# Patient Record
Sex: FEMALE | Race: WHITE | Employment: OTHER
[De-identification: names, ages, dates, MRNs, and addresses within clinical notes are randomized per-mention and may not be internally consistent; named-entity substitution may affect disease eponyms.]

---

## 2017-01-19 PROBLEM — N18.30 ACUTE RENAL FAILURE SUPERIMPOSED ON STAGE 3 CHRONIC KIDNEY DISEASE (HCC): Status: ACTIVE | Noted: 2017-01-19

## 2017-01-19 PROBLEM — E44.0 MODERATE PROTEIN MALNUTRITION (HCC): Chronic | Status: ACTIVE | Noted: 2017-01-19

## 2017-01-19 PROBLEM — D50.9 IRON DEFICIENCY ANEMIA: Chronic | Status: ACTIVE | Noted: 2017-01-19

## 2017-01-19 PROBLEM — D64.9 ANEMIA: Status: ACTIVE | Noted: 2017-01-19

## 2017-01-19 PROBLEM — N17.9 ACUTE RENAL FAILURE SUPERIMPOSED ON STAGE 3 CHRONIC KIDNEY DISEASE (HCC): Status: ACTIVE | Noted: 2017-01-19

## 2017-01-19 PROBLEM — R33.9 URINARY RETENTION: Status: ACTIVE | Noted: 2017-01-19

## 2017-01-19 PROBLEM — N18.30 CKD (CHRONIC KIDNEY DISEASE) STAGE 3, GFR 30-59 ML/MIN (HCC): Chronic | Status: ACTIVE | Noted: 2017-01-19

## 2017-01-20 PROBLEM — N30.00 ACUTE CYSTITIS: Status: ACTIVE | Noted: 2017-01-20

## 2017-01-23 PROBLEM — R53.81 DEBILITY: Status: ACTIVE | Noted: 2017-01-23

## 2017-01-23 PROBLEM — D50.0 IRON DEFICIENCY ANEMIA DUE TO CHRONIC BLOOD LOSS: Status: ACTIVE | Noted: 2017-01-23

## 2017-01-25 ENCOUNTER — OFFICE VISIT (OUTPATIENT)
Dept: ONCOLOGY | Facility: CLINIC | Age: 67
End: 2017-01-25

## 2017-01-25 VITALS
TEMPERATURE: 97.3 F | SYSTOLIC BLOOD PRESSURE: 144 MMHG | BODY MASS INDEX: 38.89 KG/M2 | DIASTOLIC BLOOD PRESSURE: 68 MMHG | HEIGHT: 66 IN | HEART RATE: 84 BPM | RESPIRATION RATE: 26 BRPM | WEIGHT: 242 LBS

## 2017-01-25 DIAGNOSIS — K90.89 OTHER SPECIFIED INTESTINAL MALABSORPTION: ICD-10-CM

## 2017-01-25 DIAGNOSIS — D50.8 OTHER IRON DEFICIENCY ANEMIA: Primary | Chronic | ICD-10-CM

## 2017-01-25 PROCEDURE — 99205 OFFICE O/P NEW HI 60 MIN: CPT | Performed by: INTERNAL MEDICINE

## 2017-01-25 PROCEDURE — 3017F COLORECTAL CA SCREEN DOC REV: CPT | Performed by: INTERNAL MEDICINE

## 2017-01-25 PROCEDURE — G8427 DOCREV CUR MEDS BY ELIG CLIN: HCPCS | Performed by: INTERNAL MEDICINE

## 2017-01-25 PROCEDURE — G8419 CALC BMI OUT NRM PARAM NOF/U: HCPCS | Performed by: INTERNAL MEDICINE

## 2017-01-25 PROCEDURE — 1036F TOBACCO NON-USER: CPT | Performed by: INTERNAL MEDICINE

## 2017-01-25 PROCEDURE — 1090F PRES/ABSN URINE INCON ASSESS: CPT | Performed by: INTERNAL MEDICINE

## 2017-01-25 PROCEDURE — 4040F PNEUMOC VAC/ADMIN/RCVD: CPT | Performed by: INTERNAL MEDICINE

## 2017-01-25 PROCEDURE — G8484 FLU IMMUNIZE NO ADMIN: HCPCS | Performed by: INTERNAL MEDICINE

## 2017-01-25 PROCEDURE — 3014F SCREEN MAMMO DOC REV: CPT | Performed by: INTERNAL MEDICINE

## 2017-01-25 RX ORDER — BISACODYL 10 MG
10 SUPPOSITORY, RECTAL RECTAL DAILY PRN
COMMUNITY

## 2017-02-01 ENCOUNTER — TELEPHONE (OUTPATIENT)
Dept: GASTROENTEROLOGY | Facility: CLINIC | Age: 67
End: 2017-02-01

## 2017-02-01 DIAGNOSIS — D50.0 IRON DEFICIENCY ANEMIA DUE TO CHRONIC BLOOD LOSS: ICD-10-CM

## 2017-02-01 DIAGNOSIS — Z86.010 HISTORY OF COLON POLYPS: Primary | ICD-10-CM

## 2017-02-03 PROBLEM — Z86.010 HISTORY OF COLON POLYPS: Status: ACTIVE | Noted: 2017-02-03

## 2017-02-03 PROBLEM — Z86.0100 HISTORY OF COLON POLYPS: Status: ACTIVE | Noted: 2017-02-03

## 2017-03-10 DIAGNOSIS — D50.0 IRON DEFICIENCY ANEMIA DUE TO CHRONIC BLOOD LOSS: Primary | Chronic | ICD-10-CM

## 2017-03-22 ENCOUNTER — OFFICE VISIT (OUTPATIENT)
Dept: ONCOLOGY | Age: 67
End: 2017-03-22
Payer: MEDICARE

## 2017-03-22 VITALS
HEIGHT: 66 IN | BODY MASS INDEX: 40.5 KG/M2 | WEIGHT: 252 LBS | DIASTOLIC BLOOD PRESSURE: 76 MMHG | TEMPERATURE: 98 F | HEART RATE: 56 BPM | SYSTOLIC BLOOD PRESSURE: 148 MMHG | RESPIRATION RATE: 20 BRPM

## 2017-03-22 DIAGNOSIS — K90.89 OTHER SPECIFIED INTESTINAL MALABSORPTION: ICD-10-CM

## 2017-03-22 DIAGNOSIS — D50.0 IRON DEFICIENCY ANEMIA DUE TO CHRONIC BLOOD LOSS: Primary | Chronic | ICD-10-CM

## 2017-03-22 DIAGNOSIS — D50.8 OTHER IRON DEFICIENCY ANEMIA: Chronic | ICD-10-CM

## 2017-03-22 PROCEDURE — 1090F PRES/ABSN URINE INCON ASSESS: CPT | Performed by: INTERNAL MEDICINE

## 2017-03-22 PROCEDURE — G8417 CALC BMI ABV UP PARAM F/U: HCPCS | Performed by: INTERNAL MEDICINE

## 2017-03-22 PROCEDURE — G8427 DOCREV CUR MEDS BY ELIG CLIN: HCPCS | Performed by: INTERNAL MEDICINE

## 2017-03-22 PROCEDURE — 1123F ACP DISCUSS/DSCN MKR DOCD: CPT | Performed by: INTERNAL MEDICINE

## 2017-03-22 PROCEDURE — 3017F COLORECTAL CA SCREEN DOC REV: CPT | Performed by: INTERNAL MEDICINE

## 2017-03-22 PROCEDURE — 1036F TOBACCO NON-USER: CPT | Performed by: INTERNAL MEDICINE

## 2017-03-22 PROCEDURE — 4040F PNEUMOC VAC/ADMIN/RCVD: CPT | Performed by: INTERNAL MEDICINE

## 2017-03-22 PROCEDURE — G8399 PT W/DXA RESULTS DOCUMENT: HCPCS | Performed by: INTERNAL MEDICINE

## 2017-03-22 PROCEDURE — 99214 OFFICE O/P EST MOD 30 MIN: CPT | Performed by: INTERNAL MEDICINE

## 2017-03-22 PROCEDURE — 3014F SCREEN MAMMO DOC REV: CPT | Performed by: INTERNAL MEDICINE

## 2017-03-22 PROCEDURE — G8484 FLU IMMUNIZE NO ADMIN: HCPCS | Performed by: INTERNAL MEDICINE

## 2017-03-22 RX ORDER — POLYETHYLENE GLYCOL 3350 17 G/17G
17 POWDER, FOR SOLUTION ORAL DAILY PRN
COMMUNITY
End: 2017-04-05

## 2017-03-23 ENCOUNTER — TELEPHONE (OUTPATIENT)
Dept: GASTROENTEROLOGY | Age: 67
End: 2017-03-23

## 2017-03-23 DIAGNOSIS — Z98.890 HISTORY OF COLONOSCOPY: ICD-10-CM

## 2017-03-23 DIAGNOSIS — Z98.890 HISTORY OF ENDOSCOPY: ICD-10-CM

## 2017-03-23 DIAGNOSIS — D50.0 IRON DEFICIENCY ANEMIA DUE TO CHRONIC BLOOD LOSS: Primary | ICD-10-CM

## 2017-04-21 PROBLEM — D64.9 ANEMIA: Status: RESOLVED | Noted: 2017-01-19 | Resolved: 2017-04-21

## 2017-04-21 PROBLEM — E03.9 HYPOTHYROIDISM: Status: ACTIVE | Noted: 2017-04-21

## 2017-04-21 PROBLEM — R53.81 DEBILITY: Status: RESOLVED | Noted: 2017-01-23 | Resolved: 2017-04-21

## 2017-04-21 PROBLEM — N18.30 ACUTE RENAL FAILURE SUPERIMPOSED ON STAGE 3 CHRONIC KIDNEY DISEASE (HCC): Status: RESOLVED | Noted: 2017-01-19 | Resolved: 2017-04-21

## 2017-04-21 PROBLEM — N18.9 CHRONIC RENAL FAILURE IN PEDIATRIC PATIENT: Status: ACTIVE | Noted: 2017-04-21

## 2017-04-21 PROBLEM — R33.9 URINARY RETENTION: Status: RESOLVED | Noted: 2017-01-19 | Resolved: 2017-04-21

## 2017-04-21 PROBLEM — N17.9 ACUTE RENAL FAILURE SUPERIMPOSED ON STAGE 3 CHRONIC KIDNEY DISEASE (HCC): Status: RESOLVED | Noted: 2017-01-19 | Resolved: 2017-04-21

## 2017-04-21 PROBLEM — N30.00 ACUTE CYSTITIS: Status: RESOLVED | Noted: 2017-01-20 | Resolved: 2017-04-21

## 2017-04-21 PROBLEM — E44.0 MODERATE PROTEIN MALNUTRITION (HCC): Chronic | Status: RESOLVED | Noted: 2017-01-19 | Resolved: 2017-04-21

## 2017-05-22 ENCOUNTER — HOSPITAL ENCOUNTER (OUTPATIENT)
Age: 67
Setting detail: SPECIMEN
Discharge: HOME | End: 2017-05-22
Payer: MEDICARE

## 2017-05-22 LAB
CHOLESTEROL/HDL RATIO: 3.6
CHOLESTEROL: 230 MG/DL
ESTIMATED AVERAGE GLUCOSE: 108 MG/DL
HBA1C MFR BLD: 5.4 % (ref 4.8–5.9)
HDLC SERPL-MCNC: 64 MG/DL
LDL CHOLESTEROL: 147 MG/DL (ref 0–130)
TRIGL SERPL-MCNC: 94 MG/DL
TSH SERPL DL<=0.05 MIU/L-ACNC: 2.23 MIU/L (ref 0.3–5)
VLDLC SERPL CALC-MCNC: ABNORMAL MG/DL (ref 1–30)

## 2017-05-22 PROCEDURE — P9604 ONE-WAY ALLOW PRORATED TRIP: HCPCS

## 2017-05-22 PROCEDURE — 36415 COLL VENOUS BLD VENIPUNCTURE: CPT

## 2017-05-22 PROCEDURE — 80061 LIPID PANEL: CPT

## 2017-05-22 PROCEDURE — 83036 HEMOGLOBIN GLYCOSYLATED A1C: CPT

## 2017-05-22 PROCEDURE — 84443 ASSAY THYROID STIM HORMONE: CPT

## 2017-06-21 ENCOUNTER — HOSPITAL ENCOUNTER (OUTPATIENT)
Dept: PREOP/PACU | Age: 67
Discharge: HOME | End: 2017-06-21
Payer: MEDICARE

## 2017-06-21 VITALS
BODY MASS INDEX: 38.57 KG/M2 | RESPIRATION RATE: 18 BRPM | HEART RATE: 64 BPM | TEMPERATURE: 97 F | SYSTOLIC BLOOD PRESSURE: 106 MMHG | WEIGHT: 240 LBS | DIASTOLIC BLOOD PRESSURE: 58 MMHG | HEIGHT: 66 IN

## 2017-06-21 PROCEDURE — 3609019000 HC EGD CAPSULE ENDOSCOPY

## 2017-06-21 ASSESSMENT — PAIN - FUNCTIONAL ASSESSMENT: PAIN_FUNCTIONAL_ASSESSMENT: 0-10

## 2017-06-22 PROBLEM — N18.9 CHRONIC RENAL FAILURE IN PEDIATRIC PATIENT: Status: RESOLVED | Noted: 2017-04-21 | Resolved: 2017-06-22

## 2017-06-26 ENCOUNTER — TELEPHONE (OUTPATIENT)
Dept: GASTROENTEROLOGY | Age: 67
End: 2017-06-26

## 2017-06-26 DIAGNOSIS — D50.0 IRON DEFICIENCY ANEMIA DUE TO CHRONIC BLOOD LOSS: Primary | ICD-10-CM

## 2017-07-10 ENCOUNTER — TELEPHONE (OUTPATIENT)
Dept: GASTROENTEROLOGY | Age: 67
End: 2017-07-10

## 2017-07-10 DIAGNOSIS — Z98.890 HISTORY OF COLONOSCOPY: ICD-10-CM

## 2017-07-10 DIAGNOSIS — D50.0 IRON DEFICIENCY ANEMIA DUE TO CHRONIC BLOOD LOSS: ICD-10-CM

## 2017-07-10 DIAGNOSIS — Z98.890 HISTORY OF ENDOSCOPY: ICD-10-CM

## 2017-07-11 DIAGNOSIS — D50.0 IRON DEFICIENCY ANEMIA DUE TO CHRONIC BLOOD LOSS: ICD-10-CM

## 2017-07-21 ENCOUNTER — HOSPITAL ENCOUNTER (EMERGENCY)
Age: 67
Discharge: HOME | End: 2017-07-21
Attending: FAMILY MEDICINE
Payer: MEDICARE

## 2017-07-21 VITALS
OXYGEN SATURATION: 97 % | TEMPERATURE: 97.9 F | SYSTOLIC BLOOD PRESSURE: 114 MMHG | RESPIRATION RATE: 20 BRPM | DIASTOLIC BLOOD PRESSURE: 83 MMHG | HEART RATE: 54 BPM

## 2017-07-21 DIAGNOSIS — R04.0 EPISTAXIS: Primary | ICD-10-CM

## 2017-07-21 LAB
ABSOLUTE EOS #: 0.1 K/UL (ref 0–0.4)
ABSOLUTE LYMPH #: 1.5 K/UL (ref 1–4.8)
ABSOLUTE MONO #: 0.4 K/UL (ref 0–1)
ANION GAP SERPL CALCULATED.3IONS-SCNC: 11 MMOL/L (ref 9–17)
BASOPHILS # BLD: 0 %
BASOPHILS ABSOLUTE: 0 K/UL (ref 0–0.2)
BUN BLDV-MCNC: 19 MG/DL (ref 8–23)
BUN/CREAT BLD: 21 (ref 9–20)
CALCIUM SERPL-MCNC: 9.2 MG/DL (ref 8.6–10.4)
CHLORIDE BLD-SCNC: 103 MMOL/L (ref 98–107)
CO2: 26 MMOL/L (ref 20–31)
CREAT SERPL-MCNC: 0.91 MG/DL (ref 0.5–0.9)
DIFFERENTIAL TYPE: NORMAL
EOSINOPHILS RELATIVE PERCENT: 2 %
GFR AFRICAN AMERICAN: >60 ML/MIN
GFR NON-AFRICAN AMERICAN: >60 ML/MIN
GFR SERPL CREATININE-BSD FRML MDRD: ABNORMAL ML/MIN/{1.73_M2}
GFR SERPL CREATININE-BSD FRML MDRD: ABNORMAL ML/MIN/{1.73_M2}
GLUCOSE BLD-MCNC: 109 MG/DL (ref 70–99)
HCT VFR BLD CALC: 38 % (ref 36–46)
HEMOGLOBIN: 12.7 G/DL (ref 12–16)
INR BLD: 1 (ref 0.9–1.2)
LYMPHOCYTES # BLD: 19 %
MCH RBC QN AUTO: 30.8 PG (ref 26–34)
MCHC RBC AUTO-ENTMCNC: 33.4 G/DL (ref 31–37)
MCV RBC AUTO: 92.2 FL (ref 80–100)
MONOCYTES # BLD: 5 %
PDW BLD-RTO: 14.9 % (ref 12.1–15.2)
PLATELET # BLD: 283 K/UL (ref 140–450)
PLATELET ESTIMATE: NORMAL
PMV BLD AUTO: 8.1 FL (ref 6–12)
POTASSIUM SERPL-SCNC: 4.4 MMOL/L (ref 3.7–5.3)
PROTHROMBIN TIME: 10.3 SEC (ref 9.7–12.2)
RBC # BLD: 4.12 M/UL (ref 4–5.2)
RBC # BLD: NORMAL 10*6/UL
SEG NEUTROPHILS: 74 %
SEGMENTED NEUTROPHILS ABSOLUTE COUNT: 5.8 K/UL (ref 1.8–7.7)
SODIUM BLD-SCNC: 140 MMOL/L (ref 135–144)
WBC # BLD: 7.8 K/UL (ref 3.5–11)
WBC # BLD: NORMAL 10*3/UL

## 2017-07-21 PROCEDURE — 80048 BASIC METABOLIC PNL TOTAL CA: CPT

## 2017-07-21 PROCEDURE — 30901 CONTROL OF NOSEBLEED: CPT

## 2017-07-21 PROCEDURE — 99283 EMERGENCY DEPT VISIT LOW MDM: CPT

## 2017-07-21 PROCEDURE — 85025 COMPLETE CBC W/AUTO DIFF WBC: CPT

## 2017-07-21 PROCEDURE — 36415 COLL VENOUS BLD VENIPUNCTURE: CPT

## 2017-07-21 PROCEDURE — 85610 PROTHROMBIN TIME: CPT

## 2017-07-21 RX ORDER — ECHINACEA PURPUREA EXTRACT 125 MG
1 TABLET ORAL 3 TIMES DAILY PRN
COMMUNITY

## 2017-07-21 RX ORDER — ACETAMINOPHEN 325 MG/1
650 TABLET ORAL EVERY 4 HOURS PRN
COMMUNITY

## 2017-08-17 PROBLEM — E03.9 HYPOTHYROIDISM: Status: ACTIVE | Noted: 2017-08-17

## 2017-08-17 PROBLEM — E03.9 HYPOTHYROIDISM: Status: RESOLVED | Noted: 2017-04-21 | Resolved: 2017-08-17

## 2017-09-11 ENCOUNTER — HOSPITAL ENCOUNTER (OUTPATIENT)
Age: 67
Setting detail: SPECIMEN
Discharge: HOME | End: 2017-09-11
Payer: MEDICARE

## 2017-09-11 DIAGNOSIS — D50.9 IRON DEFICIENCY ANEMIA, UNSPECIFIED IRON DEFICIENCY ANEMIA TYPE: Chronic | ICD-10-CM

## 2017-09-11 DIAGNOSIS — K90.89 OTHER SPECIFIED INTESTINAL MALABSORPTION: ICD-10-CM

## 2017-09-11 LAB
ABSOLUTE EOS #: 0.2 K/UL (ref 0–0.4)
ABSOLUTE LYMPH #: 1.5 K/UL (ref 1–4.8)
ABSOLUTE MONO #: 0.5 K/UL (ref 0.2–0.8)
BASOPHILS # BLD: 1 %
BASOPHILS ABSOLUTE: 0 K/UL (ref 0–0.2)
DIFFERENTIAL TYPE: NORMAL
EOSINOPHILS RELATIVE PERCENT: 3 %
FERRITIN: 101 UG/L (ref 13–150)
HCT VFR BLD CALC: 38 % (ref 36–46)
HEMOGLOBIN: 12.9 G/DL (ref 12–16)
IRON SATURATION: 29 % (ref 20–55)
IRON: 61 UG/DL (ref 37–145)
LYMPHOCYTES # BLD: 24 %
MCH RBC QN AUTO: 31.6 PG (ref 26–34)
MCHC RBC AUTO-ENTMCNC: 33.9 G/DL (ref 31–37)
MCV RBC AUTO: 93.2 FL (ref 80–100)
MONOCYTES # BLD: 8 %
PDW BLD-RTO: 12.4 % (ref 12.1–15.2)
PLATELET # BLD: 288 K/UL (ref 140–450)
PLATELET ESTIMATE: NORMAL
PMV BLD AUTO: 8.5 FL (ref 6–12)
RBC # BLD: 4.08 M/UL (ref 4–5.2)
RBC # BLD: NORMAL 10*6/UL
SEG NEUTROPHILS: 64 %
SEGMENTED NEUTROPHILS ABSOLUTE COUNT: 4.2 K/UL (ref 1.8–7.7)
TOTAL IRON BINDING CAPACITY: 211 UG/DL (ref 250–450)
UNSATURATED IRON BINDING CAPACITY: 149.6 UG/DL (ref 112–347)
WBC # BLD: 6.4 K/UL (ref 3.5–11)
WBC # BLD: NORMAL 10*3/UL

## 2017-09-11 PROCEDURE — 83540 ASSAY OF IRON: CPT

## 2017-09-11 PROCEDURE — 85025 COMPLETE CBC W/AUTO DIFF WBC: CPT

## 2017-09-11 PROCEDURE — 82728 ASSAY OF FERRITIN: CPT

## 2017-09-11 PROCEDURE — 36415 COLL VENOUS BLD VENIPUNCTURE: CPT

## 2017-09-11 PROCEDURE — P9604 ONE-WAY ALLOW PRORATED TRIP: HCPCS

## 2017-09-11 PROCEDURE — 83550 IRON BINDING TEST: CPT

## 2017-09-20 ENCOUNTER — OFFICE VISIT (OUTPATIENT)
Dept: ONCOLOGY | Age: 67
End: 2017-09-20
Payer: MEDICARE

## 2017-09-20 VITALS
WEIGHT: 248 LBS | RESPIRATION RATE: 22 BRPM | BODY MASS INDEX: 39.86 KG/M2 | DIASTOLIC BLOOD PRESSURE: 104 MMHG | HEIGHT: 66 IN | HEART RATE: 60 BPM | TEMPERATURE: 97.9 F | SYSTOLIC BLOOD PRESSURE: 145 MMHG

## 2017-09-20 DIAGNOSIS — D50.0 IRON DEFICIENCY ANEMIA DUE TO CHRONIC BLOOD LOSS: Primary | ICD-10-CM

## 2017-09-20 PROCEDURE — 3017F COLORECTAL CA SCREEN DOC REV: CPT | Performed by: INTERNAL MEDICINE

## 2017-09-20 PROCEDURE — 1090F PRES/ABSN URINE INCON ASSESS: CPT | Performed by: INTERNAL MEDICINE

## 2017-09-20 PROCEDURE — G8417 CALC BMI ABV UP PARAM F/U: HCPCS | Performed by: INTERNAL MEDICINE

## 2017-09-20 PROCEDURE — G8399 PT W/DXA RESULTS DOCUMENT: HCPCS | Performed by: INTERNAL MEDICINE

## 2017-09-20 PROCEDURE — 3014F SCREEN MAMMO DOC REV: CPT | Performed by: INTERNAL MEDICINE

## 2017-09-20 PROCEDURE — G8427 DOCREV CUR MEDS BY ELIG CLIN: HCPCS | Performed by: INTERNAL MEDICINE

## 2017-09-20 PROCEDURE — 1036F TOBACCO NON-USER: CPT | Performed by: INTERNAL MEDICINE

## 2017-09-20 PROCEDURE — 4040F PNEUMOC VAC/ADMIN/RCVD: CPT | Performed by: INTERNAL MEDICINE

## 2017-09-20 PROCEDURE — 1123F ACP DISCUSS/DSCN MKR DOCD: CPT | Performed by: INTERNAL MEDICINE

## 2017-09-20 PROCEDURE — 99214 OFFICE O/P EST MOD 30 MIN: CPT | Performed by: INTERNAL MEDICINE

## 2018-05-14 ENCOUNTER — HOSPITAL ENCOUNTER (OUTPATIENT)
Age: 68
Setting detail: SPECIMEN
Discharge: HOME | End: 2018-05-14
Payer: MEDICARE

## 2018-05-14 LAB
CHOLESTEROL/HDL RATIO: 4.1
CHOLESTEROL: 190 MG/DL
ESTIMATED AVERAGE GLUCOSE: 105 MG/DL
HBA1C MFR BLD: 5.3 % (ref 4.8–5.9)
HDLC SERPL-MCNC: 46 MG/DL
LDL CHOLESTEROL: 120 MG/DL (ref 0–130)
TRIGL SERPL-MCNC: 119 MG/DL
TSH SERPL DL<=0.05 MIU/L-ACNC: 0.67 MIU/L (ref 0.3–5)
VLDLC SERPL CALC-MCNC: NORMAL MG/DL (ref 1–30)

## 2018-05-14 PROCEDURE — 36415 COLL VENOUS BLD VENIPUNCTURE: CPT

## 2018-05-14 PROCEDURE — 80061 LIPID PANEL: CPT

## 2018-05-14 PROCEDURE — 83036 HEMOGLOBIN GLYCOSYLATED A1C: CPT

## 2018-05-14 PROCEDURE — P9604 ONE-WAY ALLOW PRORATED TRIP: HCPCS

## 2018-05-14 PROCEDURE — 84443 ASSAY THYROID STIM HORMONE: CPT

## 2018-07-02 ENCOUNTER — HOSPITAL ENCOUNTER (OUTPATIENT)
Age: 68
Setting detail: SPECIMEN
Discharge: HOME | End: 2018-07-02
Payer: MEDICARE

## 2018-07-02 LAB
ALBUMIN SERPL-MCNC: 3.2 G/DL (ref 3.5–5.2)
ALBUMIN/GLOBULIN RATIO: 1.1 (ref 1–2.5)
ALP BLD-CCNC: 66 U/L (ref 35–104)
ALT SERPL-CCNC: 14 U/L (ref 5–33)
ANION GAP SERPL CALCULATED.3IONS-SCNC: 10 MMOL/L (ref 9–17)
AST SERPL-CCNC: 14 U/L
BILIRUB SERPL-MCNC: 0.37 MG/DL (ref 0.3–1.2)
BUN BLDV-MCNC: 16 MG/DL (ref 8–23)
BUN/CREAT BLD: 16 (ref 9–20)
CALCIUM SERPL-MCNC: 9 MG/DL (ref 8.6–10.4)
CHLORIDE BLD-SCNC: 103 MMOL/L (ref 98–107)
CO2: 29 MMOL/L (ref 20–31)
CREAT SERPL-MCNC: 0.98 MG/DL (ref 0.5–0.9)
GFR AFRICAN AMERICAN: >60 ML/MIN
GFR NON-AFRICAN AMERICAN: 56 ML/MIN
GFR SERPL CREATININE-BSD FRML MDRD: ABNORMAL ML/MIN/{1.73_M2}
GFR SERPL CREATININE-BSD FRML MDRD: ABNORMAL ML/MIN/{1.73_M2}
GLUCOSE BLD-MCNC: 85 MG/DL (ref 70–99)
POTASSIUM SERPL-SCNC: 3.8 MMOL/L (ref 3.7–5.3)
SODIUM BLD-SCNC: 142 MMOL/L (ref 135–144)
TOTAL PROTEIN: 6.1 G/DL (ref 6.4–8.3)

## 2018-07-02 PROCEDURE — 80053 COMPREHEN METABOLIC PANEL: CPT

## 2018-07-02 PROCEDURE — 36415 COLL VENOUS BLD VENIPUNCTURE: CPT

## 2018-07-02 PROCEDURE — P9604 ONE-WAY ALLOW PRORATED TRIP: HCPCS

## 2018-07-30 ENCOUNTER — HOSPITAL ENCOUNTER (OUTPATIENT)
Age: 68
Setting detail: SPECIMEN
Discharge: HOME | End: 2018-07-30
Payer: MEDICARE

## 2018-07-30 LAB
ABSOLUTE EOS #: 0.28 K/UL (ref 0–0.44)
ABSOLUTE IMMATURE GRANULOCYTE: <0.03 K/UL (ref 0–0.3)
ABSOLUTE LYMPH #: 1.92 K/UL (ref 1.1–3.7)
ABSOLUTE MONO #: 0.54 K/UL (ref 0.1–1.2)
ALBUMIN SERPL-MCNC: 2.9 G/DL (ref 3.5–5.2)
ALBUMIN/GLOBULIN RATIO: 1.1 (ref 1–2.5)
ALP BLD-CCNC: 62 U/L (ref 35–104)
ALT SERPL-CCNC: 12 U/L (ref 5–33)
ANION GAP SERPL CALCULATED.3IONS-SCNC: 8 MMOL/L (ref 9–17)
AST SERPL-CCNC: 13 U/L
BASOPHILS # BLD: 1 % (ref 0–2)
BASOPHILS ABSOLUTE: 0.03 K/UL (ref 0–0.2)
BILIRUB SERPL-MCNC: 0.33 MG/DL (ref 0.3–1.2)
BUN BLDV-MCNC: 15 MG/DL (ref 8–23)
BUN/CREAT BLD: 15 (ref 9–20)
CALCIUM SERPL-MCNC: 8.9 MG/DL (ref 8.6–10.4)
CHLORIDE BLD-SCNC: 103 MMOL/L (ref 98–107)
CO2: 28 MMOL/L (ref 20–31)
CREAT SERPL-MCNC: 0.97 MG/DL (ref 0.5–0.9)
DIFFERENTIAL TYPE: ABNORMAL
EOSINOPHILS RELATIVE PERCENT: 4 % (ref 1–4)
GFR AFRICAN AMERICAN: >60 ML/MIN
GFR NON-AFRICAN AMERICAN: 57 ML/MIN
GFR SERPL CREATININE-BSD FRML MDRD: ABNORMAL ML/MIN/{1.73_M2}
GFR SERPL CREATININE-BSD FRML MDRD: ABNORMAL ML/MIN/{1.73_M2}
GLUCOSE BLD-MCNC: 89 MG/DL (ref 70–99)
HCT VFR BLD CALC: 36.4 % (ref 36.3–47.1)
HEMOGLOBIN: 12.2 G/DL (ref 11.9–15.1)
IMMATURE GRANULOCYTES: 0 %
LYMPHOCYTES # BLD: 30 % (ref 24–43)
MCH RBC QN AUTO: 31.8 PG (ref 25.2–33.5)
MCHC RBC AUTO-ENTMCNC: 33.5 G/DL (ref 28.4–34.8)
MCV RBC AUTO: 94.8 FL (ref 82.6–102.9)
MONOCYTES # BLD: 9 % (ref 3–12)
NRBC AUTOMATED: 0 PER 100 WBC
PDW BLD-RTO: 12.1 % (ref 11.8–14.4)
PLATELET # BLD: 254 K/UL (ref 138–453)
PLATELET ESTIMATE: ABNORMAL
PMV BLD AUTO: 9.9 FL (ref 8.1–13.5)
POTASSIUM SERPL-SCNC: 3.8 MMOL/L (ref 3.7–5.3)
RBC # BLD: 3.84 M/UL (ref 3.95–5.11)
RBC # BLD: ABNORMAL 10*6/UL
SEG NEUTROPHILS: 56 % (ref 36–65)
SEGMENTED NEUTROPHILS ABSOLUTE COUNT: 3.53 K/UL (ref 1.5–8.1)
SODIUM BLD-SCNC: 139 MMOL/L (ref 135–144)
TOTAL PROTEIN: 5.6 G/DL (ref 6.4–8.3)
WBC # BLD: 6.3 K/UL (ref 3.5–11.3)
WBC # BLD: ABNORMAL 10*3/UL

## 2018-07-30 PROCEDURE — 80053 COMPREHEN METABOLIC PANEL: CPT

## 2018-07-30 PROCEDURE — 36415 COLL VENOUS BLD VENIPUNCTURE: CPT

## 2018-07-30 PROCEDURE — P9604 ONE-WAY ALLOW PRORATED TRIP: HCPCS

## 2018-07-30 PROCEDURE — 85025 COMPLETE CBC W/AUTO DIFF WBC: CPT

## 2018-09-17 ENCOUNTER — HOSPITAL ENCOUNTER (OUTPATIENT)
Age: 68
Setting detail: SPECIMEN
Discharge: HOME | End: 2018-09-17
Payer: MEDICARE

## 2018-09-17 DIAGNOSIS — D50.0 IRON DEFICIENCY ANEMIA DUE TO CHRONIC BLOOD LOSS: ICD-10-CM

## 2018-09-17 LAB
ABSOLUTE EOS #: 0.29 K/UL (ref 0–0.44)
ABSOLUTE IMMATURE GRANULOCYTE: <0.03 K/UL (ref 0–0.3)
ABSOLUTE LYMPH #: 2.13 K/UL (ref 1.1–3.7)
ABSOLUTE MONO #: 0.51 K/UL (ref 0.1–1.2)
BASOPHILS # BLD: 1 % (ref 0–2)
BASOPHILS ABSOLUTE: 0.03 K/UL (ref 0–0.2)
DIFFERENTIAL TYPE: ABNORMAL
EOSINOPHILS RELATIVE PERCENT: 5 % (ref 1–4)
FERRITIN: 167 UG/L (ref 13–150)
HCT VFR BLD CALC: 37.4 % (ref 36.3–47.1)
HEMOGLOBIN: 12.5 G/DL (ref 11.9–15.1)
IMMATURE GRANULOCYTES: 0 %
IRON SATURATION: 32 % (ref 20–55)
IRON: 59 UG/DL (ref 37–145)
LYMPHOCYTES # BLD: 34 % (ref 24–43)
MCH RBC QN AUTO: 32.5 PG (ref 25.2–33.5)
MCHC RBC AUTO-ENTMCNC: 33.4 G/DL (ref 28.4–34.8)
MCV RBC AUTO: 97.1 FL (ref 82.6–102.9)
MONOCYTES # BLD: 8 % (ref 3–12)
NRBC AUTOMATED: 0 PER 100 WBC
PDW BLD-RTO: 12.1 % (ref 11.8–14.4)
PLATELET # BLD: 259 K/UL (ref 138–453)
PLATELET ESTIMATE: ABNORMAL
PMV BLD AUTO: 10.1 FL (ref 8.1–13.5)
RBC # BLD: 3.85 M/UL (ref 3.95–5.11)
RBC # BLD: ABNORMAL 10*6/UL
SEG NEUTROPHILS: 52 % (ref 36–65)
SEGMENTED NEUTROPHILS ABSOLUTE COUNT: 3.35 K/UL (ref 1.5–8.1)
TOTAL IRON BINDING CAPACITY: 183 UG/DL (ref 250–450)
UNSATURATED IRON BINDING CAPACITY: 124.3 UG/DL (ref 112–347)
WBC # BLD: 6.3 K/UL (ref 3.5–11.3)
WBC # BLD: ABNORMAL 10*3/UL

## 2018-09-17 PROCEDURE — 83550 IRON BINDING TEST: CPT

## 2018-09-17 PROCEDURE — 82728 ASSAY OF FERRITIN: CPT

## 2018-09-17 PROCEDURE — 36415 COLL VENOUS BLD VENIPUNCTURE: CPT

## 2018-09-17 PROCEDURE — 83540 ASSAY OF IRON: CPT

## 2018-09-17 PROCEDURE — P9604 ONE-WAY ALLOW PRORATED TRIP: HCPCS

## 2018-09-17 PROCEDURE — 85025 COMPLETE CBC W/AUTO DIFF WBC: CPT

## 2018-10-01 PROBLEM — K57.30 DIVERTICULOSIS OF LARGE INTESTINE WITHOUT DIVERTICULITIS: Status: ACTIVE | Noted: 2018-10-01

## 2018-10-01 PROBLEM — G89.29 OTHER CHRONIC PAIN: Status: ACTIVE | Noted: 2018-10-01

## 2018-10-01 PROBLEM — K64.8 OTHER HEMORRHOIDS: Status: ACTIVE | Noted: 2018-10-01

## 2018-10-01 PROBLEM — F90.9 ATTENTION DEFICIT HYPERACTIVITY DISORDER: Status: ACTIVE | Noted: 2018-10-01

## 2018-10-26 PROBLEM — E66.01 MORBID OBESITY (HCC): Status: ACTIVE | Noted: 2018-10-26

## 2019-02-25 ENCOUNTER — HOSPITAL ENCOUNTER (OUTPATIENT)
Age: 69
Setting detail: SPECIMEN
Discharge: HOME | End: 2019-02-25
Payer: MEDICARE

## 2019-02-25 LAB
ALBUMIN SERPL-MCNC: 3.2 G/DL (ref 3.5–5.2)
ALBUMIN/GLOBULIN RATIO: 1.2 (ref 1–2.5)
ALP BLD-CCNC: 63 U/L (ref 35–104)
ALT SERPL-CCNC: 17 U/L (ref 5–33)
ANION GAP SERPL CALCULATED.3IONS-SCNC: 14 MMOL/L (ref 9–17)
AST SERPL-CCNC: 15 U/L
BILIRUB SERPL-MCNC: 0.3 MG/DL (ref 0.3–1.2)
BUN BLDV-MCNC: 19 MG/DL (ref 8–23)
BUN/CREAT BLD: 19 (ref 9–20)
CALCIUM SERPL-MCNC: 9.3 MG/DL (ref 8.6–10.4)
CHLORIDE BLD-SCNC: 104 MMOL/L (ref 98–107)
CHOLESTEROL/HDL RATIO: 3.6
CHOLESTEROL: 175 MG/DL
CO2: 22 MMOL/L (ref 20–31)
CREAT SERPL-MCNC: 1.02 MG/DL (ref 0.5–0.9)
ESTIMATED AVERAGE GLUCOSE: 97 MG/DL
GFR AFRICAN AMERICAN: >60 ML/MIN
GFR NON-AFRICAN AMERICAN: 54 ML/MIN
GFR SERPL CREATININE-BSD FRML MDRD: ABNORMAL ML/MIN/{1.73_M2}
GFR SERPL CREATININE-BSD FRML MDRD: ABNORMAL ML/MIN/{1.73_M2}
GLUCOSE BLD-MCNC: 88 MG/DL (ref 70–99)
HBA1C MFR BLD: 5 % (ref 4.8–5.9)
HCT VFR BLD CALC: 37.4 % (ref 36.3–47.1)
HDLC SERPL-MCNC: 48 MG/DL
HEMOGLOBIN: 12.5 G/DL (ref 11.9–15.1)
LDL CHOLESTEROL: 110 MG/DL (ref 0–130)
MCH RBC QN AUTO: 31.6 PG (ref 25.2–33.5)
MCHC RBC AUTO-ENTMCNC: 33.4 G/DL (ref 28.4–34.8)
MCV RBC AUTO: 94.7 FL (ref 82.6–102.9)
NRBC AUTOMATED: 0 PER 100 WBC
PDW BLD-RTO: 12 % (ref 11.8–14.4)
PLATELET # BLD: 247 K/UL (ref 138–453)
PMV BLD AUTO: 10.3 FL (ref 8.1–13.5)
POTASSIUM SERPL-SCNC: 3.7 MMOL/L (ref 3.7–5.3)
RBC # BLD: 3.95 M/UL (ref 3.95–5.11)
SODIUM BLD-SCNC: 140 MMOL/L (ref 135–144)
TOTAL PROTEIN: 5.9 G/DL (ref 6.4–8.3)
TRIGL SERPL-MCNC: 86 MG/DL
TSH SERPL DL<=0.05 MIU/L-ACNC: 2.14 MIU/L (ref 0.3–5)
VLDLC SERPL CALC-MCNC: NORMAL MG/DL (ref 1–30)
WBC # BLD: 6.1 K/UL (ref 3.5–11.3)

## 2019-02-25 PROCEDURE — 80053 COMPREHEN METABOLIC PANEL: CPT

## 2019-02-25 PROCEDURE — 80061 LIPID PANEL: CPT

## 2019-02-25 PROCEDURE — 84443 ASSAY THYROID STIM HORMONE: CPT

## 2019-02-25 PROCEDURE — 83036 HEMOGLOBIN GLYCOSYLATED A1C: CPT

## 2019-02-25 PROCEDURE — 36415 COLL VENOUS BLD VENIPUNCTURE: CPT

## 2019-02-25 PROCEDURE — P9604 ONE-WAY ALLOW PRORATED TRIP: HCPCS

## 2019-02-25 PROCEDURE — 85027 COMPLETE CBC AUTOMATED: CPT

## 2019-05-15 ENCOUNTER — HOSPITAL ENCOUNTER (OUTPATIENT)
Age: 69
Setting detail: SPECIMEN
Discharge: HOME | End: 2019-05-15
Payer: MEDICARE

## 2019-05-15 LAB
ALBUMIN SERPL-MCNC: 3.2 G/DL (ref 3.5–5.2)
ALBUMIN/GLOBULIN RATIO: 1.2 (ref 1–2.5)
ALP BLD-CCNC: 66 U/L (ref 35–104)
ALT SERPL-CCNC: 19 U/L (ref 5–33)
ANION GAP SERPL CALCULATED.3IONS-SCNC: 11 MMOL/L (ref 9–17)
AST SERPL-CCNC: 17 U/L
BILIRUB SERPL-MCNC: 0.47 MG/DL (ref 0.3–1.2)
BUN BLDV-MCNC: 21 MG/DL (ref 8–23)
BUN/CREAT BLD: 22 (ref 9–20)
CALCIUM SERPL-MCNC: 9 MG/DL (ref 8.6–10.4)
CHLORIDE BLD-SCNC: 104 MMOL/L (ref 98–107)
CHOLESTEROL/HDL RATIO: 3.9
CHOLESTEROL: 177 MG/DL
CO2: 25 MMOL/L (ref 20–31)
CREAT SERPL-MCNC: 0.96 MG/DL (ref 0.5–0.9)
ESTIMATED AVERAGE GLUCOSE: 94 MG/DL
GFR AFRICAN AMERICAN: >60 ML/MIN
GFR NON-AFRICAN AMERICAN: 57 ML/MIN
GFR SERPL CREATININE-BSD FRML MDRD: ABNORMAL ML/MIN/{1.73_M2}
GFR SERPL CREATININE-BSD FRML MDRD: ABNORMAL ML/MIN/{1.73_M2}
GLUCOSE BLD-MCNC: 90 MG/DL (ref 70–99)
HBA1C MFR BLD: 4.9 % (ref 4.8–5.9)
HDLC SERPL-MCNC: 45 MG/DL
LDL CHOLESTEROL: 115 MG/DL (ref 0–130)
POTASSIUM SERPL-SCNC: 4 MMOL/L (ref 3.7–5.3)
SODIUM BLD-SCNC: 140 MMOL/L (ref 135–144)
TOTAL PROTEIN: 5.9 G/DL (ref 6.4–8.3)
TRIGL SERPL-MCNC: 87 MG/DL
TSH SERPL DL<=0.05 MIU/L-ACNC: 1.67 MIU/L (ref 0.3–5)
VLDLC SERPL CALC-MCNC: NORMAL MG/DL (ref 1–30)

## 2019-05-15 PROCEDURE — 36415 COLL VENOUS BLD VENIPUNCTURE: CPT

## 2019-05-15 PROCEDURE — P9604 ONE-WAY ALLOW PRORATED TRIP: HCPCS

## 2019-05-15 PROCEDURE — 80061 LIPID PANEL: CPT

## 2019-05-15 PROCEDURE — 83036 HEMOGLOBIN GLYCOSYLATED A1C: CPT

## 2019-05-15 PROCEDURE — 80053 COMPREHEN METABOLIC PANEL: CPT

## 2019-05-15 PROCEDURE — 84443 ASSAY THYROID STIM HORMONE: CPT

## 2019-06-20 ENCOUNTER — HOSPITAL ENCOUNTER (OUTPATIENT)
Age: 69
Setting detail: SPECIMEN
Discharge: HOME | End: 2019-06-20
Payer: MEDICARE

## 2019-06-20 LAB
HCT VFR BLD CALC: 38.5 % (ref 36.3–47.1)
HEMOGLOBIN: 12.9 G/DL (ref 11.9–15.1)
MCH RBC QN AUTO: 32 PG (ref 25.2–33.5)
MCHC RBC AUTO-ENTMCNC: 33.5 G/DL (ref 28.4–34.8)
MCV RBC AUTO: 95.5 FL (ref 82.6–102.9)
NRBC AUTOMATED: 0 PER 100 WBC
PDW BLD-RTO: 12.2 % (ref 11.8–14.4)
PLATELET # BLD: 261 K/UL (ref 138–453)
PMV BLD AUTO: 10.3 FL (ref 8.1–13.5)
RBC # BLD: 4.03 M/UL (ref 3.95–5.11)
WBC # BLD: 6.1 K/UL (ref 3.5–11.3)

## 2019-06-20 PROCEDURE — 85027 COMPLETE CBC AUTOMATED: CPT

## 2019-06-20 PROCEDURE — P9604 ONE-WAY ALLOW PRORATED TRIP: HCPCS

## 2019-06-20 PROCEDURE — 36415 COLL VENOUS BLD VENIPUNCTURE: CPT

## 2020-05-28 ENCOUNTER — HOSPITAL ENCOUNTER (OUTPATIENT)
Age: 70
Setting detail: SPECIMEN
Discharge: HOME | End: 2020-05-28
Payer: MEDICARE

## 2020-05-28 LAB
ALBUMIN SERPL-MCNC: 3.7 G/DL (ref 3.5–5.2)
ALBUMIN/GLOBULIN RATIO: 1.4 (ref 1–2.5)
ALP BLD-CCNC: 82 U/L (ref 35–104)
ALT SERPL-CCNC: 15 U/L (ref 5–33)
ANION GAP SERPL CALCULATED.3IONS-SCNC: 11 MMOL/L (ref 9–17)
AST SERPL-CCNC: 14 U/L
BILIRUB SERPL-MCNC: 0.54 MG/DL (ref 0.3–1.2)
BUN BLDV-MCNC: 14 MG/DL (ref 8–23)
BUN/CREAT BLD: 14 (ref 9–20)
CALCIUM SERPL-MCNC: 9.5 MG/DL (ref 8.6–10.4)
CHLORIDE BLD-SCNC: 104 MMOL/L (ref 98–107)
CHOLESTEROL/HDL RATIO: 3.3
CHOLESTEROL: 203 MG/DL
CO2: 25 MMOL/L (ref 20–31)
CREAT SERPL-MCNC: 1 MG/DL (ref 0.5–0.9)
GFR AFRICAN AMERICAN: >60 ML/MIN
GFR NON-AFRICAN AMERICAN: 55 ML/MIN
GFR SERPL CREATININE-BSD FRML MDRD: ABNORMAL ML/MIN/{1.73_M2}
GFR SERPL CREATININE-BSD FRML MDRD: ABNORMAL ML/MIN/{1.73_M2}
GLUCOSE BLD-MCNC: 89 MG/DL (ref 70–99)
HDLC SERPL-MCNC: 61 MG/DL
LDL CHOLESTEROL: 120 MG/DL (ref 0–130)
POTASSIUM SERPL-SCNC: 4.1 MMOL/L (ref 3.7–5.3)
SODIUM BLD-SCNC: 140 MMOL/L (ref 135–144)
TOTAL PROTEIN: 6.4 G/DL (ref 6.4–8.3)
TRIGL SERPL-MCNC: 109 MG/DL
TSH SERPL DL<=0.05 MIU/L-ACNC: 2.45 MIU/L (ref 0.3–5)
VLDLC SERPL CALC-MCNC: ABNORMAL MG/DL (ref 1–30)

## 2020-05-28 PROCEDURE — 84443 ASSAY THYROID STIM HORMONE: CPT

## 2020-05-28 PROCEDURE — 80061 LIPID PANEL: CPT

## 2020-05-28 PROCEDURE — 80053 COMPREHEN METABOLIC PANEL: CPT

## 2020-06-03 ENCOUNTER — HOSPITAL ENCOUNTER (OUTPATIENT)
Age: 70
Setting detail: SPECIMEN
Discharge: HOME | End: 2020-06-03
Payer: MEDICARE

## 2020-06-03 LAB
ESTIMATED AVERAGE GLUCOSE: 97 MG/DL
HBA1C MFR BLD: 5 % (ref 4.8–5.9)

## 2020-06-03 PROCEDURE — 83036 HEMOGLOBIN GLYCOSYLATED A1C: CPT

## 2020-10-19 ENCOUNTER — HOSPITAL ENCOUNTER (OUTPATIENT)
Age: 70
Setting detail: SPECIMEN
Discharge: HOME | End: 2020-10-19
Payer: MEDICARE

## 2020-10-21 ENCOUNTER — HOSPITAL ENCOUNTER (OUTPATIENT)
Age: 70
Setting detail: SPECIMEN
Discharge: HOME | End: 2020-10-21
Payer: MEDICARE

## 2020-10-21 PROCEDURE — 86403 PARTICLE AGGLUT ANTBDY SCRN: CPT

## 2020-10-21 PROCEDURE — 87086 URINE CULTURE/COLONY COUNT: CPT

## 2020-10-23 LAB
CULTURE: ABNORMAL
Lab: ABNORMAL
SPECIMEN DESCRIPTION: ABNORMAL

## 2021-02-17 ENCOUNTER — HOSPITAL ENCOUNTER (OUTPATIENT)
Age: 71
Setting detail: SPECIMEN
Discharge: HOME | End: 2021-02-17
Payer: MEDICARE

## 2021-02-17 PROCEDURE — 81001 URINALYSIS AUTO W/SCOPE: CPT

## 2021-02-17 PROCEDURE — 87077 CULTURE AEROBIC IDENTIFY: CPT

## 2021-02-17 PROCEDURE — 87086 URINE CULTURE/COLONY COUNT: CPT

## 2021-02-17 PROCEDURE — 87186 SC STD MICRODIL/AGAR DIL: CPT

## 2021-02-18 LAB
-: ABNORMAL
AMORPHOUS: ABNORMAL
BACTERIA: ABNORMAL
BILIRUBIN URINE: NEGATIVE
CASTS UA: ABNORMAL /LPF
COLOR: YELLOW
COMMENT UA: ABNORMAL
CRYSTALS, UA: ABNORMAL /HPF
EPITHELIAL CELLS UA: ABNORMAL /HPF (ref 0–25)
GLUCOSE URINE: NEGATIVE
KETONES, URINE: NEGATIVE
LEUKOCYTE ESTERASE, URINE: ABNORMAL
MUCUS: ABNORMAL
NITRITE, URINE: NEGATIVE
OTHER OBSERVATIONS UA: ABNORMAL
PH UA: 6 (ref 5–9)
PROTEIN UA: NEGATIVE
RBC UA: ABNORMAL /HPF (ref 0–2)
RENAL EPITHELIAL, UA: ABNORMAL /HPF
SPECIFIC GRAVITY UA: 1.01 (ref 1.01–1.02)
TRICHOMONAS: ABNORMAL
TURBIDITY: CLEAR
URINE HGB: NEGATIVE
UROBILINOGEN, URINE: NORMAL
WBC UA: ABNORMAL /HPF (ref 0–5)
YEAST: ABNORMAL

## 2021-02-20 LAB
CULTURE: ABNORMAL
Lab: ABNORMAL
SPECIMEN DESCRIPTION: ABNORMAL

## 2021-03-05 ENCOUNTER — HOSPITAL ENCOUNTER (OUTPATIENT)
Age: 71
Setting detail: SPECIMEN
Discharge: HOME | End: 2021-03-05
Payer: MEDICARE

## 2021-03-05 LAB
ALBUMIN SERPL-MCNC: 3.7 G/DL (ref 3.5–5.2)
ALBUMIN/GLOBULIN RATIO: 1.3 (ref 1–2.5)
ALP BLD-CCNC: 94 U/L (ref 35–104)
ALT SERPL-CCNC: 23 U/L (ref 5–33)
ANION GAP SERPL CALCULATED.3IONS-SCNC: 9 MMOL/L
AST SERPL-CCNC: 21 U/L
BILIRUB SERPL-MCNC: 0.49 MG/DL (ref 0.3–1.2)
BUN BLDV-MCNC: 17 MG/DL (ref 8–23)
BUN/CREAT BLD: 13 (ref 9–20)
CALCIUM SERPL-MCNC: 9.2 MG/DL (ref 8.6–10.4)
CHLORIDE BLD-SCNC: 102 MMOL/L (ref 98–107)
CO2: 26 MMOL/L (ref 20–31)
CREAT SERPL-MCNC: 1.36 MG/DL (ref 0.5–0.9)
GFR AFRICAN AMERICAN: 46 ML/MIN
GFR NON-AFRICAN AMERICAN: 38 ML/MIN
GFR SERPL CREATININE-BSD FRML MDRD: ABNORMAL ML/MIN/{1.73_M2}
GFR SERPL CREATININE-BSD FRML MDRD: ABNORMAL ML/MIN/{1.73_M2}
GLUCOSE BLD-MCNC: ABNORMAL MG/DL (ref 70–99)
HCT VFR BLD CALC: 36 % (ref 36.3–47.1)
HEMOGLOBIN: 11.1 G/DL (ref 11.9–15.1)
MCH RBC QN AUTO: 29.8 PG (ref 25.2–33.5)
MCHC RBC AUTO-ENTMCNC: 30.8 G/DL (ref 28.4–34.8)
MCV RBC AUTO: 96.8 FL (ref 82.6–102.9)
NRBC AUTOMATED: 0 PER 100 WBC
PDW BLD-RTO: 13.5 % (ref 11.8–14.4)
PLATELET # BLD: 340 K/UL (ref 138–453)
PMV BLD AUTO: 9.3 FL (ref 8.1–13.5)
POTASSIUM SERPL-SCNC: 4.2 MMOL/L (ref 3.7–5.3)
RBC # BLD: 3.72 M/UL (ref 3.95–5.11)
SODIUM BLD-SCNC: 137 MMOL/L (ref 135–144)
TOTAL PROTEIN: 6.5 G/DL (ref 6.4–8.3)
WBC # BLD: 7.7 K/UL (ref 3.5–11.3)

## 2021-03-05 PROCEDURE — 85027 COMPLETE CBC AUTOMATED: CPT

## 2021-03-05 PROCEDURE — 80053 COMPREHEN METABOLIC PANEL: CPT

## 2021-03-08 ENCOUNTER — HOSPITAL ENCOUNTER (OUTPATIENT)
Age: 71
Setting detail: SPECIMEN
Discharge: HOME | End: 2021-03-08
Payer: MEDICARE

## 2021-03-08 LAB
ANION GAP SERPL CALCULATED.3IONS-SCNC: 8 MMOL/L (ref 9–17)
BUN BLDV-MCNC: 15 MG/DL (ref 8–23)
BUN/CREAT BLD: 13 (ref 9–20)
CALCIUM SERPL-MCNC: 9.7 MG/DL (ref 8.6–10.4)
CHLORIDE BLD-SCNC: 94 MMOL/L (ref 98–107)
CO2: 28 MMOL/L (ref 20–31)
CREAT SERPL-MCNC: 1.18 MG/DL (ref 0.5–0.9)
GFR AFRICAN AMERICAN: 55 ML/MIN
GFR NON-AFRICAN AMERICAN: 45 ML/MIN
GFR SERPL CREATININE-BSD FRML MDRD: ABNORMAL ML/MIN/{1.73_M2}
GFR SERPL CREATININE-BSD FRML MDRD: ABNORMAL ML/MIN/{1.73_M2}
GLUCOSE BLD-MCNC: 101 MG/DL (ref 70–99)
POTASSIUM SERPL-SCNC: 4 MMOL/L (ref 3.7–5.3)
SODIUM BLD-SCNC: 130 MMOL/L (ref 135–144)

## 2021-03-08 PROCEDURE — 36415 COLL VENOUS BLD VENIPUNCTURE: CPT

## 2021-03-08 PROCEDURE — 80048 BASIC METABOLIC PNL TOTAL CA: CPT

## 2021-03-08 PROCEDURE — P9603 ONE-WAY ALLOW PRORATED MILES: HCPCS

## 2021-04-09 ENCOUNTER — OUTSIDE SERVICES (OUTPATIENT)
Dept: PRIMARY CARE CLINIC | Age: 71
End: 2021-04-09
Payer: MEDICARE

## 2021-04-09 DIAGNOSIS — E66.9 OBESITY (BMI 30-39.9): ICD-10-CM

## 2021-04-09 DIAGNOSIS — I10 ESSENTIAL HYPERTENSION: ICD-10-CM

## 2021-04-09 DIAGNOSIS — R06.02 SHORTNESS OF BREATH ON EXERTION: Primary | ICD-10-CM

## 2021-04-09 DIAGNOSIS — N18.31 STAGE 3A CHRONIC KIDNEY DISEASE (HCC): ICD-10-CM

## 2021-04-09 DIAGNOSIS — D50.0 IRON DEFICIENCY ANEMIA DUE TO CHRONIC BLOOD LOSS: ICD-10-CM

## 2021-04-09 NOTE — PROGRESS NOTES
Patient:  Theresa Goodson, 1950  I saw this patient on 4/10/2021, at [facility]   She is having shortness of breath on exertion. denies chest pain,palpitations or pnd  Tries to comply with diet and has lost 5 lbs  Blood sugars better  Has gained weight            Reason for Visit:      ICD-10-CM    1. Shortness of breath on exertion  R06.02    2. Obesity (BMI 30-39. 9)  E66.9    3. Essential hypertension  I10    4. Iron deficiency anemia due to chronic blood loss  D50.0    5. Stage 3a chronic kidney disease  N18.31        Changes since last visit:   Has shortness of breath on exertion,releived by rast,no chest pain,palpitations or pnd  Trying to comply with diet  Has lost 5 lbs    BP better  1. Fall:  none  2. Behavioral Change: mood fluctuates  3. Pain Control: adequate  4. Mobility: same  5. Pressure Sore:  none  Current medications    Medication Sig Start Date End Date Taking? Authorizing Provider   ARIPiprazole (ABILIFY) 5 MG tablet Take 5 mg by mouth nightly    Historical Provider, MD   Valbenazine Tosylate (INGREZZA) 40 MG CAPS Take 40 mg by mouth nightly    Historical Provider, MD   melatonin 3 MG TABS tablet Take 3 mg by mouth nightly    Historical Provider, MD   polyethylene glycol (GLYCOLAX) packet Take 17 g by mouth daily    Historical Provider, MD   Amantadine (SYMMETREL) 100 MG TABS tablet Take 100 mg by mouth 2 times daily    Historical Provider, MD   clonazePAM (KLONOPIN) 0.5 MG tablet Take 0.25 mg by mouth every morning. Susan Fuentes     Historical Provider, MD   losartan-hydrochlorothiazide (HYZAAR) 50-12.5 MG per tablet Take 1 tablet by mouth daily    Historical Provider, MD   Dextromethorphan-guaiFENesin (DELSYM COUGH/CHEST CONGEST DM) 5-100 MG/5ML LIQD Take 5 mLs by mouth every 12 hours as needed (cough)    Historical Provider, MD   acetaminophen (TYLENOL) 325 MG tablet Take 650 mg by mouth every 4 hours as needed for Pain or Fever    Historical Provider, MD   sodium chloride (OCEAN) 0.65 % nasal Bipolar disorder ([facility]     Chronic anemia     Chronic back pain     Chronic kidney disease     Patient states unaware of this. Has never been mentioned to her.  Colitis     Constipation     COPD (chronic obstructive pulmonary disease) (HCC)     Cystitis without hematuria     Diastolic CHF (HCC)     Esophagitis     grade A    GERD (gastroesophageal reflux disease)     Hemorrhoids     History of echocardiogram 14    EF >60%, LV wall thickness mildly increased,    Hypertension     Hypothyroidism     Metabolic syndrome     Moderate protein-calorie malnutrition (HCC)     Muscle weakness (generalized)     Obesity     TIA (transient ischemic attack)        Past Surgical History:    Past Surgical History:   Procedure Laterality Date    COLONOSCOPY  2011    ischemic colitis    COLONOSCOPY  2017    -diverticulosis,hemorrhoids    HYMENECTOMY      1972    HYSTERECTOMY      2008    MENISCECTOMY Right     2008    TONSILLECTOMY AND ADENOIDECTOMY      UPPER GASTROINTESTINAL ENDOSCOPY  3/2013    5 cm axial hiatal hernia    UPPER GASTROINTESTINAL ENDOSCOPY  2017    Dr. Xavier V[place]ia       Social History:   Social History     Tobacco Use    Smoking status: Former Smoker     Quit date: 3/12/1998     Years since quittin.0    Smokeless tobacco: Never Used   Substance Use Topics    Alcohol use: No       Family History:   Family History   Problem Relation Age of Onset    Diabetes Mother     High Blood Pressure Mother     Diabetes Father     High Blood Pressure Father     Bipolar Disorder Sister     Schizophrenia Sister     High Blood Pressure Other         2 sons    Bipolar Disorder Other         2 sons           Review of Systems:  Constitutional: negative for fevers or chills  Eyes: negative for visual disturbance   ENT: negative for sore throat or nasal congestion,no dysphagia. No epistaxis.   Respiratory: neg for shortness of breath , cough  Cardiovascular: neg for chest pain , palpitations,pnd,  edema better  Gastrointestinal: neg for abd pain, nausea, vomiting, diarrhea ,  constipation,no reyna,no blood in stool  Genitourinary: negative for dysuria, urgency,neg for hematuria , frequency  Integument/breast: negative for skin rash or lesions  Neurological: negative for unilateral weakness, numbness or tingling,has tremors  Muscular Skeletal: denies joint pain   Psych- mood changes worse    Objective:    Vitals: BP:140/80 T:97.8 P:81  R:18   -----------------------------------------------------------------  Exam:  GEN:   A & O x3, no apparent distress,obese  EYES: No gross abnormalities. and PERRLA  ENT:Throat- no lesions or congestion,nose- no drainage  NECK: normal, supple, no lymphadenopathy,  no carotid bruits  PULM: clear to auscultation bilaterally- no wheezes, rales or rhonchi, normal air movement, no respiratory distress  COR: regular rate & rhythm, no murmurs and no gallops, rate well controlled  ABD:  soft, non-tender, non-distended, normal bowel sounds, no masses or organomegaly  : no cva or flank tenderness  EXT:has minimal edema, no calf tenderness, and warm to touch. NEURO: Motor and sensory grossly intact,has tremors at rest  PSYCH: anxious  SKIN:  No rash or lesions    -----------------------------------------------------------------  Diagnostic Data:   · All diagnostic data was reviewed    Assessment:      Problem List Items Addressed This Visit     Obesity (BMI 30-39. 9)    Essential hypertension    Iron deficiency anemia due to chronic blood loss      Other Visit Diagnoses     Shortness of breath on exertion    -  Primary    Stage 3a chronic kidney disease            Patient Active Problem List   Diagnosis Code    TIA (transient ischemic attack) G45.9    [facility] F31.9    DDD (degenerative disc disease), lumbar M51.36    Bradycardia R00.1    Essential hypertension I10    Obesity (BMI 30-39. 9) E66.9    GERD (gastroesophageal reflux disease) K21.9    Iron deficiency anemia D50.9    Iron deficiency anemia due to chronic blood loss D50.0    History of colon polyps Z86.010    Hypothyroidism E03.9    Other chronic pain G89.29    Other hemorrhoids K64.8    Diverticulosis of large intestine without diverticulitis K57.30    Attention deficit hyperactivity disorder F90.9    Morbid obesity (HCC) E66.01    Cellulitis of leg without foot, right L03.115         Plan:    Echocardiogram  lexiscan stress test  Psych to manage psychotropics  Encourage activity as tolerated and follow diet  Continue current medications  Monitor bp   Prevent pressure sore  Prevent falls      Electronically signed by Mar Lewis MD on 4/11/2021 at 9:15 AM

## 2021-04-10 PROCEDURE — 99309 SBSQ NF CARE MODERATE MDM 30: CPT | Performed by: FAMILY MEDICINE

## 2021-04-19 ENCOUNTER — HOSPITAL ENCOUNTER (OUTPATIENT)
Dept: NON INVASIVE DIAGNOSTICS | Age: 71
Discharge: HOME | End: 2021-04-19
Payer: MEDICARE

## 2021-04-19 DIAGNOSIS — R06.02 SHORTNESS OF BREATH: ICD-10-CM

## 2021-04-19 LAB
LV EF: 60 %
LVEF MODALITY: NORMAL

## 2021-04-19 PROCEDURE — A9500 TC99M SESTAMIBI: HCPCS | Performed by: FAMILY MEDICINE

## 2021-04-19 PROCEDURE — 3430000000 HC RX DIAGNOSTIC RADIOPHARMACEUTICAL: Performed by: FAMILY MEDICINE

## 2021-04-19 PROCEDURE — 93306 TTE W/DOPPLER COMPLETE: CPT

## 2021-04-19 PROCEDURE — 6360000002 HC RX W HCPCS: Performed by: FAMILY MEDICINE

## 2021-04-19 PROCEDURE — 78452 HT MUSCLE IMAGE SPECT MULT: CPT

## 2021-04-19 PROCEDURE — 93017 CV STRESS TEST TRACING ONLY: CPT

## 2021-04-19 RX ADMIN — Medication 30 MILLICURIE: at 10:02

## 2021-04-19 RX ADMIN — REGADENOSON 0.4 MG: 0.08 INJECTION, SOLUTION INTRAVENOUS at 10:59

## 2021-04-19 RX ADMIN — Medication 10 MILLICURIE: at 10:02

## 2021-04-19 NOTE — PROGRESS NOTES
Instructed patient on the benefit and protocol of a Cardiolite/Lexiscan stress test. Verbalized understanding

## 2021-04-19 NOTE — PROCEDURES
[address] [facility], [address] [facility]                              CARDIAC STRESS TEST    PATIENT NAME: Shakila Martinez                :        1950  MED REC NO:   [id number]                              ROOM:  ACCOUNT NO:   [de-identified]                           ADMIT DATE: 2021  PROVIDER:     [place] [facility]    DATE OF STUDY:  2021    ORDERING PROVIDER:  Juana Toth MD    PRIMARY CARE PROVIDER:  Juana Toth MD    INTERPRETING PHYSICIAN:  Caroline Ghosh MD    PHARMACOLOGIC MYOCARDIAL PERFUSION STRESS TESTING    Rest/stress single isotope SPECT imaging with exercise stress and gated  SPECT imaging. INDICATIONS:  Assessment of a cardiac cause:  Dyspnea. CLINICAL HISTORY:  The patient is a 78-year-old woman with no known  coronary artery disease. Previous cardiac history includes:  Stress test.    Other previous history includes:  Fatigue, dyspnea, arthritis,  hypertension. Symptoms just prior to testing include:  None. Relevant medications:  Norvasc. PROCEDURE:  The heart rate was 75 at baseline and lucila to 88 beats per  minute during the regadenoson infusion. The rest blood pressure was  148/77 mm/Hg and increased to 157/88 mm/Hg. The patient did not  complain of any symptoms following infusion. Pharmacologic stress testing was performed with regadenoson at a dose of  0.4 mg. Additionally, low level exercise using hand compressions were  performed along with vasodilator infusion. MYOCARDIAL PERFUSION IMAGING:  Imaging was performed at rest 30-45  minutes following the injection of 10 mCi of sestamibi. Approximately  10 seconds after Lexiscan injection, the patient was injected with 30  mCi of sestamibi. Gating post-stress tomographic imaging was performed  30-45 minutes after stress.     STRESS ECG RESULTS:  The resting electrocardiogram demonstrated atrial  fibrillation with significant ST-segment abnormalities that may impair  accurate ECG detection of stress induced cardiac ischemia. NUCLEAR IMAGING RESULTS:  The overall quality of the study is poor. Mild/moderate attenuation artifact was seen. There is no evidence of  abnormal lung uptake. Additionally, the right ventricle appears normal.  The left ventricular cavity is noted to be normal in size on the stress  images. There is no evidence of transient ischemic dilatation (TID) of  the left ventricle. Gated SPECT imaging reveals normal myocardial thickening and wall motion  with a calculated left ventricular ejection fraction of 68%. The rest images demonstrated a small/moderate perfusion abnormality of  mild/moderate intensity in the anterior and inferior regions, which is  most likely due to artifact. On stress imaging, a small/moderate perfusion abnormality of  mild/moderate intensity in the anterior and inferior regions, which is  most likely due to artifact. IMPRESSION:  1. Limited quality study with significant motion artifact particularly  during stress imaging. 2.  Equivocal myocardial perfusion study. There is a small/moderate  perfusion defect of mild/moderate intensity in the anterior and inferior  regions during stress and rest imaging which is most consistent with  artifact, but may be due to a small degree of coronary ischemia. 3.  Global left ventricular systolic function was normal with an EF of  68% without regional wall motion abnormalities. 4.  Significant ST segment changes at rest that impair accurate ECG  interpretation during stress. Depending on the patient's symptoms and level of clinical suspicion,  aggressive medical management versus additional testing by coronary  angiography may be indicated. The sensitivity for detecting ischemia on this test may have been  reduced due to the patient being on a calcium channel blocker.       ALI Rhoda BREWER    D: 04/19/2021 13:13:35       T: 04/19/2021 13:15:27     [email or web address]  Job#: [id number]     Doc#: Unknown    CC:  Linette Whitaker

## 2021-05-11 ENCOUNTER — OUTSIDE SERVICES (OUTPATIENT)
Dept: PRIMARY CARE CLINIC | Age: 71
End: 2021-05-11
Payer: MEDICARE

## 2021-05-11 DIAGNOSIS — R00.1 BRADYCARDIA: Primary | ICD-10-CM

## 2021-05-11 DIAGNOSIS — E03.9 HYPOTHYROIDISM, UNSPECIFIED TYPE: ICD-10-CM

## 2021-05-11 DIAGNOSIS — I50.31 ACUTE DIASTOLIC HEART FAILURE (HCC): ICD-10-CM

## 2021-05-11 DIAGNOSIS — E66.9 OBESITY (BMI 30-39.9): ICD-10-CM

## 2021-05-11 DIAGNOSIS — I10 ESSENTIAL HYPERTENSION: ICD-10-CM

## 2021-05-11 NOTE — PROGRESS NOTES
Patient:  Slime Raygoza, 1950  I saw this patient on 5/12/2021, at [facility]    shortness of breath on exertion improved with diuresis,. denies chest pain,palpitations or pnd  Tries to comply with diet and has lost 37 lbs  Blood sugars better              Reason for Visit:      ICD-10-CM    1. Bradycardia  R00.1 Holter Monitor 48 Hour   2. Essential hypertension  I10    3. Acute diastolic heart failure (HCC)  I50.31    4. Obesity (BMI 30-39. 9)  E66.9    5. Hypothyroidism, unspecified type  E03.9        Changes since last visit:    shortness of breath on exertion improved,no chest pain,palpitations or pnd  Has lost weight    BP better  1. Fall:  none  2. Behavioral Change: mood better  3. Pain Control: adequate  4. Mobility: improving  5. Pressure Sore:  none  Current medications    Medication Sig Start Date End Date Taking? Authorizing Provider   ARIPiprazole (ABILIFY) 5 MG tablet Take 5 mg by mouth nightly    Historical Provider, MD   Valbenazine Tosylate (INGREZZA) 40 MG CAPS Take 40 mg by mouth nightly    Historical Provider, MD   melatonin 3 MG TABS tablet Take 3 mg by mouth nightly    Historical Provider, MD   polyethylene glycol (GLYCOLAX) packet Take 17 g by mouth daily    Historical Provider, MD   Amantadine (SYMMETREL) 100 MG TABS tablet Take 100 mg by mouth 2 times daily    Historical Provider, MD   clonazePAM (KLONOPIN) 0.5 MG tablet Take 0.25 mg by mouth every morning. Roya Back     Historical Provider, MD   losartan-hydrochlorothiazide (HYZAAR) 50-12.5 MG per tablet Take 1 tablet by mouth daily    Historical Provider, MD   Dextromethorphan-guaiFENesin (DELSYM COUGH/CHEST CONGEST DM) 5-100 MG/5ML LIQD Take 5 mLs by mouth every 12 hours as needed (cough)    Historical Provider, MD   acetaminophen (TYLENOL) 325 MG tablet Take 650 mg by mouth every 4 hours as needed for Pain or Fever    Historical Provider, MD   sodium chloride (OCEAN) 0.65 % nasal spray 1 spray by Nasal route 3 times daily as needed for Congestion    Historical Provider, MD   Sodium Phosphates (FLEET) 7-19 GM/118ML Place 1 enema rectally once as needed    Historical Provider, MD   ascorbic acid (VITAMIN C) 500 MG tablet Take 500 mg by mouth nightly     Historical Provider, MD   bisacodyl (DULCOLAX) 10 MG suppository Place 10 mg rectally daily as needed for Constipation    Historical Provider, MD   magnesium hydroxide (MILK OF MAGNESIA) 400 MG/5ML suspension Take 30 mLs by mouth daily as needed for Constipation    Historical Provider, MD   calcium carbonate 600 MG TABS tablet Take 1 tablet by mouth nightly     Historical Provider, MD   metoprolol (TOPROL-XL) 50 MG XL tablet TAKE 1 TABLET BY MOUTH DAILY. 6/1/15   Beti Carlos MD   levothyroxine (SYNTHROID) 50 MCG tablet TAKE 1 TABLET BY MOUTH DAILY. 6/1/15   Beti Carlos MD   omeprazole (PRILOSEC) 20 MG capsule TAKE 1 CAPSULE BY MOUTH DAILY. 5/21/15   Beti Carlos MD   citalopram (CELEXA) 20 MG tablet Take 20 mg by mouth daily. Historical Provider, MD   traZODone (DESYREL) 50 MG tablet Take 150 mg by mouth nightly     Historical Provider, MD   Simethicone (GAS-X PO) Take  by mouth as needed. Historical Provider, MD   Multiple Vitamin (MULTIVITAMIN PO) Take  by mouth daily. Historical Provider, MD   Acetaminophen (TYLENOL ARTHRITIS PAIN PO) Take 1,300 tablets by mouth 2 times daily as needed 2 tabs TID PRN    Historical Provider, MD   guaifenesin (MUCINEX) 600 MG SR tablet Take 1,200 mg by mouth 2 times daily. PRN    Historical Provider, MD   loratadine (CLARITIN) 10 MG tablet Take 10 mg by mouth daily.  PRN     Historical Provider, MD     Allergies:  Lamictal [lamotrigine], Oxcarbazepine, Pcn [penicillins], Sertraline, Trileptal, and Strattera [atomoxetine hcl]    Past Medical History:    Past Medical History:   Diagnosis Date    ADHD (attention deficit hyperactivity disorder)     Anemia     severe    Asthma     Bipolar disorder (HCC)     Chronic anemia     Chronic back pain     Chronic kidney disease     Patient states unaware of this. Has never been mentioned to her.  Colitis     Constipation     COPD (chronic obstructive pulmonary disease) (HCC)     Cystitis without hematuria     Diastolic CHF (HCC)     Esophagitis     grade A    GERD (gastroesophageal reflux disease)     Hemorrhoids     History of echocardiogram 14    EF >60%, LV wall thickness mildly increased,    Hypertension     Hypothyroidism     Metabolic syndrome     Moderate protein-calorie malnutrition (HCC)     Muscle weakness (generalized)     Obesity     TIA (transient ischemic attack)        Past Surgical History:    Past Surgical History:   Procedure Laterality Date    COLONOSCOPY  2011    ischemic colitis    COLONOSCOPY  2017    -diverticulosis,hemorrhoids    HYMENECTOMY      1972    HYSTERECTOMY      2008    MENISCECTOMY Right     2008    TONSILLECTOMY AND ADENOIDECTOMY      UPPER GASTROINTESTINAL ENDOSCOPY  3/2013    5 cm axial hiatal hernia    UPPER GASTROINTESTINAL ENDOSCOPY  2017    Dr. Adarsh Porter       Social History:   Social History     Tobacco Use    Smoking status: Former Smoker     Quit date: 3/12/1998     Years since quittin.1    Smokeless tobacco: Never Used   Substance Use Topics    Alcohol use: No       Family History:   Family History   Problem Relation Age of Onset    Diabetes Mother     High Blood Pressure Mother     Diabetes Father     High Blood Pressure Father     Bipolar Disorder Sister     Schizophrenia Sister     High Blood Pressure Other         2 sons    Bipolar Disorder Other         2 sons           Review of Systems:  Constitutional: negative for fevers or chills  Eyes: negative for visual disturbance   ENT: negative for sore throat or nasal congestion,no dysphagia. No epistaxis.   Respiratory:  shortness of breath improved  Cardiovascular: neg for chest pain , palpitations,pnd,  edema better  Gastrointestinal: neg for abd pain, nausea, vomiting, diarrhea ,  constipation,no reyna,no blood in stool  Genitourinary: negative for dysuria, urgency,neg for hematuria , frequency  Integument/breast: negative for skin rash or lesions  Neurological: negative for unilateral weakness, numbness or tingling,has tremors  Muscular Skeletal: denies joint pain   Psych- mood changes worse    Objective:    Vitals: BP:126/74 T:97.7 P:58 R:18   -----------------------------------------------------------------  Exam:  GEN:   A & O x3, no apparent distress,obese  EYES: No gross abnormalities. and PERRLA  ENT:Throat- no lesions or congestion,nose- no drainage  NECK: normal, supple, no lymphadenopathy,  no carotid bruits  PULM: Diminished air entry on auscultation bilaterally- no wheezes, rales or rhonchi, normal air movement, no respiratory distress  COR: S1,s2,sinus bradycardia, no murmurs and no gallops,has bilat leg edema  ABD:  soft, non-tender, non-distended, normal bowel sounds, no masses or organomegaly  : no cva or flank tenderness  EXT:has minimal edema, no calf tenderness, and warm to touch. NEURO: Motor and sensory grossly intact,has tremors at rest  PSYCH: anxious  SKIN:  No rash or lesions    -----------------------------------------------------------------  Diagnostic Data:   · All diagnostic data was reviewed    Assessment:      Problem List Items Addressed This Visit     Obesity (BMI 30-39. 9)    Bradycardia - Primary    Relevant Orders    Holter Monitor 48 Hour    Essential hypertension    Hypothyroidism      Other Visit Diagnoses     Acute diastolic heart failure ([facility]            Patient Active Problem List   Diagnosis Code    TIA (transient ischemic attack) G45.9    [facility] F31.9    DDD (degenerative disc disease), lumbar M51.36    Bradycardia R00.1    Essential hypertension I10    Obesity (BMI 30-39. 9) E66.9    GERD (gastroesophageal reflux disease) K21.9    Iron deficiency anemia D50.9    Iron deficiency anemia due to chronic blood loss D50.0    History of colon polyps Z86.010    Hypothyroidism E03.9    Other chronic pain G89.29    Other hemorrhoids K64.8    Diverticulosis of large intestine without diverticulitis K57.30    Attention deficit hyperactivity disorder F90.9    Morbid obesity (HCC) E66.01    Cellulitis of leg without foot, right L03.115         Plan:    continue diuresis  Check bmp and bnp  Await cardiology eval  holter monitor  Psych to manage psychotropics  Encourage activity as tolerated and follow diet  Continue current medications  Monitor bp   Prevent pressure sore  Prevent falls      Electronically signed by Anthony Mason MD on 5/12/2021 at 8:51 PM

## 2021-05-12 PROCEDURE — 99308 SBSQ NF CARE LOW MDM 20: CPT | Performed by: FAMILY MEDICINE

## 2021-05-13 ENCOUNTER — HOSPITAL ENCOUNTER (OUTPATIENT)
Age: 71
Setting detail: SPECIMEN
Discharge: HOME | End: 2021-05-13
Payer: MEDICARE

## 2021-05-13 LAB
ANION GAP SERPL CALCULATED.3IONS-SCNC: 10 MMOL/L (ref 9–17)
BNP INTERPRETATION: ABNORMAL
BUN BLDV-MCNC: 22 MG/DL (ref 8–23)
BUN/CREAT BLD: 18 (ref 9–20)
CALCIUM SERPL-MCNC: 10.4 MG/DL (ref 8.6–10.4)
CHLORIDE BLD-SCNC: 100 MMOL/L (ref 98–107)
CO2: 28 MMOL/L (ref 20–31)
CREAT SERPL-MCNC: 1.24 MG/DL (ref 0.5–0.9)
GFR AFRICAN AMERICAN: 52 ML/MIN
GFR NON-AFRICAN AMERICAN: 43 ML/MIN
GFR SERPL CREATININE-BSD FRML MDRD: ABNORMAL ML/MIN/{1.73_M2}
GFR SERPL CREATININE-BSD FRML MDRD: ABNORMAL ML/MIN/{1.73_M2}
GLUCOSE BLD-MCNC: 100 MG/DL (ref 70–99)
HCT VFR BLD CALC: 37.5 % (ref 36.3–47.1)
HEMOGLOBIN: 11.7 G/DL (ref 11.9–15.1)
MCH RBC QN AUTO: 28.5 PG (ref 25.2–33.5)
MCHC RBC AUTO-ENTMCNC: 31.2 G/DL (ref 28.4–34.8)
MCV RBC AUTO: 91.2 FL (ref 82.6–102.9)
NRBC AUTOMATED: 0 PER 100 WBC
PDW BLD-RTO: 14.7 % (ref 11.8–14.4)
PLATELET # BLD: 325 K/UL (ref 138–453)
PMV BLD AUTO: 9.2 FL (ref 8.1–13.5)
POTASSIUM SERPL-SCNC: 4.3 MMOL/L (ref 3.7–5.3)
PRO-BNP: 3636 PG/ML
RBC # BLD: 4.11 M/UL (ref 3.95–5.11)
SODIUM BLD-SCNC: 138 MMOL/L (ref 135–144)
WBC # BLD: 6.1 K/UL (ref 3.5–11.3)

## 2021-05-13 PROCEDURE — P9604 ONE-WAY ALLOW PRORATED TRIP: HCPCS

## 2021-05-13 PROCEDURE — 85027 COMPLETE CBC AUTOMATED: CPT

## 2021-05-13 PROCEDURE — 83880 ASSAY OF NATRIURETIC PEPTIDE: CPT

## 2021-05-13 PROCEDURE — 36415 COLL VENOUS BLD VENIPUNCTURE: CPT

## 2021-05-13 PROCEDURE — 80048 BASIC METABOLIC PNL TOTAL CA: CPT

## 2021-05-17 ENCOUNTER — HOSPITAL ENCOUNTER (OUTPATIENT)
Dept: NON INVASIVE DIAGNOSTICS | Age: 71
Discharge: HOME | End: 2021-05-17
Payer: MEDICARE

## 2021-05-17 DIAGNOSIS — R00.1 BRADYCARDIA: ICD-10-CM

## 2021-05-17 PROCEDURE — 93225 XTRNL ECG REC<48 HRS REC: CPT

## 2021-05-17 PROCEDURE — 93226 XTRNL ECG REC<48 HR SCAN A/R: CPT

## 2021-05-20 LAB
ACQUISITION DURATION: NORMAL S
AVERAGE HEART RATE: 72 BPM
EKG DIAGNOSIS: NORMAL
HOLTER MAX HEART RATE: 142 BPM
HOOKUP DATE: NORMAL
HOOKUP TIME: NORMAL
MAX HEART RATE TIME/DATE: NORMAL
MIN HEART RATE TIME/DATE: NORMAL
MIN HEART RATE: 50 BPM
NUMBER OF QRS COMPLEXES: NORMAL
NUMBER OF SUPRAVENTRICULAR COUPLETS: 0
NUMBER OF SUPRAVENTRICULAR ECTOPICS: 0
NUMBER OF SUPRAVENTRICULAR ISOLATED BEATS: 0
NUMBER OF VENTRICULAR BIGEMINAL CYCLES: 0
NUMBER OF VENTRICULAR COUPLETS: 19
NUMBER OF VENTRICULAR ECTOPICS: NORMAL

## 2021-05-24 ENCOUNTER — TELEPHONE (OUTPATIENT)
Dept: PRIMARY CARE CLINIC | Age: 71
End: 2021-05-24

## 2021-05-26 ENCOUNTER — HOSPITAL ENCOUNTER (OUTPATIENT)
Age: 71
Setting detail: SPECIMEN
Discharge: HOME | End: 2021-05-26
Payer: MEDICARE

## 2021-05-26 ENCOUNTER — HOSPITAL ENCOUNTER (OUTPATIENT)
Age: 71
Discharge: HOME | End: 2021-05-26
Payer: MEDICARE

## 2021-05-26 ENCOUNTER — OFFICE VISIT (OUTPATIENT)
Dept: CARDIOLOGY | Age: 71
End: 2021-05-26
Payer: MEDICARE

## 2021-05-26 VITALS
BODY MASS INDEX: 43.55 KG/M2 | OXYGEN SATURATION: 96 % | HEART RATE: 59 BPM | HEIGHT: 66 IN | WEIGHT: 271 LBS | DIASTOLIC BLOOD PRESSURE: 74 MMHG | RESPIRATION RATE: 20 BRPM | SYSTOLIC BLOOD PRESSURE: 130 MMHG

## 2021-05-26 DIAGNOSIS — I50.812 CHRONIC RIGHT-SIDED HEART FAILURE (HCC): ICD-10-CM

## 2021-05-26 DIAGNOSIS — E66.01 CLASS 3 SEVERE OBESITY WITH BODY MASS INDEX (BMI) OF 40.0 TO 44.9 IN ADULT, UNSPECIFIED OBESITY TYPE, UNSPECIFIED WHETHER SERIOUS COMORBIDITY PRESENT (HCC): ICD-10-CM

## 2021-05-26 DIAGNOSIS — R94.39 ABNORMAL STRESS TEST: ICD-10-CM

## 2021-05-26 DIAGNOSIS — I73.9 PVD (PERIPHERAL VASCULAR DISEASE) (HCC): ICD-10-CM

## 2021-05-26 DIAGNOSIS — I48.91 NEW ONSET A-FIB (HCC): ICD-10-CM

## 2021-05-26 DIAGNOSIS — I07.1 SEVERE TRICUSPID REGURGITATION: ICD-10-CM

## 2021-05-26 DIAGNOSIS — I10 ESSENTIAL HYPERTENSION: ICD-10-CM

## 2021-05-26 DIAGNOSIS — I48.91 NEW ONSET A-FIB (HCC): Primary | ICD-10-CM

## 2021-05-26 LAB
ANION GAP SERPL CALCULATED.3IONS-SCNC: 12 MMOL/L (ref 9–17)
BUN BLDV-MCNC: 19 MG/DL (ref 8–23)
BUN/CREAT BLD: 16 (ref 9–20)
CALCIUM SERPL-MCNC: 9.7 MG/DL (ref 8.6–10.4)
CHLORIDE BLD-SCNC: 99 MMOL/L (ref 98–107)
CO2: 24 MMOL/L (ref 20–31)
CREAT SERPL-MCNC: 1.16 MG/DL (ref 0.5–0.9)
GFR AFRICAN AMERICAN: 56 ML/MIN
GFR NON-AFRICAN AMERICAN: 46 ML/MIN
GFR SERPL CREATININE-BSD FRML MDRD: ABNORMAL ML/MIN/{1.73_M2}
GFR SERPL CREATININE-BSD FRML MDRD: ABNORMAL ML/MIN/{1.73_M2}
GLUCOSE BLD-MCNC: 114 MG/DL (ref 70–99)
HCT VFR BLD CALC: 37.1 % (ref 36.3–47.1)
HEMOGLOBIN: 11.7 G/DL (ref 11.9–15.1)
MCH RBC QN AUTO: 29 PG (ref 25.2–33.5)
MCHC RBC AUTO-ENTMCNC: 31.5 G/DL (ref 28.4–34.8)
MCV RBC AUTO: 92.1 FL (ref 82.6–102.9)
NRBC AUTOMATED: 0 PER 100 WBC
PDW BLD-RTO: 15.1 % (ref 11.8–14.4)
PLATELET # BLD: 320 K/UL (ref 138–453)
PMV BLD AUTO: 9.2 FL (ref 8.1–13.5)
POTASSIUM SERPL-SCNC: 4 MMOL/L (ref 3.7–5.3)
RBC # BLD: 4.03 M/UL (ref 3.95–5.11)
SODIUM BLD-SCNC: 135 MMOL/L (ref 135–144)
WBC # BLD: 8.2 K/UL (ref 3.5–11.3)

## 2021-05-26 PROCEDURE — G8427 DOCREV CUR MEDS BY ELIG CLIN: HCPCS | Performed by: INTERNAL MEDICINE

## 2021-05-26 PROCEDURE — 80048 BASIC METABOLIC PNL TOTAL CA: CPT

## 2021-05-26 PROCEDURE — 99204 OFFICE O/P NEW MOD 45 MIN: CPT | Performed by: INTERNAL MEDICINE

## 2021-05-26 PROCEDURE — 93010 ELECTROCARDIOGRAM REPORT: CPT | Performed by: INTERNAL MEDICINE

## 2021-05-26 PROCEDURE — G8417 CALC BMI ABV UP PARAM F/U: HCPCS | Performed by: INTERNAL MEDICINE

## 2021-05-26 PROCEDURE — 1090F PRES/ABSN URINE INCON ASSESS: CPT | Performed by: INTERNAL MEDICINE

## 2021-05-26 PROCEDURE — 85027 COMPLETE CBC AUTOMATED: CPT

## 2021-05-26 PROCEDURE — 93005 ELECTROCARDIOGRAM TRACING: CPT | Performed by: INTERNAL MEDICINE

## 2021-05-26 PROCEDURE — 36415 COLL VENOUS BLD VENIPUNCTURE: CPT

## 2021-05-26 PROCEDURE — 99202 OFFICE O/P NEW SF 15 MIN: CPT | Performed by: INTERNAL MEDICINE

## 2021-05-26 RX ORDER — ATORVASTATIN CALCIUM 20 MG/1
20 TABLET, FILM COATED ORAL DAILY
Qty: 90 TABLET | Refills: 3 | Status: ON HOLD | OUTPATIENT
Start: 2021-05-26 | End: 2021-05-27

## 2021-05-26 NOTE — PATIENT INSTRUCTIONS
SURVEY:    You may be receiving a survey from [employer] regarding your visit today. Please complete the survey to enable us to provide the highest quality of care to you and your family. If you cannot score us a very good on any question, please call the office to discuss how we could have made your experience a very good one. Thank you. Danika Baker was your MA today!

## 2021-05-27 ENCOUNTER — HOSPITAL ENCOUNTER (OUTPATIENT)
Dept: CARDIAC CATH/INVASIVE PROCEDURES | Age: 71
Discharge: ANOTHER HOSPITAL | End: 2021-05-28
Attending: FAMILY MEDICINE | Admitting: FAMILY MEDICINE
Payer: MEDICARE

## 2021-05-27 ENCOUNTER — HOSPITAL ENCOUNTER (OUTPATIENT)
Age: 71
Setting detail: SPECIMEN
Discharge: HOME | End: 2021-05-27
Payer: MEDICARE

## 2021-05-27 VITALS
HEIGHT: 66 IN | TEMPERATURE: 97.7 F | RESPIRATION RATE: 18 BRPM | SYSTOLIC BLOOD PRESSURE: 161 MMHG | DIASTOLIC BLOOD PRESSURE: 81 MMHG | WEIGHT: 271 LBS | HEART RATE: 88 BPM | BODY MASS INDEX: 43.55 KG/M2 | OXYGEN SATURATION: 96 %

## 2021-05-27 PROBLEM — I25.10 CORONARY ARTERY DISEASE: Status: ACTIVE | Noted: 2021-05-27

## 2021-05-27 PROBLEM — I25.10 CAD, MULTIPLE VESSEL: Status: ACTIVE | Noted: 2021-05-27

## 2021-05-27 LAB
ALBUMIN SERPL-MCNC: 4 G/DL (ref 3.5–5.2)
ALBUMIN/GLOBULIN RATIO: 1.2 (ref 1–2.5)
ALP BLD-CCNC: 136 U/L (ref 35–104)
ALT SERPL-CCNC: 27 U/L (ref 5–33)
ANION GAP SERPL CALCULATED.3IONS-SCNC: 11 MMOL/L (ref 9–17)
AST SERPL-CCNC: 22 U/L
BILIRUB SERPL-MCNC: 0.81 MG/DL (ref 0.3–1.2)
BUN BLDV-MCNC: 18 MG/DL (ref 8–23)
BUN/CREAT BLD: 18 (ref 9–20)
CALCIUM SERPL-MCNC: 9.9 MG/DL (ref 8.6–10.4)
CHLORIDE BLD-SCNC: 102 MMOL/L (ref 98–107)
CHOLESTEROL/HDL RATIO: 2.2
CHOLESTEROL: 150 MG/DL
CO2: 25 MMOL/L (ref 20–31)
CREAT SERPL-MCNC: 1 MG/DL (ref 0.5–0.9)
GFR AFRICAN AMERICAN: >60 ML/MIN
GFR NON-AFRICAN AMERICAN: 55 ML/MIN
GFR SERPL CREATININE-BSD FRML MDRD: ABNORMAL ML/MIN/{1.73_M2}
GFR SERPL CREATININE-BSD FRML MDRD: ABNORMAL ML/MIN/{1.73_M2}
GLUCOSE BLD-MCNC: 97 MG/DL (ref 70–99)
HDLC SERPL-MCNC: 67 MG/DL
LDL CHOLESTEROL: 72 MG/DL (ref 0–130)
POTASSIUM SERPL-SCNC: 3.9 MMOL/L (ref 3.7–5.3)
SODIUM BLD-SCNC: 138 MMOL/L (ref 135–144)
TOTAL PROTEIN: 7.3 G/DL (ref 6.4–8.3)
TRIGL SERPL-MCNC: 57 MG/DL
TSH SERPL DL<=0.05 MIU/L-ACNC: 4.29 MIU/L (ref 0.3–5)
VLDLC SERPL CALC-MCNC: NORMAL MG/DL (ref 1–30)

## 2021-05-27 PROCEDURE — 6360000004 HC RX CONTRAST MEDICATION: Performed by: FAMILY MEDICINE

## 2021-05-27 PROCEDURE — 2580000003 HC RX 258: Performed by: FAMILY MEDICINE

## 2021-05-27 PROCEDURE — C1887 CATHETER, GUIDING: HCPCS

## 2021-05-27 PROCEDURE — 80061 LIPID PANEL: CPT

## 2021-05-27 PROCEDURE — C1769 GUIDE WIRE: HCPCS

## 2021-05-27 PROCEDURE — 6370000000 HC RX 637 (ALT 250 FOR IP): Performed by: FAMILY MEDICINE

## 2021-05-27 PROCEDURE — C1894 INTRO/SHEATH, NON-LASER: HCPCS

## 2021-05-27 PROCEDURE — 80053 COMPREHEN METABOLIC PANEL: CPT

## 2021-05-27 PROCEDURE — 2709999900 HC NON-CHARGEABLE SUPPLY

## 2021-05-27 PROCEDURE — 84443 ASSAY THYROID STIM HORMONE: CPT

## 2021-05-27 PROCEDURE — P9604 ONE-WAY ALLOW PRORATED TRIP: HCPCS

## 2021-05-27 PROCEDURE — 83036 HEMOGLOBIN GLYCOSYLATED A1C: CPT

## 2021-05-27 PROCEDURE — 93458 L HRT ARTERY/VENTRICLE ANGIO: CPT | Performed by: FAMILY MEDICINE

## 2021-05-27 RX ORDER — SODIUM CHLORIDE 0.9 % (FLUSH) 0.9 %
5-40 SYRINGE (ML) INJECTION EVERY 12 HOURS SCHEDULED
Status: DISCONTINUED | OUTPATIENT
Start: 2021-05-27 | End: 2021-05-28 | Stop reason: HOSPADM

## 2021-05-27 RX ORDER — SODIUM CHLORIDE 9 MG/ML
25 INJECTION, SOLUTION INTRAVENOUS PRN
Status: DISCONTINUED | OUTPATIENT
Start: 2021-05-27 | End: 2021-05-28 | Stop reason: HOSPADM

## 2021-05-27 RX ORDER — ACETAMINOPHEN 325 MG/1
650 TABLET ORAL EVERY 4 HOURS PRN
Status: DISCONTINUED | OUTPATIENT
Start: 2021-05-27 | End: 2021-05-28 | Stop reason: HOSPADM

## 2021-05-27 RX ORDER — SODIUM CHLORIDE 9 MG/ML
INJECTION, SOLUTION INTRAVENOUS CONTINUOUS
Status: DISCONTINUED | OUTPATIENT
Start: 2021-05-27 | End: 2021-05-28 | Stop reason: HOSPADM

## 2021-05-27 RX ORDER — NITROGLYCERIN 0.4 MG/1
0.4 TABLET SUBLINGUAL EVERY 5 MIN PRN
Status: DISCONTINUED | OUTPATIENT
Start: 2021-05-27 | End: 2021-05-28 | Stop reason: HOSPADM

## 2021-05-27 RX ORDER — SODIUM CHLORIDE 0.9 % (FLUSH) 0.9 %
5-40 SYRINGE (ML) INJECTION PRN
Status: DISCONTINUED | OUTPATIENT
Start: 2021-05-27 | End: 2021-05-28 | Stop reason: HOSPADM

## 2021-05-27 RX ORDER — DIPHENHYDRAMINE HCL 50 MG
50 CAPSULE ORAL ONCE
Status: DISCONTINUED | OUTPATIENT
Start: 2021-05-27 | End: 2021-05-28 | Stop reason: HOSPADM

## 2021-05-27 RX ADMIN — SODIUM CHLORIDE: 9 INJECTION, SOLUTION INTRAVENOUS at 13:55

## 2021-05-27 RX ADMIN — ACETAMINOPHEN 650 MG: 325 TABLET ORAL at 18:32

## 2021-05-27 RX ADMIN — IOPAMIDOL 40 ML: 755 INJECTION, SOLUTION INTRAVENOUS at 15:05

## 2021-05-27 ASSESSMENT — PAIN DESCRIPTION - LOCATION: LOCATION: GENERALIZED

## 2021-05-27 ASSESSMENT — PAIN SCALES - GENERAL: PAINLEVEL_OUTOF10: 6

## 2021-05-27 ASSESSMENT — PAIN DESCRIPTION - PAIN TYPE: TYPE: CHRONIC PAIN

## 2021-05-27 NOTE — PROGRESS NOTES
Report given to [facility], RN. Patient vitals stable. Dressing to right wrist clean and dry.

## 2021-05-27 NOTE — PROGRESS NOTES
Call placed to Jone at [facility], updated on patients transfer to [facility]. Wheelchair to be picked up by Susan this evening, placed in ED registration area labeled with patients information. Call received from [facility], [phone] [facility] will be calling with ETA. Inda Brittle

## 2021-05-27 NOTE — PROGRESS NOTES
1638-Call placed to [place] [phone] to arrange transport to [facility] for PCI with Dr Keke Acuña. 1700-Call received from [place] [phone]. [facility]. [facility] arranging transport.

## 2021-05-28 ENCOUNTER — APPOINTMENT (OUTPATIENT)
Dept: CARDIAC CATH/INVASIVE PROCEDURES | Age: 71
DRG: 247 | End: 2021-05-28
Attending: INTERNAL MEDICINE
Payer: MEDICARE

## 2021-05-28 ENCOUNTER — APPOINTMENT (OUTPATIENT)
Dept: GENERAL RADIOLOGY | Age: 71
DRG: 247 | End: 2021-05-28
Attending: INTERNAL MEDICINE
Payer: MEDICARE

## 2021-05-28 ENCOUNTER — HOSPITAL ENCOUNTER (INPATIENT)
Age: 71
LOS: 1 days | Discharge: OTHER INSTITUTION | DRG: 247 | End: 2021-05-31
Attending: INTERNAL MEDICINE | Admitting: INTERNAL MEDICINE
Payer: MEDICARE

## 2021-05-28 LAB
ABO: NORMAL
ACTIVATED CLOTTING TIME: 241 SECONDS (ref 1–150)
ANION GAP SERPL CALCULATED.3IONS-SCNC: 10 MEQ/L (ref 8–16)
ANTIBODY SCREEN: NORMAL
BUN BLDV-MCNC: 13 MG/DL (ref 7–22)
CALCIUM SERPL-MCNC: 9.4 MG/DL (ref 8.5–10.5)
CHLORIDE BLD-SCNC: 103 MEQ/L (ref 98–111)
CHOLESTEROL, TOTAL: 133 MG/DL (ref 100–199)
CO2: 24 MEQ/L (ref 23–33)
CREAT SERPL-MCNC: 0.9 MG/DL (ref 0.4–1.2)
EKG Q-T INTERVAL: 370 MS
EKG QRS DURATION: 92 MS
EKG QTC CALCULATION (BAZETT): 452 MS
EKG R AXIS: 36 DEGREES
EKG T AXIS: -96 DEGREES
EKG VENTRICULAR RATE: 90 BPM
ERYTHROCYTE [DISTWIDTH] IN BLOOD BY AUTOMATED COUNT: 15.2 % (ref 11.5–14.5)
ERYTHROCYTE [DISTWIDTH] IN BLOOD BY AUTOMATED COUNT: 51.7 FL (ref 35–45)
ESTIMATED AVERAGE GLUCOSE: 108 MG/DL
GFR SERPL CREATININE-BSD FRML MDRD: 61 ML/MIN/1.73M2
GLUCOSE BLD-MCNC: 116 MG/DL (ref 70–108)
GLUCOSE BLD-MCNC: 89 MG/DL (ref 70–108)
GLUCOSE BLD-MCNC: 91 MG/DL (ref 70–108)
GLUCOSE BLD-MCNC: 91 MG/DL (ref 70–108)
GLUCOSE BLD-MCNC: 92 MG/DL (ref 70–108)
HBA1C MFR BLD: 5.4 % (ref 4–6)
HCT VFR BLD CALC: 35.1 % (ref 37–47)
HDLC SERPL-MCNC: 62 MG/DL
HEMOGLOBIN: 11 GM/DL (ref 12–16)
INR BLD: 1.25 (ref 0.85–1.13)
LDL CHOLESTEROL CALCULATED: 60 MG/DL
MCH RBC QN AUTO: 29 PG (ref 26–33)
MCHC RBC AUTO-ENTMCNC: 31.3 GM/DL (ref 32.2–35.5)
MCV RBC AUTO: 92.6 FL (ref 81–99)
PLATELET # BLD: 302 THOU/MM3 (ref 130–400)
PMV BLD AUTO: 9 FL (ref 9.4–12.4)
POTASSIUM REFLEX MAGNESIUM: 3.7 MEQ/L (ref 3.5–5.2)
PRO-BNP: 4216 PG/ML (ref 0–900)
RBC # BLD: 3.79 MILL/MM3 (ref 4.2–5.4)
RH FACTOR: NORMAL
SODIUM BLD-SCNC: 137 MEQ/L (ref 135–145)
TRIGL SERPL-MCNC: 56 MG/DL (ref 0–199)
WBC # BLD: 7.5 THOU/MM3 (ref 4.8–10.8)

## 2021-05-28 PROCEDURE — C1769 GUIDE WIRE: HCPCS

## 2021-05-28 PROCEDURE — 6360000002 HC RX W HCPCS: Performed by: NURSE PRACTITIONER

## 2021-05-28 PROCEDURE — 6370000000 HC RX 637 (ALT 250 FOR IP): Performed by: NURSE PRACTITIONER

## 2021-05-28 PROCEDURE — C1887 CATHETER, GUIDING: HCPCS

## 2021-05-28 PROCEDURE — 86850 RBC ANTIBODY SCREEN: CPT

## 2021-05-28 PROCEDURE — 2500000003 HC RX 250 WO HCPCS

## 2021-05-28 PROCEDURE — G0378 HOSPITAL OBSERVATION PER HR: HCPCS

## 2021-05-28 PROCEDURE — 93458 L HRT ARTERY/VENTRICLE ANGIO: CPT | Performed by: FAMILY MEDICINE

## 2021-05-28 PROCEDURE — 82948 REAGENT STRIP/BLOOD GLUCOSE: CPT

## 2021-05-28 PROCEDURE — 83880 ASSAY OF NATRIURETIC PEPTIDE: CPT

## 2021-05-28 PROCEDURE — 93005 ELECTROCARDIOGRAM TRACING: CPT | Performed by: INTERNAL MEDICINE

## 2021-05-28 PROCEDURE — 027034Z DILATION OF CORONARY ARTERY, ONE ARTERY WITH DRUG-ELUTING INTRALUMINAL DEVICE, PERCUTANEOUS APPROACH: ICD-10-PCS | Performed by: INTERNAL MEDICINE

## 2021-05-28 PROCEDURE — 85027 COMPLETE CBC AUTOMATED: CPT

## 2021-05-28 PROCEDURE — 2500000003 HC RX 250 WO HCPCS: Performed by: NURSE PRACTITIONER

## 2021-05-28 PROCEDURE — 85610 PROTHROMBIN TIME: CPT

## 2021-05-28 PROCEDURE — 92928 PRQ TCAT PLMT NTRAC ST 1 LES: CPT | Performed by: INTERNAL MEDICINE

## 2021-05-28 PROCEDURE — 6370000000 HC RX 637 (ALT 250 FOR IP): Performed by: INTERNAL MEDICINE

## 2021-05-28 PROCEDURE — 86901 BLOOD TYPING SEROLOGIC RH(D): CPT

## 2021-05-28 PROCEDURE — 6360000004 HC RX CONTRAST MEDICATION: Performed by: INTERNAL MEDICINE

## 2021-05-28 PROCEDURE — G0379 DIRECT REFER HOSPITAL OBSERV: HCPCS

## 2021-05-28 PROCEDURE — 6360000002 HC RX W HCPCS

## 2021-05-28 PROCEDURE — 80048 BASIC METABOLIC PNL TOTAL CA: CPT

## 2021-05-28 PROCEDURE — 2580000003 HC RX 258: Performed by: INTERNAL MEDICINE

## 2021-05-28 PROCEDURE — 80061 LIPID PANEL: CPT

## 2021-05-28 PROCEDURE — 36415 COLL VENOUS BLD VENIPUNCTURE: CPT

## 2021-05-28 PROCEDURE — 71046 X-RAY EXAM CHEST 2 VIEWS: CPT

## 2021-05-28 PROCEDURE — 86900 BLOOD TYPING SEROLOGIC ABO: CPT

## 2021-05-28 PROCEDURE — C1725 CATH, TRANSLUMIN NON-LASER: HCPCS

## 2021-05-28 PROCEDURE — 6370000000 HC RX 637 (ALT 250 FOR IP)

## 2021-05-28 PROCEDURE — C1894 INTRO/SHEATH, NON-LASER: HCPCS

## 2021-05-28 PROCEDURE — C1874 STENT, COATED/COV W/DEL SYS: HCPCS

## 2021-05-28 PROCEDURE — 85347 COAGULATION TIME ACTIVATED: CPT

## 2021-05-28 RX ORDER — NITROGLYCERIN 0.4 MG/1
0.4 TABLET SUBLINGUAL EVERY 5 MIN PRN
Status: DISCONTINUED | OUTPATIENT
Start: 2021-05-28 | End: 2021-05-31 | Stop reason: HOSPADM

## 2021-05-28 RX ORDER — CLONIDINE HYDROCHLORIDE 0.1 MG/1
0.1 TABLET ORAL 3 TIMES DAILY
Status: DISCONTINUED | OUTPATIENT
Start: 2021-05-28 | End: 2021-05-31 | Stop reason: HOSPADM

## 2021-05-28 RX ORDER — ACETAMINOPHEN 325 MG/1
650 TABLET ORAL EVERY 4 HOURS PRN
Status: DISCONTINUED | OUTPATIENT
Start: 2021-05-28 | End: 2021-05-28 | Stop reason: SDUPTHER

## 2021-05-28 RX ORDER — GUAIFENESIN 600 MG/1
1200 TABLET, EXTENDED RELEASE ORAL 2 TIMES DAILY PRN
Status: DISCONTINUED | OUTPATIENT
Start: 2021-05-28 | End: 2021-05-31 | Stop reason: HOSPADM

## 2021-05-28 RX ORDER — METOPROLOL SUCCINATE 50 MG/1
50 TABLET, EXTENDED RELEASE ORAL DAILY
Status: DISCONTINUED | OUTPATIENT
Start: 2021-05-28 | End: 2021-05-31 | Stop reason: HOSPADM

## 2021-05-28 RX ORDER — ASPIRIN 81 MG/1
324 TABLET, CHEWABLE ORAL ONCE
Status: COMPLETED | OUTPATIENT
Start: 2021-05-28 | End: 2021-05-28

## 2021-05-28 RX ORDER — HYDROCHLOROTHIAZIDE 12.5 MG/1
12.5 CAPSULE, GELATIN COATED ORAL DAILY
Status: DISCONTINUED | OUTPATIENT
Start: 2021-05-28 | End: 2021-05-31 | Stop reason: HOSPADM

## 2021-05-28 RX ORDER — TIZANIDINE 4 MG/1
2 TABLET ORAL EVERY 8 HOURS PRN
Status: DISCONTINUED | OUTPATIENT
Start: 2021-05-28 | End: 2021-05-31 | Stop reason: HOSPADM

## 2021-05-28 RX ORDER — AMANTADINE HYDROCHLORIDE 100 MG/1
100 CAPSULE, GELATIN COATED ORAL 2 TIMES DAILY
Status: DISCONTINUED | OUTPATIENT
Start: 2021-05-28 | End: 2021-05-31 | Stop reason: HOSPADM

## 2021-05-28 RX ORDER — PANTOPRAZOLE SODIUM 40 MG/1
40 TABLET, DELAYED RELEASE ORAL
Status: DISCONTINUED | OUTPATIENT
Start: 2021-05-28 | End: 2021-05-31 | Stop reason: HOSPADM

## 2021-05-28 RX ORDER — LOSARTAN POTASSIUM AND HYDROCHLOROTHIAZIDE 12.5; 1 MG/1; MG/1
1 TABLET ORAL DAILY
Status: DISCONTINUED | OUTPATIENT
Start: 2021-05-28 | End: 2021-05-28 | Stop reason: CLARIF

## 2021-05-28 RX ORDER — ARIPIPRAZOLE 15 MG/1
7.5 TABLET ORAL NIGHTLY
Status: DISCONTINUED | OUTPATIENT
Start: 2021-05-28 | End: 2021-05-31 | Stop reason: HOSPADM

## 2021-05-28 RX ORDER — SODIUM CHLORIDE 9 MG/ML
25 INJECTION, SOLUTION INTRAVENOUS PRN
Status: DISCONTINUED | OUTPATIENT
Start: 2021-05-28 | End: 2021-05-28 | Stop reason: SDUPTHER

## 2021-05-28 RX ORDER — LEVOTHYROXINE SODIUM 0.05 MG/1
50 TABLET ORAL DAILY
Status: DISCONTINUED | OUTPATIENT
Start: 2021-05-29 | End: 2021-05-31 | Stop reason: HOSPADM

## 2021-05-28 RX ORDER — CITALOPRAM 20 MG/1
20 TABLET ORAL DAILY
Status: DISCONTINUED | OUTPATIENT
Start: 2021-05-28 | End: 2021-05-31 | Stop reason: HOSPADM

## 2021-05-28 RX ORDER — AMLODIPINE BESYLATE 10 MG/1
10 TABLET ORAL DAILY
Status: DISCONTINUED | OUTPATIENT
Start: 2021-05-28 | End: 2021-05-31 | Stop reason: HOSPADM

## 2021-05-28 RX ORDER — CALCIUM CARBONATE 200(500)MG
1 TABLET,CHEWABLE ORAL NIGHTLY
Status: DISCONTINUED | OUTPATIENT
Start: 2021-05-28 | End: 2021-05-31 | Stop reason: HOSPADM

## 2021-05-28 RX ORDER — LOSARTAN POTASSIUM 100 MG/1
100 TABLET ORAL DAILY
Status: DISCONTINUED | OUTPATIENT
Start: 2021-05-28 | End: 2021-05-29

## 2021-05-28 RX ORDER — SODIUM CHLORIDE 0.9 % (FLUSH) 0.9 %
5-40 SYRINGE (ML) INJECTION EVERY 12 HOURS SCHEDULED
Status: DISCONTINUED | OUTPATIENT
Start: 2021-05-28 | End: 2021-05-31 | Stop reason: HOSPADM

## 2021-05-28 RX ORDER — SODIUM CHLORIDE 9 MG/ML
25 INJECTION, SOLUTION INTRAVENOUS PRN
Status: DISCONTINUED | OUTPATIENT
Start: 2021-05-28 | End: 2021-05-31 | Stop reason: HOSPADM

## 2021-05-28 RX ORDER — LANOLIN ALCOHOL/MO/W.PET/CERES
3 CREAM (GRAM) TOPICAL NIGHTLY
Status: DISCONTINUED | OUTPATIENT
Start: 2021-05-28 | End: 2021-05-31 | Stop reason: HOSPADM

## 2021-05-28 RX ORDER — POLYETHYLENE GLYCOL 3350 17 G/17G
17 POWDER, FOR SOLUTION ORAL DAILY
Status: DISCONTINUED | OUTPATIENT
Start: 2021-05-28 | End: 2021-05-31 | Stop reason: HOSPADM

## 2021-05-28 RX ORDER — SODIUM CHLORIDE 0.9 % (FLUSH) 0.9 %
5-40 SYRINGE (ML) INJECTION EVERY 12 HOURS SCHEDULED
Status: DISCONTINUED | OUTPATIENT
Start: 2021-05-28 | End: 2021-05-28 | Stop reason: SDUPTHER

## 2021-05-28 RX ORDER — SODIUM CHLORIDE 0.9 % (FLUSH) 0.9 %
5-40 SYRINGE (ML) INJECTION PRN
Status: DISCONTINUED | OUTPATIENT
Start: 2021-05-28 | End: 2021-05-28 | Stop reason: SDUPTHER

## 2021-05-28 RX ORDER — CLOPIDOGREL BISULFATE 75 MG/1
75 TABLET ORAL DAILY
Status: DISCONTINUED | OUTPATIENT
Start: 2021-05-29 | End: 2021-05-31 | Stop reason: HOSPADM

## 2021-05-28 RX ORDER — ASPIRIN 81 MG/1
81 TABLET, CHEWABLE ORAL DAILY
Status: DISCONTINUED | OUTPATIENT
Start: 2021-05-29 | End: 2021-05-31 | Stop reason: HOSPADM

## 2021-05-28 RX ORDER — ARIPIPRAZOLE 15 MG/1
7.5 TABLET ORAL NIGHTLY
COMMUNITY
End: 2022-02-11

## 2021-05-28 RX ORDER — ACETAMINOPHEN 325 MG/1
650 TABLET ORAL EVERY 4 HOURS PRN
Status: DISCONTINUED | OUTPATIENT
Start: 2021-05-28 | End: 2021-05-31 | Stop reason: HOSPADM

## 2021-05-28 RX ORDER — FUROSEMIDE 10 MG/ML
40 INJECTION INTRAMUSCULAR; INTRAVENOUS ONCE
Status: COMPLETED | OUTPATIENT
Start: 2021-05-28 | End: 2021-05-28

## 2021-05-28 RX ORDER — SODIUM CHLORIDE 0.9 % (FLUSH) 0.9 %
5-40 SYRINGE (ML) INJECTION PRN
Status: DISCONTINUED | OUTPATIENT
Start: 2021-05-28 | End: 2021-05-31 | Stop reason: HOSPADM

## 2021-05-28 RX ORDER — SODIUM CHLORIDE 9 MG/ML
INJECTION, SOLUTION INTRAVENOUS CONTINUOUS
Status: DISCONTINUED | OUTPATIENT
Start: 2021-05-28 | End: 2021-05-31 | Stop reason: HOSPADM

## 2021-05-28 RX ADMIN — ASPIRIN 324 MG: 81 TABLET, CHEWABLE ORAL at 07:23

## 2021-05-28 RX ADMIN — IOPAMIDOL 80 ML: 755 INJECTION, SOLUTION INTRAVENOUS at 13:40

## 2021-05-28 RX ADMIN — MICONAZOLE NITRATE: 20 POWDER TOPICAL at 21:21

## 2021-05-28 RX ADMIN — SODIUM CHLORIDE, PRESERVATIVE FREE 10 ML: 5 INJECTION INTRAVENOUS at 21:11

## 2021-05-28 RX ADMIN — Medication 3 MG: at 21:12

## 2021-05-28 RX ADMIN — FUROSEMIDE 40 MG: 10 INJECTION, SOLUTION INTRAVENOUS at 14:26

## 2021-05-28 RX ADMIN — AMLODIPINE BESYLATE 10 MG: 10 TABLET ORAL at 12:17

## 2021-05-28 RX ADMIN — CLONIDINE HYDROCHLORIDE 0.1 MG: 0.1 TABLET ORAL at 21:12

## 2021-05-28 RX ADMIN — METOPROLOL SUCCINATE 50 MG: 50 TABLET, EXTENDED RELEASE ORAL at 12:16

## 2021-05-28 RX ADMIN — AMANTADINE HYDROCHLORIDE 100 MG: 100 CAPSULE, GELATIN COATED ORAL at 12:16

## 2021-05-28 RX ADMIN — TIZANIDINE 2 MG: 4 TABLET ORAL at 21:11

## 2021-05-28 RX ADMIN — CITALOPRAM 20 MG: 20 TABLET, FILM COATED ORAL at 12:16

## 2021-05-28 RX ADMIN — ACETAMINOPHEN 650 MG: 325 TABLET ORAL at 12:15

## 2021-05-28 RX ADMIN — SODIUM CHLORIDE: 9 INJECTION, SOLUTION INTRAVENOUS at 04:45

## 2021-05-28 RX ADMIN — AMANTADINE HYDROCHLORIDE 100 MG: 100 CAPSULE, GELATIN COATED ORAL at 21:12

## 2021-05-28 RX ADMIN — ANTACID TABLETS 500 MG: 500 TABLET, CHEWABLE ORAL at 21:11

## 2021-05-28 RX ADMIN — ARIPIPRAZOLE 7.5 MG: 15 TABLET ORAL at 21:12

## 2021-05-28 RX ADMIN — CLONIDINE HYDROCHLORIDE 0.1 MG: 0.1 TABLET ORAL at 14:27

## 2021-05-28 ASSESSMENT — PAIN DESCRIPTION - PAIN TYPE
TYPE: CHRONIC PAIN
TYPE: CHRONIC PAIN

## 2021-05-28 ASSESSMENT — PAIN DESCRIPTION - DESCRIPTORS
DESCRIPTORS: ACHING
DESCRIPTORS: ACHING

## 2021-05-28 ASSESSMENT — PAIN DESCRIPTION - PROGRESSION
CLINICAL_PROGRESSION: NOT CHANGED

## 2021-05-28 ASSESSMENT — PAIN DESCRIPTION - DIRECTION: RADIATING_TOWARDS: ALL OVER

## 2021-05-28 ASSESSMENT — PAIN SCALES - GENERAL
PAINLEVEL_OUTOF10: 6
PAINLEVEL_OUTOF10: 0
PAINLEVEL_OUTOF10: 4

## 2021-05-28 ASSESSMENT — PAIN - FUNCTIONAL ASSESSMENT
PAIN_FUNCTIONAL_ASSESSMENT: ACTIVITIES ARE NOT PREVENTED
PAIN_FUNCTIONAL_ASSESSMENT: PREVENTS OR INTERFERES SOME ACTIVE ACTIVITIES AND ADLS

## 2021-05-28 ASSESSMENT — PAIN DESCRIPTION - FREQUENCY
FREQUENCY: INTERMITTENT
FREQUENCY: CONTINUOUS

## 2021-05-28 ASSESSMENT — PAIN DESCRIPTION - LOCATION
LOCATION: GENERALIZED
LOCATION: GENERALIZED

## 2021-05-28 ASSESSMENT — PAIN DESCRIPTION - ORIENTATION
ORIENTATION: OTHER (COMMENT)
ORIENTATION: OTHER (COMMENT)

## 2021-05-28 ASSESSMENT — PAIN DESCRIPTION - ONSET
ONSET: ON-GOING
ONSET: ON-GOING

## 2021-05-28 NOTE — BRIEF OP NOTE
[address] [address]  Sedation/Analgesia Post Sedation Record    Pt Name: Poli Klein  Account number: [de-identified]  MRN: [id number]  YOB: 1950  Procedure Performed By: MD MD Cuco Watts, [address]  Primary Care Physician: Parvez Mahmood MD  Date: 5/28/2021    POST-PROCEDURE    Physicians/Assistants: MD MD Cuco Watts, ERI    Procedure Performed:PCI      Sedation/Anesthesia: Versed/ Fentanyl and 2% xylocaine local anesthesia. Estimated Blood Loss: < 50 ml. Specimens Removed: None         Disposition of Specimen: N/A        Complications: No Immediate Complications.        Post-procedure Diagnosis/Findings:     PCI of the LAD               MD MD Cuco Watts, ERI  Electronically signed 5/28/2021 at 1:32 PM  Interventional Cardiology

## 2021-05-28 NOTE — DISCHARGE INSTR - COC
Continuity of Care Form    Patient Name: Luna Billing   :  1950  MRN:  [id number]    Admit date:  2021  Discharge date:  ***    Code Status Order: Full Code   Advance Directives:   Advance Care Flowsheet Documentation       Date/Time Healthcare Directive Type of Healthcare Directive Copy in [address] Agent's Name Healthcare Agent's Phone Number    21 0211  Yes, patient has an advance directive for healthcare treatment  Health care treatment directive  Yes, copy in chart  Healthcare power of   [facility] Physician:  Rhea Randolph MD  PCP: Carol Willis MD    Discharging Nurse: [facility] Unit/Room#: 8B-33/033-A  Discharging Unit Phone Number: ***    Emergency Contact:   Extended Emergency Contact Information  Primary Emergency Contact: Ria Mckeon (POA)   [place] [address] [address]  Phone: [phone]  Relation: Unknown   needed?  No    Past Surgical History:  Past Surgical History:   Procedure Laterality Date    CARDIAC CATHETERIZATION Left 2021    right radial/ [facility]/ Dr. Tabatha Mcdaniel    COLONOSCOPY  2011    ischemic colitis    COLONOSCOPY  2017    -diverticulosis,hemorrhoids    HYMENECTOMY      1972    HYSTERECTOMY      2008    MENISCECTOMY Right     2008    TONSILLECTOMY AND ADENOIDECTOMY      UPPER GASTROINTESTINAL ENDOSCOPY  3/2013    5 cm axial hiatal hernia    UPPER GASTROINTESTINAL ENDOSCOPY  2017    Dr. Jessica Causey       Immunization History:   Immunization History   Administered Date(s) Administered    COVID-19, Pfizer, PF, 30mcg/0.3mL 2021, 2021    Influenza Virus Vaccine 10/19/2020    Influenza, Fadi Hum, IM, PF (6 mo and older Fluzone, Flulaval, Fluarix, and 3 yrs and older Afluria) 2017    Pneumococcal Conjugate 13-valent (Ifoghgm96) 2017, 2018    Pneumococcal Polysaccharide (Nazyjgmby78) 2014    Tdap Concerns: At Risk for Falls    Impairments/Disabilities:      Vision    Nutrition Therapy:  Current Nutrition Therapy:   - Oral Diet:  Cardiac    Routes of Feeding: Oral  Liquids: {Slp liquid thickness:34021}  Daily Fluid Restriction: no  Last Modified Barium Swallow with Video (Video Swallowing Test): not done    Treatments at the Time of Hospital Discharge:   Respiratory Treatments: ***  Oxygen Therapy:  is not on home oxygen therapy.   Ventilator:    - No ventilator support    Rehab Therapies: {THERAPEUTIC INTERVENTION:3784156406}  Weight Bearing Status/Restrictions: No weight bearing restirctions  Other Medical Equipment (for information only, NOT a DME order):  {EQUIPMENT:694597328}  Other Treatments: ***    Patient's personal belongings (please select all that are sent with patient):  Glasses    RN SIGNATURE:  Electronically signed by Rosy Mcintyre RN on 5/29/21 at 10:37 AM EDT       CASE MANAGEMENT/SOCIAL WORK SECTION    Inpatient Status Date: Observation    Readmission Risk Assessment Score:  Readmission Risk              Risk of Unplanned Readmission:  0           Discharging to Facility/ Agency   Name: [facility]  Address: [address], [place] [address], [address] [place] [address]  Phone: [phone]  Fax:     Dialysis Facility (if applicable)   Name:  Address:  Dialysis Schedule:  Phone:  Fax:    / signature: Electronically signed by ANG Whitfield on 5/28/21 at 2:32 PM EDT    PHYSICIAN SECTION    Prognosis: {Prognosis:5568619648}    Condition at Discharge: [address] Patient Condition:384002662}    Rehab Potential (if transferring to Rehab): {Prognosis:8299086841}    Recommended Labs or Other Treatments After Discharge: ***    Physician Certification: I certify the above information and transfer of Olga Monroy  is necessary for the continuing treatment of the diagnosis listed and that she requires {Admit to Appropriate Level of Care:86406} for {GREATER/LESS:531269143} 30 days.     Update Admission H&P: {CHP DME Changes in FNGQV:209363650}    PHYSICIAN SIGNATURE:  Electronically signed by Glenn Hair MD on 5/29/21 at 10:38 AM EDT

## 2021-05-28 NOTE — PROGRESS NOTES
Pt admitted to  8AB33 via via cart/stretcher from [facility]. Complaints: s/p cardiac cath. Admit for PCI placement. IV site free of s/s of infection or infiltration. Vital signs obtained. Assessment and data collection initiated. Two nurse skin assessment performed by Greg Garcia RN and Jerome Abraham. Oriented to room. Policies and procedures for 8AB explained. All questions answered with no further questions at this time. Fall prevention and safety brochure discussed with patient. Bed alarm on. Call light in reach. Oriented to room.    Michell Lao, RN, RN 5/28/2021 5:01 AM

## 2021-05-28 NOTE — PROGRESS NOTES
Last layer of elastoplast removed at this time. Bruising noted around site. No bleeding or redness. Cap refill <3 seconds. No numbness or tinging reported by patient. Site open to air.

## 2021-05-28 NOTE — PROGRESS NOTES
Notified Jeanette Clark patient is here for a heart cath. Hypoxia this morning; spo2 - 87-88% room air. Applied O2 at 2L; now 93% on 2L. Patient has a low-grade fever 99.6, patient needs her home medications reconciled (none have been ordered), patient is also complaining of pain all over (chronic) and has no orders for any pain medication. See chart for new orders.

## 2021-05-28 NOTE — PROGRESS NOTES
First layer of elastoplast removed. Pt denies pain, numbness, or tingling. Radial pulse palpated. Bruising noted around dressing, otherwise color appropriate.

## 2021-05-28 NOTE — PRE SEDATION
[address] [place] [address]  Sedation/Analgesia History & Physical    Pt Name: Deepak Carrillo  Account number: [de-identified]  MRN: [id number]  YOB: 1950  Provider Performing Procedure: Ellie Jarrett MD MD  Referring Provider: Ellie Jarrett MD   Primary Care Physician: Tres Laguna MD  Date: 5/28/2021    PRE-PROCEDURE    Code Status: FULL CODE  Brief History/Pre-Procedure Diagnosis:   Severe LAD stenosis  CCS III anginal equivalent, sob  Equivocal stress test       Consent: : I have discussed with the patient risks, benefits, and alternatives (and relevant risks, benefits, and side effects related to alternatives or not receiving care), and likelihood of the success. The patient and/or representative appear to understand and agree to proceed. The discussion encompasses risks, benefits, and side effects related to the alternatives and the risks related to not receiving the proposed care, treatment, and services. The indication, risks and benefits of the procedure and possible therapeutic consequences and alternatives were discussed with the patient. The patient was given the opportunity to ask questions and to have them answered to his/her satisfaction. Risks of the procedure include but are not limited to the following: Bleeding, hematoma including retroperitoneal hemmorhage, infection, pain and discomfort, injury to the aorta and other blood vessels, rhythm disturbance, low blood pressure, myocardial infarction, stroke, kidney damage/failure, myocardial perforation, allergic reactions to sedatives/contrast material, loss of pulse/vascular compromise requiring surgery, aneurysm/pseudoaneurysm formation, possible loss of a limb/hand/leg, needing blood transfusion, requiring emergent open heart surgery or emergent coronary intervention, the need for intubation/respiratory support, the requirement for defibrillation/cardioversion, and death.  Alternatives to and omission of the suggested procedure were discussed. The patient had no further questions and wished to proceed; the consent form was signed. MEDICAL HISTORY  [x]ASHD/ANGINA/MI/CHF   [x]Hypertension  []Diabetes  [x]Hyperlipidemia  []Smoking  []Family Hx of ASHD  [x]Additional information:       has a past medical history of ADHD (attention deficit hyperactivity disorder), Anemia, Asthma, Bipolar disorder ([address] Bradycardia, unspecified, Chronic anemia, Chronic atrial fibrillation ([address] Chronic back pain, Chronic kidney disease, Colitis, Constipation, COPD (chronic obstructive pulmonary disease) ([address] Cystitis without hematuria, Diastolic CHF ([address] Esophagitis, GERD (gastroesophageal reflux disease), Hemorrhoids, History of echocardiogram, Hypertension, Hypothyroidism, Metabolic syndrome, Moderate protein-calorie malnutrition ([address] Muscle weakness (generalized), Obesity, and TIA (transient ischemic attack). SURGICAL HISTORY   has a past surgical history that includes Hysterectomy; meniscectomy (Right); Tonsillectomy and adenoidectomy; Hymenectomy; Upper gastrointestinal endoscopy (3/2013); Colonoscopy (5/2011); Upper gastrointestinal endoscopy (03/07/2017); Colonoscopy (03/07/2017); and Cardiac catheterization (Left, 05/27/2021).   Additional information:       ALLERGIES   Allergies as of 05/27/2021 - Fully Reviewed 05/27/2021   Allergen Reaction Noted    Lamictal [lamotrigine] Hives 05/15/2012    Oxcarbazepine  01/25/2017    Pcn [penicillins] Itching 05/15/2012    Sertraline  01/25/2017    Trileptal Hives 05/15/2012    Strattera [atomoxetine hcl] Rash 07/31/2012     Additional information:       MEDICATIONS   Aspirin  [x] 81 mg  [] 325 mg  [] None  Antiplatelet drug therapy use last 5 days  [x]No []Yes  Coumadin Use Last 5 Days [x]No []Yes  Other anticoagulant use last 5 days  [x]No []Yes    Current Facility-Administered Medications:     0.9 % sodium chloride infusion, , Intravenous, Continuous, Ayyash Melhem, MD, Last Rate: 100 mL/hr at 05/28/21 0445, New Bag at 05/28/21 0445    sodium chloride flush 0.9 % injection 5-40 mL, 5-40 mL, Intravenous, 2 times per day, David Curtis MD    sodium chloride flush 0.9 % injection 5-40 mL, 5-40 mL, Intravenous, PRN, David Curtis MD    0.9 % sodium chloride infusion, 25 mL, Intravenous, PRN, David Curtis MD    nitroGLYCERIN (NITROSTAT) SL tablet 0.4 mg, 0.4 mg, Sublingual, Q5 Min PRN, SONJA Shankar CNP    acetaminophen (TYLENOL) tablet 650 mg, 650 mg, Oral, Q4H PRN, SONJA Shankar CNP    amantadine (SYMMETREL) capsule 100 mg, 100 mg, Oral, BID, SONJA Louie CNP    amLODIPine (NORVASC) tablet 10 mg, 10 mg, Oral, Daily, SONJA Shankar CNP    [Held by provider] apixaban (ELIQUIS) tablet 5 mg, 5 mg, Oral, BID, SONJA Louie CNP    ARIPiprazole (ABILIFY) tablet 7.5 mg, 7.5 mg, Oral, Nightly, SONJA Shankar CNP    calcium carbonate (TUMS) chewable tablet 500 mg, 1 tablet, Oral, Nightly, SONJA Shankar CNP    citalopram (CELEXA) tablet 20 mg, 20 mg, Oral, Daily, SONJA Louie CNP    cloNIDine (CATAPRES) tablet 0.1 mg, 0.1 mg, Oral, TID, SONJA Shankar CNP    guaiFENesin [facility]) extended release tablet 1,200 mg, 1,200 mg, Oral, BID PRN, SONJA Shankar CNP    [START ON 5/29/2021] levothyroxine (SYNTHROID) tablet 50 mcg, 50 mcg, Oral, Daily, SONJA Shankar CNP    [Held by provider] losartan-hydroCHLOROthiazide (HYZAAR) 100-12.5 MG per tablet 1 tablet, 1 tablet, Oral, Daily, SONJA Shankar CNP    magnesium hydroxide (MILK OF MAGNESIA) 400 MG/5ML suspension 30 mL, 30 mL, Oral, Daily PRN, SONJA Shankar CNP    melatonin tablet 3 mg, 3 mg, Oral, Nightly, SONJA Shankar CNP    metoprolol succinate (TOPROL XL) extended release tablet 50 mg, 50 mg, Oral, Daily, Ebbiloren Jarad Tony, APRN - CNP    pantoprazole (PROTONIX) tablet 40 mg, 40 mg, Oral, Daily before lunch, Ashleigh Ambriz, APRN - CNP    polyethylene glycol (GLYCOLAX) packet 17 g, 17 g, Oral, Daily, Ashleigh Ambriz, APRN - CNP    tiZANidine (ZANAFLEX) tablet 2 mg, 2 mg, Oral, Q8H PRN, Ashleigh Ambriz, APRN - CNP    traZODone (DESYREL) tablet 150 mg, 150 mg, Oral, Nightly, Ashleigh Ambriz, APRN - CNP    Valbenazine Tosylate CAPS 40 mg, 40 mg, Oral, Nightly, Ashleigh Ambriz, APRN - CNP    acetaminophen (TYLENOL) tablet 650 mg, 650 mg, Oral, Q4H PRN, Ashleigh Ambriz, APRN - CNP  Prior to Admission medications    Medication Sig Start Date End Date Taking?  Authorizing Provider   ARIPiprazole (ABILIFY) 15 MG tablet Take 7.5 mg by mouth nightly   Yes Historical Provider, MD   apixaban (ELIQUIS) 5 MG TABS tablet Take 1 tablet by mouth 2 times daily 5/13/21  Yes Osiel Gonsalves MD   tiZANidine (ZANAFLEX) 2 MG tablet Take 2 mg by mouth every 6 hours as needed   Yes Historical Provider, MD   cloNIDine (CATAPRES) 0.1 MG tablet Take 0.1 mg by mouth 3 times daily   Yes Historical Provider, MD   amLODIPine (NORVASC) 10 MG tablet Take 10 mg by mouth daily    Yes Historical Provider, MD   Valbenazine Tosylate (INGREZZA) 40 MG CAPS Take 40 mg by mouth nightly   Yes Historical Provider, MD   melatonin 3 MG TABS tablet Take 3 mg by mouth nightly   Yes Historical Provider, MD   Amantadine (SYMMETREL) 100 MG TABS tablet Take 100 mg by mouth 2 times daily   Yes Historical Provider, MD   losartan-hydroCHLOROthiazide (HYZAAR) 100-12.5 MG per tablet Take 1 tablet by mouth daily    Yes Historical Provider, MD   Dextromethorphan-guaiFENesin (DELSYM COUGH/CHEST CONGEST DM) 5-100 MG/5ML LIQD Take 5 mLs by mouth every 12 hours as needed (cough)   Yes Historical Provider, MD   acetaminophen (TYLENOL) 325 MG tablet Take 650 mg by mouth every 4 hours as needed for Pain or Fever   Yes Historical Provider, MD   calcium C,C,E.  Pulses 2+ bilaterally  Mental Status: Alert & Oriented        PLANNED PROCEDURE   []Cath  [x]PCI                []Pacemaker/AICD  []LANE             []Cardioversion []Peripheral angiography/PTA  []Other:      SEDATION  Planned agent:[x]Midazolam []Meperidine [x]Sublimaze []Morphine  []Diazepam  []Other:     ASA Classification:  []1 []2 [x]3 []4 []5  Class 1: A normal healthy patient  Class 2: Pt with mild to moderate systemic disease  Class 3: Severe systemic disease or disturbance  Class 4: Severe systemic disorders that are already life threatening. Class 5: Moribund pt with little chances of survival, for more than 24 hours. Mallampati I Airway Classification:   []1 []2 [x]3 []4    [x]Pre-procedure diagnostic studies complete and results available. Comment:    [x]Previous sedation/anesthesia experiences assessed. Comment:    [x]The patient is an appropriate candidate to undergo the planned procedure sedation and anesthesia. (Refer to nursing sedation/analgesia documentation record)  [x]Formulation and discussion of sedation/procedure plan, risks, and expectations with patient and/or responsible adult completed. [x]Patient examined immediately prior to the procedure.  (Refer to nursing sedation/analgesia documentation record)    Rhea Randolph MD MD   Electronically signed 5/28/2021 at 11:34 AM

## 2021-05-29 LAB
ANION GAP SERPL CALCULATED.3IONS-SCNC: 11 MEQ/L (ref 8–16)
BUN BLDV-MCNC: 14 MG/DL (ref 7–22)
CALCIUM SERPL-MCNC: 9.3 MG/DL (ref 8.5–10.5)
CHLORIDE BLD-SCNC: 103 MEQ/L (ref 98–111)
CO2: 26 MEQ/L (ref 23–33)
CREAT SERPL-MCNC: 1 MG/DL (ref 0.4–1.2)
GFR SERPL CREATININE-BSD FRML MDRD: 54 ML/MIN/1.73M2
GLUCOSE BLD-MCNC: 87 MG/DL (ref 70–108)
GLUCOSE BLD-MCNC: 87 MG/DL (ref 70–108)
POTASSIUM SERPL-SCNC: 3.8 MEQ/L (ref 3.5–5.2)
SARS-COV-2, NAAT: NOT DETECTED
SODIUM BLD-SCNC: 140 MEQ/L (ref 135–145)

## 2021-05-29 PROCEDURE — 6370000000 HC RX 637 (ALT 250 FOR IP): Performed by: INTERNAL MEDICINE

## 2021-05-29 PROCEDURE — 99232 SBSQ HOSP IP/OBS MODERATE 35: CPT | Performed by: NURSE PRACTITIONER

## 2021-05-29 PROCEDURE — 87635 SARS-COV-2 COVID-19 AMP PRB: CPT

## 2021-05-29 PROCEDURE — 36415 COLL VENOUS BLD VENIPUNCTURE: CPT

## 2021-05-29 PROCEDURE — 80048 BASIC METABOLIC PNL TOTAL CA: CPT

## 2021-05-29 PROCEDURE — 6370000000 HC RX 637 (ALT 250 FOR IP): Performed by: NURSE PRACTITIONER

## 2021-05-29 PROCEDURE — G0378 HOSPITAL OBSERVATION PER HR: HCPCS

## 2021-05-29 PROCEDURE — 2580000003 HC RX 258: Performed by: INTERNAL MEDICINE

## 2021-05-29 PROCEDURE — 82948 REAGENT STRIP/BLOOD GLUCOSE: CPT

## 2021-05-29 RX ORDER — FUROSEMIDE 40 MG/1
40 TABLET ORAL DAILY
Status: DISCONTINUED | OUTPATIENT
Start: 2021-05-29 | End: 2021-05-31 | Stop reason: HOSPADM

## 2021-05-29 RX ORDER — LOSARTAN POTASSIUM 50 MG/1
50 TABLET ORAL DAILY
Status: DISCONTINUED | OUTPATIENT
Start: 2021-05-29 | End: 2021-05-31 | Stop reason: HOSPADM

## 2021-05-29 RX ORDER — NITROGLYCERIN 0.4 MG/1
TABLET SUBLINGUAL
Qty: 25 TABLET | Refills: 1 | Status: SHIPPED | OUTPATIENT
Start: 2021-05-29

## 2021-05-29 RX ORDER — LOSARTAN POTASSIUM 50 MG/1
50 TABLET ORAL DAILY
Qty: 30 TABLET | Refills: 3 | Status: SHIPPED | OUTPATIENT
Start: 2021-05-29

## 2021-05-29 RX ORDER — ASPIRIN 81 MG/1
81 TABLET, CHEWABLE ORAL DAILY
Qty: 30 TABLET | Refills: 3 | Status: SHIPPED | OUTPATIENT
Start: 2021-05-30 | End: 2021-08-16

## 2021-05-29 RX ORDER — CLOPIDOGREL BISULFATE 75 MG/1
75 TABLET ORAL DAILY
Qty: 30 TABLET | Refills: 3 | Status: SHIPPED | OUTPATIENT
Start: 2021-05-30

## 2021-05-29 RX ORDER — FUROSEMIDE 40 MG/1
40 TABLET ORAL DAILY
Qty: 60 TABLET | Refills: 3 | Status: ON HOLD | OUTPATIENT
Start: 2021-05-29 | End: 2022-06-19 | Stop reason: SDUPTHER

## 2021-05-29 RX ADMIN — CLONIDINE HYDROCHLORIDE 0.1 MG: 0.1 TABLET ORAL at 13:27

## 2021-05-29 RX ADMIN — FUROSEMIDE 40 MG: 40 TABLET ORAL at 11:34

## 2021-05-29 RX ADMIN — MICONAZOLE NITRATE: 20 POWDER TOPICAL at 08:53

## 2021-05-29 RX ADMIN — CLOPIDOGREL BISULFATE 75 MG: 75 TABLET ORAL at 08:53

## 2021-05-29 RX ADMIN — PANTOPRAZOLE SODIUM 40 MG: 40 TABLET, DELAYED RELEASE ORAL at 11:34

## 2021-05-29 RX ADMIN — SODIUM CHLORIDE, PRESERVATIVE FREE 10 ML: 5 INJECTION INTRAVENOUS at 21:55

## 2021-05-29 RX ADMIN — AMLODIPINE BESYLATE 10 MG: 10 TABLET ORAL at 08:53

## 2021-05-29 RX ADMIN — LOSARTAN POTASSIUM 50 MG: 50 TABLET, FILM COATED ORAL at 11:34

## 2021-05-29 RX ADMIN — METOPROLOL SUCCINATE 50 MG: 50 TABLET, EXTENDED RELEASE ORAL at 11:32

## 2021-05-29 RX ADMIN — SODIUM CHLORIDE, PRESERVATIVE FREE 10 ML: 5 INJECTION INTRAVENOUS at 08:55

## 2021-05-29 RX ADMIN — CLONIDINE HYDROCHLORIDE 0.1 MG: 0.1 TABLET ORAL at 21:53

## 2021-05-29 RX ADMIN — Medication 3 MG: at 21:53

## 2021-05-29 RX ADMIN — AMANTADINE HYDROCHLORIDE 100 MG: 100 CAPSULE, GELATIN COATED ORAL at 08:53

## 2021-05-29 RX ADMIN — CITALOPRAM 20 MG: 20 TABLET, FILM COATED ORAL at 08:53

## 2021-05-29 RX ADMIN — ANTACID TABLETS 500 MG: 500 TABLET, CHEWABLE ORAL at 21:53

## 2021-05-29 RX ADMIN — MICONAZOLE NITRATE: 20 POWDER TOPICAL at 21:54

## 2021-05-29 RX ADMIN — TIZANIDINE 2 MG: 4 TABLET ORAL at 21:54

## 2021-05-29 RX ADMIN — TRAZODONE HYDROCHLORIDE 150 MG: 100 TABLET ORAL at 21:54

## 2021-05-29 RX ADMIN — ASPIRIN 81 MG: 81 TABLET, CHEWABLE ORAL at 08:53

## 2021-05-29 RX ADMIN — AMANTADINE HYDROCHLORIDE 100 MG: 100 CAPSULE, GELATIN COATED ORAL at 21:53

## 2021-05-29 RX ADMIN — CLONIDINE HYDROCHLORIDE 0.1 MG: 0.1 TABLET ORAL at 08:53

## 2021-05-29 RX ADMIN — LEVOTHYROXINE SODIUM 50 MCG: 0.05 TABLET ORAL at 05:16

## 2021-05-29 RX ADMIN — ARIPIPRAZOLE 7.5 MG: 15 TABLET ORAL at 21:54

## 2021-05-29 ASSESSMENT — PAIN SCALES - GENERAL
PAINLEVEL_OUTOF10: 0

## 2021-05-29 ASSESSMENT — PAIN DESCRIPTION - PROGRESSION: CLINICAL_PROGRESSION: NOT CHANGED

## 2021-05-29 NOTE — PROGRESS NOTES
Was informed by [facility] that patient would not be able to be picked up until 5/30/21 at 12pm. Will call and inform [facility].

## 2021-05-29 NOTE — PROGRESS NOTES
Cardiology Progress Note      Patient:  Suha Santo  YOB: 1950  MRN: [id number]   Acct: [de-identified]  Admit Date:  5/28/2021  Primary Cardiologist: Dr. Cody Starr    Chief Complaint:   Pt transferred from [facility] for PCI     Subjective (Events in last 24 hours):     Pt diuresed 3L with IV diuresis after cath - labs good this am     Pt denies CP or SOB   Still on O2 with 97% sat - 2L NC    VSS    Rt radial cath site - slight ecchymosis - no hematoma - pulses present - neurovascular check WNL         Objective:   BP (!) 152/68   Pulse 69   Temp 98.8 °F (37.1 °C) (Oral)   Resp 18   Wt 276 lb 6.4 oz (125.4 kg)   SpO2 97%   BMI 44.61 kg/m²        TELEMETRY: AFB CVR     Physical Exam:  General Appearance: alert and oriented to person, place and time, in no acute distress  Cardiovascular: irregular rhythm, normal S1 and S2, no murmurs, rubs, clicks, or gallops, distal pulses intact,   Pulmonary/Chest: clear to auscultation bilaterally- no wheezes, rales or rhonchi, normal air movement, no respiratory distress  Abdomen: soft, non-tender, non-distended, normal bowel sounds, no masses Extremities: no cyanosis, clubbing or edema, pulses present    Skin: warm and dry, no rash or erythema  Musculoskeletal: normal range of motion, no joint swelling, deformity or tenderness  Neurological: alert, oriented, normal speech, no focal findings or movement disorder noted    Medications:    sodium chloride flush  5-40 mL Intravenous 2 times per day    amantadine  100 mg Oral BID    amLODIPine  10 mg Oral Daily    [Held by provider] apixaban  5 mg Oral BID    ARIPiprazole  7.5 mg Oral Nightly    calcium carbonate  1 tablet Oral Nightly    citalopram  20 mg Oral Daily    cloNIDine  0.1 mg Oral TID    levothyroxine  50 mcg Oral Daily    melatonin  3 mg Oral Nightly    metoprolol succinate  50 mg Oral Daily    pantoprazole  40 mg Oral Daily before lunch    polyethylene glycol  17 g Oral Daily    traZODone  150 mg Oral Nightly    Valbenazine Tosylate  40 mg Oral Nightly    [Held by provider] losartan  100 mg Oral Daily    And    [Held by provider] hydroCHLOROthiazide  12.5 mg Oral Daily    aspirin  81 mg Oral Daily    clopidogrel  75 mg Oral Daily    miconazole   Topical BID      sodium chloride 100 mL/hr at 05/28/21 0445    sodium chloride       sodium chloride flush, 5-40 mL, PRN  sodium chloride, 25 mL, PRN  nitroGLYCERIN, 0.4 mg, Q5 Min PRN  guaiFENesin, 1,200 mg, BID PRN  magnesium hydroxide, 30 mL, Daily PRN  tiZANidine, 2 mg, Q8H PRN  acetaminophen, 650 mg, Q4H PRN        Diagnostics:      Left Heart Cath:   Procedure Summary      Moderate to severe single vessel disease involving the LAD coronary artery   with moderate disease in a large Cx. Normal left ventricular end diastolic pressure. (LVEDP). Recommendations      Consult to interventional cardiology for possibly angioplasty and/or   stenting of the patients severe stenosis vs guideline directed maximal   medical management. Signature      ----------------------------------------------------------------   Electronically signed by Flo Jean Baptiste(Performing Physician) on   05/27/2021 16:16    Angiographic Findings      Cardiac Arteries and Lesion Findings     LAD:       Lesion on Mid LAD: Proximal subsection. 70% stenosis. LCx:       Lesion on Prox CX: Distal subsection. 50% stenosis. RCA: Normal 0% stenosis.      Coronary Tree      Dominance: Left     LV function assessed as:Normal.  Ejection Fraction  +-------------------------------------------+------------------------------+  ! Method                                     ! EF%                           ! +-------------------------------------------+------------------------------+  ! Echocardiography                           ! 60                            !  +-------------------------------------------+------------------------------+     Ventriculography Findings:  Normal left ventricular end diastolic pressure (LVEDP) with no significant  aortic valve gradient seen on pull-back across the aortic valve. Post-procedure Diagnosis/Findings:           PCI of the LAD                                                                                            Daryll Feinstein, MD MD Audra Dancer, RPVI  Electronically signed 5/28/2021 at 1:32 PM  Interventional Cardiology       Lab Data:    Cardiac Enzymes:  No results for input(s): CKTOTAL, CKMB, CKMBINDEX, TROPONINI in the last 72 hours.     CBC:   Lab Results   Component Value Date    WBC 7.5 05/28/2021    RBC 3.79 05/28/2021    RBC 3.71 05/16/2012    HGB 11.0 05/28/2021    HCT 35.1 05/28/2021     05/28/2021     05/16/2012       CMP:    Lab Results   Component Value Date     05/29/2021    K 3.8 05/29/2021    K 3.7 05/28/2021     05/29/2021    CO2 26 05/29/2021    BUN 14 05/29/2021    CREATININE 1.0 05/29/2021    GFRAA >60 05/27/2021    LABGLOM 54 05/29/2021    GLUCOSE 87 05/29/2021    GLUCOSE 110 05/16/2012    CALCIUM 9.3 05/29/2021       Hepatic Function Panel:    Lab Results   Component Value Date    ALKPHOS 136 05/27/2021    ALT 27 05/27/2021    AST 22 05/27/2021    PROT 7.3 05/27/2021    BILITOT 0.81 05/27/2021    LABALBU 4.0 05/27/2021    LABALBU 4.1 05/16/2012       Magnesium:  No results found for: MG    PT/INR:    Lab Results   Component Value Date    PROTIME 10.3 07/21/2017    INR 1.25 05/28/2021       HgBA1c:    Lab Results   Component Value Date    LABA1C 5.4 05/27/2021       FLP:    Lab Results   Component Value Date    TRIG 56 05/28/2021    HDL 62 05/28/2021    LDLCALC 60 05/28/2021       TSH:    Lab Results   Component Value Date    TSH 4.29 05/27/2021         Assessment:    S\p OP elective cardiac cath 5/28/2021: PCI LAD   Preserved EF     Fluid overload: acute on chronic diastolic chf  - did diurese well with IV lasix - resolving     Chronic AFB CVR    [id number]=  4  On NOAC     HLP- LDL 60 Hx CHF       Plan:  · Wean off O2   ·   · Change to po lasix and stop HCTZ   ·   Salt restriction 2 gm ( 2,000 mg) daily   · Fluid restriction 2 L daily    Weigh yourself daily: Should you gain 2-3 pounds in 1 days time or 3-5 pounds in 2 days time-- call Dr. Deo Guerrero office (cardiology) for recommendations     · Ok to DC to nursing home today   · Follow with Dr. Deo Guerrero in [facility]          Electronically signed by SONJA López CNP on 5/29/2021 at 9:02 AM

## 2021-05-30 PROBLEM — R07.9 CHEST PAIN: Status: ACTIVE | Noted: 2021-05-30

## 2021-05-30 PROCEDURE — 1200000003 HC TELEMETRY R&B

## 2021-05-30 PROCEDURE — 6370000000 HC RX 637 (ALT 250 FOR IP): Performed by: INTERNAL MEDICINE

## 2021-05-30 PROCEDURE — 6370000000 HC RX 637 (ALT 250 FOR IP): Performed by: NURSE PRACTITIONER

## 2021-05-30 PROCEDURE — 2580000003 HC RX 258: Performed by: INTERNAL MEDICINE

## 2021-05-30 RX ADMIN — AMLODIPINE BESYLATE 10 MG: 10 TABLET ORAL at 09:36

## 2021-05-30 RX ADMIN — Medication 3 MG: at 22:21

## 2021-05-30 RX ADMIN — LEVOTHYROXINE SODIUM 50 MCG: 0.05 TABLET ORAL at 06:34

## 2021-05-30 RX ADMIN — AMANTADINE HYDROCHLORIDE 100 MG: 100 CAPSULE, GELATIN COATED ORAL at 09:36

## 2021-05-30 RX ADMIN — METOPROLOL SUCCINATE 50 MG: 50 TABLET, EXTENDED RELEASE ORAL at 09:30

## 2021-05-30 RX ADMIN — ARIPIPRAZOLE 7.5 MG: 15 TABLET ORAL at 22:20

## 2021-05-30 RX ADMIN — SODIUM CHLORIDE, PRESERVATIVE FREE 10 ML: 5 INJECTION INTRAVENOUS at 09:35

## 2021-05-30 RX ADMIN — AMANTADINE HYDROCHLORIDE 100 MG: 100 CAPSULE, GELATIN COATED ORAL at 22:21

## 2021-05-30 RX ADMIN — CLONIDINE HYDROCHLORIDE 0.1 MG: 0.1 TABLET ORAL at 09:37

## 2021-05-30 RX ADMIN — ASPIRIN 81 MG: 81 TABLET, CHEWABLE ORAL at 09:35

## 2021-05-30 RX ADMIN — TRAZODONE HYDROCHLORIDE 150 MG: 100 TABLET ORAL at 20:40

## 2021-05-30 RX ADMIN — PANTOPRAZOLE SODIUM 40 MG: 40 TABLET, DELAYED RELEASE ORAL at 09:35

## 2021-05-30 RX ADMIN — FUROSEMIDE 40 MG: 40 TABLET ORAL at 09:37

## 2021-05-30 RX ADMIN — MICONAZOLE NITRATE: 20 POWDER TOPICAL at 09:38

## 2021-05-30 RX ADMIN — LOSARTAN POTASSIUM 50 MG: 50 TABLET, FILM COATED ORAL at 09:36

## 2021-05-30 RX ADMIN — CLOPIDOGREL BISULFATE 75 MG: 75 TABLET ORAL at 09:35

## 2021-05-30 RX ADMIN — MICONAZOLE NITRATE: 20 POWDER TOPICAL at 22:21

## 2021-05-30 RX ADMIN — CLONIDINE HYDROCHLORIDE 0.1 MG: 0.1 TABLET ORAL at 22:21

## 2021-05-30 RX ADMIN — ANTACID TABLETS 500 MG: 500 TABLET, CHEWABLE ORAL at 22:21

## 2021-05-30 RX ADMIN — CITALOPRAM 20 MG: 20 TABLET, FILM COATED ORAL at 09:36

## 2021-05-30 RX ADMIN — CLONIDINE HYDROCHLORIDE 0.1 MG: 0.1 TABLET ORAL at 15:19

## 2021-05-30 ASSESSMENT — PAIN SCALES - GENERAL
PAINLEVEL_OUTOF10: 0

## 2021-05-30 NOTE — PROGRESS NOTES
Call placed to [facility] to determine ETA for patient. [facility] (Hannah Melendez) informed this nurse they got the patient confused with another patient and they actually never scheduled a  for patient today and [facility] would not be able to  patient until 0030 tomorrow. This nurse informed Branden Garcias; Charge RN, [facility] had called [facility] three times today to verify  for [facility] to be [place] [facility], RN verified with them multiple times for  today and  time was changed three times and the last conversation; nursing staff was informed by [facility], transportation was on the way to the hospital. Branden Garcias informed this the information was incorrect and [facility] wouldn't be able to come and get patient until after midnight. Informed Charge RN, Mariela Champagne of the above and notified house supervisor, Justina Smith. Call placed to facility who informed this nurse patient would not be able to be received that early in the morning and requested transportation be set at 0630 so patient would arrive to their facility at 0830. Updated patient on situation and telemetry placed back on patient.  appointment made by Lyndsay Hill for 0630 tomorrow; Mariela Champagne RN spoke with Magan Goldman who verified patient's name, and date of birth. Updated [place] of new  time for tomorrow.

## 2021-05-30 NOTE — PROGRESS NOTES
This RN spoke with primary nurse [facility] about transport time to return to nursing facility. Julissa Gillespie with [facility] told bravo [facility]UTSCH that there was an error with patients information and they would now not be able to get patient picked up until 1230 am. This RN called nursing facility to verify if that time was okay, Radha Tremaine at [facility] facility stated that they would rather not take her that late due to low staff numbers. [facility] called back and requested morning pickup tomorrow, Rufina with [facility] stated that they can pick her up at 0630 am. This RN verified patients name and date of birth several times to ensure that the patient information was correct. Sharda Vera notified of situation.

## 2021-05-30 NOTE — PROGRESS NOTES
Report called to Edilson Mason LPN at [facility]. Full report given. Awaiting on transporation, [facility] called and unable to give this nurse an ETA. Incontinent care provided to patient; patient dressed and ready for discharge.

## 2021-05-30 NOTE — PLAN OF CARE
Problem: Falls - Risk of:  Goal: Will remain free from falls  Outcome: Ongoing  Goal: Absence of physical injury  Outcome: Ongoing     Problem: Pain:  Goal: Pain level will decrease  Outcome: Ongoing  Goal: Control of acute pain  Outcome: Ongoing  Goal: Control of chronic pain  Outcome: Ongoing     Problem: DISCHARGE BARRIERS  Goal: Patient's continuum of care needs are met  Outcome: Ongoing

## 2021-05-30 NOTE — PLAN OF CARE
Problem: Falls - Risk of:  Goal: Will remain free from falls  Description: Will remain free from falls  Outcome: Completed  Goal: Absence of physical injury  Description: Absence of physical injury  Outcome: Completed     Problem: Pain:  Goal: Pain level will decrease  Description: Pain level will decrease  Outcome: Completed  Goal: Control of acute pain  Description: Control of acute pain  Outcome: Completed  Goal: Control of chronic pain  Description: Control of chronic pain  Outcome: Completed     Problem: DISCHARGE BARRIERS  Goal: Patient's continuum of care needs are met  Outcome: Completed

## 2021-05-31 VITALS
RESPIRATION RATE: 16 BRPM | BODY MASS INDEX: 44.61 KG/M2 | WEIGHT: 276.4 LBS | SYSTOLIC BLOOD PRESSURE: 133 MMHG | OXYGEN SATURATION: 93 % | DIASTOLIC BLOOD PRESSURE: 60 MMHG | TEMPERATURE: 98.6 F | HEART RATE: 64 BPM

## 2021-05-31 RX ADMIN — LEVOTHYROXINE SODIUM 50 MCG: 0.05 TABLET ORAL at 06:44

## 2021-05-31 ASSESSMENT — PAIN SCALES - GENERAL: PAINLEVEL_OUTOF10: 0

## 2021-06-01 NOTE — PROCEDURES
EKG was handed to [facility].
procedure well. IMMEDIATE COMPLICATIONS:  None. MEDICATIONS:  See EMR. ACCESS:  A Vasc Band was used for hemostasis. ESTIMATED BLOOD LOSS:  Less than 10 mL. SUMMARY:  Successful PCI of the LAD with one drug-eluting stent. PLAN:  1. DAPT. 2.  Bedrest.  3.  Optimal medical therapy. 4.  IV fluids. 5.  Overnight observation. 6.  Guideline-directed therapy for CAD. 8.  Follow up with myself, primary cardiologist, in one to two weeks  postprocedure. All the above was explained to the patient and the patient's family. They are agreeable and amenable to the above plan.         Ford Cruz MD    D: 06/01/2021 10:00:28       T: 06/01/2021 10:30:56     LUIS [email or web address]  Job#: [id number]     Doc#: [id number]    CC:

## 2021-06-01 NOTE — CARE COORDINATION
6/1/21, 11:15 AM EDT  Late Entry    Patient goals/plan/ treatment preferences discussed by  and . Patient goals/plan/ treatment preferences reviewed with patient/ family. Patient/ family verbalize understanding of discharge plan and are in agreement with goal/plan/treatment preferences. Understanding was demonstrated using the teach back method. AVS provided by RN at time of discharge, which includes all necessary medical information pertaining to the patients current course of illness, treatment, post-discharge goals of care, and treatment preferences. Services After Discharge  Services At/After Discharge: [facility], [facility], Nursing Services           Patient discharged on 5/31 to return to [address] [place] [address] in [facility].  RN called report and faxed AVS.

## 2021-06-01 NOTE — DISCHARGE SUMMARY
Cardiology Discharge Note      Patient:  Rossy Or  YOB: 1950  MRN: [id number]   Acct: [de-identified]  [facility] Date:  5/28/2021  Primary Cardiologist: Dr. Everardo Quigley    Chief Complaint:   Pt transferred from [address] for PCI     Subjective (Events in last 24 hours):     Pt diuresed 3L with IV diuresis after cath - labs good this am     Pt denies CP or SOB   Still on O2 with 97% sat - 2L NC    VSS    Rt radial cath site - slight ecchymosis - no hematoma - pulses present - neurovascular check WNL         Objective:   /60   Pulse 64   Temp 98.6 °F (37 °C) (Oral)   Resp 16   Wt 276 lb 6.4 oz (125.4 kg)   SpO2 93%   BMI 44.61 kg/m²        TELEMETRY: AFB CVR     Physical Exam:  General Appearance: alert and oriented to person, place and time, in no acute distress  Cardiovascular: irregular rhythm, normal S1 and S2, no murmurs, rubs, clicks, or gallops, distal pulses intact,   Pulmonary/Chest: clear to auscultation bilaterally- no wheezes, rales or rhonchi, normal air movement, no respiratory distress  Abdomen: soft, non-tender, non-distended, normal bowel sounds, no masses Extremities: no cyanosis, clubbing or edema, pulses present    Skin: warm and dry, no rash or erythema  Musculoskeletal: normal range of motion, no joint swelling, deformity or tenderness  Neurological: alert, oriented, normal speech, no focal findings or movement disorder noted    Medications:           Diagnostics:      Left Heart Cath:   Procedure Summary      Moderate to severe single vessel disease involving the LAD coronary artery   with moderate disease in a large Cx. Normal left ventricular end diastolic pressure. (LVEDP). Recommendations      Consult to interventional cardiology for possibly angioplasty and/or   stenting of the patients severe stenosis vs guideline directed maximal   medical management.       Signature      ---------------------------------------------------------------- Electronically signed by Flo Jean Baptiste(Performing Physician) on   05/27/2021 16:16    Angiographic Findings      Cardiac Arteries and Lesion Findings     LAD:       Lesion on Mid LAD: Proximal subsection. 70% stenosis. LCx:       Lesion on Prox CX: Distal subsection. 50% stenosis. RCA: Normal 0% stenosis.      Coronary Tree      Dominance: Left     LV function assessed as:Normal.  Ejection Fraction  +-------------------------------------------+------------------------------+  ! Method                                     ! EF%                           ! +-------------------------------------------+------------------------------+  ! Echocardiography                           ! 61                            !  +-------------------------------------------+------------------------------+     Ventriculography Findings:  Normal left ventricular end diastolic pressure (LVEDP) with no significant  aortic valve gradient seen on pull-back across the aortic valve. Post-procedure Diagnosis/Findings:           PCI of the LAD                                                                                            MD MD Celine Corral RPVI  Electronically signed 5/28/2021 at 1:32 PM  Interventional Cardiology       Lab Data:    Cardiac Enzymes:  No results for input(s): CKTOTAL, CKMB, CKMBINDEX, TROPONINI in the last 72 hours.     CBC:   Lab Results   Component Value Date    WBC 7.5 05/28/2021    RBC 3.79 05/28/2021    RBC 3.71 05/16/2012    HGB 11.0 05/28/2021    HCT 35.1 05/28/2021     05/28/2021     05/16/2012       CMP:    Lab Results   Component Value Date     05/29/2021    K 3.8 05/29/2021    K 3.7 05/28/2021     05/29/2021    CO2 26 05/29/2021    BUN 14 05/29/2021    CREATININE 1.0 05/29/2021    GFRAA >60 05/27/2021    LABGLOM 54 05/29/2021    GLUCOSE 87 05/29/2021    GLUCOSE 110 05/16/2012    CALCIUM 9.3 05/29/2021       Hepatic Function Panel:    Lab Results   Component Value Date    ALKPHOS 136 05/27/2021    ALT 27 05/27/2021    AST 22 05/27/2021    PROT 7.3 05/27/2021    BILITOT 0.81 05/27/2021    LABALBU 4.0 05/27/2021    LABALBU 4.1 05/16/2012       Magnesium:  No results found for: MG    PT/INR:    Lab Results   Component Value Date    PROTIME 10.3 07/21/2017    INR 1.25 05/28/2021       HgBA1c:    Lab Results   Component Value Date    LABA1C 5.4 05/27/2021       FLP:    Lab Results   Component Value Date    TRIG 56 05/28/2021    HDL 62 05/28/2021    LDLCALC 60 05/28/2021       TSH:    Lab Results   Component Value Date    TSH 4.29 05/27/2021         Assessment:    S\p OP elective cardiac cath 5/28/2021: PCI LAD   Preserved EF     Fluid overload: acute on chronic diastolic chf  - did diurese well with IV lasix - resolving     Chronic AFB CVR    [id number]=  4  On NOAC     HLP- LDL 60    Hx CHF       Plan:  · Wean off O2   ·   · Change to po lasix and stop HCTZ   ·   Salt restriction 2 gm ( 2,000 mg) daily   · Fluid restriction 2 L daily    Weigh yourself daily: Should you gain 2-3 pounds in 1 days time or 3-5 pounds in 2 days time-- call Dr. Won Garza office (cardiology) for recommendations     · Ok to DC to nursing home today   · Follow with Dr. Won Garza in [facility]          Electronically signed by SONJA Price CNP on 6/1/2021 at 10:37 AM          ** pt stayed another day in the hospital pending ambulance arrangements  for transfer back to nursing home - pt remained stable without cardiac c/o.  She was DC in stable condition

## 2021-06-02 ENCOUNTER — OUTSIDE SERVICES (OUTPATIENT)
Dept: PRIMARY CARE CLINIC | Age: 71
End: 2021-06-02

## 2021-06-02 DIAGNOSIS — D50.0 IRON DEFICIENCY ANEMIA DUE TO CHRONIC BLOOD LOSS: ICD-10-CM

## 2021-06-02 DIAGNOSIS — E03.9 HYPOTHYROIDISM, UNSPECIFIED TYPE: ICD-10-CM

## 2021-06-02 DIAGNOSIS — I10 ESSENTIAL HYPERTENSION: ICD-10-CM

## 2021-06-02 DIAGNOSIS — I48.91 ATRIAL FIBRILLATION, UNSPECIFIED TYPE (HCC): ICD-10-CM

## 2021-06-02 DIAGNOSIS — I25.10 CORONARY ARTERY DISEASE INVOLVING NATIVE CORONARY ARTERY OF NATIVE HEART WITHOUT ANGINA PECTORIS: Primary | ICD-10-CM

## 2021-06-02 DIAGNOSIS — E66.9 OBESITY (BMI 30-39.9): ICD-10-CM

## 2021-06-02 DIAGNOSIS — I50.32 CHRONIC DIASTOLIC HEART FAILURE (HCC): ICD-10-CM

## 2021-06-02 DIAGNOSIS — N18.31 STAGE 3A CHRONIC KIDNEY DISEASE (HCC): ICD-10-CM

## 2021-06-02 NOTE — PROGRESS NOTES
Patient:  Malinda Boswell, 1950  I saw this patient on 06/02/2021, at [facility]    Had angioplasty and denies any chest pain,palpitation   Shortness of breath better  Edema better  No bleeding  Blood sugars better              Reason for Visit:      ICD-10-CM    1. Coronary artery disease involving native coronary artery of native heart without angina pectoris  I25.10    2. Essential hypertension  I10    3. Atrial fibrillation, unspecified type ([address]  I48.91    4. Chronic diastolic heart failure (HCC)  I50.32    5. Obesity (BMI 30-39. 9)  E66.9    6. Stage 3a chronic kidney disease  N18.31    7. Iron deficiency anemia due to chronic blood loss  D50.0    8. Hypothyroidism, unspecified type  E03.9        Changes since last visit:    shortness of breath on exertion improved,no chest pain,palpitations or pnd      BP better  1. Fall:  none  2. Behavioral Change: mood better  3. Pain Control: adequate  4. Mobility: improving  5. Pressure Sore:  none  Current medications    Medication Sig Start Date End Date Taking? Authorizing Provider   ARIPiprazole (ABILIFY) 5 MG tablet Take 5 mg by mouth nightly    Historical Provider, MD   Valbenazine Tosylate (INGREZZA) 40 MG CAPS Take 40 mg by mouth nightly    Historical Provider, MD   melatonin 3 MG TABS tablet Take 3 mg by mouth nightly    Historical Provider, MD   polyethylene glycol (GLYCOLAX) packet Take 17 g by mouth daily    Historical Provider, MD   Amantadine (SYMMETREL) 100 MG TABS tablet Take 100 mg by mouth 2 times daily    Historical Provider, MD   clonazePAM (KLONOPIN) 0.5 MG tablet Take 0.25 mg by mouth every morning. Andree Lilly     Historical Provider, MD   losartan-hydrochlorothiazide (HYZAAR) 50-12.5 MG per tablet Take 1 tablet by mouth daily    Historical Provider, MD   Dextromethorphan-guaiFENesin (DELSYM COUGH/CHEST CONGEST DM) 5-100 MG/5ML LIQD Take 5 mLs by mouth every 12 hours as needed (cough)    Historical Provider, MD   acetaminophen (TYLENOL) 325 MG tablet Take 650 mg by mouth every 4 hours as needed for Pain or Fever    Historical Provider, MD   sodium chloride (OCEAN) 0.65 % nasal spray 1 spray by Nasal route 3 times daily as needed for Congestion    Historical Provider, MD   Sodium Phosphates (FLEET) 7-19 GM/118ML Place 1 enema rectally once as needed    Historical Provider, MD   ascorbic acid (VITAMIN C) 500 MG tablet Take 500 mg by mouth nightly     Historical Provider, MD   bisacodyl (DULCOLAX) 10 MG suppository Place 10 mg rectally daily as needed for Constipation    Historical Provider, MD   magnesium hydroxide (MILK OF MAGNESIA) 400 MG/5ML suspension Take 30 mLs by mouth daily as needed for Constipation    Historical Provider, MD   calcium carbonate 600 MG TABS tablet Take 1 tablet by mouth nightly     Historical Provider, MD   metoprolol (TOPROL-XL) 50 MG XL tablet TAKE 1 TABLET BY MOUTH DAILY. 6/1/15   Jess Golden MD   levothyroxine (SYNTHROID) 50 MCG tablet TAKE 1 TABLET BY MOUTH DAILY. 6/1/15   Jess Golden MD   omeprazole (PRILOSEC) 20 MG capsule TAKE 1 CAPSULE BY MOUTH DAILY. 5/21/15   Jess Golden MD   citalopram (CELEXA) 20 MG tablet Take 20 mg by mouth daily. Historical Provider, MD   traZODone (DESYREL) 50 MG tablet Take 150 mg by mouth nightly     Historical Provider, MD   Simethicone (GAS-X PO) Take  by mouth as needed. Historical Provider, MD   Multiple Vitamin (MULTIVITAMIN PO) Take  by mouth daily. Historical Provider, MD   Acetaminophen (TYLENOL ARTHRITIS PAIN PO) Take 1,300 tablets by mouth 2 times daily as needed 2 tabs TID PRN    Historical Provider, MD   guaifenesin (MUCINEX) 600 MG SR tablet Take 1,200 mg by mouth 2 times daily. PRN    Historical Provider, MD   loratadine (CLARITIN) 10 MG tablet Take 10 mg by mouth daily.  PRN     Historical Provider, MD     Allergies:  Lamictal [lamotrigine], Oxcarbazepine, Pcn [penicillins], Sertraline, Trileptal, and Strattera [atomoxetine hcl]    Past Medical History: Past Medical History:   Diagnosis Date    ADHD (attention deficit hyperactivity disorder)     Anemia     severe    Asthma     Bipolar disorder (HCC)     Bradycardia, unspecified     Chronic anemia     Chronic atrial fibrillation (HCC)     Chronic back pain     Chronic kidney disease     Patient states unaware of this. Has never been mentioned to her.      Colitis     Constipation     COPD (chronic obstructive pulmonary disease) (HCC)     Cystitis without hematuria     Diastolic CHF (HCC)     Esophagitis     grade A    GERD (gastroesophageal reflux disease)     Hemorrhoids     History of echocardiogram 2014    EF >60%, LV wall thickness mildly increased,    Hypertension     Hypothyroidism     Metabolic syndrome     Moderate protein-calorie malnutrition (HCC)     Muscle weakness (generalized)     Obesity     TIA (transient ischemic attack)        Past Surgical History:    Past Surgical History:   Procedure Laterality Date    CARDIAC CATHETERIZATION Left 2021    right radial/ [id number] [facility]/ Dr. Sophia Liang    COLONOSCOPY  2011    ischemic colitis    COLONOSCOPY  2017    -diverticulosis,hemorrhoids    HYMENECTOMY      1972    HYSTERECTOMY      2008    MENISCECTOMY Right     2008    TONSILLECTOMY AND ADENOIDECTOMY      UPPER GASTROINTESTINAL ENDOSCOPY  3/2013    5 cm axial hiatal hernia    UPPER GASTROINTESTINAL ENDOSCOPY  2017    Dr. Melissa Barnett       Social History:   Social History     Tobacco Use    Smoking status: Former Smoker     Quit date: 3/12/1998     Years since quittin.2    Smokeless tobacco: Never Used   Substance Use Topics    Alcohol use: No       Family History:   Family History   Problem Relation Age of Onset    Diabetes Mother     High Blood Pressure Mother     Diabetes Father     High Blood Pressure Father     Bipolar Disorder Sister     Schizophrenia Sister     High Blood Pressure Other         2 sons    Bipolar Disorder Other         2 sons           Review of Systems:  Constitutional: negative for fevers or chills  Eyes: negative for visual disturbance   ENT: negative for sore throat or nasal congestion,no dysphagia. No epistaxis. Respiratory:  shortness of breath improved  Cardiovascular: neg for chest pain , palpitations,pnd,  edema better  Gastrointestinal: neg for abd pain, nausea, vomiting, diarrhea ,  constipation,no reyna,no blood in stool  Genitourinary: negative for dysuria, urgency,neg for hematuria , frequency  Integument/breast: negative for skin rash or lesions  Neurological: negative for unilateral weakness, numbness or tingling,has tremors  Muscular Skeletal: denies joint pain   Psych- mood changes worse    Objective:    Vitals: BP:138/81 T:97.3 P:90 R:18   -----------------------------------------------------------------  Exam:  GEN:   A & O x3, no apparent distress,obese  EYES: No gross abnormalities. and PERRLA  ENT:Throat- no lesions or congestion,nose- no drainage  NECK: normal, supple, no lymphadenopathy,  no carotid bruits  PULM: Diminished air entry on auscultation bilaterally- no wheezes, rales or rhonchi, normal air movement, no respiratory distress  COR: S1,s2,RRR, no murmurs and no gallops,has bilat leg edema  ABD:  soft, non-tender, non-distended, normal bowel sounds, no masses or organomegaly  : no cva or flank tenderness  EXT:has minimal edema, no calf tenderness, and warm to touch. NEURO: Motor and sensory grossly intact,has tremors at rest  PSYCH: anxious  SKIN:  No rash or lesions    -----------------------------------------------------------------  Diagnostic Data:   · All diagnostic data was reviewed    Assessment:      Problem List Items Addressed This Visit     Obesity (BMI 30-39. 9)    Essential hypertension    Iron deficiency anemia due to chronic blood loss    Hypothyroidism    Coronary artery disease - Primary      Other Visit Diagnoses     Atrial fibrillation, unspecified type ([facility]        Chronic diastolic heart failure (HCC)        Stage 3a chronic kidney disease            Patient Active Problem List   Diagnosis Code    TIA (transient ischemic attack) G45.9    Bipolar 1 disorder ([facility] F31.9    DDD (degenerative disc disease), lumbar M51.36    Bradycardia R00.1    Essential hypertension I10    Obesity (BMI 30-39. 9) E66.9    GERD (gastroesophageal reflux disease) K21.9    Iron deficiency anemia D50.9    Iron deficiency anemia due to chronic blood loss D50.0    History of colon polyps Z86.010    Hypothyroidism E03.9    Other chronic pain G89.29    Other hemorrhoids K64.8    Diverticulosis of large intestine without diverticulitis K57.30    Attention deficit hyperactivity disorder F90.9    Morbid obesity (HCC) E66.01    Cellulitis of leg without foot, right L03.115    Coronary artery disease I25.10    CAD, multiple vessel I25.10    Chest pain R07.9         Plan:    continue diuresis  Monitor for bleeding  Psych to manage psychotropics  Encourage activity as tolerated and follow diet  Continue current medications  Monitor bp   Prevent pressure sore  Prevent falls      Electronically signed by Wesley Hopson MD on 6/2/2021 at 4:20 PM

## 2021-06-09 ENCOUNTER — OUTSIDE SERVICES (OUTPATIENT)
Dept: PRIMARY CARE CLINIC | Age: 71
End: 2021-06-09
Payer: MEDICARE

## 2021-06-09 DIAGNOSIS — I10 ESSENTIAL HYPERTENSION: ICD-10-CM

## 2021-06-09 DIAGNOSIS — I25.10 CORONARY ARTERY DISEASE INVOLVING NATIVE CORONARY ARTERY OF NATIVE HEART WITHOUT ANGINA PECTORIS: ICD-10-CM

## 2021-06-09 DIAGNOSIS — K12.2 ORAL INFECTION: Primary | ICD-10-CM

## 2021-06-09 DIAGNOSIS — I48.91 ATRIAL FIBRILLATION, UNSPECIFIED TYPE (HCC): ICD-10-CM

## 2021-06-09 PROCEDURE — 99308 SBSQ NF CARE LOW MDM 20: CPT | Performed by: FAMILY MEDICINE

## 2021-06-09 NOTE — PROGRESS NOTES
GM/118ML Place 1 enema rectally once as needed    Historical Provider, MD   ascorbic acid (VITAMIN C) 500 MG tablet Take 500 mg by mouth nightly     Historical Provider, MD   bisacodyl (DULCOLAX) 10 MG suppository Place 10 mg rectally daily as needed for Constipation    Historical Provider, MD   magnesium hydroxide (MILK OF MAGNESIA) 400 MG/5ML suspension Take 30 mLs by mouth daily as needed for Constipation    Historical Provider, MD   calcium carbonate 600 MG TABS tablet Take 1 tablet by mouth nightly     Historical Provider, MD   metoprolol (TOPROL-XL) 50 MG XL tablet TAKE 1 TABLET BY MOUTH DAILY. 6/1/15   Lissett Nath MD   levothyroxine (SYNTHROID) 50 MCG tablet TAKE 1 TABLET BY MOUTH DAILY. 6/1/15   Lissett Nath MD   omeprazole (PRILOSEC) 20 MG capsule TAKE 1 CAPSULE BY MOUTH DAILY. 5/21/15   Lissett Nath MD   citalopram (CELEXA) 20 MG tablet Take 20 mg by mouth daily. Historical Provider, MD   traZODone (DESYREL) 50 MG tablet Take 150 mg by mouth nightly     Historical Provider, MD   Simethicone (GAS-X PO) Take  by mouth as needed. Historical Provider, MD   Multiple Vitamin (MULTIVITAMIN PO) Take  by mouth daily. Historical Provider, MD   Acetaminophen (TYLENOL ARTHRITIS PAIN PO) Take 1,300 tablets by mouth 2 times daily as needed 2 tabs TID PRN    Historical Provider, MD   guaifenesin (MUCINEX) 600 MG SR tablet Take 1,200 mg by mouth 2 times daily. PRN    Historical Provider, MD   loratadine (CLARITIN) 10 MG tablet Take 10 mg by mouth daily.  PRN     Historical Provider, MD     Allergies:  Lamictal [lamotrigine], Oxcarbazepine, Pcn [penicillins], Sertraline, Trileptal, and Strattera [atomoxetine hcl]    Past Medical History:    Past Medical History:   Diagnosis Date    ADHD (attention deficit hyperactivity disorder)     Anemia     severe    Asthma     Bipolar disorder (HCC)     Bradycardia, unspecified     Chronic anemia     Chronic atrial fibrillation (HCC)     Chronic back pain     Chronic kidney disease     Patient states unaware of this. Has never been mentioned to her.  Colitis     Constipation     COPD (chronic obstructive pulmonary disease) (HCC)     Cystitis without hematuria     Diastolic CHF (HCC)     Esophagitis     grade A    GERD (gastroesophageal reflux disease)     Hemorrhoids     History of echocardiogram 2014    EF >60%, LV wall thickness mildly increased,    Hypertension     Hypothyroidism     Metabolic syndrome     Moderate protein-calorie malnutrition (HCC)     Muscle weakness (generalized)     Obesity     TIA (transient ischemic attack)        Past Surgical History:    Past Surgical History:   Procedure Laterality Date    CARDIAC CATHETERIZATION Left 2021    right radial/ [facility]/ Dr. Susu Baumann    COLONOSCOPY  2011    ischemic colitis    COLONOSCOPY  2017    -diverticulosis,hemorrhoids    HYMENECTOMY      1972    HYSTERECTOMY      2008    MENISCECTOMY Right     2008    TONSILLECTOMY AND ADENOIDECTOMY      UPPER GASTROINTESTINAL ENDOSCOPY  3/2013    5 cm axial hiatal hernia    UPPER GASTROINTESTINAL ENDOSCOPY  2017    Dr. Marilyn Marcelino       Social History:   Social History     Tobacco Use    Smoking status: Former Smoker     Quit date: 3/12/1998     Years since quittin.2    Smokeless tobacco: Never Used   Substance Use Topics    Alcohol use: No       Family History:   Family History   Problem Relation Age of Onset    Diabetes Mother     High Blood Pressure Mother     Diabetes Father     High Blood Pressure Father     Bipolar Disorder Sister     Schizophrenia Sister     High Blood Pressure Other         2 sons    Bipolar Disorder Other         2 sons           Review of Systems:  Constitutional: negative for fevers or chills  Eyes: negative for visual disturbance   ENT: positive for oral infection,negative for sore throat or nasal congestion,no dysphagia. No epistaxis.   Respiratory: shortness of breath improved  Cardiovascular: neg for chest pain , palpitations,pnd,  edema better  Gastrointestinal: neg for abd pain, nausea, vomiting, diarrhea ,  constipation,no reyna,no blood in stool  Genitourinary: negative for dysuria, urgency,neg for hematuria , frequency  Integument/breast: negative for skin rash or lesions  Neurological: negative for unilateral weakness, numbness or tingling,has tremors  Muscular Skeletal: denies joint pain   Psych- mood changes better    Objective:    Vitals: BP:129/68 T:97.7 P:57 R:18   -----------------------------------------------------------------  Exam:  GEN:   A & O x3, no apparent distress,obese  EYES: No gross abnormalities. and PERRLA  ENT:Throat- has oral unlcer,nose- no drainage  NECK: normal, supple, no lymphadenopathy,  no carotid bruits  PULM: Diminished air entry on auscultation bilaterally- no wheezes, rales or rhonchi, normal air movement, no respiratory distress  COR: S1,s2,RRR, no murmurs and no gallops,has bilat leg edema  ABD:  soft, non-tender, non-distended, normal bowel sounds, no masses or organomegaly  : no cva or flank tenderness  EXT:has minimal edema, no calf tenderness, and warm to touch. NEURO: Motor and sensory grossly intact,has tremors at rest  PSYCH: anxious  SKIN:  No rash or lesions    -----------------------------------------------------------------  Diagnostic Data:   · All diagnostic data was reviewed    Assessment:      Problem List Items Addressed This Visit     Essential hypertension    Coronary artery disease      Other Visit Diagnoses     Oral infection    -  Primary    Atrial fibrillation, unspecified type [facility])            Patient Active Problem List   Diagnosis Code    TIA (transient ischemic attack) G45.9    Bipolar 1 disorder ([facility] F31.9    DDD (degenerative disc disease), lumbar M51.36    Bradycardia R00.1    Essential hypertension I10    Obesity (BMI 30-39. 9) E66.9    GERD (gastroesophageal reflux disease) K21.9    Iron deficiency anemia D50.9    Iron deficiency anemia due to chronic blood loss D50.0    History of colon polyps Z86.010    Hypothyroidism E03.9    Other chronic pain G89.29    Other hemorrhoids K64.8    Diverticulosis of large intestine without diverticulitis K57.30    Attention deficit hyperactivity disorder F90.9    Morbid obesity (HCC) E66.01    Cellulitis of leg without foot, right L03.115    Coronary artery disease I25.10    CAD, multiple vessel I25.10    Chest pain R07.9         Plan:    change pen vk to cleocin  Monitor for bleeding  Psych to manage psychotropics  Encourage activity as tolerated and follow diet  Continue current medications  Monitor bp   Prevent pressure sore  Prevent falls      Electronically signed by Bean Joshi MD on 6/11/2021 at 3:43 PM

## 2021-06-15 ENCOUNTER — OFFICE VISIT (OUTPATIENT)
Dept: CARDIOLOGY | Age: 71
End: 2021-06-15
Payer: MEDICARE

## 2021-06-15 VITALS
DIASTOLIC BLOOD PRESSURE: 70 MMHG | OXYGEN SATURATION: 97 % | SYSTOLIC BLOOD PRESSURE: 112 MMHG | BODY MASS INDEX: 40.72 KG/M2 | WEIGHT: 253.4 LBS | HEART RATE: 90 BPM | HEIGHT: 66 IN | RESPIRATION RATE: 18 BRPM

## 2021-06-15 DIAGNOSIS — E66.01 CLASS 3 SEVERE OBESITY WITH BODY MASS INDEX (BMI) OF 40.0 TO 44.9 IN ADULT, UNSPECIFIED OBESITY TYPE, UNSPECIFIED WHETHER SERIOUS COMORBIDITY PRESENT (HCC): ICD-10-CM

## 2021-06-15 DIAGNOSIS — I73.9 PVD (PERIPHERAL VASCULAR DISEASE) (HCC): ICD-10-CM

## 2021-06-15 DIAGNOSIS — Z95.820 S/P ANGIOPLASTY WITH STENT: ICD-10-CM

## 2021-06-15 DIAGNOSIS — I07.1 SEVERE TRICUSPID REGURGITATION: ICD-10-CM

## 2021-06-15 DIAGNOSIS — I25.10 CAD, MULTIPLE VESSEL: ICD-10-CM

## 2021-06-15 DIAGNOSIS — I10 ESSENTIAL HYPERTENSION: ICD-10-CM

## 2021-06-15 DIAGNOSIS — I50.812 CHRONIC RIGHT-SIDED HEART FAILURE (HCC): ICD-10-CM

## 2021-06-15 DIAGNOSIS — I48.19 PERSISTENT ATRIAL FIBRILLATION (HCC): Primary | ICD-10-CM

## 2021-06-15 PROCEDURE — G8399 PT W/DXA RESULTS DOCUMENT: HCPCS | Performed by: INTERNAL MEDICINE

## 2021-06-15 PROCEDURE — 99214 OFFICE O/P EST MOD 30 MIN: CPT | Performed by: INTERNAL MEDICINE

## 2021-06-15 PROCEDURE — 3017F COLORECTAL CA SCREEN DOC REV: CPT | Performed by: INTERNAL MEDICINE

## 2021-06-15 PROCEDURE — 4040F PNEUMOC VAC/ADMIN/RCVD: CPT | Performed by: INTERNAL MEDICINE

## 2021-06-15 PROCEDURE — 1111F DSCHRG MED/CURRENT MED MERGE: CPT | Performed by: INTERNAL MEDICINE

## 2021-06-15 PROCEDURE — 1090F PRES/ABSN URINE INCON ASSESS: CPT | Performed by: INTERNAL MEDICINE

## 2021-06-15 PROCEDURE — 99211 OFF/OP EST MAY X REQ PHY/QHP: CPT | Performed by: INTERNAL MEDICINE

## 2021-06-15 PROCEDURE — 1123F ACP DISCUSS/DSCN MKR DOCD: CPT | Performed by: INTERNAL MEDICINE

## 2021-06-15 PROCEDURE — G8427 DOCREV CUR MEDS BY ELIG CLIN: HCPCS | Performed by: INTERNAL MEDICINE

## 2021-06-15 PROCEDURE — G8417 CALC BMI ABV UP PARAM F/U: HCPCS | Performed by: INTERNAL MEDICINE

## 2021-06-15 PROCEDURE — 1036F TOBACCO NON-USER: CPT | Performed by: INTERNAL MEDICINE

## 2021-06-15 RX ORDER — NYSTATIN 100000 [USP'U]/G
POWDER TOPICAL PRN
COMMUNITY
Start: 2021-04-21 | End: 2022-01-26

## 2021-06-15 RX ORDER — CHLORHEXIDINE GLUCONATE 0.12 MG/ML
15 RINSE ORAL 2 TIMES DAILY
COMMUNITY
Start: 2021-06-10

## 2021-06-15 RX ORDER — ATORVASTATIN CALCIUM 20 MG/1
20 TABLET, FILM COATED ORAL DAILY
Qty: 90 TABLET | Refills: 3 | Status: SHIPPED | OUTPATIENT
Start: 2021-06-15
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
## 2021-06-25 ENCOUNTER — TELEPHONE (OUTPATIENT)
Dept: CARDIOLOGY | Age: 71
End: 2021-06-25

## 2021-07-01 ENCOUNTER — TELEPHONE (OUTPATIENT)
Dept: CARDIOLOGY | Age: 71
End: 2021-07-01

## 2021-07-01 NOTE — TELEPHONE ENCOUNTER
Patient was seen in office this week. She told SNF that you wanted her to start wearing DAVID hose. I didn't see in your note that they were ordered. Does an order for these need sent for this?

## 2021-07-02 ENCOUNTER — TELEPHONE (OUTPATIENT)
Dept: CARDIOLOGY | Age: 71
End: 2021-07-02

## 2021-07-02 NOTE — TELEPHONE ENCOUNTER
Verbal ordered given for use of DAVID hose per pt request and Dr. India Caicedo approved. Nurse notified. Thanks!

## 2021-07-09 ENCOUNTER — OUTSIDE SERVICES (OUTPATIENT)
Dept: PRIMARY CARE CLINIC | Age: 71
End: 2021-07-09
Payer: MEDICARE

## 2021-07-09 ENCOUNTER — TELEPHONE (OUTPATIENT)
Dept: CARDIOLOGY | Age: 71
End: 2021-07-09

## 2021-07-09 DIAGNOSIS — E66.9 OBESITY (BMI 30-39.9): ICD-10-CM

## 2021-07-09 DIAGNOSIS — I25.10 CORONARY ARTERY DISEASE INVOLVING NATIVE CORONARY ARTERY OF NATIVE HEART WITHOUT ANGINA PECTORIS: Primary | ICD-10-CM

## 2021-07-09 DIAGNOSIS — I10 ESSENTIAL HYPERTENSION: ICD-10-CM

## 2021-07-09 DIAGNOSIS — N18.31 STAGE 3A CHRONIC KIDNEY DISEASE (HCC): ICD-10-CM

## 2021-07-09 DIAGNOSIS — I50.32 CHRONIC DIASTOLIC HEART FAILURE (HCC): ICD-10-CM

## 2021-07-09 DIAGNOSIS — I48.91 ATRIAL FIBRILLATION, UNSPECIFIED TYPE (HCC): ICD-10-CM

## 2021-07-09 NOTE — PROGRESS NOTES
Patient:  Siddhartha Ba, 1950  I saw this patient on 07/10/2021, at [facility]   Shortness of breath better  Edema better  No bleeding from anticoagulant  Blood sugars better              Reason for Visit:      ICD-10-CM    1. Coronary artery disease involving native coronary artery of native heart without angina pectoris  I25.10    2. Essential hypertension  I10    3. Atrial fibrillation, unspecified type ([facility] [address]  I48.91    4. Chronic diastolic heart failure (HCC)  I50.32    5. Obesity (BMI 30-39. 9)  E66.9    6. Stage 3a chronic kidney disease (HCC)  N18.31        Changes since last visit:    Shortness of breath and edema improving    BP better  1. Fall:  none  2. Behavioral Change: mood better  3. Pain Control: adequate  4. Mobility: improving  5. Pressure Sore:  none  Current medications    Medication Sig Start Date End Date Taking? Authorizing Provider   ARIPiprazole (ABILIFY) 5 MG tablet Take 5 mg by mouth nightly    Historical Provider, MD   Valbenazine Tosylate (INGREZZA) 40 MG CAPS Take 40 mg by mouth nightly    Historical Provider, MD   melatonin 3 MG TABS tablet Take 3 mg by mouth nightly    Historical Provider, MD   polyethylene glycol (GLYCOLAX) packet Take 17 g by mouth daily    Historical Provider, MD   Amantadine (SYMMETREL) 100 MG TABS tablet Take 100 mg by mouth 2 times daily    Historical Provider, MD   clonazePAM (KLONOPIN) 0.5 MG tablet Take 0.25 mg by mouth every morning. Judy Flynn     Historical Provider, MD   losartan-hydrochlorothiazide (HYZAAR) 50-12.5 MG per tablet Take 1 tablet by mouth daily    Historical Provider, MD   Dextromethorphan-guaiFENesin (DELSYM COUGH/CHEST CONGEST DM) 5-100 MG/5ML LIQD Take 5 mLs by mouth every 12 hours as needed (cough)    Historical Provider, MD   acetaminophen (TYLENOL) 325 MG tablet Take 650 mg by mouth every 4 hours as needed for Pain or Fever    Historical Provider, MD   sodium chloride (OCEAN) 0.65 % nasal spray 1 spray by Nasal route 3 times daily as needed for Congestion    Historical Provider, MD   Sodium Phosphates (FLEET) 7-19 GM/118ML Place 1 enema rectally once as needed    Historical Provider, MD   ascorbic acid (VITAMIN C) 500 MG tablet Take 500 mg by mouth nightly     Historical Provider, MD   bisacodyl (DULCOLAX) 10 MG suppository Place 10 mg rectally daily as needed for Constipation    Historical Provider, MD   magnesium hydroxide (MILK OF MAGNESIA) 400 MG/5ML suspension Take 30 mLs by mouth daily as needed for Constipation    Historical Provider, MD   calcium carbonate 600 MG TABS tablet Take 1 tablet by mouth nightly     Historical Provider, MD   metoprolol (TOPROL-XL) 50 MG XL tablet TAKE 1 TABLET BY MOUTH DAILY. 6/1/15   Brandy Ross MD   levothyroxine (SYNTHROID) 50 MCG tablet TAKE 1 TABLET BY MOUTH DAILY. 6/1/15   Brandy Ross MD   omeprazole (PRILOSEC) 20 MG capsule TAKE 1 CAPSULE BY MOUTH DAILY. 5/21/15   Brandy Ross MD   citalopram (CELEXA) 20 MG tablet Take 20 mg by mouth daily. Historical Provider, MD   traZODone (DESYREL) 50 MG tablet Take 150 mg by mouth nightly     Historical Provider, MD   Simethicone (GAS-X PO) Take  by mouth as needed. Historical Provider, MD   Multiple Vitamin (MULTIVITAMIN PO) Take  by mouth daily. Historical Provider, MD   Acetaminophen (TYLENOL ARTHRITIS PAIN PO) Take 1,300 tablets by mouth 2 times daily as needed 2 tabs TID PRN    Historical Provider, MD   guaifenesin (MUCINEX) 600 MG SR tablet Take 1,200 mg by mouth 2 times daily. PRN    Historical Provider, MD   loratadine (CLARITIN) 10 MG tablet    Eliquis 5 mg bid  norvasc 10 mg daily  clonodine 0.1 mg tid  Lasix 40 mg daily  deseryl 50 mg hs  Ingrezza 40 mg daily Take 10 mg by mouth daily.  PRN       Historical Provider, MD     Allergies:  Lamictal [lamotrigine], Oxcarbazepine, Pcn [penicillins], Sertraline, Trileptal, and Strattera [atomoxetine hcl]    Past Medical History:    Past Medical History:   Diagnosis Date    ADHD (attention deficit hyperactivity disorder)     Anemia     severe    Asthma     Bipolar disorder (HCC)     Bradycardia, unspecified     Chronic anemia     Chronic atrial fibrillation (HCC)     Chronic back pain     Chronic kidney disease     Patient states unaware of this. Has never been mentioned to her.      Colitis     Constipation     COPD (chronic obstructive pulmonary disease) (HCC)     Cystitis without hematuria     Diastolic CHF (HCC)     Esophagitis     grade A    GERD (gastroesophageal reflux disease)     Hemorrhoids     History of echocardiogram 2014    EF >60%, LV wall thickness mildly increased,    Hypertension     Hypothyroidism     Metabolic syndrome     Moderate protein-calorie malnutrition (HCC)     Muscle weakness (generalized)     Obesity     TIA (transient ischemic attack)        Past Surgical History:    Past Surgical History:   Procedure Laterality Date    CARDIAC CATHETERIZATION Left 2021    right radial/ [facility]/ Dr. Gregoria Lloyd    COLONOSCOPY  2011    ischemic colitis    COLONOSCOPY  2017    -diverticulosis,hemorrhoids    HYMENECTOMY      1972    HYSTERECTOMY      2008    MENISCECTOMY Right     2008    TONSILLECTOMY AND ADENOIDECTOMY      UPPER GASTROINTESTINAL ENDOSCOPY  3/2013    5 cm axial hiatal hernia    UPPER GASTROINTESTINAL ENDOSCOPY  2017    Dr. Kaushal Smith       Social History:   Social History     Tobacco Use    Smoking status: Former Smoker     Packs/day: 0.25     Years: 10.00     Pack years: 2.50     Quit date: 3/12/1998     Years since quittin.3    Smokeless tobacco: Never Used   Substance Use Topics    Alcohol use: No       Family History:   Family History   Problem Relation Age of Onset    Diabetes Mother     High Blood Pressure Mother     Diabetes Father     High Blood Pressure Father     Bipolar Disorder Sister     Schizophrenia Sister     High Blood Pressure Other         2 sons    Bipolar Disorder Other         2 sons           Review of Systems:  Constitutional: negative for fevers or chills  Eyes: negative for visual disturbance   ENT: positive for oral infection,negative for sore throat or nasal congestion,no dysphagia. No epistaxis. Respiratory:  shortness of breath improved  Cardiovascular: neg for chest pain , palpitations,pnd,  edema better  Gastrointestinal: neg for abd pain, nausea, vomiting, diarrhea ,  constipation,no reyna,no blood in stool  Genitourinary: negative for dysuria, urgency,neg for hematuria , frequency  Integument/breast: negative for skin rash or lesions  Neurological: negative for unilateral weakness, numbness or tingling,has tremors  Muscular Skeletal: denies joint pain   Psych- mood changes better    Objective:    Vitals: BP:126/59 T:97.6 P:60 R:18   -----------------------------------------------------------------  Exam:  GEN:   A & O x3, no apparent distress,obese  EYES: No gross abnormalities. and PERRLA  ENT:Throat- has oral unlcer,nose- no drainage  NECK: normal, supple, no lymphadenopathy,  no carotid bruits  PULM: Diminished air entry on auscultation bilaterally- no wheezes, rales or rhonchi, normal air movement, no respiratory distress  COR: S1,s2,RRR, no murmurs and no gallops,has minimal bilat leg edema  ABD:  soft, non-tender, non-distended, normal bowel sounds, no masses or organomegaly  : no cva or flank tenderness  EXT:has minimal edema, no calf tenderness, and warm to touch. NEURO: Motor and sensory grossly intact,has tremors at rest  PSYCH: anxious  SKIN:  No rash or lesions    -----------------------------------------------------------------  Diagnostic Data:   · All diagnostic data was reviewed    Assessment:      Problem List Items Addressed This Visit     Obesity (BMI 30-39. 9)    Essential hypertension    Coronary artery disease - Primary      Other Visit Diagnoses     Atrial fibrillation, unspecified type (HCC)        Chronic diastolic heart failure (HCC)        Stage 3a chronic kidney disease ([facility] [address]            Patient Active Problem List   Diagnosis Code    TIA (transient ischemic attack) G45.9    Bipolar 1 disorder ([facility] [address] F31.9    DDD (degenerative disc disease), lumbar M51.36    Bradycardia R00.1    Essential hypertension I10    Obesity (BMI 30-39. 9) E66.9    GERD (gastroesophageal reflux disease) K21.9    Iron deficiency anemia D50.9    Iron deficiency anemia due to chronic blood loss D50.0    History of colon polyps Z86.010    Hypothyroidism E03.9    Other chronic pain G89.29    Other hemorrhoids K64.8    Diverticulosis of large intestine without diverticulitis K57.30    Attention deficit hyperactivity disorder F90.9    Morbid obesity (HCC) E66.01    Cellulitis of leg without foot, right L03.115    Coronary artery disease I25.10    CAD, multiple vessel I25.10    Chest pain R07.9         Plan:      Monitor for bleeding  Psych to manage psychotropics  Encourage activity as tolerated and follow diet  Continue current medications  Monitor bp   Prevent pressure sore  Prevent falls      Electronically signed by Loretta Kent MD on 7/11/2021 at 2:27 PM

## 2021-07-09 NOTE — TELEPHONE ENCOUNTER
Heart failure follow up call: called patient and asked how she is feeling and doing including weight, vital signs, medication compliance, and symptoms. Spoke with her nurse Kip. She reports that patient is doing well. No complaints of shortness of breath, dizziness, or lightheadedness, CP. Says that she does take breaks from walking but says this is due to her pain and not from shortness of breath. Weight: 252 pounds 7/2/2021- weight stable and leg swelling stable, wearing compression stockings. BP: 121/59 at lunch and 126/59 in the morning today     HR: 57 at lunch and 60 in the morning today and says it ranges between mid 54-72.     Please advise thanks so much

## 2021-07-10 PROCEDURE — 99308 SBSQ NF CARE LOW MDM 20: CPT | Performed by: FAMILY MEDICINE

## 2021-07-30 ENCOUNTER — TELEPHONE (OUTPATIENT)
Dept: CARDIOLOGY | Age: 71
End: 2021-07-30

## 2021-07-30 NOTE — TELEPHONE ENCOUNTER
Heart Failure Follow up Call:        Called patient to see how she is doing and feeling and spoke with [address] to ask about vital signs and weight and symptoms if any. [facility] nurse randy states that she has been doing overall ok with not really any new complaints. Weight today: 248.6 lbs: 7/25/2021 (takes her weight every Sunday) up 3 pounds since 7/18/2021 when they last took her weight before however according to our scale she is down 5 pounds since last visit . She is wearing her esvin hose and denies any worsening shortness of breath and takes breaks when she walks and says that she is in a lot of pain in her right hip and left knee. Which has been ongoing due to arthritis but denies any swelling in lower extremities. Blood Pressure: 130/63    Heart Rate: 60    Temp:97.3    98% on room air. Please advise.  Thanks so much

## 2021-08-10 ENCOUNTER — OUTSIDE SERVICES (OUTPATIENT)
Dept: FAMILY MEDICINE CLINIC | Age: 71
End: 2021-08-10
Payer: MEDICARE

## 2021-08-10 DIAGNOSIS — I48.91 ATRIAL FIBRILLATION, UNSPECIFIED TYPE (HCC): ICD-10-CM

## 2021-08-10 DIAGNOSIS — E03.9 HYPOTHYROIDISM, UNSPECIFIED TYPE: ICD-10-CM

## 2021-08-10 DIAGNOSIS — I10 ESSENTIAL HYPERTENSION: Primary | ICD-10-CM

## 2021-08-10 DIAGNOSIS — E66.01 MORBID OBESITY (HCC): ICD-10-CM

## 2021-08-10 NOTE — PROGRESS NOTES
mouth daily 90 tablet 3    aspirin 81 MG chewable tablet Take 1 tablet by mouth daily 30 tablet 3    nitroGLYCERIN (NITROSTAT) 0.4 MG SL tablet up to max of 3 total doses. If no relief after 1 dose, call 911. 25 tablet 1    clopidogrel (PLAVIX) 75 MG tablet Take 1 tablet by mouth daily 30 tablet 3    losartan (COZAAR) 50 MG tablet Take 1 tablet by mouth daily 30 tablet 3    furosemide (LASIX) 40 MG tablet Take 1 tablet by mouth daily 60 tablet 3    ARIPiprazole (ABILIFY) 15 MG tablet Take 7.5 mg by mouth nightly      apixaban (ELIQUIS) 5 MG TABS tablet Take 1 tablet by mouth 2 times daily 60 tablet 0    tiZANidine (ZANAFLEX) 2 MG tablet Take 2 mg by mouth every 6 hours as needed      cloNIDine (CATAPRES) 0.1 MG tablet Take 0.1 mg by mouth 3 times daily      amLODIPine (NORVASC) 10 MG tablet Take 10 mg by mouth daily       Valbenazine Tosylate (INGREZZA) 40 MG CAPS Take 40 mg by mouth nightly      melatonin 3 MG TABS tablet Take 3 mg by mouth nightly      polyethylene glycol (GLYCOLAX) packet Take 17 g by mouth daily      Amantadine (SYMMETREL) 100 MG TABS tablet Take 100 mg by mouth 2 times daily      Dextromethorphan-guaiFENesin (DELSYM COUGH/CHEST CONGEST DM) 5-100 MG/5ML LIQD Take 5 mLs by mouth every 12 hours as needed (cough)      acetaminophen (TYLENOL) 325 MG tablet Take 650 mg by mouth every 4 hours as needed for Pain or Fever      sodium chloride (OCEAN) 0.65 % nasal spray 1 spray by Nasal route 3 times daily as needed for Congestion      bisacodyl (DULCOLAX) 10 MG suppository Place 10 mg rectally daily as needed for Constipation      magnesium hydroxide (MILK OF MAGNESIA) 400 MG/5ML suspension Take 30 mLs by mouth daily as needed for Constipation      calcium carbonate 600 MG TABS tablet Take 1 tablet by mouth nightly       metoprolol (TOPROL-XL) 50 MG XL tablet TAKE 1 TABLET BY MOUTH DAILY. 30 tablet 5    levothyroxine (SYNTHROID) 50 MCG tablet TAKE 1 TABLET BY MOUTH DAILY.  30 tablet 5    omeprazole (PRILOSEC) 20 MG capsule TAKE 1 CAPSULE BY MOUTH DAILY. 30 capsule 5    citalopram (CELEXA) 20 MG tablet Take 20 mg by mouth daily.  traZODone (DESYREL) 50 MG tablet Take 150 mg by mouth nightly       Multiple Vitamin (MULTIVITAMIN PO) Take  by mouth daily.  Acetaminophen (TYLENOL ARTHRITIS PAIN PO) Take 1,300 tablets by mouth 4 times daily       guaifenesin (MUCINEX) 600 MG SR tablet Take 1,200 mg by mouth 2 times daily. PRN      loratadine (CLARITIN) 10 MG tablet Take 10 mg by mouth daily. PRN        No current facility-administered medications for this visit. Past Medical History:    Past Medical History:   Diagnosis Date    ADHD (attention deficit hyperactivity disorder)     Anemia     severe    Asthma     Bipolar disorder (HCC)     Bradycardia, unspecified     Chronic anemia     Chronic atrial fibrillation (HCC)     Chronic back pain     Chronic kidney disease     Patient states unaware of this. Has never been mentioned to her.      Colitis     Constipation     COPD (chronic obstructive pulmonary disease) (HCC)     Cystitis without hematuria     Diastolic CHF (HCC)     Esophagitis     grade A    GERD (gastroesophageal reflux disease)     Hemorrhoids     History of echocardiogram 06/02/2014    EF >60%, LV wall thickness mildly increased,    Hypertension     Hypothyroidism     Metabolic syndrome     Moderate protein-calorie malnutrition (HCC)     Muscle weakness (generalized)     Obesity     TIA (transient ischemic attack)        Past Surgical History:    Past Surgical History:   Procedure Laterality Date    CARDIAC CATHETERIZATION Left 05/27/2021    [facility] Dr[facility] Osvaldo Parikh  5/2011    ischemic colitis    COLONOSCOPY  03/07/2017    -diverticulosis,hemorrhoids    HYMENECTOMY      1972    HYSTERECTOMY      2008    MENISCECTOMY Right     2008    TONSILLECTOMY AND ADENOIDECTOMY      UPPER GASTROINTESTINAL ENDOSCOPY  3/2013    5 cm axial hiatal hernia    UPPER GASTROINTESTINAL ENDOSCOPY  2017    Dr. Gamaliel Coe       Social History:   Social History     Tobacco Use    Smoking status: Former Smoker     Packs/day: 0.25     Years: 10.00     Pack years: 2.50     Quit date: 3/12/1998     Years since quittin.4    Smokeless tobacco: Never Used   Substance Use Topics    Alcohol use: No       Family History:   Family History   Problem Relation Age of Onset    Diabetes Mother     High Blood Pressure Mother     Diabetes Father     High Blood Pressure Father     Bipolar Disorder Sister     Schizophrenia Sister     High Blood Pressure Other         2 sons    Bipolar Disorder Other         2 sons           Review of Systems:  Constitutional: negative for fevers or chills  Eyes: negative for visual disturbance   ENT: positive for oral infection,negative for sore throat or nasal congestion,no dysphagia. No epistaxis.   Respiratory:  shortness of breath improved  Cardiovascular: neg for chest pain , palpitations,pnd,  edema better  Gastrointestinal: neg for abd pain, nausea, vomiting, diarrhea ,  constipation,no reyna,no blood in stool  Genitourinary: negative for dysuria, urgency,neg for hematuria , frequency  Integument/breast: negative for skin rash or lesions  Neurological: negative for unilateral weakness, numbness or tingling,has tremors  Muscular Skeletal: denies joint pain   Psych- mood changes better    Objective:    Vitals: BP:123/56 T:97.5 P:60 R:18   -----------------------------------------------------------------  Exam:  GEN:   A & O x3, no apparent distress,obese    -----------------------------------------------------------------  Diagnostic Data:   · All diagnostic data was reviewed    Assessment:      Problem List Items Addressed This Visit     Essential hypertension - Primary    Hypothyroidism    Morbid obesity ([facility]      Other Visit Diagnoses     Atrial fibrillation, unspecified type ([facility] Patient Active Problem List   Diagnosis Code    TIA (transient ischemic attack) G45.9    Bipolar 1 disorder ([facility] [address] F31.9    DDD (degenerative disc disease), lumbar M51.36    Bradycardia R00.1    Essential hypertension I10    Obesity (BMI 30-39. 9) E66.9    GERD (gastroesophageal reflux disease) K21.9    Iron deficiency anemia D50.9    Iron deficiency anemia due to chronic blood loss D50.0    History of colon polyps Z86.010    Hypothyroidism E03.9    Other chronic pain G89.29    Other hemorrhoids K64.8    Diverticulosis of large intestine without diverticulitis K57.30    Attention deficit hyperactivity disorder F90.9    Morbid obesity (HCC) E66.01    Cellulitis of leg without foot, right L03.115    Coronary artery disease I25.10    CAD, multiple vessel I25.10    Chest pain R07.9         Plan:      Monitor for bleeding  Psych to manage psychotropics  Encourage activity as tolerated and follow diet  Continue current medications  Monitor bp   Prevent pressure sore  Prevent falls      Electronically signed by Lisa Delgadillo MD on 8/16/2021 at 8:29 AM

## 2021-08-11 PROCEDURE — 99308 SBSQ NF CARE LOW MDM 20: CPT | Performed by: FAMILY MEDICINE

## 2021-08-16 RX ORDER — SODIUM PHOSPHATE, DIBASIC AND SODIUM PHOSPHATE, MONOBASIC 7; 19 G/133ML; G/133ML
1 ENEMA RECTAL PRN
COMMUNITY

## 2021-08-16 RX ORDER — PETROLATUM,WHITE
OINTMENT IN PACKET (GRAM) TOPICAL 2 TIMES DAILY PRN
COMMUNITY

## 2021-08-16 RX ORDER — TRAMADOL HYDROCHLORIDE 50 MG/1
50 TABLET ORAL EVERY 6 HOURS PRN
COMMUNITY

## 2021-08-25 ENCOUNTER — TELEPHONE (OUTPATIENT)
Dept: CARDIOLOGY | Age: 71
End: 2021-08-25

## 2021-08-25 NOTE — TELEPHONE ENCOUNTER
Heart failure follow up call: Benji Weaver (nurse from [facility])     Weight: 244.6 lbs (8/22/2021)  Last weight in office 253 pounds (weight is down a total of 9 pounds since last visit which was in June 2021)    BP: 143/84 today    HR:73 today    Symptoms Newman: Per Mojgan she is doing fine heart wise. She denies any swelling, shortness of breath, or chest pain. Only complains of back pain and alternates tramadol and Zanaflex for this.     Please advise Thanks so much

## 2021-09-03 ENCOUNTER — OUTSIDE SERVICES (OUTPATIENT)
Dept: PRIMARY CARE CLINIC | Age: 71
End: 2021-09-03
Payer: MEDICARE

## 2021-09-03 DIAGNOSIS — E66.9 OBESITY (BMI 30-39.9): ICD-10-CM

## 2021-09-03 DIAGNOSIS — I25.10 CORONARY ARTERY DISEASE INVOLVING NATIVE CORONARY ARTERY OF NATIVE HEART WITHOUT ANGINA PECTORIS: ICD-10-CM

## 2021-09-03 DIAGNOSIS — I10 ESSENTIAL HYPERTENSION: Primary | ICD-10-CM

## 2021-09-03 DIAGNOSIS — I50.32 CHRONIC DIASTOLIC HEART FAILURE (HCC): ICD-10-CM

## 2021-09-03 DIAGNOSIS — I48.91 ATRIAL FIBRILLATION, UNSPECIFIED TYPE (HCC): ICD-10-CM

## 2021-09-03 NOTE — PROGRESS NOTES
Patient:  Kristi Garnett, 1950  I saw this patient on 09/06/2021, at [facility]   Shortness of breath better  Edema better  No bleeding from anticoagulant  Blood sugars better  bp well controlled        Reason for Visit:      ICD-10-CM    1. Essential hypertension  I10    2. Atrial fibrillation, unspecified type ([facility] [address]  I48.91    3. Chronic diastolic heart failure (HCC)  I50.32    4. Coronary artery disease involving native coronary artery of native heart without angina pectoris  I25.10    5. Obesity (BMI 30-39. 9)  E66.9        Changes since last visit:   Shortness of breath and edema improving  BP better  Mood worse  1. Fall:  none  2. Behavioral Change: mood better  3. Pain Control: adequate  4. Mobility: improving  5. Pressure Sore:  none  Allergies:  Lamictal [lamotrigine], Oxcarbazepine, Pcn [penicillins], Sertraline, Trileptal, and Strattera [atomoxetine hcl]      Current Outpatient Medications   Medication Sig Dispense Refill    Sodium Phosphates (FLEET) 7-19 GM/118ML Place 1 enema rectally as needed      SIMETHICONE-80 PO Take by mouth      traMADol (ULTRAM) 50 MG tablet Take 50 mg by mouth every 6 hours as needed for Pain.  white petrolatum GEL Apply topically 2 times daily as needed for Dry Skin Arms and legs      chlorhexidine (PERIDEX) 0.12 % solution Take 15 mLs by mouth 2 times daily       NYSTATIN 809456 UNIT/GM powder Apply topically as needed       atorvastatin (LIPITOR) 20 MG tablet Take 1 tablet by mouth daily 90 tablet 3    nitroGLYCERIN (NITROSTAT) 0.4 MG SL tablet up to max of 3 total doses.  If no relief after 1 dose, call 911. 25 tablet 1    clopidogrel (PLAVIX) 75 MG tablet Take 1 tablet by mouth daily 30 tablet 3    losartan (COZAAR) 50 MG tablet Take 1 tablet by mouth daily 30 tablet 3    furosemide (LASIX) 40 MG tablet Take 1 tablet by mouth daily 60 tablet 3    ARIPiprazole (ABILIFY) 15 MG tablet Take 7.5 mg by mouth nightly      apixaban (ELIQUIS) 5 MG TABS tablet Take 1 tablet by mouth 2 times daily 60 tablet 0    tiZANidine (ZANAFLEX) 2 MG tablet Take 2 mg by mouth every 6 hours as needed      cloNIDine (CATAPRES) 0.1 MG tablet Take 0.1 mg by mouth 3 times daily      amLODIPine (NORVASC) 10 MG tablet Take 10 mg by mouth daily       Valbenazine Tosylate (INGREZZA) 40 MG CAPS Take 40 mg by mouth nightly      melatonin 3 MG TABS tablet Take 3 mg by mouth nightly      polyethylene glycol (GLYCOLAX) packet Take 17 g by mouth daily      Amantadine (SYMMETREL) 100 MG TABS tablet Take 100 mg by mouth 2 times daily      Dextromethorphan-guaiFENesin (DELSYM COUGH/CHEST CONGEST DM) 5-100 MG/5ML LIQD Take 5 mLs by mouth every 12 hours as needed (cough)      acetaminophen (TYLENOL) 325 MG tablet Take 650 mg by mouth every 4 hours as needed for Pain or Fever      sodium chloride (OCEAN) 0.65 % nasal spray 1 spray by Nasal route 3 times daily as needed for Congestion      bisacodyl (DULCOLAX) 10 MG suppository Place 10 mg rectally daily as needed for Constipation      magnesium hydroxide (MILK OF MAGNESIA) 400 MG/5ML suspension Take 30 mLs by mouth daily as needed for Constipation      calcium carbonate 600 MG TABS tablet Take 1 tablet by mouth nightly       metoprolol (TOPROL-XL) 50 MG XL tablet TAKE 1 TABLET BY MOUTH DAILY. 30 tablet 5    levothyroxine (SYNTHROID) 50 MCG tablet TAKE 1 TABLET BY MOUTH DAILY. 30 tablet 5    omeprazole (PRILOSEC) 20 MG capsule TAKE 1 CAPSULE BY MOUTH DAILY. 30 capsule 5    citalopram (CELEXA) 20 MG tablet Take 20 mg by mouth daily.  traZODone (DESYREL) 50 MG tablet Take 150 mg by mouth nightly       guaifenesin (MUCINEX) 600 MG SR tablet Take 1,200 mg by mouth 2 times daily. PRN      loratadine (CLARITIN) 10 MG tablet Take 10 mg by mouth daily. PRN        No current facility-administered medications for this visit.    Past Medical History:    Past Medical History:   Diagnosis Date    ADHD (attention deficit hyperactivity disorder)     Anemia     severe    Asthma     Bipolar disorder (HCC)     Bradycardia, unspecified     Chronic anemia     Chronic atrial fibrillation (HCC)     Chronic back pain     Chronic kidney disease     Patient states unaware of this. Has never been mentioned to her.      Colitis     Constipation     COPD (chronic obstructive pulmonary disease) (HCC)     Cystitis without hematuria     Diastolic CHF (HCC)     Esophagitis     grade A    GERD (gastroesophageal reflux disease)     Hemorrhoids     History of echocardiogram 2014    EF >60%, LV wall thickness mildly increased,    Hypertension     Hypothyroidism     Metabolic syndrome     Moderate protein-calorie malnutrition (HCC)     Muscle weakness (generalized)     Obesity     TIA (transient ischemic attack)        Past Surgical History:    Past Surgical History:   Procedure Laterality Date    CARDIAC CATHETERIZATION Left 2021    right radial/ [facility]/ Dr. Ulloa Earing    COLONOSCOPY  2011    ischemic colitis    COLONOSCOPY  2017    -diverticulosis,hemorrhoids    HYMENECTOMY      1972    HYSTERECTOMY      2008    MENISCECTOMY Right     2008    TONSILLECTOMY AND ADENOIDECTOMY      UPPER GASTROINTESTINAL ENDOSCOPY  3/2013    5 cm axial hiatal hernia    UPPER GASTROINTESTINAL ENDOSCOPY  2017    Dr. Danyell Logan       Social History:   Social History     Tobacco Use    Smoking status: Former Smoker     Packs/day: 0.25     Years: 10.00     Pack years: 2.50     Quit date: 3/12/1998     Years since quittin.5    Smokeless tobacco: Never Used   Substance Use Topics    Alcohol use: No       Family History:   Family History   Problem Relation Age of Onset    Diabetes Mother     High Blood Pressure Mother     Diabetes Father     High Blood Pressure Father     Bipolar Disorder Sister     Schizophrenia Sister     High Blood Pressure Other         2 sons    Bipolar Disorder Other         2 sons Review of Systems:  Constitutional: negative for fevers or chills  Eyes: negative for visual disturbance   ENT: positive for oral infection,negative for sore throat or nasal congestion,no dysphagia. No epistaxis. Respiratory:  shortness of breath improved  Cardiovascular: neg for chest pain , palpitations,pnd,  edema better  Gastrointestinal: neg for abd pain, nausea, vomiting, diarrhea ,  constipation,no reyna,no blood in stool  Genitourinary: negative for dysuria, urgency,neg for hematuria , frequency  Integument/breast: negative for skin rash or lesions  Neurological: negative for unilateral weakness, numbness or tingling,has tremors  Muscular Skeletal: denies joint pain   Psych- mood changes worse    Objective:    Vitals: BP:155/83 T:97.8 P:67 R:18   -----------------------------------------------------------------  Exam:  GEN:   A & O x3, no apparent distress,obese  EYES:  No gross abnormalities. and PERRLA  ENT:Throat- has oral unlcer,nose- no drainage  NECK: normal, supple, no lymphadenopathy,  no carotid bruits  PULM: Diminished air entry on auscultation bilaterally- no wheezes, rales or rhonchi, normal air movement, no respiratory distress  COR:   S1,s2,RRR, no murmurs and no gallops,has minimal bilat leg edema  ABD:    soft, non-tender, non-distended, normal bowel sounds, no masses or organomegaly  : no cva or flank tenderness  EXT:has minimal edema, no calf tenderness, and warm to touch. NEURO: Motor and sensory grossly intact,has tremors at rest  PSYCH: anxious  SKIN:   No rash or lesions    -----------------------------------------------------------------  Diagnostic Data:   · All diagnostic data was reviewed    Assessment:      Problem List Items Addressed This Visit     Obesity (BMI 30-39. 9)    Essential hypertension - Primary    Coronary artery disease      Other Visit Diagnoses     Atrial fibrillation, unspecified type ([facility]        Chronic diastolic heart failure ([facility] Patient Active Problem List   Diagnosis Code    TIA (transient ischemic attack) G45.9    Bipolar 1 disorder ([facility] [address] F31.9    DDD (degenerative disc disease), lumbar M51.36    Bradycardia R00.1    Essential hypertension I10    Obesity (BMI 30-39. 9) E66.9    GERD (gastroesophageal reflux disease) K21.9    Iron deficiency anemia D50.9    Iron deficiency anemia due to chronic blood loss D50.0    History of colon polyps Z86.010    Hypothyroidism E03.9    Other chronic pain G89.29    Other hemorrhoids K64.8    Diverticulosis of large intestine without diverticulitis K57.30    Attention deficit hyperactivity disorder F90.9    Morbid obesity (HCC) E66.01    Cellulitis of leg without foot, right L03.115    Coronary artery disease I25.10    CAD, multiple vessel I25.10    Chest pain R07.9         Plan:      Monitor for bleeding  Psych to manage psychotropics  Encourage activity as tolerated and follow diet  Continue current medications  Monitor bp   Prevent pressure sore  Prevent falls      Electronically signed by Alma Delia Zimmerman MD on 9/6/2021 at 3:37 PM

## 2021-09-06 PROCEDURE — 99308 SBSQ NF CARE LOW MDM 20: CPT | Performed by: FAMILY MEDICINE

## 2021-09-21 ENCOUNTER — OFFICE VISIT (OUTPATIENT)
Dept: CARDIOLOGY | Age: 71
End: 2021-09-21
Payer: MEDICARE

## 2021-09-21 VITALS
WEIGHT: 247 LBS | HEIGHT: 66 IN | BODY MASS INDEX: 39.7 KG/M2 | HEART RATE: 58 BPM | DIASTOLIC BLOOD PRESSURE: 70 MMHG | SYSTOLIC BLOOD PRESSURE: 114 MMHG | RESPIRATION RATE: 18 BRPM | OXYGEN SATURATION: 99 %

## 2021-09-21 DIAGNOSIS — I73.9 PVD (PERIPHERAL VASCULAR DISEASE) (HCC): ICD-10-CM

## 2021-09-21 DIAGNOSIS — Z95.820 S/P ANGIOPLASTY WITH STENT: ICD-10-CM

## 2021-09-21 DIAGNOSIS — I10 ESSENTIAL HYPERTENSION: ICD-10-CM

## 2021-09-21 DIAGNOSIS — I25.10 CAD, MULTIPLE VESSEL: ICD-10-CM

## 2021-09-21 DIAGNOSIS — E66.01 CLASS 3 SEVERE OBESITY WITH BODY MASS INDEX (BMI) OF 40.0 TO 44.9 IN ADULT, UNSPECIFIED OBESITY TYPE, UNSPECIFIED WHETHER SERIOUS COMORBIDITY PRESENT (HCC): ICD-10-CM

## 2021-09-21 DIAGNOSIS — I07.1 SEVERE TRICUSPID REGURGITATION: ICD-10-CM

## 2021-09-21 DIAGNOSIS — I48.19 PERSISTENT ATRIAL FIBRILLATION (HCC): Primary | ICD-10-CM

## 2021-09-21 DIAGNOSIS — I50.812 CHRONIC RIGHT-SIDED HEART FAILURE (HCC): ICD-10-CM

## 2021-09-21 PROCEDURE — 99214 OFFICE O/P EST MOD 30 MIN: CPT | Performed by: INTERNAL MEDICINE

## 2021-09-21 PROCEDURE — 3017F COLORECTAL CA SCREEN DOC REV: CPT | Performed by: INTERNAL MEDICINE

## 2021-09-21 PROCEDURE — 1090F PRES/ABSN URINE INCON ASSESS: CPT | Performed by: INTERNAL MEDICINE

## 2021-09-21 PROCEDURE — G8417 CALC BMI ABV UP PARAM F/U: HCPCS | Performed by: INTERNAL MEDICINE

## 2021-09-21 PROCEDURE — G8399 PT W/DXA RESULTS DOCUMENT: HCPCS | Performed by: INTERNAL MEDICINE

## 2021-09-21 PROCEDURE — G8427 DOCREV CUR MEDS BY ELIG CLIN: HCPCS | Performed by: INTERNAL MEDICINE

## 2021-09-21 PROCEDURE — 99211 OFF/OP EST MAY X REQ PHY/QHP: CPT | Performed by: INTERNAL MEDICINE

## 2021-09-21 PROCEDURE — 1036F TOBACCO NON-USER: CPT | Performed by: INTERNAL MEDICINE

## 2021-09-21 PROCEDURE — 4040F PNEUMOC VAC/ADMIN/RCVD: CPT | Performed by: INTERNAL MEDICINE

## 2021-09-21 PROCEDURE — 1123F ACP DISCUSS/DSCN MKR DOCD: CPT | Performed by: INTERNAL MEDICINE

## 2021-09-21 NOTE — PATIENT INSTRUCTIONS
SURVEY:    You may be receiving a survey from [employer] regarding your visit today. Please complete the survey to enable us to provide the highest quality of care to you and your family. If you cannot score us a very good on any question, please call the office to discuss how we could have made your experience a very good one. Thank you.

## 2021-09-21 NOTE — PROGRESS NOTES
Roopa Fletcher am scribing for and in the presence of Pamela Acosta MD, F.A.C.C. Patient: Geovanni Malcolm  : 1950  Date of Visit: 2021    REASON FOR VISIT / CONSULTATION: Follow-up (Hx: pers afib, CAD, CHF, HTN, obesity, PVD, s/p stent, sev tric regurg. Pt is here for 3 month f/u. SOb when walking Denies: CP, dizziness, lightheaded, palps)      History of Present Illness:         Dear Vikki Matson MD    I had the pleasure of seeing Geovanni Malcolm in my office today. Ms. Devon Fernandez is a 66 y.o. female who presented today for follow-up. She initially seen in my office to establish care back on 2021. She had a stress test, echocardiogram and a Holter monitor done prior to her initial visit as below due to worsening shortness of breath. She had had hypertension for many years and has been on anti-hypertensive drugs for awhile. She also has had thyorid issue and has been on Levothyroxine as well for many years. She denied every being diagnosied with diabtes or sleep apnea. She is a former smoker. She quit smoking however in  and has not smoked since. Her family history consits of her mother who had caroid artery disease in her 50-60's. Echo done on 2021: Global left ventricular systolic function appears preserved with an estimated ejection fraction of >60%. The left ventricular cavity size is within normal limits and the left ventricular wall thickness is mildly increased. No definite specific wall motion abnormalities were identified. The left atrium is severely dilated (>40), borderline dilated with a left atrial volume index of 57 ml/m2. Normal mitral valve structure with mild to moderate mitral regurgitation. Normal tricuspid valve structure with moderate to severe tricuspid regurgitation. Mild to moderate pulmonary hypertension with an estimated right ventricular systolic pressure of 37 mmHg.  Diastology cannot be assessed due to the patients rhythm of what appeared to be atrial fibrillation. Compared to the previous study of 6/2/14, the patient now has moderate to severe tricuspid regurgitation and her mitral regurgitation is now at least mild to moderate. Holter done on 5/17/2021: The rhythm was atrial fibrillation. Average QRS duration 0.09. Maximum R-R 2.19 seconds with 58 pauses greater than 2.0  seconds. 2. Diary returned with entries of \"shortness of breath\" with isolated PVCs noted. 1-Atrial fibrillation throughout with average HR of 72 bpm , ranging between 50 and 142 bpm. well controlled ventricular  rate. No significant pauses. Maximum R-R interval is 2.19 second during typical sleep time. recorded 2- Very frequent PVCs (PVCs burden 10%), mainly isolated with occasional couplets. No ventricular runs. Stress test done on 4/19/2021: Limited quality study with significant motion artifact particularly during stress imaging. Equivocal myocardial perfusion study. There is a small/moderate perfusion defect of mild/moderate intensity in the anterior and inferior regions during stress and rest imaging which is most consistent with artifact, but may be due to a small degree of coronary ischemia. Global left ventricular systolic function was normal with an EF of 68% without regional wall motion abnormalities. Significant ST segment changes at rest that impair accurate ECG interpretation during stress. EKG done in office (5/26/2021): Showed atrial fibrillation with a HR of 70 bpm.    Heart cath done on 5/27/2021-  Moderate to severe single vessel disease involving the LAD coronary artery with moderate disease in a large Cx. Normal left ventricular end diastolic pressure. (LVEDP). Heart cath done at [facility] on 5/28/2021- Successful PCI of the with no with one drug-eluting stent.     Ms. Karen Nova is here for a follow up. She is doing well since having her stent placed in May.  She has had no emergency room visits, hospitalizations or minor office procedures. She does have very limited mobility secondary to her Parkinson's disease and morbid obesity. She is trying to do her own physical therapy at home by pushing her walker around. She lives in an assisted living facility. She has been in assited living for about 4-5 years. She said her tremors are secondary to dyskinesia that she is aware of however she does not follow up with a neurologist for this. No fever or cough. No stomach pain. No issues moving her bowels. She is able to sleep well through out the night. She denied any lightheaded or dizziness or heart palpitations. She denied any current or recent chest pain, abdominal pain, bleeding problems, problems with her medications or any other concerns at this time. PAST MEDICAL HISTORY:        Past Medical History:   Diagnosis Date    ADHD (attention deficit hyperactivity disorder)     Anemia     severe    Asthma     Bipolar disorder (HCC)     Bradycardia, unspecified     Chronic anemia     Chronic atrial fibrillation (HCC)     Chronic back pain     Chronic kidney disease     Patient states unaware of this. Has never been mentioned to her.  Colitis     Constipation     COPD (chronic obstructive pulmonary disease) (HCC)     Cystitis without hematuria     Diastolic [facility])     Esophagitis     grade A    GERD (gastroesophageal reflux disease)     Hemorrhoids     History of echocardiogram 06/02/2014    EF >60%, LV wall thickness mildly increased,    Hypertension     Hypothyroidism     Metabolic syndrome     Moderate protein-calorie malnutrition (HCC)     Muscle weakness (generalized)     Obesity     TIA (transient ischemic attack)            CURRENT ALLERGIES: Lamictal [lamotrigine], Oxcarbazepine, Pcn [penicillins], Sertraline, Trileptal, and Strattera [atomoxetine hcl] REVIEW OF SYSTEMS: 14 systems were reviewed. Pertinent positives and negatives as above, all else negative.      Past Surgical History: Procedure Laterality Date    CARDIAC CATHETERIZATION Left 2021    right radial/ [facility]/ Dr. Kev Gillette    COLONOSCOPY  2011    ischemic colitis    COLONOSCOPY  2017    -diverticulosis,hemorrhoids    HYMENECTOMY      1972    HYSTERECTOMY      2008    MENISCECTOMY Right     2008    TONSILLECTOMY AND ADENOIDECTOMY      UPPER GASTROINTESTINAL ENDOSCOPY  3/2013    5 cm axial hiatal hernia    UPPER GASTROINTESTINAL ENDOSCOPY  2017    Dr. Radha Devries    Social History:  Social History     Tobacco Use    Smoking status: Former Smoker     Packs/day: 0.25     Years: 10.00     Pack years: 2.50     Quit date: 3/12/1998     Years since quittin.5    Smokeless tobacco: Never Used   Substance Use Topics    Alcohol use: No    Drug use: No        CURRENT MEDICATIONS:        Outpatient Medications Marked as Taking for the 21 encounter (Office Visit) with Randee Vera MD   Medication Sig Dispense Refill    Sodium Phosphates (FLEET) 7-19 GM/118ML Place 1 enema rectally as needed      SIMETHICONE-80 PO Take 1 capsule by mouth every 6-8 hours as needed       traMADol (ULTRAM) 50 MG tablet Take 50 mg by mouth every 6 hours as needed for Pain.  white petrolatum GEL Apply topically 2 times daily as needed for Dry Skin Arms and legs      chlorhexidine (PERIDEX) 0.12 % solution Take 15 mLs by mouth 2 times daily       NYSTATIN 002961 UNIT/GM powder Apply topically as needed       atorvastatin (LIPITOR) 20 MG tablet Take 1 tablet by mouth daily 90 tablet 3    nitroGLYCERIN (NITROSTAT) 0.4 MG SL tablet up to max of 3 total doses.  If no relief after 1 dose, call 911. 25 tablet 1    clopidogrel (PLAVIX) 75 MG tablet Take 1 tablet by mouth daily 30 tablet 3    losartan (COZAAR) 50 MG tablet Take 1 tablet by mouth daily 30 tablet 3    furosemide (LASIX) 40 MG tablet Take 1 tablet by mouth daily 60 tablet 3    ARIPiprazole (ABILIFY) 15 MG tablet Take 7.5 mg by mouth nightly  apixaban (ELIQUIS) 5 MG TABS tablet Take 1 tablet by mouth 2 times daily 60 tablet 0    tiZANidine (ZANAFLEX) 2 MG tablet Take 2 mg by mouth every 6 hours as needed      cloNIDine (CATAPRES) 0.1 MG tablet Take 0.1 mg by mouth 3 times daily      amLODIPine (NORVASC) 10 MG tablet Take 10 mg by mouth daily       Valbenazine Tosylate (INGREZZA) 40 MG CAPS Take 40 mg by mouth nightly      melatonin 3 MG TABS tablet Take 3 mg by mouth nightly      polyethylene glycol (GLYCOLAX) packet Take 17 g by mouth daily      Amantadine (SYMMETREL) 100 MG TABS tablet Take 100 mg by mouth 2 times daily      Dextromethorphan-guaiFENesin (DELSYM COUGH/CHEST CONGEST DM) 5-100 MG/5ML LIQD Take 5 mLs by mouth every 12 hours as needed (cough)      acetaminophen (TYLENOL) 325 MG tablet Take 650 mg by mouth every 4 hours as needed for Pain or Fever      sodium chloride (OCEAN) 0.65 % nasal spray 1 spray by Nasal route 3 times daily as needed for Congestion      bisacodyl (DULCOLAX) 10 MG suppository Place 10 mg rectally daily as needed for Constipation      magnesium hydroxide (MILK OF MAGNESIA) 400 MG/5ML suspension Take 30 mLs by mouth daily as needed for Constipation      calcium carbonate 600 MG TABS tablet Take 1 tablet by mouth nightly       metoprolol (TOPROL-XL) 50 MG XL tablet TAKE 1 TABLET BY MOUTH DAILY. 30 tablet 5    levothyroxine (SYNTHROID) 50 MCG tablet TAKE 1 TABLET BY MOUTH DAILY. 30 tablet 5    omeprazole (PRILOSEC) 20 MG capsule TAKE 1 CAPSULE BY MOUTH DAILY. 30 capsule 5    citalopram (CELEXA) 20 MG tablet Take 20 mg by mouth daily.  traZODone (DESYREL) 50 MG tablet Take 150 mg by mouth nightly       guaifenesin (MUCINEX) 600 MG SR tablet Take 1,200 mg by mouth 2 times daily. PRN      loratadine (CLARITIN) 10 MG tablet Take 10 mg by mouth daily.  PRN          FAMILY HISTORY: family history includes Bipolar Disorder in her sister and another family member; Diabetes in her father and mother; High Blood Pressure in her father, mother, and another family member; Schizophrenia in her sister. Physical Examination:     /70 (Site: Right Upper Arm, Position: Sitting, Cuff Size: Large Adult)   Pulse 58   Resp 18   Ht 5' 5.98\" (1.676 m)   Wt 247 lb (112 kg)   SpO2 99%   BMI 39.89 kg/m²  Body mass index is 39.89 kg/m². Constitutional: She appeared oriented to person and place. She appears well-developed and well-nourished. In no acute distress. HEENT: Normocephalic and atraumatic. No JVD present. Carotid bruit is not present. No mass and no thyromegaly present. No lymphadenopathy noted. Cardiovascular: Normal rate, irregularly irregular rhythm, normal heart sounds. Exam reveals no gallop and no friction rubs. 2/6 systolic murmur, 4th intercostal space on the LEFT must lateral to the sternal border  Pulmonary/Chest: Effort normal and breath sounds normal. No respiratory distress. She has no wheezes, rhonchi or rales. Abdominal: Soft, non-tender. She exhibits no organomegaly, mass or bruit. Extremities: Mild bilateral ankle edema. No cyanosis or clubbing. 2+ radial and carotid pulses. Distal extremity pulses: 2+ bilaterally. Neurological: Alertness and orientation as per Constitutional exam. No evidence of gross cranial nerve deficit. Coordination appeared normal.   Skin: Skin is warm and dry. There is no rash or diaphoresis. Psychiatric: She has a normal mood and affect.  Her speech is normal and behavior is normal.      MOST RECENT LABS ON RECORD:   Lab Results   Component Value Date    WBC 7.5 05/28/2021    HGB 11.0 (L) 05/28/2021    HCT 35.1 (L) 05/28/2021     05/28/2021    CHOL 133 05/28/2021    TRIG 56 05/28/2021    HDL 62 05/28/2021    ALT 27 05/27/2021    AST 22 05/27/2021     05/29/2021    K 3.8 05/29/2021     05/29/2021    CREATININE 1.0 05/29/2021    BUN 14 05/29/2021    CO2 26 05/29/2021    TSH 4.29 05/27/2021    INR 1.25 (H) 05/28/2021    LABA1C 5.4 05/27/2021 LABMICR CANNOT BE CALCULATED 09/20/2013    BNP NOT REPORTED 03/11/2014       ASSESSMENT:        1. Persistent atrial fibrillation ([facility]    2. Chronic right-sided heart failure ([facility]    3. Severe tricuspid regurgitation    4. Essential hypertension    5. Class 3 severe obesity with body mass index (BMI) of 40.0 to 44.9 in adult, unspecified obesity type, unspecified whether serious comorbidity present ([facility]    6. PVD (peripheral vascular disease) ([facility]    7. CAD, multiple vessel    8. S/P angioplasty with stent       PLAN:      Persistent Atrial Fibrillation: Rate Control. Reviewed her Holter from 5/17/202 did atrial fibrillation throughout with average HR of 72 bpm, ranging between 50 and 142 bpm. Well controlled ventricular rate. Beta Blocker: Continue metoprolol succinate (Toprol XL) 50 mg once daily. I also discussed the potential side effects of this medication including lightheadedness and dizziness and told her to stop the medication of this occurs and call our office if this occurs. Calcium Channel Blocker: Continue amlodipine (Norvasc)  10 mg daily. I discussed the potential side effects of this medication including lightheadedness and dizziness and told her to call the office if this occurs. [id number] Score for Atrial Fibrillation Stroke Risk   Risk   Factors  Component Value   C CHF No 0   H HTN Yes 1   A2 Age >= 76 No,  (72 y.o.) 0   D DM No 0   S2 Prior Stroke/TIA Yes 2   V Vascular Disease No 0   A Age 74-69 Yes,  (72 y.o.) 1   Sc Sex female 1    [id number]  Score  5   Score last updated 2/51/01 1:69 PM EDT  Click here for a link to the UpToDate guideline \"Atrial Fibrillation: Anticoagulation therapy to prevent embolization  Disclaimer: Risk Score calculation is dependent on accuracy of patient problem list and past encounter diagnosis. Stroke Risk: Her CHADS2-VASc score is 5/9 (6.7% stroke risk)  Anticoagulation: Continue Apixaban (Eliquis) 5 mg every 12 hours.  I also reminded her to watch for signs of blood in her stool or black tarry stools and stop the medication immediately if this develops as this could be life threatening.  Right Sided Chronic diastolic heart failure: New York Heart Association Class: IIb (Marked symptoms during daily activities)   Beta Blocker: Continue Metoprolol succinate (Toprol XL) 50 mg daily. ACE Inibitor/ARB: Continue losartan (Cozaar) 50 mg daily.  Heart failure counseling: I advised them to try and keep their legs up whenever possible and to limit salt in their diet.  I do plan to get a repeat echocardiogram at her next visit in 6 months. · Moderate to Severe Tricuspid Regurgitation seen on Echo from 4/2021:    · Beta Blocker: Continue Metoprolol succinate (Toprol XL) 50 mg daily. · ACE Inibitor/ARB: Continue losartan (Cozaar) 50 mg daily. · Essential Hypertension: Controlled   Beta Blocker: Continue Metoprolol succinate (Toprol XL) 50 mg daily.  ACE Inibitor/ARB: Continue losartan (Cozaar) 50 mg daily.  Calcium Channel Blocker: Continue amlodipine (Norvasc) 10 mg once daily. Morbid obesity: Body mass index is 39.89 kg/m². I also briefly discussed both diet and exercise strategies for her to continue to loses weight and she was very receptive to this. · Peripheral Vascular Disease: No Stents   · Continue current therapy and medical management as above. · Atherosclerotic Heart Disease: Successful PCI of the LAD with one drug-eluting stent on 5/28/2021.  Antiplatelet Agent: Continue clopidogrel (Plavix): Plavix 75 mg daily.  Beta Blocker: Continue Metoprolol succinate (Toprol XL) 50 mg daily.  Anti-anginal medications: Continue nitroglycerin 0.4 mg tablets as needed for chest pain. and amlodipine (Norvasc) 10 mg once daily.  Cholesterol Reduction Therapy: Continue Atorvastatin (Lipitor) 20 mg daily.      Additional counseling: I advised them to call our office or go to the emergency room if they developed worsening or persistent chest pain or increased shortness of breath as this could be life threatening. In the meantime, I encouraged Ms. Dos Santos to continue to take her other medications. FOLLOW UP:   I told Ms. Mary Jo Talavera to call my office if she had any problems, but otherwise I asked her to Return in about 6 months (around 3/21/2022). However, I would be happy to see her sooner should the need arise. Sincerely,  Jesse White MD, F.A.C.C. [facility]    [address] [place] [address]  Phone: [phone], Fax: [phone]     I believe that the risk of significant morbidity and mortality related to the patient's current medical conditions are: intermediate-high. >30 minutes were spent during prep work, discussion and exam of the patient, and follow up documentation and all of their questions were answered. The documentation recorded by the scribe, accurately and completely reflects the services I personally performed and the decisions made by me. Jesse White MD, F.A.C.C.  September 21, 2021

## 2021-10-12 ENCOUNTER — OUTSIDE SERVICES (OUTPATIENT)
Dept: PRIMARY CARE CLINIC | Age: 71
End: 2021-10-12
Payer: MEDICARE

## 2021-10-12 DIAGNOSIS — I50.32 CHRONIC DIASTOLIC HEART FAILURE (HCC): ICD-10-CM

## 2021-10-12 DIAGNOSIS — I25.10 CORONARY ARTERY DISEASE INVOLVING NATIVE CORONARY ARTERY OF NATIVE HEART WITHOUT ANGINA PECTORIS: ICD-10-CM

## 2021-10-12 DIAGNOSIS — I48.91 ATRIAL FIBRILLATION, UNSPECIFIED TYPE (HCC): ICD-10-CM

## 2021-10-12 DIAGNOSIS — I10 ESSENTIAL HYPERTENSION: Primary | ICD-10-CM

## 2021-10-12 NOTE — PROGRESS NOTES
Patient:  Bakari Ontiveros, 1950  I saw this patient on 10/13/2021, at [facility]   Shortness of breath better  Edema better  No bleeding from anticoagulant  Blood sugars better  bp well controlled        Reason for Visit:      ICD-10-CM    1. Essential hypertension  I10    2. Atrial fibrillation, unspecified type ([facility] [address]  I48.91    3. Coronary artery disease involving native coronary artery of native heart without angina pectoris  I25.10    4. Chronic diastolic heart failure (HCC)  I50.32        Changes since last visit:   Shortness of breath and edema improving  BP better  Mood worse  1. Fall:  none  2. Behavioral Change: mood better  3. Pain Control: adequate  4. Mobility: improving  5. Pressure Sore:  none  Allergies:  Lamictal [lamotrigine], Oxcarbazepine, Pcn [penicillins], Sertraline, Trileptal, and Strattera [atomoxetine hcl]      Current Outpatient Medications   Medication Sig Dispense Refill    Sodium Phosphates (FLEET) 7-19 GM/118ML Place 1 enema rectally as needed      SIMETHICONE-80 PO Take 1 capsule by mouth every 6-8 hours as needed       traMADol (ULTRAM) 50 MG tablet Take 50 mg by mouth every 6 hours as needed for Pain.  white petrolatum GEL Apply topically 2 times daily as needed for Dry Skin Arms and legs      chlorhexidine (PERIDEX) 0.12 % solution Take 15 mLs by mouth 2 times daily       NYSTATIN 113754 UNIT/GM powder Apply topically as needed       atorvastatin (LIPITOR) 20 MG tablet Take 1 tablet by mouth daily 90 tablet 3    nitroGLYCERIN (NITROSTAT) 0.4 MG SL tablet up to max of 3 total doses.  If no relief after 1 dose, call 911. 25 tablet 1    clopidogrel (PLAVIX) 75 MG tablet Take 1 tablet by mouth daily 30 tablet 3    losartan (COZAAR) 50 MG tablet Take 1 tablet by mouth daily 30 tablet 3    furosemide (LASIX) 40 MG tablet Take 1 tablet by mouth daily 60 tablet 3    ARIPiprazole (ABILIFY) 15 MG tablet Take 7.5 mg by mouth nightly      apixaban (ELIQUIS) 5 MG TABS tablet Take 1 tablet by mouth 2 times daily 60 tablet 0    tiZANidine (ZANAFLEX) 2 MG tablet Take 2 mg by mouth every 6 hours as needed      cloNIDine (CATAPRES) 0.1 MG tablet Take 0.1 mg by mouth 3 times daily      amLODIPine (NORVASC) 10 MG tablet Take 10 mg by mouth daily       Valbenazine Tosylate (INGREZZA) 40 MG CAPS Take 40 mg by mouth nightly      melatonin 3 MG TABS tablet Take 3 mg by mouth nightly      polyethylene glycol (GLYCOLAX) packet Take 17 g by mouth daily      Amantadine (SYMMETREL) 100 MG TABS tablet Take 100 mg by mouth 2 times daily      Dextromethorphan-guaiFENesin (DELSYM COUGH/CHEST CONGEST DM) 5-100 MG/5ML LIQD Take 5 mLs by mouth every 12 hours as needed (cough)      acetaminophen (TYLENOL) 325 MG tablet Take 650 mg by mouth every 4 hours as needed for Pain or Fever      sodium chloride (OCEAN) 0.65 % nasal spray 1 spray by Nasal route 3 times daily as needed for Congestion      bisacodyl (DULCOLAX) 10 MG suppository Place 10 mg rectally daily as needed for Constipation      magnesium hydroxide (MILK OF MAGNESIA) 400 MG/5ML suspension Take 30 mLs by mouth daily as needed for Constipation      calcium carbonate 600 MG TABS tablet Take 1 tablet by mouth nightly       metoprolol (TOPROL-XL) 50 MG XL tablet TAKE 1 TABLET BY MOUTH DAILY. 30 tablet 5    levothyroxine (SYNTHROID) 50 MCG tablet TAKE 1 TABLET BY MOUTH DAILY. 30 tablet 5    omeprazole (PRILOSEC) 20 MG capsule TAKE 1 CAPSULE BY MOUTH DAILY. 30 capsule 5    citalopram (CELEXA) 20 MG tablet Take 20 mg by mouth daily.  traZODone (DESYREL) 50 MG tablet Take 150 mg by mouth nightly       guaifenesin (MUCINEX) 600 MG SR tablet Take 1,200 mg by mouth 2 times daily. PRN      loratadine (CLARITIN) 10 MG tablet Take 10 mg by mouth daily. PRN        No current facility-administered medications for this visit.    Past Medical History:    Past Medical History:   Diagnosis Date    ADHD (attention deficit hyperactivity disorder)     Anemia     severe    Asthma     Bipolar disorder (HCC)     Bradycardia, unspecified     Chronic anemia     Chronic atrial fibrillation (HCC)     Chronic back pain     Chronic kidney disease     Patient states unaware of this. Has never been mentioned to her.      Colitis     Constipation     COPD (chronic obstructive pulmonary disease) (HCC)     Cystitis without hematuria     Diastolic CHF (HCC)     Esophagitis     grade A    GERD (gastroesophageal reflux disease)     Hemorrhoids     History of echocardiogram 2014    EF >60%, LV wall thickness mildly increased,    Hypertension     Hypothyroidism     Metabolic syndrome     Moderate protein-calorie malnutrition (HCC)     Muscle weakness (generalized)     Obesity     TIA (transient ischemic attack)        Past Surgical History:    Past Surgical History:   Procedure Laterality Date    CARDIAC CATHETERIZATION Left 2021    right radial/ [id number] [facility]/ Dr. Volodymyr Lynn    COLONOSCOPY  2011    ischemic colitis    COLONOSCOPY  2017    -diverticulosis,hemorrhoids    HYMENECTOMY      1972    HYSTERECTOMY      2008    MENISCECTOMY Right     2008    TONSILLECTOMY AND ADENOIDECTOMY      UPPER GASTROINTESTINAL ENDOSCOPY  3/2013    5 cm axial hiatal hernia    UPPER GASTROINTESTINAL ENDOSCOPY  2017    Dr. Foreign Louie       Social History:   Social History     Tobacco Use    Smoking status: Former Smoker     Packs/day: 0.25     Years: 10.00     Pack years: 2.50     Quit date: 3/12/1998     Years since quittin.6    Smokeless tobacco: Never Used   Substance Use Topics    Alcohol use: No       Family History:   Family History   Problem Relation Age of Onset    Diabetes Mother     High Blood Pressure Mother     Diabetes Father     High Blood Pressure Father     Bipolar Disorder Sister     Schizophrenia Sister     High Blood Pressure Other         2 sons    Bipolar Disorder Other         2 sons Review of Systems:  Constitutional: negative for fevers or chills  Eyes: negative for visual disturbance   ENT: positive for oral infection,negative for sore throat or nasal congestion,no dysphagia. No epistaxis. Respiratory:  shortness of breath improved  Cardiovascular: neg for chest pain , palpitations,pnd,  edema better  Gastrointestinal: neg for abd pain, nausea, vomiting, diarrhea ,  constipation,no reyna,no blood in stool  Genitourinary: negative for dysuria, urgency,neg for hematuria , frequency  Integument/breast: negative for skin rash or lesions  Neurological: negative for unilateral weakness, numbness or tingling,has tremors  Muscular Skeletal: denies joint pain   Psych- mood changes better    Objective:    Vitals: BP:116/67 T:97.3 P:71 R:18 [id number]   -----------------------------------------------------------------  Exam:  GEN:   A & O x3, no apparent distress,obese  EYES:  No gross abnormalities. and PERRLA  ENT:Throat- has oral unlcer,nose- no drainage  NECK: normal, supple, no lymphadenopathy,  no carotid bruits  PULM: Diminished air entry on auscultation bilaterally- no wheezes, rales or rhonchi, normal air movement, no respiratory distress  COR:   S1,s2,RRR, no murmurs and no gallops,has minimal bilat leg edema  ABD:    soft, non-tender, non-distended, normal bowel sounds, no masses or organomegaly  : no cva or flank tenderness  EXT:has minimal edema, no calf tenderness, and warm to touch.    NEURO: Motor and sensory grossly intact,has tremors at rest  PSYCH: mood stable  SKIN:   No rash or lesions    -----------------------------------------------------------------  Diagnostic Data:   · All diagnostic data was reviewed    Assessment:      Problem List Items Addressed This Visit     Essential hypertension - Primary    Coronary artery disease      Other Visit Diagnoses     Atrial fibrillation, unspecified type ([facility]        Chronic diastolic heart failure [facility])            Patient Active Problem List   Diagnosis Code    TIA (transient ischemic attack) G45.9    Bipolar 1 disorder ([facility] [address] F31.9    DDD (degenerative disc disease), lumbar M51.36    Bradycardia R00.1    Essential hypertension I10    Obesity (BMI 30-39. 9) E66.9    GERD (gastroesophageal reflux disease) K21.9    Iron deficiency anemia D50.9    Iron deficiency anemia due to chronic blood loss D50.0    History of colon polyps Z86.010    Hypothyroidism E03.9    Other chronic pain G89.29    Other hemorrhoids K64.8    Diverticulosis of large intestine without diverticulitis K57.30    Attention deficit hyperactivity disorder F90.9    Morbid obesity (HCC) E66.01    Cellulitis of leg without foot, right L03.115    Coronary artery disease I25.10    CAD, multiple vessel I25.10    Chest pain R07.9         Plan:      Monitor for bleeding  Psych to manage psychotropics  Encourage activity as tolerated and follow diet  Continue current medications  Monitor bp   Prevent pressure sore  Prevent falls      Electronically signed by Santa Farmer MD on 10/14/2021 at 2:13 PM

## 2021-10-13 ENCOUNTER — HOSPITAL ENCOUNTER (OUTPATIENT)
Age: 71
Setting detail: SPECIMEN
Discharge: HOME | End: 2021-10-13
Payer: MEDICARE

## 2021-10-13 LAB
VALPROIC ACID LEVEL: 42 UG/ML (ref 50–125)
VALPROIC DATE LAST DOSE: ABNORMAL
VALPROIC DOSE AMOUNT: ABNORMAL
VALPROIC TIME LAST DOSE: ABNORMAL

## 2021-10-13 PROCEDURE — 80164 ASSAY DIPROPYLACETIC ACD TOT: CPT

## 2021-10-13 PROCEDURE — P9604 ONE-WAY ALLOW PRORATED TRIP: HCPCS

## 2021-10-13 PROCEDURE — 99308 SBSQ NF CARE LOW MDM 20: CPT | Performed by: FAMILY MEDICINE

## 2021-10-13 PROCEDURE — 36415 COLL VENOUS BLD VENIPUNCTURE: CPT

## 2021-11-02 RX ORDER — SENNOSIDES 8.6 MG
650 CAPSULE ORAL 4 TIMES DAILY
COMMUNITY

## 2021-11-02 RX ORDER — DIVALPROEX SODIUM 250 MG/1
TABLET, DELAYED RELEASE ORAL
COMMUNITY
Start: 2021-09-28 | End: 2022-02-11

## 2021-11-10 PROCEDURE — 99308 SBSQ NF CARE LOW MDM 20: CPT | Performed by: FAMILY MEDICINE

## 2021-11-11 ENCOUNTER — OUTSIDE SERVICES (OUTPATIENT)
Dept: PRIMARY CARE CLINIC | Age: 71
End: 2021-11-11
Payer: MEDICARE

## 2021-11-11 DIAGNOSIS — I10 ESSENTIAL HYPERTENSION: ICD-10-CM

## 2021-11-11 DIAGNOSIS — I50.32 CHRONIC DIASTOLIC HEART FAILURE (HCC): ICD-10-CM

## 2021-11-11 DIAGNOSIS — I48.91 ATRIAL FIBRILLATION, UNSPECIFIED TYPE (HCC): Primary | ICD-10-CM

## 2021-11-11 DIAGNOSIS — I25.10 CORONARY ARTERY DISEASE INVOLVING NATIVE CORONARY ARTERY OF NATIVE HEART WITHOUT ANGINA PECTORIS: ICD-10-CM

## 2021-11-11 DIAGNOSIS — N18.31 STAGE 3A CHRONIC KIDNEY DISEASE (HCC): ICD-10-CM

## 2021-11-11 NOTE — PROGRESS NOTES
Patient:  Jaylen Morales, 1950  I saw this patient on 11/10/2021, at [facility]   Shortness of breath better  Edema better  No bleeding from anticoagulant  Blood sugars better  bp well controlled        Reason for Visit:      ICD-10-CM    1. Atrial fibrillation, unspecified type ([facility] [address]  I48.91    2. Coronary artery disease involving native coronary artery of native heart without angina pectoris  I25.10    3. Chronic diastolic heart failure (HCC)  I50.32    4. Essential hypertension  I10    5. Stage 3a chronic kidney disease (HCC)  N18.31        Changes since last visit:   Shortness of breath and edema improving  BP better  Mood better  1. Fall:  none  2. Behavioral Change: mood better  3. Pain Control: adequate  4. Mobility: improving  5. Pressure Sore:  none  Allergies:  Lamictal [lamotrigine], Oxcarbazepine, Pcn [penicillins], Sertraline, Trileptal, and Strattera [atomoxetine hcl]      Current Outpatient Medications   Medication Sig Dispense Refill    divalproex (DEPAKOTE) 250 MG DR tablet       acetaminophen (TYLENOL) 650 MG extended release tablet Take 650 mg by mouth 4 times daily      Sodium Phosphates (FLEET) 7-19 GM/118ML Place 1 enema rectally as needed      SIMETHICONE-80 PO Take 1 capsule by mouth every 6-8 hours as needed       traMADol (ULTRAM) 50 MG tablet Take 50 mg by mouth every 6 hours as needed for Pain.  white petrolatum GEL Apply topically 2 times daily as needed for Dry Skin Arms and legs      chlorhexidine (PERIDEX) 0.12 % solution Take 15 mLs by mouth 2 times daily       NYSTATIN [id number] UNIT/GM powder Apply topically as needed       atorvastatin (LIPITOR) 20 MG tablet Take 1 tablet by mouth daily 90 tablet 3    nitroGLYCERIN (NITROSTAT) 0.4 MG SL tablet up to max of 3 total doses.  If no relief after 1 dose, call 911. 25 tablet 1    clopidogrel (PLAVIX) 75 MG tablet Take 1 tablet by mouth daily 30 tablet 3    losartan (COZAAR) 50 MG tablet Take 1 tablet by mouth daily 30 tablet 3    furosemide (LASIX) 40 MG tablet Take 1 tablet by mouth daily 60 tablet 3    ARIPiprazole (ABILIFY) 15 MG tablet Take 7.5 mg by mouth nightly      apixaban (ELIQUIS) 5 MG TABS tablet Take 1 tablet by mouth 2 times daily 60 tablet 0    tiZANidine (ZANAFLEX) 2 MG tablet Take 2 mg by mouth every 6 hours as needed      cloNIDine (CATAPRES) 0.1 MG tablet Take 0.1 mg by mouth 3 times daily      amLODIPine (NORVASC) 10 MG tablet Take 10 mg by mouth daily       Valbenazine Tosylate (INGREZZA) 40 MG CAPS Take 40 mg by mouth nightly      melatonin 3 MG TABS tablet Take 3 mg by mouth nightly      polyethylene glycol (GLYCOLAX) packet Take 17 g by mouth daily      Amantadine (SYMMETREL) 100 MG TABS tablet Take 100 mg by mouth 2 times daily      Dextromethorphan-guaiFENesin (DELSYM COUGH/CHEST CONGEST DM) 5-100 MG/5ML LIQD Take 5 mLs by mouth every 12 hours as needed (cough)      acetaminophen (TYLENOL) 325 MG tablet Take 650 mg by mouth every 4 hours as needed for Pain or Fever      sodium chloride (OCEAN) 0.65 % nasal spray 1 spray by Nasal route 3 times daily as needed for Congestion      bisacodyl (DULCOLAX) 10 MG suppository Place 10 mg rectally daily as needed for Constipation      magnesium hydroxide (MILK OF MAGNESIA) 400 MG/5ML suspension Take 30 mLs by mouth daily as needed for Constipation      calcium carbonate 600 MG TABS tablet Take 1 tablet by mouth nightly       metoprolol (TOPROL-XL) 50 MG XL tablet TAKE 1 TABLET BY MOUTH DAILY. 30 tablet 5    levothyroxine (SYNTHROID) 50 MCG tablet TAKE 1 TABLET BY MOUTH DAILY. 30 tablet 5    omeprazole (PRILOSEC) 20 MG capsule TAKE 1 CAPSULE BY MOUTH DAILY. 30 capsule 5    citalopram (CELEXA) 20 MG tablet Take 20 mg by mouth daily.  traZODone (DESYREL) 50 MG tablet Take 150 mg by mouth nightly       guaifenesin (MUCINEX) 600 MG SR tablet Take 1,200 mg by mouth 2 times daily.  PRN      loratadine (CLARITIN) 10 MG tablet Take 10 mg by mouth daily. PRN        No current facility-administered medications for this visit. Past Medical History:    Past Medical History:   Diagnosis Date    ADHD (attention deficit hyperactivity disorder)     Anemia     severe    Asthma     Bipolar disorder (HCC)     Bradycardia, unspecified     Chronic anemia     Chronic atrial fibrillation (HCC)     Chronic back pain     Chronic kidney disease     Patient states unaware of this. Has never been mentioned to her.      Colitis     Constipation     COPD (chronic obstructive pulmonary disease) (HCC)     Cystitis without hematuria     Diastolic CHF (HCC)     Esophagitis     grade A    GERD (gastroesophageal reflux disease)     Hemorrhoids     History of echocardiogram 2014    EF >60%, LV wall thickness mildly increased,    Hypertension     Hypothyroidism     Metabolic syndrome     Moderate protein-calorie malnutrition (HCC)     Muscle weakness (generalized)     Obesity     TIA (transient ischemic attack)        Past Surgical History:    Past Surgical History:   Procedure Laterality Date    CARDIAC CATHETERIZATION Left 2021    right radial/ [address] [facility]/ Dr. Sheila Corey    COLONOSCOPY  2011    ischemic colitis    COLONOSCOPY  2017    -diverticulosis,hemorrhoids    HYMENECTOMY      1972    HYSTERECTOMY      2008    MENISCECTOMY Right     2008    TONSILLECTOMY AND ADENOIDECTOMY      UPPER GASTROINTESTINAL ENDOSCOPY  3/2013    5 cm axial hiatal hernia    UPPER GASTROINTESTINAL ENDOSCOPY  2017    Dr. Judge Zaragoza       Social History:   Social History     Tobacco Use    Smoking status: Former Smoker     Packs/day: 0.25     Years: 10.00     Pack years: 2.50     Quit date: 3/12/1998     Years since quittin.6    Smokeless tobacco: Never Used   Substance Use Topics    Alcohol use: No       Family History:   Family History   Problem Relation Age of Onset    Diabetes Mother     High Blood Pressure Mother     Diabetes Father     High Blood Pressure Father     Bipolar Disorder Sister     Schizophrenia Sister     High Blood Pressure Other         2 sons    Bipolar Disorder Other         2 sons           Review of Systems:  Constitutional: negative for fevers or chills  Eyes: negative for visual disturbance   ENT: positive for oral infection,negative for sore throat or nasal congestion,no dysphagia. No epistaxis. Respiratory:  shortness of breath improved  Cardiovascular: neg for chest pain , palpitations,pnd,  edema better  Gastrointestinal: neg for abd pain, nausea, vomiting, diarrhea ,  constipation,no reyna,no blood in stool  Genitourinary: negative for dysuria, urgency,neg for hematuria , frequency  Integument/breast: negative for skin rash or lesions  Neurological: negative for unilateral weakness, numbness or tingling,has tremors  Muscular Skeletal: denies joint pain   Psych- mood changes better    Objective:    Vitals: BP:117/70 T:97.2 P:71 R:18    -----------------------------------------------------------------  Exam:  GEN:   A & O x3, no apparent distress,obese  EYES:  No gross abnormalities. and PERRLA  ENT:Throat- has oral unlcer,nose- no drainage  NECK: normal, supple, no lymphadenopathy,  no carotid bruits  PULM: Diminished air entry on auscultation bilaterally- no wheezes, rales or rhonchi, normal air movement, no respiratory distress  COR:   S1,s2,RRR, no murmurs and no gallops,has minimal bilat leg edema  ABD:    soft, non-tender, non-distended, normal bowel sounds, no masses or organomegaly  : no cva or flank tenderness  EXT:has minimal edema, no calf tenderness, and warm to touch.    NEURO: Motor and sensory grossly intact,has tremors at rest  PSYCH: mood improved  SKIN:   No rash or lesions    -----------------------------------------------------------------  Diagnostic Data:   · All diagnostic data was reviewed    Assessment:      Problem List Items Addressed This Visit     Essential hypertension    Coronary artery disease      Other Visit Diagnoses     Atrial fibrillation, unspecified type ([facility]    -  Primary    Chronic diastolic heart failure (HCC)        Stage 3a chronic kidney disease ([facility]            Patient Active Problem List   Diagnosis Code    TIA (transient ischemic attack) G45.9    Bipolar 1 disorder ([facility] F31.9    DDD (degenerative disc disease), lumbar M51.36    Bradycardia R00.1    Essential hypertension I10    Obesity (BMI 30-39. 9) E66.9    GERD (gastroesophageal reflux disease) K21.9    Iron deficiency anemia D50.9    Iron deficiency anemia due to chronic blood loss D50.0    History of colon polyps Z86.010    Hypothyroidism E03.9    Other chronic pain G89.29    Other hemorrhoids K64.8    Diverticulosis of large intestine without diverticulitis K57.30    Attention deficit hyperactivity disorder F90.9    Morbid obesity (HCC) E66.01    Cellulitis of leg without foot, right L03.115    Coronary artery disease I25.10    CAD, multiple vessel I25.10    Chest pain R07.9         Plan:      Monitor for bleeding  Psych to manage psychotropics  Encourage activity as tolerated and follow diet  Continue current medications  Monitor bp   Prevent pressure sore  Prevent falls      Electronically signed by Rory Azar MD on 11/12/2021 at 1:39 PM

## 2021-12-08 ENCOUNTER — OUTSIDE SERVICES (OUTPATIENT)
Dept: PRIMARY CARE CLINIC | Age: 71
End: 2021-12-08
Payer: MEDICARE

## 2021-12-08 DIAGNOSIS — I50.32 CHRONIC DIASTOLIC HEART FAILURE (HCC): Primary | ICD-10-CM

## 2021-12-08 DIAGNOSIS — I48.91 ATRIAL FIBRILLATION, UNSPECIFIED TYPE (HCC): ICD-10-CM

## 2021-12-08 DIAGNOSIS — I25.10 CORONARY ARTERY DISEASE INVOLVING NATIVE CORONARY ARTERY OF NATIVE HEART WITHOUT ANGINA PECTORIS: ICD-10-CM

## 2021-12-08 PROCEDURE — 99308 SBSQ NF CARE LOW MDM 20: CPT | Performed by: FAMILY MEDICINE

## 2021-12-08 NOTE — PROGRESS NOTES
Patient:  Guilherme Arita, 1950  I saw this patient on 12/08/2021, at [facility]   Shortness of breath better  Edema better  No bleeding from anticoagulant  bp well controlled        Reason for Visit:      ICD-10-CM    1. Chronic diastolic heart failure (HCC)  I50.32    2. Atrial fibrillation, unspecified type ([facility] [address]  I48.91    3. Coronary artery disease involving native coronary artery of native heart without angina pectoris  I25.10        Changes since last visit:   Shortness of breath and edema improving  BP better  Mood better  1. Fall:  none  2. Behavioral Change: mood better  3. Pain Control: adequate  4. Mobility: improving  5. Pressure Sore:  none  Allergies:  Lamictal [lamotrigine], Oxcarbazepine, Pcn [penicillins], Sertraline, Trileptal, and Strattera [atomoxetine hcl]    Current Outpatient Medications   Medication Sig Dispense Refill    divalproex (DEPAKOTE) 250 MG DR tablet       acetaminophen (TYLENOL) 650 MG extended release tablet Take 650 mg by mouth 4 times daily      Sodium Phosphates (FLEET) 7-19 GM/118ML Place 1 enema rectally as needed      SIMETHICONE-80 PO Take 1 capsule by mouth every 6-8 hours as needed       traMADol (ULTRAM) 50 MG tablet Take 50 mg by mouth every 6 hours as needed for Pain.  white petrolatum GEL Apply topically 2 times daily as needed for Dry Skin Arms and legs      chlorhexidine (PERIDEX) 0.12 % solution Take 15 mLs by mouth 2 times daily       NYSTATIN 436100 UNIT/GM powder Apply topically as needed       atorvastatin (LIPITOR) 20 MG tablet Take 1 tablet by mouth daily 90 tablet 3    nitroGLYCERIN (NITROSTAT) 0.4 MG SL tablet up to max of 3 total doses.  If no relief after 1 dose, call 911. 25 tablet 1    clopidogrel (PLAVIX) 75 MG tablet Take 1 tablet by mouth daily 30 tablet 3    losartan (COZAAR) 50 MG tablet Take 1 tablet by mouth daily 30 tablet 3    furosemide (LASIX) 40 MG tablet Take 1 tablet by mouth daily 60 tablet 3    ARIPiprazole (ABILIFY) 15 MG tablet Take 7.5 mg by mouth nightly      apixaban (ELIQUIS) 5 MG TABS tablet Take 1 tablet by mouth 2 times daily 60 tablet 0    tiZANidine (ZANAFLEX) 2 MG tablet Take 2 mg by mouth every 6 hours as needed      cloNIDine (CATAPRES) 0.1 MG tablet Take 0.1 mg by mouth 3 times daily      amLODIPine (NORVASC) 10 MG tablet Take 10 mg by mouth daily       Valbenazine Tosylate (INGREZZA) 40 MG CAPS Take 40 mg by mouth nightly      melatonin 3 MG TABS tablet Take 3 mg by mouth nightly      polyethylene glycol (GLYCOLAX) packet Take 17 g by mouth daily      Amantadine (SYMMETREL) 100 MG TABS tablet Take 100 mg by mouth 2 times daily      Dextromethorphan-guaiFENesin (DELSYM COUGH/CHEST CONGEST DM) 5-100 MG/5ML LIQD Take 5 mLs by mouth every 12 hours as needed (cough)      acetaminophen (TYLENOL) 325 MG tablet Take 650 mg by mouth every 4 hours as needed for Pain or Fever      sodium chloride (OCEAN) 0.65 % nasal spray 1 spray by Nasal route 3 times daily as needed for Congestion      bisacodyl (DULCOLAX) 10 MG suppository Place 10 mg rectally daily as needed for Constipation      magnesium hydroxide (MILK OF MAGNESIA) 400 MG/5ML suspension Take 30 mLs by mouth daily as needed for Constipation      calcium carbonate 600 MG TABS tablet Take 1 tablet by mouth nightly       metoprolol (TOPROL-XL) 50 MG XL tablet TAKE 1 TABLET BY MOUTH DAILY. 30 tablet 5    levothyroxine (SYNTHROID) 50 MCG tablet TAKE 1 TABLET BY MOUTH DAILY. 30 tablet 5    omeprazole (PRILOSEC) 20 MG capsule TAKE 1 CAPSULE BY MOUTH DAILY. 30 capsule 5    citalopram (CELEXA) 20 MG tablet Take 20 mg by mouth daily.  traZODone (DESYREL) 50 MG tablet Take 150 mg by mouth nightly       guaifenesin (MUCINEX) 600 MG SR tablet Take 1,200 mg by mouth 2 times daily. PRN      loratadine (CLARITIN) 10 MG tablet Take 10 mg by mouth daily. PRN        No current facility-administered medications for this visit.        Past Medical History:    Past Medical History:   Diagnosis Date    ADHD (attention deficit hyperactivity disorder)     Anemia     severe    Asthma     Bipolar disorder (HCC)     Bradycardia, unspecified     Chronic anemia     Chronic atrial fibrillation (HCC)     Chronic back pain     Chronic kidney disease     Patient states unaware of this. Has never been mentioned to her.      Colitis     Constipation     COPD (chronic obstructive pulmonary disease) (HCC)     Cystitis without hematuria     Diastolic CHF (HCC)     Esophagitis     grade A    GERD (gastroesophageal reflux disease)     Hemorrhoids     History of echocardiogram 2014    EF >60%, LV wall thickness mildly increased,    Hypertension     Hypothyroidism     Metabolic syndrome     Moderate protein-calorie malnutrition (HCC)     Muscle weakness (generalized)     Obesity     TIA (transient ischemic attack)        Past Surgical History:    Past Surgical History:   Procedure Laterality Date    CARDIAC CATHETERIZATION Left 2021    right radial/ [facility]/ Dr. George Delgado    COLONOSCOPY  2011    ischemic colitis    COLONOSCOPY  2017    -diverticulosis,hemorrhoids    HYMENECTOMY      1972    HYSTERECTOMY      2008    MENISCECTOMY Right     2008    TONSILLECTOMY AND ADENOIDECTOMY      UPPER GASTROINTESTINAL ENDOSCOPY  3/2013    5 cm axial hiatal hernia    UPPER GASTROINTESTINAL ENDOSCOPY  2017    Dr. Berny Becerril       Social History:   Social History     Tobacco Use    Smoking status: Former Smoker     Packs/day: 0.25     Years: 10.00     Pack years: 2.50     Quit date: 3/12/1998     Years since quittin.7    Smokeless tobacco: Never Used   Substance Use Topics    Alcohol use: No       Family History:   Family History   Problem Relation Age of Onset    Diabetes Mother     High Blood Pressure Mother     Diabetes Father     High Blood Pressure Father     Bipolar Disorder Sister     Schizophrenia Sister     High Blood Pressure Other         2 sons    Bipolar Disorder Other         2 sons           Review of Systems:  Constitutional: negative for fevers or chills  Eyes: negative for visual disturbance   ENT: positive for oral infection,negative for sore throat or nasal congestion,no dysphagia. No epistaxis. Respiratory:  shortness of breath improved  Cardiovascular: neg for chest pain , palpitations,pnd,  edema better  Gastrointestinal: neg for abd pain, nausea, vomiting, diarrhea ,  constipation,no reyna,no blood in stool  Genitourinary: negative for dysuria, urgency,neg for hematuria , frequency  Integument/breast: negative for skin rash or lesions  Neurological: negative for unilateral weakness, numbness or tingling,has tremors  Muscular Skeletal: denies joint pain   Psych- mood changes better    Objective:    Vitals: BP:140/54 T:97.3 P:65 R:18    -----------------------------------------------------------------  Exam:  GEN:   A & O x3, no apparent distress,obese  EYES:  No gross abnormalities. and PERRLA  ENT:Throat- has oral unlcer,nose- no drainage  NECK: normal, supple, no lymphadenopathy,  no carotid bruits  PULM: Diminished air entry on auscultation bilaterally- no wheezes, rales or rhonchi, normal air movement, no respiratory distress  COR:   S1,s2,RRR, no murmurs and no gallops,has minimal bilat leg edema  ABD:    soft, non-tender, non-distended, normal bowel sounds, no masses or organomegaly  : no cva or flank tenderness  EXT:has minimal edema, no calf tenderness, and warm to touch.    NEURO: Motor and sensory grossly intact,has tremors at rest  PSYCH: mood improved  SKIN:   No rash or lesions    -----------------------------------------------------------------  Diagnostic Data:   · All diagnostic data was reviewed    Assessment:      Problem List Items Addressed This Visit     Coronary artery disease      Other Visit Diagnoses     Chronic diastolic heart failure ([facility] [address]    -  Primary    Atrial fibrillation, unspecified type [facility])            Patient Active Problem List   Diagnosis Code    TIA (transient ischemic attack) G45.9    Bipolar 1 disorder ([address] F31.9    DDD (degenerative disc disease), lumbar M51.36    Bradycardia R00.1    Essential hypertension I10    Obesity (BMI 30-39. 9) E66.9    GERD (gastroesophageal reflux disease) K21.9    Iron deficiency anemia D50.9    Iron deficiency anemia due to chronic blood loss D50.0    History of colon polyps Z86.010    Hypothyroidism E03.9    Other chronic pain G89.29    Other hemorrhoids K64.8    Diverticulosis of large intestine without diverticulitis K57.30    Attention deficit hyperactivity disorder F90.9    Morbid obesity (HCC) E66.01    Cellulitis of leg without foot, right L03.115    Coronary artery disease I25.10    CAD, multiple vessel I25.10    Chest pain R07.9         Plan:      Monitor for bleeding  Psych to manage psychotropics  Encourage activity as tolerated and follow diet  Continue current medications  Monitor bp   Prevent pressure sore  Prevent falls      Electronically signed by Christel Sprague MD on 12/12/2021 at 1:00 PM

## 2021-12-26 PROBLEM — H25.041 POSTERIOR SUBCAPSULAR AGE-RELATED CATARACT OF RIGHT EYE: Chronic | Status: ACTIVE | Noted: 2021-12-26

## 2021-12-27 ENCOUNTER — HOSPITAL ENCOUNTER (OUTPATIENT)
Age: 71
Setting detail: OUTPATIENT SURGERY
Discharge: HOME | End: 2021-12-27
Attending: OPHTHALMOLOGY | Admitting: OPHTHALMOLOGY
Payer: MEDICARE

## 2021-12-27 VITALS
WEIGHT: 250 LBS | HEIGHT: 66 IN | DIASTOLIC BLOOD PRESSURE: 84 MMHG | BODY MASS INDEX: 40.18 KG/M2 | SYSTOLIC BLOOD PRESSURE: 161 MMHG | HEART RATE: 68 BPM | RESPIRATION RATE: 16 BRPM | OXYGEN SATURATION: 98 % | TEMPERATURE: 97.2 F

## 2021-12-27 PROBLEM — H25.041 POSTERIOR SUBCAPSULAR AGE-RELATED CATARACT OF RIGHT EYE: Chronic | Status: RESOLVED | Noted: 2021-12-26 | Resolved: 2021-12-27

## 2021-12-27 PROCEDURE — 7100000010 HC PHASE II RECOVERY - FIRST 15 MIN: Performed by: OPHTHALMOLOGY

## 2021-12-27 PROCEDURE — 2580000003 HC RX 258: Performed by: OPHTHALMOLOGY

## 2021-12-27 PROCEDURE — 6360000002 HC RX W HCPCS: Performed by: OPHTHALMOLOGY

## 2021-12-27 PROCEDURE — 2500000003 HC RX 250 WO HCPCS: Performed by: OPHTHALMOLOGY

## 2021-12-27 PROCEDURE — 3600000003 HC SURGERY LEVEL 3 BASE: Performed by: OPHTHALMOLOGY

## 2021-12-27 PROCEDURE — 7100000011 HC PHASE II RECOVERY - ADDTL 15 MIN: Performed by: OPHTHALMOLOGY

## 2021-12-27 PROCEDURE — 6370000000 HC RX 637 (ALT 250 FOR IP): Performed by: OPHTHALMOLOGY

## 2021-12-27 PROCEDURE — 2709999900 HC NON-CHARGEABLE SUPPLY: Performed by: OPHTHALMOLOGY

## 2021-12-27 PROCEDURE — 99153 MOD SED SAME PHYS/QHP EA: CPT | Performed by: OPHTHALMOLOGY

## 2021-12-27 PROCEDURE — 99152 MOD SED SAME PHYS/QHP 5/>YRS: CPT | Performed by: OPHTHALMOLOGY

## 2021-12-27 PROCEDURE — 3600000013 HC SURGERY LEVEL 3 ADDTL 15MIN: Performed by: OPHTHALMOLOGY

## 2021-12-27 PROCEDURE — V2632 POST CHMBR INTRAOCULAR LENS: HCPCS | Performed by: OPHTHALMOLOGY

## 2021-12-27 DEVICE — LENS INTRAOCULAR BCNVX 23.5+ DIOPT 6X12.5 MM ACRYL ENVISTA: Type: IMPLANTABLE DEVICE | Site: EYE | Status: FUNCTIONAL

## 2021-12-27 RX ORDER — PROPARACAINE HYDROCHLORIDE 5 MG/ML
1 SOLUTION/ DROPS OPHTHALMIC SEE ADMIN INSTRUCTIONS
Status: DISCONTINUED | OUTPATIENT
Start: 2021-12-27 | End: 2021-12-27 | Stop reason: HOSPADM

## 2021-12-27 RX ORDER — TETRACAINE HYDROCHLORIDE 5 MG/ML
1 SOLUTION OPHTHALMIC SEE ADMIN INSTRUCTIONS
Status: DISCONTINUED | OUTPATIENT
Start: 2021-12-27 | End: 2021-12-27 | Stop reason: HOSPADM

## 2021-12-27 RX ORDER — TETRACAINE HYDROCHLORIDE 5 MG/ML
SOLUTION OPHTHALMIC PRN
Status: DISCONTINUED | OUTPATIENT
Start: 2021-12-27 | End: 2021-12-27 | Stop reason: ALTCHOICE

## 2021-12-27 RX ORDER — SODIUM CHLORIDE 9 MG/ML
25 INJECTION, SOLUTION INTRAVENOUS PRN
Status: DISCONTINUED | OUTPATIENT
Start: 2021-12-27 | End: 2021-12-27 | Stop reason: HOSPADM

## 2021-12-27 RX ORDER — PHENYLEPHRINE HCL 2.5 %
1 DROPS OPHTHALMIC (EYE) SEE ADMIN INSTRUCTIONS
Status: DISCONTINUED | OUTPATIENT
Start: 2021-12-27 | End: 2021-12-27 | Stop reason: HOSPADM

## 2021-12-27 RX ORDER — LIDOCAINE HYDROCHLORIDE 10 MG/ML
INJECTION, SOLUTION EPIDURAL; INFILTRATION; INTRACAUDAL; PERINEURAL PRN
Status: DISCONTINUED | OUTPATIENT
Start: 2021-12-27 | End: 2021-12-27 | Stop reason: ALTCHOICE

## 2021-12-27 RX ORDER — SODIUM CHLORIDE 0.9 % (FLUSH) 0.9 %
10 SYRINGE (ML) INJECTION EVERY 12 HOURS SCHEDULED
Status: CANCELLED | OUTPATIENT
Start: 2021-12-27

## 2021-12-27 RX ORDER — SODIUM CHLORIDE 9 MG/ML
25 INJECTION, SOLUTION INTRAVENOUS PRN
Status: CANCELLED | OUTPATIENT
Start: 2021-12-27

## 2021-12-27 RX ORDER — SODIUM CHLORIDE 9 MG/ML
INJECTION, SOLUTION INTRAVENOUS CONTINUOUS
Status: DISCONTINUED | OUTPATIENT
Start: 2021-12-27 | End: 2021-12-27 | Stop reason: HOSPADM

## 2021-12-27 RX ORDER — FENTANYL CITRATE 50 UG/ML
INJECTION, SOLUTION INTRAMUSCULAR; INTRAVENOUS PRN
Status: DISCONTINUED | OUTPATIENT
Start: 2021-12-27 | End: 2021-12-27 | Stop reason: ALTCHOICE

## 2021-12-27 RX ORDER — TROPICAMIDE 10 MG/ML
1 SOLUTION/ DROPS OPHTHALMIC SEE ADMIN INSTRUCTIONS
Status: DISCONTINUED | OUTPATIENT
Start: 2021-12-27 | End: 2021-12-27 | Stop reason: HOSPADM

## 2021-12-27 RX ORDER — SODIUM CHLORIDE 0.9 % (FLUSH) 0.9 %
5-40 SYRINGE (ML) INJECTION PRN
Status: DISCONTINUED | OUTPATIENT
Start: 2021-12-27 | End: 2021-12-27 | Stop reason: HOSPADM

## 2021-12-27 RX ORDER — SODIUM CHLORIDE 0.9 % (FLUSH) 0.9 %
5-40 SYRINGE (ML) INJECTION EVERY 12 HOURS SCHEDULED
Status: DISCONTINUED | OUTPATIENT
Start: 2021-12-27 | End: 2021-12-27 | Stop reason: HOSPADM

## 2021-12-27 RX ORDER — SODIUM CHLORIDE 0.9 % (FLUSH) 0.9 %
10 SYRINGE (ML) INJECTION PRN
Status: CANCELLED | OUTPATIENT
Start: 2021-12-27

## 2021-12-27 RX ORDER — MIDAZOLAM HYDROCHLORIDE 1 MG/ML
INJECTION INTRAMUSCULAR; INTRAVENOUS PRN
Status: DISCONTINUED | OUTPATIENT
Start: 2021-12-27 | End: 2021-12-27 | Stop reason: ALTCHOICE

## 2021-12-27 RX ADMIN — TETRACAINE HYDROCHLORIDE 1 DROP: 5 SOLUTION OPHTHALMIC at 11:12

## 2021-12-27 RX ADMIN — PROPARACAINE HYDROCHLORIDE 1 DROP: 5 SOLUTION/ DROPS OPHTHALMIC at 10:26

## 2021-12-27 RX ADMIN — PHENYLEPHRINE HYDROCHLORIDE 1 DROP: 25 SOLUTION/ DROPS OPHTHALMIC at 10:26

## 2021-12-27 RX ADMIN — PHENYLEPHRINE HYDROCHLORIDE 1 DROP: 25 SOLUTION/ DROPS OPHTHALMIC at 10:52

## 2021-12-27 RX ADMIN — PROPARACAINE HYDROCHLORIDE 1 DROP: 5 SOLUTION/ DROPS OPHTHALMIC at 10:47

## 2021-12-27 RX ADMIN — PHENYLEPHRINE HYDROCHLORIDE 1 DROP: 25 SOLUTION/ DROPS OPHTHALMIC at 10:47

## 2021-12-27 RX ADMIN — TROPICAMIDE 1 DROP: 10 SOLUTION/ DROPS OPHTHALMIC at 10:52

## 2021-12-27 RX ADMIN — SODIUM CHLORIDE: 9 INJECTION, SOLUTION INTRAVENOUS at 11:11

## 2021-12-27 RX ADMIN — PROPARACAINE HYDROCHLORIDE 1 DROP: 5 SOLUTION/ DROPS OPHTHALMIC at 10:52

## 2021-12-27 RX ADMIN — TROPICAMIDE 1 DROP: 10 SOLUTION/ DROPS OPHTHALMIC at 10:26

## 2021-12-27 RX ADMIN — TROPICAMIDE 1 DROP: 10 SOLUTION/ DROPS OPHTHALMIC at 10:46

## 2021-12-27 ASSESSMENT — PAIN SCALES - GENERAL: PAINLEVEL_OUTOF10: 1

## 2021-12-27 ASSESSMENT — PAIN DESCRIPTION - DESCRIPTORS: DESCRIPTORS: OTHER (COMMENT)

## 2021-12-27 ASSESSMENT — PAIN DESCRIPTION - LOCATION
LOCATION: EYE
LOCATION: EYE

## 2021-12-27 ASSESSMENT — PAIN DESCRIPTION - ORIENTATION
ORIENTATION: RIGHT
ORIENTATION: RIGHT

## 2021-12-27 NOTE — H&P
Cathy Degroot is a 66y.o. year old who presents for elective outpatient ophthalmic surgery with Rajan Mustafa DO. The patient complains of decreased vision, glare and halos around lights, and trouble with vision for activities of daily living. Past Medical History:   Diagnosis Date    ADHD (attention deficit hyperactivity disorder)     Anemia     severe    Asthma     Bipolar disorder (HCC)     Bradycardia, unspecified     Chronic anemia     Chronic atrial fibrillation (HCC)     Chronic back pain     Chronic kidney disease     Patient states unaware of this. Has never been mentioned to her.  Colitis     Constipation     COPD (chronic obstructive pulmonary disease) (HCC)     Cystitis without hematuria     Diastolic CHF (HCC)     Esophagitis     grade A    GERD (gastroesophageal reflux disease)     Hemorrhoids     History of echocardiogram 06/02/2014    EF >60%, LV wall thickness mildly increased,    Hypertension     Hypothyroidism     Metabolic syndrome     Moderate protein-calorie malnutrition (HCC)     Muscle weakness (generalized)     Obesity     TIA (transient ischemic attack)        Past Surgical History:   Procedure Laterality Date    CARDIAC CATHETERIZATION Left 05/27/2021    right radial/ [facility]/ Dr. Raza Field    COLONOSCOPY  5/2011    ischemic colitis    COLONOSCOPY  03/07/2017    -diverticulosis,hemorrhoids    HYMENECTOMY      1972    HYSTERECTOMY      2008    MENISCECTOMY Right     2008    TONSILLECTOMY AND ADENOIDECTOMY      UPPER GASTROINTESTINAL ENDOSCOPY  3/2013    5 cm axial hiatal hernia    UPPER GASTROINTESTINAL ENDOSCOPY  03/07/2017    Dr. Decker Mo       Home meds:   Prior to Admission medications    Medication Sig Start Date End Date Taking?  Authorizing Provider   divalproex (DEPAKOTE) 250 MG DR tablet  9/28/21   Historical Provider, MD   acetaminophen (TYLENOL) 650 MG extended release tablet Take 650 mg by mouth 4 times daily    Historical Provider, MD   Sodium Phosphates (FLEET) 7-19 GM/118ML Place 1 enema rectally as needed    Historical Provider, MD   SIMETHICONE-80 PO Take 1 capsule by mouth every 6-8 hours as needed     Historical Provider, MD   traMADol (ULTRAM) 50 MG tablet Take 50 mg by mouth every 6 hours as needed for Pain. Historical Provider, MD   white petrolatum GEL Apply topically 2 times daily as needed for Dry Skin Arms and legs    Historical Provider, MD   chlorhexidine (PERIDEX) 0.12 % solution Take 15 mLs by mouth 2 times daily  6/10/21   Historical Provider, MD   NYSTATIN 543390 UNIT/GM powder Apply topically as needed  4/21/21   Historical Provider, MD   atorvastatin (LIPITOR) 20 MG tablet Take 1 tablet by mouth daily 6/15/21   Kevin Vázquez MD   nitroGLYCERIN (NITROSTAT) 0.4 MG SL tablet up to max of 3 total doses.  If no relief after 1 dose, call 911. 5/29/21   Christi Simeon MD   clopidogrel (PLAVIX) 75 MG tablet Take 1 tablet by mouth daily 5/30/21   Christi Simeon MD   losartan (COZAAR) 50 MG tablet Take 1 tablet by mouth daily 5/29/21   SONJA Villeda CNP   furosemide (LASIX) 40 MG tablet Take 1 tablet by mouth daily 5/29/21   SONJA Villeda CNP   ARIPiprazole (ABILIFY) 15 MG tablet Take 7.5 mg by mouth nightly    Historical Provider, MD   apixaban (ELIQUIS) 5 MG TABS tablet Take 1 tablet by mouth 2 times daily 5/13/21   Dima Foss MD   tiZANidine (ZANAFLEX) 2 MG tablet Take 2 mg by mouth every 6 hours as needed    Historical Provider, MD   cloNIDine (CATAPRES) 0.1 MG tablet Take 0.1 mg by mouth 3 times daily    Historical Provider, MD   amLODIPine (NORVASC) 10 MG tablet Take 10 mg by mouth daily     Historical Provider, MD   Valbenazine Tosylate (INGREZZA) 40 MG CAPS Take 40 mg by mouth nightly    Historical Provider, MD   melatonin 3 MG TABS tablet Take 3 mg by mouth nightly    Historical Provider, MD   polyethylene glycol (GLYCOLAX) packet Take 17 g by mouth daily    Historical Provider, MD   Amantadine (SYMMETREL) 100 MG TABS tablet Take 100 mg by mouth 2 times daily    Historical Provider, MD   Dextromethorphan-guaiFENesin (DELSYM COUGH/CHEST CONGEST DM) 5-100 MG/5ML LIQD Take 5 mLs by mouth every 12 hours as needed (cough)    Historical Provider, MD   acetaminophen (TYLENOL) 325 MG tablet Take 650 mg by mouth every 4 hours as needed for Pain or Fever    Historical Provider, MD   sodium chloride (OCEAN) 0.65 % nasal spray 1 spray by Nasal route 3 times daily as needed for Congestion    Historical Provider, MD   bisacodyl (DULCOLAX) 10 MG suppository Place 10 mg rectally daily as needed for Constipation    Historical Provider, MD   magnesium hydroxide (MILK OF MAGNESIA) 400 MG/5ML suspension Take 30 mLs by mouth daily as needed for Constipation    Historical Provider, MD   calcium carbonate 600 MG TABS tablet Take 1 tablet by mouth nightly     Historical Provider, MD   metoprolol (TOPROL-XL) 50 MG XL tablet TAKE 1 TABLET BY MOUTH DAILY. 6/1/15   Ethan Olmos MD   levothyroxine (SYNTHROID) 50 MCG tablet TAKE 1 TABLET BY MOUTH DAILY. 6/1/15   Ethan Olmos MD   omeprazole (PRILOSEC) 20 MG capsule TAKE 1 CAPSULE BY MOUTH DAILY. 5/21/15   Ethan Olmos MD   citalopram (CELEXA) 20 MG tablet Take 20 mg by mouth daily. Historical Provider, MD   traZODone (DESYREL) 50 MG tablet Take 150 mg by mouth nightly     Historical Provider, MD   guaifenesin (MUCINEX) 600 MG SR tablet Take 1,200 mg by mouth 2 times daily. PRN    Historical Provider, MD   loratadine (CLARITIN) 10 MG tablet Take 10 mg by mouth daily. PRN     Historical Provider, MD     Scheduled Meds:  Continuous Infusions:  PRN Meds:.     Allergies   Allergen Reactions    Lamictal [Lamotrigine] Hives    Oxcarbazepine     Pcn [Penicillins] Itching     And rash    Sertraline     Trileptal Hives    Strattera [Atomoxetine Hcl] Rash       There were no vitals filed for this

## 2021-12-27 NOTE — OP NOTE
OPERATIVE NOTE      Patient:  Karen Charles:  1950    Account #:  [de-identified]    Date:  12/27/2021    Surgeon:  Puneet Hernandez. Rios Mccallum MD      Preoperative Diagnosis: Posterior Subcapsular Age-Related Cataract- right eye    Postoperative Diagnosis: Same    Procedure: Phacoemulsification with intraocular lens implantation, right eye    Anesthesia: Topical and intracameral anesthesia with intravenous sedation    Complications: none    Specimens: none    Indications for procedure: The patient is a 66y.o. year old with decreased vision, glare and halos around lights, and trouble with activities of daily living. Examination revealed a visually significant cataract in the right eye. Other eye diseases have been ruled out as the primary cause of decreased visual function. Significant improvement is expected in the patient's visual acuity and/or visual function from the removal of the cataract. Risks, benefits, and alternatives to surgery were discussed with the patient and the patient elected to proceed with phacoemulsification with lens implantation. Details of the procedure: Following informed consent, the patient was identified by name and identification tag in the holding area,taken to the operating room and placed in the supine position. A time out was performed by all present in the room to identify the patient, the eye to be operated on, the correct operative procedure, and the correct intraocular lens. Monitoring leads were placed. The patient was positioned. An eyelid prep of half-strength Betadine was performed. The patient was administered intravenous sedation if desired and topical anesthetic was placed in the operative eye. The eye prepped a second time and draped in the usual sterile fashion using aseptic technique for cataract surgery. A lid speculum was then inserted. [place] was placed onto the corneal surface.   A stab incision was made using a disposable blade into the anterior chamber. Intracameral preservative free xylocaine was placed into the anterior chamber. Amvisc was then used to replace the aqueous of the anterior chamber. A 2.2 mm keratome blade was used to make a 3-plane clear corneal incision into cornea depending on the patient's astigmatism. The cystitome was used to make a tear in the anterior capsule. Fine forceps were used to make a complete curvilinear capsulorrhexis. The lens was hydrodissected and freely rotated using a balanced salt filled syringe. The ultrasound handpiece was then used to emulsify the nucleus and epi nucleus. The residual cortex was then aspirated using the IA handpiece. The posterior capsule was polished. The eye was filled with viscoelastic and a foldable posterior chamber intraocular lens was injected into the capsular bag unless otherwise stated in the addendum. Irrigation/aspiration was used to remove all excess viscoelastic. The eye was pressurized and the wounds were check for leaks and none were found. A collagen shield, which had been soaked in antibiotic drops, was then placed onto the cornea. The lid speculum was removed. The eye was covered with a clear plastic shield. The patient was taken to the recovery area in excellent condition, having tolerated the procedure well, and will follow up with me tomorrow for postop care. ADDENDUM:  The phacoemulsification time 56.41 seconds @ 18% average ultrasound power for an effective phacoemulsification time of 10.26 seconds. The lens inserted was a model MX60 made by [facility] [employer] [employer] that was +23.5 diopters in power. The lens was inserted into the capsular bag utilizing the BLIS-X1 injector made by [employer] [employer].  The serial number on this lens was [id number]. There was no stitch placed to close this temporal posterior corneal incision. EBL was less than 1.0 ml. Cherrie Young.  Thomas Reynoso DO

## 2021-12-27 NOTE — PROGRESS NOTES
Discharge instructions given to Jessica Basilio LPN at [facility], verbalizes understanding and offers no questions at this time. Paper copy of discharge instructions given to patient, informed Jessica Basilio that patient has paper copy of discharge instructions.

## 2021-12-27 NOTE — H&P
I have examined the patient and reviewed the history and physical completed on 12/26/21 and find no relevant changes. I have reviewed with the patient and/or family the risks, benefits, and alternatives to the procedure and they have agreed to proceed.     Maile Peraza DO  12/27/2021  10:57 AM

## 2022-01-12 PROCEDURE — 99308 SBSQ NF CARE LOW MDM 20: CPT | Performed by: FAMILY MEDICINE

## 2022-01-13 ENCOUNTER — OUTSIDE SERVICES (OUTPATIENT)
Dept: PRIMARY CARE CLINIC | Age: 72
End: 2022-01-13
Payer: MEDICARE

## 2022-01-13 DIAGNOSIS — I48.91 ATRIAL FIBRILLATION, UNSPECIFIED TYPE (HCC): ICD-10-CM

## 2022-01-13 DIAGNOSIS — I73.9 PVD (PERIPHERAL VASCULAR DISEASE) (HCC): ICD-10-CM

## 2022-01-13 DIAGNOSIS — I25.118 ATHEROSCLEROTIC HEART DISEASE OF NATIVE CORONARY ARTERY WITH OTHER FORMS OF ANGINA PECTORIS (HCC): ICD-10-CM

## 2022-01-13 DIAGNOSIS — F31.70 BIPOLAR AFFECTIVE DISORDER IN REMISSION (HCC): ICD-10-CM

## 2022-01-13 DIAGNOSIS — I50.32 CHRONIC DIASTOLIC HEART FAILURE (HCC): Primary | ICD-10-CM

## 2022-01-13 DIAGNOSIS — N18.31 STAGE 3A CHRONIC KIDNEY DISEASE (HCC): ICD-10-CM

## 2022-01-13 NOTE — PROGRESS NOTES
Patient:  Jah Mustafa, 1950  I saw this patient on 1/12/2022, at [facility]   Shortness of breath better,blood sugars well controlled  Edema better  No bleeding from anticoagulant  bp well controlled        Reason for Visit:      ICD-10-CM    1. Chronic diastolic heart failure ([facility])  I50.32    2. PVD (peripheral vascular disease) ([facility])  I73.9    3. Atrial fibrillation, unspecified type ([facility]  I48.91    4. Stage 3a chronic kidney disease ([facility])  N18.31    5. Bipolar affective disorder in remission ([facility]  F31.70    6. Atherosclerotic heart disease of native coronary artery with other forms of angina pectoris ([facility]  I25.118        Changes since last visit:   Shortness of breath and edema improving  BP better  Mood better  1. Fall:  none  2. Behavioral Change: mood better  3. Pain Control: adequate  4. Mobility: improving  5. Pressure Sore:  none  Allergies:  Lamictal [lamotrigine], Oxcarbazepine, Pcn [penicillins], Sertraline, Trileptal, and Strattera [atomoxetine hcl]    Current Outpatient Medications   Medication Sig Dispense Refill    divalproex (DEPAKOTE) 250 MG DR tablet       acetaminophen (TYLENOL) 650 MG extended release tablet Take 650 mg by mouth 4 times daily      Sodium Phosphates (FLEET) 7-19 GM/118ML Place 1 enema rectally as needed      SIMETHICONE-80 PO Take 1 capsule by mouth every 6-8 hours as needed       traMADol (ULTRAM) 50 MG tablet Take 50 mg by mouth every 6 hours as needed for Pain.  white petrolatum GEL Apply topically 2 times daily as needed for Dry Skin Arms and legs      chlorhexidine (PERIDEX) 0.12 % solution Take 15 mLs by mouth 2 times daily       NYSTATIN 183634 UNIT/GM powder Apply topically as needed       atorvastatin (LIPITOR) 20 MG tablet Take 1 tablet by mouth daily 90 tablet 3    nitroGLYCERIN (NITROSTAT) 0.4 MG SL tablet up to max of 3 total doses.  If no relief after 1 dose, call 911. 25 tablet 1    clopidogrel (PLAVIX) 75 MG tablet Take 1 tablet by mouth daily 30 tablet 3    losartan (COZAAR) 50 MG tablet Take 1 tablet by mouth daily 30 tablet 3    furosemide (LASIX) 40 MG tablet Take 1 tablet by mouth daily 60 tablet 3    ARIPiprazole (ABILIFY) 15 MG tablet Take 7.5 mg by mouth nightly      apixaban (ELIQUIS) 5 MG TABS tablet Take 1 tablet by mouth 2 times daily 60 tablet 0    tiZANidine (ZANAFLEX) 2 MG tablet Take 2 mg by mouth every 6 hours as needed      cloNIDine (CATAPRES) 0.1 MG tablet Take 0.1 mg by mouth 3 times daily      amLODIPine (NORVASC) 10 MG tablet Take 10 mg by mouth daily       Valbenazine Tosylate (INGREZZA) 40 MG CAPS Take 40 mg by mouth nightly      melatonin 3 MG TABS tablet Take 3 mg by mouth nightly      polyethylene glycol (GLYCOLAX) packet Take 17 g by mouth daily      Amantadine (SYMMETREL) 100 MG TABS tablet Take 100 mg by mouth 2 times daily      Dextromethorphan-guaiFENesin (DELSYM COUGH/CHEST CONGEST DM) 5-100 MG/5ML LIQD Take 5 mLs by mouth every 12 hours as needed (cough)      acetaminophen (TYLENOL) 325 MG tablet Take 650 mg by mouth every 4 hours as needed for Pain or Fever      sodium chloride (OCEAN) 0.65 % nasal spray 1 spray by Nasal route 3 times daily as needed for Congestion      bisacodyl (DULCOLAX) 10 MG suppository Place 10 mg rectally daily as needed for Constipation      magnesium hydroxide (MILK OF MAGNESIA) 400 MG/5ML suspension Take 30 mLs by mouth daily as needed for Constipation      calcium carbonate 600 MG TABS tablet Take 1 tablet by mouth nightly       metoprolol (TOPROL-XL) 50 MG XL tablet TAKE 1 TABLET BY MOUTH DAILY. 30 tablet 5    levothyroxine (SYNTHROID) 50 MCG tablet TAKE 1 TABLET BY MOUTH DAILY. 30 tablet 5    omeprazole (PRILOSEC) 20 MG capsule TAKE 1 CAPSULE BY MOUTH DAILY. 30 capsule 5    citalopram (CELEXA) 20 MG tablet Take 20 mg by mouth daily.       traZODone (DESYREL) 50 MG tablet Take 150 mg by mouth nightly       guaifenesin (MUCINEX) 600 MG SR tablet Take 1,200 mg by mouth 2 times daily. PRN      loratadine (CLARITIN) 10 MG tablet Take 10 mg by mouth daily. PRN        No current facility-administered medications for this visit. Past Medical History:    Past Medical History:   Diagnosis Date    ADHD (attention deficit hyperactivity disorder)     Anemia     severe    Asthma     Bipolar disorder (HCC)     Bradycardia, unspecified     Chronic anemia     Chronic atrial fibrillation (HCC)     Chronic back pain     Chronic kidney disease     Patient states unaware of this. Has never been mentioned to her.      Colitis     Constipation     COPD (chronic obstructive pulmonary disease) (HCC)     Cystitis without hematuria     Diastolic CHF (HCC)     Esophagitis     grade A    GERD (gastroesophageal reflux disease)     Hemorrhoids     History of echocardiogram 06/02/2014    EF >60%, LV wall thickness mildly increased,    Hypertension     Hypothyroidism     Metabolic syndrome     Moderate protein-calorie malnutrition (HCC)     Muscle weakness (generalized)     Obesity     TIA (transient ischemic attack)        Past Surgical History:    Past Surgical History:   Procedure Laterality Date    CARDIAC CATHETERIZATION Left 05/27/2021    right radial/ [facility]/ Dr. Cem Harrison Right 12/27/2021    Dr. Rigo Jiménez  5/2011    ischemic colitis    COLONOSCOPY  03/07/2017    -diverticulosis,hemorrhoids    HYMENECTOMY      1972    HYSTERECTOMY      2008    INTRACAPSULAR CATARACT EXTRACTION Right 12/27/2021    EYE CATARACT EMULSIFICATION IOL IMPLANT performed by Alexandru De Los Santos DO at [address] Right     2008    TONSILLECTOMY AND ADENOIDECTOMY      UPPER GASTROINTESTINAL ENDOSCOPY  3/2013    5 cm axial hiatal hernia    UPPER GASTROINTESTINAL ENDOSCOPY  03/07/2017    Dr. Hair Medina       Social History:   Social History     Tobacco Use    Smoking status: Former Smoker     Packs/day: 0.25 Years: 10.00     Pack years: 2.50     Quit date: 3/12/1998     Years since quittin.8    Smokeless tobacco: Never Used   Substance Use Topics    Alcohol use: No       Family History:   Family History   Problem Relation Age of Onset    Diabetes Mother     High Blood Pressure Mother     Diabetes Father     High Blood Pressure Father     Bipolar Disorder Sister     Schizophrenia Sister     High Blood Pressure Other         2 sons    Bipolar Disorder Other         2 sons           Review of Systems:  Constitutional: negative for fevers or chills  Eyes: negative for visual disturbance   ENT: positive for oral infection,negative for sore throat or nasal congestion,no dysphagia. No epistaxis. Respiratory:  shortness of breath improved  Cardiovascular: neg for chest pain , palpitations,pnd,  edema better  Gastrointestinal: neg for abd pain, nausea, vomiting, diarrhea ,  constipation,no reyna,no blood in stool  Genitourinary: negative for dysuria, urgency,neg for hematuria , frequency  Integument/breast: negative for skin rash or lesions  Neurological: negative for unilateral weakness, numbness or tingling,has tremors  Muscular Skeletal: denies joint pain   Psych- mood changes better    Objective:    Vitals: BP:141/71 T:98.6 P:69 R:18    -----------------------------------------------------------------  Exam:  GEN:   A & O x3, no apparent distress,obese  EYES:  No gross abnormalities. and PERRLA  ENT:Throat- has oral unlcer,nose- no drainage  NECK: normal, supple, no lymphadenopathy,  no carotid bruits  PULM: Diminished air entry on auscultation bilaterally- no wheezes, rales or rhonchi, normal air movement, no respiratory distress  COR:   S1,s2,RRR, no murmurs and no gallops,has minimal bilat leg edema  ABD:    soft, non-tender, non-distended, normal bowel sounds, no masses or organomegaly  : no cva or flank tenderness  EXT:has minimal edema, no calf tenderness, and warm to touch.    NEURO: Motor and sensory grossly intact,has tremors at rest  PSYCH: mood improved  SKIN:   No rash or lesions    -----------------------------------------------------------------  Diagnostic Data:   · All diagnostic data was reviewed    Assessment:      Problem List Items Addressed This Visit     Atherosclerotic heart disease of native coronary artery with other forms of angina pectoris ([facility]    PVD (peripheral vascular disease) ([facility]    Atrial fibrillation, unspecified type ([facility]    Stage 3a chronic kidney disease ([facility]      Other Visit Diagnoses     Chronic diastolic heart failure ([facility]    -  Primary    Bipolar affective disorder in remission [facility])            Patient Active Problem List   Diagnosis Code    TIA (transient ischemic attack) G45.9    Bipolar 1 disorder ([facility] F31.9    DDD (degenerative disc disease), lumbar M51.36    Bradycardia R00.1    Essential hypertension I10    Obesity (BMI 30-39. 9) E66.9    GERD (gastroesophageal reflux disease) K21.9    Iron deficiency anemia D50.9    Iron deficiency anemia due to chronic blood loss D50.0    History of colon polyps Z86.010    Hypothyroidism E03.9    Other chronic pain G89.29    Other hemorrhoids K64.8    Diverticulosis of large intestine without diverticulitis K57.30    Attention deficit hyperactivity disorder F90.9    Morbid obesity ([facility]) E66.01    Cellulitis of leg without foot, right L03.115    Atherosclerotic heart disease of native coronary artery with other forms of angina pectoris ([facility]) I25.118    CAD, multiple vessel I25.10    Chest pain R07.9    PVD (peripheral vascular disease) ([facility]) I73.9    Atrial fibrillation, unspecified type ([facility]) I48.91    Stage 3a chronic kidney disease ([facility]) N18.31         Plan:      Monitor for bleeding  Psych to manage psychotropics  Encourage activity as tolerated and follow diet  Continue current medications  Monitor bp   Prevent pressure sore  Prevent falls      Electronically signed by Bella Anand MD on 1/13/2022 at 5:17 PM

## 2022-01-21 ENCOUNTER — HOSPITAL ENCOUNTER (OUTPATIENT)
Age: 72
Setting detail: SPECIMEN
Discharge: HOME | End: 2022-01-21

## 2022-01-25 ENCOUNTER — HOSPITAL ENCOUNTER (OUTPATIENT)
Age: 72
Setting detail: SPECIMEN
Discharge: HOME | End: 2022-01-25
Payer: MEDICARE

## 2022-01-25 LAB
ALBUMIN SERPL-MCNC: 3.9 G/DL (ref 3.5–5.2)
ALBUMIN/GLOBULIN RATIO: 1.3 (ref 1–2.5)
ALP BLD-CCNC: 111 U/L (ref 35–104)
ALT SERPL-CCNC: 28 U/L (ref 5–33)
ANION GAP SERPL CALCULATED.3IONS-SCNC: 17 MMOL/L (ref 9–17)
AST SERPL-CCNC: 16 U/L
BILIRUB SERPL-MCNC: 0.77 MG/DL (ref 0.3–1.2)
BUN BLDV-MCNC: 12 MG/DL (ref 8–23)
BUN/CREAT BLD: 11 (ref 9–20)
CALCIUM SERPL-MCNC: 9.9 MG/DL (ref 8.6–10.4)
CHLORIDE BLD-SCNC: 97 MMOL/L (ref 98–107)
CO2: 22 MMOL/L (ref 20–31)
CREAT SERPL-MCNC: 1.08 MG/DL (ref 0.5–0.9)
GFR AFRICAN AMERICAN: >60 ML/MIN
GFR NON-AFRICAN AMERICAN: 50 ML/MIN
GFR SERPL CREATININE-BSD FRML MDRD: ABNORMAL ML/MIN/{1.73_M2}
GFR SERPL CREATININE-BSD FRML MDRD: ABNORMAL ML/MIN/{1.73_M2}
GLUCOSE BLD-MCNC: 100 MG/DL (ref 70–99)
HCT VFR BLD CALC: 42.2 % (ref 36.3–47.1)
HEMOGLOBIN: 13.7 G/DL (ref 11.9–15.1)
MCH RBC QN AUTO: 31.4 PG (ref 25.2–33.5)
MCHC RBC AUTO-ENTMCNC: 32.5 G/DL (ref 28.4–34.8)
MCV RBC AUTO: 96.6 FL (ref 82.6–102.9)
NRBC AUTOMATED: 0 PER 100 WBC
PDW BLD-RTO: 12.7 % (ref 11.8–14.4)
PLATELET # BLD: 352 K/UL (ref 138–453)
PMV BLD AUTO: 9.5 FL (ref 8.1–13.5)
POTASSIUM SERPL-SCNC: 4.3 MMOL/L (ref 3.7–5.3)
RBC # BLD: 4.37 M/UL (ref 3.95–5.11)
SODIUM BLD-SCNC: 136 MMOL/L (ref 135–144)
TOTAL PROTEIN: 7 G/DL (ref 6.4–8.3)
VALPROIC ACID LEVEL: 45 UG/ML (ref 50–125)
VALPROIC DATE LAST DOSE: ABNORMAL
VALPROIC DOSE AMOUNT: ABNORMAL
VALPROIC TIME LAST DOSE: ABNORMAL
WBC # BLD: 7.7 K/UL (ref 3.5–11.3)

## 2022-01-25 PROCEDURE — 85027 COMPLETE CBC AUTOMATED: CPT

## 2022-01-25 PROCEDURE — 80053 COMPREHEN METABOLIC PANEL: CPT

## 2022-01-25 PROCEDURE — 36415 COLL VENOUS BLD VENIPUNCTURE: CPT

## 2022-01-25 PROCEDURE — P9603 ONE-WAY ALLOW PRORATED MILES: HCPCS

## 2022-01-25 PROCEDURE — 80164 ASSAY DIPROPYLACETIC ACD TOT: CPT

## 2022-01-26 RX ORDER — LATANOPROST 50 UG/ML
1 SOLUTION/ DROPS OPHTHALMIC NIGHTLY
COMMUNITY

## 2022-01-26 RX ORDER — POLYVINYL ALCOHOL 14 MG/ML
1 SOLUTION/ DROPS OPHTHALMIC 4 TIMES DAILY
COMMUNITY

## 2022-01-26 NOTE — PROGRESS NOTES
Pt does not need clearance prior to surgery per Catina Donovan CRNA. Pt does not need to stop plavix and eliquis per Dr. Loo Fus office. Pre surgical instructions faxed to [facility].

## 2022-01-31 ENCOUNTER — APPOINTMENT (OUTPATIENT)
Dept: ULTRASOUND IMAGING | Age: 72
End: 2022-01-31
Payer: MEDICARE

## 2022-01-31 ENCOUNTER — APPOINTMENT (OUTPATIENT)
Dept: CT IMAGING | Age: 72
End: 2022-01-31
Payer: MEDICARE

## 2022-01-31 ENCOUNTER — APPOINTMENT (OUTPATIENT)
Dept: GENERAL RADIOLOGY | Age: 72
End: 2022-01-31
Payer: MEDICARE

## 2022-01-31 ENCOUNTER — HOSPITAL ENCOUNTER (EMERGENCY)
Age: 72
Discharge: HOME | End: 2022-01-31
Payer: MEDICARE

## 2022-01-31 VITALS
TEMPERATURE: 96.9 F | HEART RATE: 82 BPM | OXYGEN SATURATION: 95 % | RESPIRATION RATE: 24 BRPM | DIASTOLIC BLOOD PRESSURE: 69 MMHG | SYSTOLIC BLOOD PRESSURE: 147 MMHG

## 2022-01-31 DIAGNOSIS — K85.80 OTHER ACUTE PANCREATITIS WITHOUT INFECTION OR NECROSIS: Primary | ICD-10-CM

## 2022-01-31 LAB
ABSOLUTE EOS #: 0.33 K/UL (ref 0–0.44)
ABSOLUTE IMMATURE GRANULOCYTE: 0.05 K/UL (ref 0–0.3)
ABSOLUTE LYMPH #: 1.48 K/UL (ref 1.1–3.7)
ABSOLUTE MONO #: 0.97 K/UL (ref 0.1–1.2)
ALBUMIN SERPL-MCNC: 3.1 G/DL (ref 3.5–5.2)
ALBUMIN/GLOBULIN RATIO: 1 (ref 1–2.5)
ALP BLD-CCNC: 107 U/L (ref 35–104)
ALT SERPL-CCNC: 21 U/L (ref 5–33)
ANION GAP SERPL CALCULATED.3IONS-SCNC: 17 MMOL/L (ref 9–17)
AST SERPL-CCNC: 14 U/L
BASOPHILS # BLD: 1 % (ref 0–2)
BASOPHILS ABSOLUTE: 0.05 K/UL (ref 0–0.2)
BILIRUB SERPL-MCNC: 0.66 MG/DL (ref 0.3–1.2)
BNP INTERPRETATION: ABNORMAL
BUN BLDV-MCNC: 15 MG/DL (ref 8–23)
BUN/CREAT BLD: 15 (ref 9–20)
C-REACTIVE PROTEIN: 222.3 MG/L (ref 0–5)
CALCIUM SERPL-MCNC: 9 MG/DL (ref 8.6–10.4)
CHLORIDE BLD-SCNC: 96 MMOL/L (ref 98–107)
CO2: 21 MMOL/L (ref 20–31)
CREAT SERPL-MCNC: 0.98 MG/DL (ref 0.5–0.9)
DIFFERENTIAL TYPE: ABNORMAL
EOSINOPHILS RELATIVE PERCENT: 3 % (ref 1–4)
GFR AFRICAN AMERICAN: >60 ML/MIN
GFR NON-AFRICAN AMERICAN: 56 ML/MIN
GFR SERPL CREATININE-BSD FRML MDRD: ABNORMAL ML/MIN/{1.73_M2}
GFR SERPL CREATININE-BSD FRML MDRD: ABNORMAL ML/MIN/{1.73_M2}
GLUCOSE BLD-MCNC: 127 MG/DL (ref 70–99)
HCT VFR BLD CALC: 32 % (ref 36.3–47.1)
HEMOGLOBIN: 10.7 G/DL (ref 11.9–15.1)
IMMATURE GRANULOCYTES: 1 %
INR BLD: 1.6
LACTIC ACID, SEPSIS WHOLE BLOOD: NORMAL MMOL/L (ref 0.5–1.9)
LACTIC ACID, SEPSIS: 1.2 MMOL/L (ref 0.5–1.9)
LIPASE: 22 U/L (ref 13–60)
LYMPHOCYTES # BLD: 14 % (ref 24–43)
MCH RBC QN AUTO: 32.2 PG (ref 25.2–33.5)
MCHC RBC AUTO-ENTMCNC: 33.4 G/DL (ref 28.4–34.8)
MCV RBC AUTO: 96.4 FL (ref 82.6–102.9)
MONOCYTES # BLD: 9 % (ref 3–12)
NRBC AUTOMATED: 0 PER 100 WBC
PDW BLD-RTO: 12.9 % (ref 11.8–14.4)
PLATELET # BLD: 254 K/UL (ref 138–453)
PLATELET ESTIMATE: ABNORMAL
PMV BLD AUTO: 9.7 FL (ref 8.1–13.5)
POTASSIUM SERPL-SCNC: 4.5 MMOL/L (ref 3.7–5.3)
PRO-BNP: 5350 PG/ML
PROTHROMBIN TIME: 18.4 SEC (ref 11.5–14.2)
RBC # BLD: 3.32 M/UL (ref 3.95–5.11)
RBC # BLD: ABNORMAL 10*6/UL
SARS-COV-2, RAPID: NOT DETECTED
SEDIMENTATION RATE, ERYTHROCYTE: 54 MM (ref 0–30)
SEG NEUTROPHILS: 72 % (ref 36–65)
SEGMENTED NEUTROPHILS ABSOLUTE COUNT: 8.01 K/UL (ref 1.5–8.1)
SODIUM BLD-SCNC: 134 MMOL/L (ref 135–144)
SPECIMEN DESCRIPTION: NORMAL
TOTAL PROTEIN: 6.2 G/DL (ref 6.4–8.3)
TROPONIN INTERP: ABNORMAL
TROPONIN INTERP: ABNORMAL
TROPONIN T: ABNORMAL NG/ML
TROPONIN T: ABNORMAL NG/ML
TROPONIN, HIGH SENSITIVITY: 16 NG/L (ref 0–14)
TROPONIN, HIGH SENSITIVITY: 17 NG/L (ref 0–14)
WBC # BLD: 10.9 K/UL (ref 3.5–11.3)
WBC # BLD: ABNORMAL 10*3/UL

## 2022-01-31 PROCEDURE — 2580000003 HC RX 258: Performed by: NURSE PRACTITIONER

## 2022-01-31 PROCEDURE — 74022 RADEX COMPL AQT ABD SERIES: CPT

## 2022-01-31 PROCEDURE — 85025 COMPLETE CBC W/AUTO DIFF WBC: CPT

## 2022-01-31 PROCEDURE — 83880 ASSAY OF NATRIURETIC PEPTIDE: CPT

## 2022-01-31 PROCEDURE — 74177 CT ABD & PELVIS W/CONTRAST: CPT

## 2022-01-31 PROCEDURE — 80053 COMPREHEN METABOLIC PANEL: CPT

## 2022-01-31 PROCEDURE — 76705 ECHO EXAM OF ABDOMEN: CPT

## 2022-01-31 PROCEDURE — 96374 THER/PROPH/DIAG INJ IV PUSH: CPT

## 2022-01-31 PROCEDURE — 86140 C-REACTIVE PROTEIN: CPT

## 2022-01-31 PROCEDURE — 85652 RBC SED RATE AUTOMATED: CPT

## 2022-01-31 PROCEDURE — 85610 PROTHROMBIN TIME: CPT

## 2022-01-31 PROCEDURE — 87635 SARS-COV-2 COVID-19 AMP PRB: CPT

## 2022-01-31 PROCEDURE — 6360000004 HC RX CONTRAST MEDICATION: Performed by: NURSE PRACTITIONER

## 2022-01-31 PROCEDURE — 83690 ASSAY OF LIPASE: CPT

## 2022-01-31 PROCEDURE — 83605 ASSAY OF LACTIC ACID: CPT

## 2022-01-31 PROCEDURE — 71260 CT THORAX DX C+: CPT

## 2022-01-31 PROCEDURE — C9803 HOPD COVID-19 SPEC COLLECT: HCPCS

## 2022-01-31 PROCEDURE — 6360000002 HC RX W HCPCS: Performed by: NURSE PRACTITIONER

## 2022-01-31 PROCEDURE — 99283 EMERGENCY DEPT VISIT LOW MDM: CPT

## 2022-01-31 PROCEDURE — 96375 TX/PRO/DX INJ NEW DRUG ADDON: CPT

## 2022-01-31 PROCEDURE — 2500000003 HC RX 250 WO HCPCS: Performed by: NURSE PRACTITIONER

## 2022-01-31 PROCEDURE — 84484 ASSAY OF TROPONIN QUANT: CPT

## 2022-01-31 PROCEDURE — 93005 ELECTROCARDIOGRAM TRACING: CPT | Performed by: NURSE PRACTITIONER

## 2022-01-31 RX ORDER — LEVOTHYROXINE SODIUM 0.05 MG/1
50 TABLET ORAL DAILY
COMMUNITY

## 2022-01-31 RX ORDER — 0.9 % SODIUM CHLORIDE 0.9 %
500 INTRAVENOUS SOLUTION INTRAVENOUS ONCE
Status: COMPLETED | OUTPATIENT
Start: 2022-01-31 | End: 2022-01-31

## 2022-01-31 RX ORDER — FUROSEMIDE 10 MG/ML
20 INJECTION INTRAMUSCULAR; INTRAVENOUS ONCE
Status: COMPLETED | OUTPATIENT
Start: 2022-01-31 | End: 2022-01-31

## 2022-01-31 RX ADMIN — IOPAMIDOL 75 ML: 755 INJECTION, SOLUTION INTRAVENOUS at 15:17

## 2022-01-31 RX ADMIN — FAMOTIDINE 20 MG: 10 INJECTION, SOLUTION INTRAVENOUS at 13:01

## 2022-01-31 RX ADMIN — SODIUM CHLORIDE 500 ML: 9 INJECTION, SOLUTION INTRAVENOUS at 13:06

## 2022-01-31 RX ADMIN — FUROSEMIDE 20 MG: 10 INJECTION, SOLUTION INTRAVENOUS at 16:36

## 2022-01-31 ASSESSMENT — PAIN DESCRIPTION - LOCATION: LOCATION: ABDOMEN

## 2022-01-31 ASSESSMENT — PAIN SCALES - GENERAL: PAINLEVEL_OUTOF10: 8

## 2022-01-31 ASSESSMENT — PAIN DESCRIPTION - DESCRIPTORS: DESCRIPTORS: SQUEEZING

## 2022-01-31 ASSESSMENT — PAIN DESCRIPTION - ORIENTATION: ORIENTATION: RIGHT;UPPER

## 2022-01-31 ASSESSMENT — PAIN DESCRIPTION - FREQUENCY: FREQUENCY: CONTINUOUS

## 2022-01-31 NOTE — ED PROVIDER NOTES
LV wall thickness mildly increased,    Hypertension     Hypothyroidism     Metabolic syndrome     Moderate protein-calorie malnutrition (HCC)     Muscle weakness (generalized)     Obesity     TIA (transient ischemic attack)          SURGICAL HISTORY       Past Surgical History:   Procedure Laterality Date    CARDIAC CATHETERIZATION Left 05/27/2021    right radial/ [facility]/ Dr. Isela Mancera Right 12/27/2021    Dr. Jennie Fong COLONOSCOPY  5/2011    ischemic colitis    COLONOSCOPY  03/07/2017    -diverticulosis,hemorrhoids    HYMENECTOMY      1972    HYSTERECTOMY      2008    INTRACAPSULAR CATARACT EXTRACTION Right 12/27/2021    EYE CATARACT EMULSIFICATION IOL IMPLANT performed by Cristal Toro DO at [facility] Right     2008    TONSILLECTOMY AND ADENOIDECTOMY      UPPER GASTROINTESTINAL ENDOSCOPY  3/2013    5 cm axial hiatal hernia    UPPER GASTROINTESTINAL ENDOSCOPY  03/07/2017    Dr. Bon Triana         CURRENT MEDICATIONS       Previous Medications    ACETAMINOPHEN (TYLENOL) 325 MG TABLET    Take 650 mg by mouth every 4 hours as needed for Pain or Fever    ACETAMINOPHEN (TYLENOL) 650 MG EXTENDED RELEASE TABLET    Take 650 mg by mouth 4 times daily    AMANTADINE (SYMMETREL) 100 MG TABS TABLET    Take 100 mg by mouth 2 times daily    AMLODIPINE (NORVASC) 10 MG TABLET    Take 10 mg by mouth daily     APIXABAN (ELIQUIS) 5 MG TABS TABLET    Take 1 tablet by mouth 2 times daily    ARIPIPRAZOLE (ABILIFY) 15 MG TABLET    Take 7.5 mg by mouth nightly    ATORVASTATIN (LIPITOR) 20 MG TABLET    Take 1 tablet by mouth daily    BISACODYL (DULCOLAX) 10 MG SUPPOSITORY    Place 10 mg rectally daily as needed for Constipation    CALCIUM CARBONATE 600 MG TABS TABLET    Take 1 tablet by mouth nightly     CHLORHEXIDINE (PERIDEX) 0.12 % SOLUTION    Take 15 mLs by mouth 2 times daily     CITALOPRAM (CELEXA) 20 MG TABLET    Take 20 mg by mouth daily.     CLONIDINE (CATAPRES) 0.1 MG TABLET    Take 0.1 mg by mouth 3 times daily    CLOPIDOGREL (PLAVIX) 75 MG TABLET    Take 1 tablet by mouth daily    DEXTROMETHORPHAN-GUAIFENESIN (DELSYM COUGH/CHEST CONGEST DM) 5-100 MG/5ML LIQD    Take 5 mLs by mouth every 12 hours as needed (cough)    DIVALPROEX (DEPAKOTE) 250 MG DR TABLET        FUROSEMIDE (LASIX) 40 MG TABLET    Take 1 tablet by mouth daily    GUAIFENESIN (MUCINEX) 600 MG SR TABLET    Take 1,200 mg by mouth 2 times daily. PRN    LATANOPROST (XALATAN) 0.005 % OPHTHALMIC SOLUTION    1 drop nightly    LEVOTHYROXINE (SYNTHROID) 50 MCG TABLET    Take 50 mcg by mouth Daily    LORATADINE (CLARITIN) 10 MG TABLET    Take 10 mg by mouth daily. PRN     LOSARTAN (COZAAR) 50 MG TABLET    Take 1 tablet by mouth daily    MAGNESIUM HYDROXIDE (MILK OF MAGNESIA) 400 MG/5ML SUSPENSION    Take 30 mLs by mouth daily as needed for Constipation    MELATONIN 3 MG TABS TABLET    Take 3 mg by mouth nightly    METOPROLOL (TOPROL-XL) 50 MG XL TABLET    TAKE 1 TABLET BY MOUTH DAILY. NITROGLYCERIN (NITROSTAT) 0.4 MG SL TABLET    up to max of 3 total doses. If no relief after 1 dose, call 911. OFLOXACIN OP    Apply to eye    OMEPRAZOLE (PRILOSEC) 20 MG CAPSULE    TAKE 1 CAPSULE BY MOUTH DAILY. POLYETHYLENE GLYCOL (GLYCOLAX) PACKET    Take 17 g by mouth daily    POLYVINYL ALCOHOL (ARTIFICIAL TEARS) 1.4 % OPHTHALMIC SOLUTION    1 drop as needed    SODIUM CHLORIDE (OCEAN) 0.65 % NASAL SPRAY    1 spray by Nasal route 3 times daily as needed for Congestion    SODIUM PHOSPHATES (FLEET) 7-19 GM/118ML    Place 1 enema rectally as needed    TIZANIDINE (ZANAFLEX) 2 MG TABLET    Take 2 mg by mouth every 6 hours as needed    TRAMADOL (ULTRAM) 50 MG TABLET    Take 50 mg by mouth every 6 hours as needed for Pain.     TRAZODONE (DESYREL) 50 MG TABLET    Take 150 mg by mouth nightly     VALBENAZINE TOSYLATE (INGREZZA) 40 MG CAPS    Take 40 mg by mouth nightly    WHITE PETROLATUM GEL    Apply topically 2 times daily Not on file    Marital Status: Not on file   Intimate Partner Violence:     Fear of Current or Ex-Partner: Not on file    Emotionally Abused: Not on file    Physically Abused: Not on file    Sexually Abused: Not on file   Housing Stability:     Unable to Pay for Housing in the Last Year: Not on file    Number of [place] in the Last Year: Not on file    Unstable Housing in the Last Year: Not on file       SCREENINGS                               CIWA Assessment  BP: (!) 147/59  Pulse: 80                 PHYSICAL EXAM    (up to 7 for level 4, 8 or more for level 5)     ED Triage Vitals [01/31/22 1213]   BP Temp Temp Source Pulse Resp SpO2 Height Weight   -- 96.9 °F (36.1 °C) Tympanic 61 24 98 % -- --       Physical Exam  General: Well-developed, well-nourished, in no apparent distress. HEENT exam: Normocephalic, atraumatic. Pupils equal round and reactive to light and external ocular muscles intact. Posterior pharynx noninjected. Oral airway widely patent. No exudate present. Neck exam: Supple no lymphadenopathy, trachea midline. Chest exam: No audible wheezing. No increased respiratory effort. Heart: Heart rhythm irregular. Normal rate. .  No murmur. Abdomen: Soft, obese, gastric and right upper quadrant tenderness without guarding. Back: No midline tenderness. No CVA tenderness. Extremities: Patient moving all extremities or difficulty. Intact distal pulses and sensation. No significant extremity swelling. Neurologic: Alert and oriented x3. Able to make informed decisions. Skin exam: Clean dry and intact.   DIAGNOSTIC RESULTS     EKG: All EKG's are interpreted by the Emergency Department Physician who either signs or Co-signs this chart in the absence of a cardiologist.        RADIOLOGY:   Non-plain film images such as CT, Ultrasound and MRI are read by the radiologist. Plain radiographic images are visualized and preliminarily interpreted by the emergency physician with the below findings:        Interpretation per the Radiologist below, if available at the time of this note:    CT ABDOMEN PELVIS W IV CONTRAST Additional Contrast? None   Final Result   No pulmonary embolism or acute pulmonary abnormality. Cardiomegaly and coronary artery disease of the left anterior descending. Findings of mild acute interstitial pancreatitis involving the head and   uncinate process. No organized peripancreatic collection. Secondary inflammatory changes of the pylorus and proximal duodenum. CT CHEST PULMONARY EMBOLISM W CONTRAST   Final Result   No pulmonary embolism or acute pulmonary abnormality. Cardiomegaly and coronary artery disease of the left anterior descending. Findings of mild acute interstitial pancreatitis involving the head and   uncinate process. No organized peripancreatic collection. Secondary inflammatory changes of the pylorus and proximal duodenum. US ABDOMEN LIMITED Specify organ? LIVER, GALLBLADDER, PANCREAS   Final Result   Increased echogenicity of the liver suggests steatosis. Unremarkable   gallbladder. XR ACUTE ABD SERIES CHEST 1 VW   Final Result   No acute abnormality seen.                ED BEDSIDE ULTRASOUND:   Performed by ED Physician - none    LABS:  Labs Reviewed   COMPREHENSIVE METABOLIC PANEL W/ REFLEX TO MG FOR LOW K - Abnormal; Notable for the following components:       Result Value    Glucose 127 (*)     CREATININE 0.98 (*)     Sodium 134 (*)     Chloride 96 (*)     Alkaline Phosphatase 107 (*)     Total Protein 6.2 (*)     Albumin 3.1 (*)     GFR Non- 56 (*)     All other components within normal limits   TROPONIN - Abnormal; Notable for the following components:    Troponin, High Sensitivity 17 (*)     All other components within normal limits   BRAIN NATRIURETIC PEPTIDE - Abnormal; Notable for the following components:    Pro-BNP 5,350 (*)     All other components within normal limits C-REACTIVE PROTEIN - Abnormal; Notable for the following components:    .3 (*)     All other components within normal limits   CBC WITH AUTO DIFFERENTIAL - Abnormal; Notable for the following components:    RBC 3.32 (*)     Hemoglobin 10.7 (*)     Hematocrit 32.0 (*)     Seg Neutrophils 72 (*)     Lymphocytes 14 (*)     Immature Granulocytes 1 (*)     All other components within normal limits   PROTIME-INR - Abnormal; Notable for the following components:    Protime 18.4 (*)     All other components within normal limits   SEDIMENTATION RATE - Abnormal; Notable for the following components:    Sed Rate 54 (*)     All other components within normal limits   TROPONIN - Abnormal; Notable for the following components:    Troponin, High Sensitivity 16 (*)     All other components within normal limits   COVID-19, RAPID   LIPASE   LACTATE, SEPSIS   LACTATE, SEPSIS       All other labs were within normal range or not returned as of this dictation. EMERGENCY DEPARTMENT COURSE and DIFFERENTIAL DIAGNOSIS/MDM:   Vitals:    Vitals:    01/31/22 1415 01/31/22 1430 01/31/22 1446 01/31/22 1726   BP: (!) 114/57 (!) 118/57 101/68 (!) 147/59   Pulse: 55 62 66 80   Resp:       Temp:       TempSrc:       SpO2: 97% 97%  (!) 87%           MDM    Josi Delgado is a 67 y.o. female presents emergency department with complaints of shortness of breath, right upper quadrant and epigastric pain, urgency and frequency of urination onset 3 days ago. Patient ports she lives in a nursing home and was brought in by staff. She denies measured fever. Reports no decrease in p.o. intake or appetite or difficulty swallowing. Denies chest pain. Reports no cough. No lower abdominal pain. Denies vomiting or diarrhea. No danyelle dysuria. Exam remarkable for a well-appearing 53-year-old in no acute distress. HEENT unremarkable. Patient has no audible wheezing. No increased respiratory effort. She satting 90% on room air.   Abdomen markedly obese. She has tenderness to the epigastrium and right upper quadrant without guarding. She is moving all extremities no difficulty. No significant extremity swelling appreciated. Initial troponin XVII, subsequent troponin XVI. Lactic acid 1.2. CBC revealed a hemoglobin of 10.7 and stable otherwise unremarkable. BNP 5350. C-reactive protein 222.3. Sed rate 54. EKG revealed atrial fibrillation with a slow ventricular response. Ventricular rate 52 bpm, atrial rate 54, QRS duration 98 ms, corrected  ms. No ST segment elevation or depression identified. No STEMI. Abdomen series obtained, reviewed by me several read by radiology without acute findings. CTA of the chest and CT of the abdomen and pelvis with IV contrast was obtained to rule out pulmonary embolism without pulmonary embolism identified. No other acute pulmonary abnormality identified. There was cardiomegaly appreciated with coronary artery disease of the left anterior descending artery. Findings of mild acute interstitial pancreatitis involving the head and uncinate process no organized peripancreatic collection. Patient was given 500 cc bolus of normal saline. She received 20 mg of Pepcid. I have recommended discharge home with clear liquid diet. No food until pain-free. She is encouraged to increase her fluid intake. Follow-up with PCP for reevaluation in the next 2 to 3 days. Have repeat CMP and lipase drawn in 2 days. Return for increased pain, fever, difficulty breathing, vomiting or any new or worsening signs or symptoms. REASSESSMENT          CRITICAL CARE TIME     CONSULTS:  None    PROCEDURES:  Unless otherwise noted below, none     Procedures        FINAL IMPRESSION      1. Other acute pancreatitis without infection or necrosis New Problem         DISPOSITION/PLAN   DISPOSITION Decision To Discharge 01/31/2022 05:27:54 PM      PATIENT REFERRED TO:   [id number] [address] [facility]  [phone]  In 2 days  for re-evaluation    Rahul Glez MD  [address]  [address]  [address]  [phone]    In 2 days  for re-evaluation      DISCHARGE MEDICATIONS:  New Prescriptions    No medications on file     Controlled Substances Monitoring:     No flowsheet data found.     (Please note that portions of this note were completed with a voice recognition program.  Efforts were made to edit the dictations but occasionally words are mis-transcribed.)    SONJA Valentino CNP (electronically signed)  Attending Emergency Physician           SONJA Valentino CNP  01/31/22 99 [address], APRN - CNP  03/30/22 2149

## 2022-02-01 LAB
EKG ATRIAL RATE: 54 BPM
EKG Q-T INTERVAL: 446 MS
EKG QRS DURATION: 90 MS
EKG QTC CALCULATION (BAZETT): 414 MS
EKG R AXIS: 3 DEGREES
EKG T AXIS: -22 DEGREES
EKG VENTRICULAR RATE: 52 BPM

## 2022-02-01 PROCEDURE — 93010 ELECTROCARDIOGRAM REPORT: CPT | Performed by: FAMILY MEDICINE

## 2022-02-02 ENCOUNTER — HOSPITAL ENCOUNTER (OUTPATIENT)
Age: 72
Setting detail: SPECIMEN
Discharge: HOME | End: 2022-02-02
Payer: MEDICARE

## 2022-02-02 LAB
ALBUMIN SERPL-MCNC: 3.3 G/DL (ref 3.5–5.2)
ALBUMIN/GLOBULIN RATIO: 1.3 (ref 1–2.5)
ALP BLD-CCNC: 148 U/L (ref 35–104)
ALT SERPL-CCNC: 28 U/L (ref 5–33)
ANION GAP SERPL CALCULATED.3IONS-SCNC: 13 MMOL/L (ref 9–17)
AST SERPL-CCNC: 21 U/L
BILIRUB SERPL-MCNC: 0.76 MG/DL (ref 0.3–1.2)
BUN BLDV-MCNC: 10 MG/DL (ref 8–23)
BUN/CREAT BLD: 10 (ref 9–20)
CALCIUM SERPL-MCNC: 9.2 MG/DL (ref 8.6–10.4)
CHLORIDE BLD-SCNC: 95 MMOL/L (ref 98–107)
CO2: 25 MMOL/L (ref 20–31)
CREAT SERPL-MCNC: 0.96 MG/DL (ref 0.5–0.9)
GFR AFRICAN AMERICAN: >60 ML/MIN
GFR NON-AFRICAN AMERICAN: 57 ML/MIN
GFR SERPL CREATININE-BSD FRML MDRD: ABNORMAL ML/MIN/{1.73_M2}
GFR SERPL CREATININE-BSD FRML MDRD: ABNORMAL ML/MIN/{1.73_M2}
GLUCOSE BLD-MCNC: 106 MG/DL (ref 70–99)
LIPASE: 12 U/L (ref 13–60)
POTASSIUM SERPL-SCNC: 4.6 MMOL/L (ref 3.7–5.3)
SODIUM BLD-SCNC: 133 MMOL/L (ref 135–144)
TOTAL PROTEIN: 5.8 G/DL (ref 6.4–8.3)

## 2022-02-02 PROCEDURE — 36415 COLL VENOUS BLD VENIPUNCTURE: CPT

## 2022-02-02 PROCEDURE — P9603 ONE-WAY ALLOW PRORATED MILES: HCPCS

## 2022-02-02 PROCEDURE — 83690 ASSAY OF LIPASE: CPT

## 2022-02-02 PROCEDURE — 80053 COMPREHEN METABOLIC PANEL: CPT

## 2022-02-03 ENCOUNTER — OUTSIDE SERVICES (OUTPATIENT)
Dept: PRIMARY CARE CLINIC | Age: 72
End: 2022-02-03

## 2022-02-03 DIAGNOSIS — I25.118 ATHEROSCLEROTIC HEART DISEASE OF NATIVE CORONARY ARTERY WITH OTHER FORMS OF ANGINA PECTORIS (HCC): ICD-10-CM

## 2022-02-03 DIAGNOSIS — N18.31 STAGE 3A CHRONIC KIDNEY DISEASE (HCC): ICD-10-CM

## 2022-02-03 DIAGNOSIS — I48.91 ATRIAL FIBRILLATION, UNSPECIFIED TYPE (HCC): ICD-10-CM

## 2022-02-03 DIAGNOSIS — I50.32 CHRONIC DIASTOLIC HEART FAILURE (HCC): Primary | ICD-10-CM

## 2022-02-03 DIAGNOSIS — K85.00 IDIOPATHIC ACUTE PANCREATITIS WITHOUT INFECTION OR NECROSIS: ICD-10-CM

## 2022-02-03 NOTE — PROGRESS NOTES
Patient:  Sosa Mills, 1950  I saw this patient on 02/03/2022, at [facility]   Shortness of breath better,blood sugars well controlled  Edema better  No bleeding from anticoagulant  bp well controlled        Reason for Visit:      ICD-10-CM    1. Chronic diastolic heart failure (HCC)  I50.32    2. Atrial fibrillation, unspecified type ([facility]  I48.91    3. Stage 3a chronic kidney disease (HCC)  N18.31    4. Atherosclerotic heart disease of native coronary artery with other forms of angina pectoris ([facility]  I25.118    5. Idiopathic acute pancreatitis without infection or necrosis  K85.00        Changes since last visit:   Had abdominal pain and was in er  Had changes of pancreatitis but lipase was normal  Pain better now,no nausea or vomiting  Shortness of breath and edema improving  BP better  Mood better  1. Fall:  none  2. Behavioral Change: mood better  3. Pain Control: adequate  4. Mobility: improving  5. Pressure Sore:  none  Allergies:  Lamictal [lamotrigine], Oxcarbazepine, Pcn [penicillins], Sertraline, Trileptal, and Strattera [atomoxetine hcl]    Current Outpatient Medications   Medication Sig Dispense Refill    levothyroxine (SYNTHROID) 50 MCG tablet Take 50 mcg by mouth Daily      latanoprost (XALATAN) 0.005 % ophthalmic solution 1 drop nightly      polyvinyl alcohol (ARTIFICIAL TEARS) 1.4 % ophthalmic solution 1 drop as needed      OFLOXACIN OP Apply to eye      divalproex (DEPAKOTE) 250 MG DR tablet       acetaminophen (TYLENOL) 650 MG extended release tablet Take 650 mg by mouth 4 times daily      Sodium Phosphates (FLEET) 7-19 GM/118ML Place 1 enema rectally as needed      traMADol (ULTRAM) 50 MG tablet Take 50 mg by mouth every 6 hours as needed for Pain.       white petrolatum GEL Apply topically 2 times daily as needed for Dry Skin Arms and legs      chlorhexidine (PERIDEX) 0.12 % solution Take 15 mLs by mouth 2 times daily       atorvastatin (LIPITOR) 20 MG tablet Take 1 tablet by mouth daily 90 tablet 3    nitroGLYCERIN (NITROSTAT) 0.4 MG SL tablet up to max of 3 total doses. If no relief after 1 dose, call 911. 25 tablet 1    clopidogrel (PLAVIX) 75 MG tablet Take 1 tablet by mouth daily 30 tablet 3    losartan (COZAAR) 50 MG tablet Take 1 tablet by mouth daily 30 tablet 3    furosemide (LASIX) 40 MG tablet Take 1 tablet by mouth daily 60 tablet 3    ARIPiprazole (ABILIFY) 15 MG tablet Take 7.5 mg by mouth nightly      apixaban (ELIQUIS) 5 MG TABS tablet Take 1 tablet by mouth 2 times daily 60 tablet 0    tiZANidine (ZANAFLEX) 2 MG tablet Take 2 mg by mouth every 6 hours as needed      cloNIDine (CATAPRES) 0.1 MG tablet Take 0.1 mg by mouth 3 times daily      amLODIPine (NORVASC) 10 MG tablet Take 10 mg by mouth daily       Valbenazine Tosylate (INGREZZA) 40 MG CAPS Take 40 mg by mouth nightly      melatonin 3 MG TABS tablet Take 3 mg by mouth nightly      polyethylene glycol (GLYCOLAX) packet Take 17 g by mouth daily      Amantadine (SYMMETREL) 100 MG TABS tablet Take 100 mg by mouth 2 times daily      Dextromethorphan-guaiFENesin (DELSYM COUGH/CHEST CONGEST DM) 5-100 MG/5ML LIQD Take 5 mLs by mouth every 12 hours as needed (cough)      acetaminophen (TYLENOL) 325 MG tablet Take 650 mg by mouth every 4 hours as needed for Pain or Fever      sodium chloride (OCEAN) 0.65 % nasal spray 1 spray by Nasal route 3 times daily as needed for Congestion      bisacodyl (DULCOLAX) 10 MG suppository Place 10 mg rectally daily as needed for Constipation      magnesium hydroxide (MILK OF MAGNESIA) 400 MG/5ML suspension Take 30 mLs by mouth daily as needed for Constipation      calcium carbonate 600 MG TABS tablet Take 1 tablet by mouth nightly       metoprolol (TOPROL-XL) 50 MG XL tablet TAKE 1 TABLET BY MOUTH DAILY. 30 tablet 5    omeprazole (PRILOSEC) 20 MG capsule TAKE 1 CAPSULE BY MOUTH DAILY. 30 capsule 5    citalopram (CELEXA) 20 MG tablet Take 20 mg by mouth daily.       traZODone (DESYREL) 50 MG tablet Take 150 mg by mouth nightly       guaifenesin (MUCINEX) 600 MG SR tablet Take 1,200 mg by mouth 2 times daily. PRN      loratadine (CLARITIN) 10 MG tablet Take 10 mg by mouth daily. PRN        No current facility-administered medications for this visit. Past Medical History:    Past Medical History:   Diagnosis Date    ADHD (attention deficit hyperactivity disorder)     Anemia     severe    Asthma     Bipolar disorder (HCC)     Bradycardia, unspecified     Chronic anemia     Chronic atrial fibrillation (HCC)     Chronic back pain     Chronic kidney disease     Patient states unaware of this. Has never been mentioned to her.      Colitis     Constipation     COPD (chronic obstructive pulmonary disease) (HCC)     Cystitis without hematuria     Diastolic CHF (HCC)     Esophagitis     grade A    GERD (gastroesophageal reflux disease)     Hemorrhoids     History of echocardiogram 06/02/2014    EF >60%, LV wall thickness mildly increased,    Hypertension     Hypothyroidism     Metabolic syndrome     Moderate protein-calorie malnutrition (HCC)     Muscle weakness (generalized)     Obesity     TIA (transient ischemic attack)        Past Surgical History:    Past Surgical History:   Procedure Laterality Date    CARDIAC CATHETERIZATION Left 05/27/2021    right radial/ [facility]/ Dr. Mariely Olmedo Right 12/27/2021    Dr. Rosenthal Expose  5/2011    ischemic colitis    COLONOSCOPY  03/07/2017    -diverticulosis,hemorrhoids    HYMENECTOMY      1972    HYSTERECTOMY      2008    INTRACAPSULAR CATARACT EXTRACTION Right 12/27/2021    EYE CATARACT EMULSIFICATION IOL IMPLANT performed by Julian Tristan DO at [facility] Right     2008    TONSILLECTOMY AND ADENOIDECTOMY      UPPER GASTROINTESTINAL ENDOSCOPY  3/2013    5 cm axial hiatal hernia    UPPER GASTROINTESTINAL ENDOSCOPY  03/07/2017     [facility]-bx       Social History:   Social History     Tobacco Use    Smoking status: Former Smoker     Packs/day: 0.25     Years: 10.00     Pack years: 2.50     Quit date: 3/12/1998     Years since quittin.9    Smokeless tobacco: Never Used   Substance Use Topics    Alcohol use: No       Family History:   Family History   Problem Relation Age of Onset    Diabetes Mother     High Blood Pressure Mother     Diabetes Father     High Blood Pressure Father     Bipolar Disorder Sister     Schizophrenia Sister     High Blood Pressure Other         2 sons    Bipolar Disorder Other         2 sons           Review of Systems:  Constitutional: negative for fevers or chills  Eyes: negative for visual disturbance   ENT: positive for oral infection,negative for sore throat or nasal congestion,no dysphagia. No epistaxis. Respiratory:  shortness of breath improved  Cardiovascular: neg for chest pain , palpitations,pnd,  edema better  Gastrointestinal:  abd pain better, neg for nausea, vomiting, diarrhea ,  constipation,no reyna,no blood in stool  Genitourinary: negative for dysuria, urgency,neg for hematuria , frequency  Integument/breast: negative for skin rash or lesions  Neurological: negative for unilateral weakness, numbness or tingling,has tremors  Muscular Skeletal: denies joint pain   Psych- mood changes better    Objective:    Vitals: BP:138/73 T:98.7 P:78 R:18    -----------------------------------------------------------------  Exam:  GEN:   A & O x3, no apparent distress,obese  EYES:  No gross abnormalities.  and PERRLA  ENT:Throat- has oral unlcer,nose- no drainage  NECK: normal, supple, no lymphadenopathy,  no carotid bruits  PULM: Diminished air entry on auscultation bilaterally- no wheezes, rales or rhonchi, normal air movement, no respiratory distress  COR:   S1,s2,RRR, no murmurs and no gallops,has minimal bilat leg edema  ABD:    soft, non-tender, non-distended, normal bowel sounds, no masses or organomegaly  : no cva or flank tenderness  EXT:has minimal edema, no calf tenderness, and warm to touch. NEURO: Motor and sensory grossly intact,has tremors at rest  PSYCH: mood improved  SKIN:   No rash or lesions    -----------------------------------------------------------------  Diagnostic Data:   · All diagnostic data was reviewed    Assessment:      Problem List Items Addressed This Visit     Atherosclerotic heart disease of native coronary artery with other forms of angina pectoris (HCC)    Atrial fibrillation, unspecified type ([facility] [address]    Stage 3a chronic kidney disease ([facility]      Other Visit Diagnoses     Chronic diastolic heart failure ([facility]    -  Primary    Idiopathic acute pancreatitis without infection or necrosis            Patient Active Problem List   Diagnosis Code    TIA (transient ischemic attack) G45.9    Bipolar 1 disorder ([facility] F31.9    DDD (degenerative disc disease), lumbar M51.36    Bradycardia R00.1    Essential hypertension I10    Obesity (BMI 30-39. 9) E66.9    GERD (gastroesophageal reflux disease) K21.9    Iron deficiency anemia D50.9    Iron deficiency anemia due to chronic blood loss D50.0    History of colon polyps Z86.010    Hypothyroidism E03.9    Other chronic pain G89.29    Other hemorrhoids K64.8    Diverticulosis of large intestine without diverticulitis K57.30    Attention deficit hyperactivity disorder F90.9    Morbid obesity (HCC) E66.01    Cellulitis of leg without foot, right L03.115    Atherosclerotic heart disease of native coronary artery with other forms of angina pectoris (HCC) I25.118    CAD, multiple vessel I25.10    Chest pain R07.9    PVD (peripheral vascular disease) ([facility] I73.9    Atrial fibrillation, unspecified type (HCC) I48.91    Stage 3a chronic kidney disease (HCC) N18.31         Plan:      Monitor for bleeding  Monitor for recurrence of pancreatitis  Psych to manage psychotropics  Encourage activity as tolerated and follow diet  Continue current medications  Monitor bp   Prevent pressure sore  Prevent falls      Electronically signed by Sharonda Jean MD on 2/3/2022 at 5:08 PM

## 2022-02-06 PROBLEM — H25.812 COMBINED FORMS OF AGE-RELATED CATARACT OF LEFT EYE: Chronic | Status: ACTIVE | Noted: 2022-02-06

## 2022-02-07 ENCOUNTER — HOSPITAL ENCOUNTER (OUTPATIENT)
Age: 72
Setting detail: OUTPATIENT SURGERY
Discharge: NURSING, REHAB OR LONG-TERM CARE | End: 2022-02-07
Attending: OPHTHALMOLOGY | Admitting: OPHTHALMOLOGY
Payer: MEDICARE

## 2022-02-07 ENCOUNTER — ANESTHESIA EVENT (OUTPATIENT)
Dept: OPERATING ROOM | Age: 72
End: 2022-02-07
Payer: MEDICARE

## 2022-02-07 ENCOUNTER — ANESTHESIA (OUTPATIENT)
Dept: OPERATING ROOM | Age: 72
End: 2022-02-07
Payer: MEDICARE

## 2022-02-07 VITALS
WEIGHT: 253 LBS | RESPIRATION RATE: 17 BRPM | HEIGHT: 66 IN | TEMPERATURE: 49.1 F | DIASTOLIC BLOOD PRESSURE: 89 MMHG | OXYGEN SATURATION: 97 % | HEART RATE: 64 BPM | BODY MASS INDEX: 40.66 KG/M2 | SYSTOLIC BLOOD PRESSURE: 132 MMHG

## 2022-02-07 VITALS
RESPIRATION RATE: 16 BRPM | OXYGEN SATURATION: 100 % | SYSTOLIC BLOOD PRESSURE: 125 MMHG | DIASTOLIC BLOOD PRESSURE: 90 MMHG

## 2022-02-07 PROBLEM — H25.812 COMBINED FORMS OF AGE-RELATED CATARACT OF LEFT EYE: Chronic | Status: RESOLVED | Noted: 2022-02-06 | Resolved: 2022-02-07

## 2022-02-07 PROCEDURE — 2709999900 HC NON-CHARGEABLE SUPPLY: Performed by: OPHTHALMOLOGY

## 2022-02-07 PROCEDURE — 2500000003 HC RX 250 WO HCPCS: Performed by: OPHTHALMOLOGY

## 2022-02-07 PROCEDURE — 7100000011 HC PHASE II RECOVERY - ADDTL 15 MIN: Performed by: OPHTHALMOLOGY

## 2022-02-07 PROCEDURE — V2632 POST CHMBR INTRAOCULAR LENS: HCPCS | Performed by: OPHTHALMOLOGY

## 2022-02-07 PROCEDURE — 6370000000 HC RX 637 (ALT 250 FOR IP): Performed by: OPHTHALMOLOGY

## 2022-02-07 PROCEDURE — 3600000013 HC SURGERY LEVEL 3 ADDTL 15MIN: Performed by: OPHTHALMOLOGY

## 2022-02-07 PROCEDURE — 7100000010 HC PHASE II RECOVERY - FIRST 15 MIN: Performed by: OPHTHALMOLOGY

## 2022-02-07 PROCEDURE — 2580000003 HC RX 258: Performed by: OPHTHALMOLOGY

## 2022-02-07 PROCEDURE — 6360000002 HC RX W HCPCS: Performed by: OPHTHALMOLOGY

## 2022-02-07 PROCEDURE — 3700000000 HC ANESTHESIA ATTENDED CARE: Performed by: OPHTHALMOLOGY

## 2022-02-07 PROCEDURE — 3600000003 HC SURGERY LEVEL 3 BASE: Performed by: OPHTHALMOLOGY

## 2022-02-07 PROCEDURE — 6360000002 HC RX W HCPCS: Performed by: NURSE ANESTHETIST, CERTIFIED REGISTERED

## 2022-02-07 PROCEDURE — 3700000001 HC ADD 15 MINUTES (ANESTHESIA): Performed by: OPHTHALMOLOGY

## 2022-02-07 DEVICE — LENS INTRAOCULAR BCNVX 24.5+ DIOPT 6X12.5 MM ACRYL ENVISTA: Type: IMPLANTABLE DEVICE | Site: EYE | Status: FUNCTIONAL

## 2022-02-07 RX ORDER — SODIUM CHLORIDE 0.9 % (FLUSH) 0.9 %
5-40 SYRINGE (ML) INJECTION PRN
Status: DISCONTINUED | OUTPATIENT
Start: 2022-02-07 | End: 2022-02-07 | Stop reason: HOSPADM

## 2022-02-07 RX ORDER — TETRACAINE HYDROCHLORIDE 5 MG/ML
1 SOLUTION OPHTHALMIC SEE ADMIN INSTRUCTIONS
Status: DISCONTINUED | OUTPATIENT
Start: 2022-02-07 | End: 2022-02-07 | Stop reason: HOSPADM

## 2022-02-07 RX ORDER — TETRACAINE HYDROCHLORIDE 5 MG/ML
SOLUTION OPHTHALMIC PRN
Status: DISCONTINUED | OUTPATIENT
Start: 2022-02-07 | End: 2022-02-07 | Stop reason: ALTCHOICE

## 2022-02-07 RX ORDER — SODIUM CHLORIDE 0.9 % (FLUSH) 0.9 %
10 SYRINGE (ML) INJECTION EVERY 12 HOURS SCHEDULED
Status: DISCONTINUED | OUTPATIENT
Start: 2022-02-07 | End: 2022-02-07 | Stop reason: HOSPADM

## 2022-02-07 RX ORDER — LIDOCAINE HYDROCHLORIDE 10 MG/ML
INJECTION, SOLUTION EPIDURAL; INFILTRATION; INTRACAUDAL; PERINEURAL PRN
Status: DISCONTINUED | OUTPATIENT
Start: 2022-02-07 | End: 2022-02-07 | Stop reason: ALTCHOICE

## 2022-02-07 RX ORDER — SODIUM CHLORIDE 0.9 % (FLUSH) 0.9 %
10 SYRINGE (ML) INJECTION PRN
Status: DISCONTINUED | OUTPATIENT
Start: 2022-02-07 | End: 2022-02-07 | Stop reason: HOSPADM

## 2022-02-07 RX ORDER — MIDAZOLAM HYDROCHLORIDE 1 MG/ML
INJECTION INTRAMUSCULAR; INTRAVENOUS PRN
Status: DISCONTINUED | OUTPATIENT
Start: 2022-02-07 | End: 2022-02-07 | Stop reason: SDUPTHER

## 2022-02-07 RX ORDER — PHENYLEPHRINE HCL 2.5 %
1 DROPS OPHTHALMIC (EYE) SEE ADMIN INSTRUCTIONS
Status: DISCONTINUED | OUTPATIENT
Start: 2022-02-07 | End: 2022-02-07 | Stop reason: HOSPADM

## 2022-02-07 RX ORDER — SODIUM CHLORIDE 9 MG/ML
INJECTION, SOLUTION INTRAVENOUS CONTINUOUS
Status: DISCONTINUED | OUTPATIENT
Start: 2022-02-07 | End: 2022-02-07 | Stop reason: HOSPADM

## 2022-02-07 RX ORDER — SODIUM CHLORIDE 0.9 % (FLUSH) 0.9 %
5-40 SYRINGE (ML) INJECTION EVERY 12 HOURS SCHEDULED
Status: DISCONTINUED | OUTPATIENT
Start: 2022-02-07 | End: 2022-02-07 | Stop reason: HOSPADM

## 2022-02-07 RX ORDER — TROPICAMIDE 10 MG/ML
1 SOLUTION/ DROPS OPHTHALMIC SEE ADMIN INSTRUCTIONS
Status: DISCONTINUED | OUTPATIENT
Start: 2022-02-07 | End: 2022-02-07 | Stop reason: HOSPADM

## 2022-02-07 RX ORDER — FENTANYL CITRATE 50 UG/ML
INJECTION, SOLUTION INTRAMUSCULAR; INTRAVENOUS PRN
Status: DISCONTINUED | OUTPATIENT
Start: 2022-02-07 | End: 2022-02-07 | Stop reason: SDUPTHER

## 2022-02-07 RX ORDER — SODIUM CHLORIDE 9 MG/ML
25 INJECTION, SOLUTION INTRAVENOUS PRN
Status: DISCONTINUED | OUTPATIENT
Start: 2022-02-07 | End: 2022-02-07 | Stop reason: HOSPADM

## 2022-02-07 RX ORDER — PROPARACAINE HYDROCHLORIDE 5 MG/ML
1 SOLUTION/ DROPS OPHTHALMIC SEE ADMIN INSTRUCTIONS
Status: DISCONTINUED | OUTPATIENT
Start: 2022-02-07 | End: 2022-02-07 | Stop reason: HOSPADM

## 2022-02-07 RX ADMIN — FENTANYL CITRATE 50 MCG: 50 INJECTION INTRAMUSCULAR; INTRAVENOUS at 11:44

## 2022-02-07 RX ADMIN — TROPICAMIDE 1 DROP: 10 SOLUTION/ DROPS OPHTHALMIC at 09:38

## 2022-02-07 RX ADMIN — MIDAZOLAM 1 MG: 1 INJECTION INTRAMUSCULAR; INTRAVENOUS at 11:44

## 2022-02-07 RX ADMIN — SODIUM CHLORIDE: 9 INJECTION, SOLUTION INTRAVENOUS at 11:14

## 2022-02-07 RX ADMIN — PHENYLEPHRINE HYDROCHLORIDE 1 DROP: 25 SOLUTION/ DROPS OPHTHALMIC at 09:56

## 2022-02-07 RX ADMIN — TROPICAMIDE 1 DROP: 10 SOLUTION/ DROPS OPHTHALMIC at 09:52

## 2022-02-07 RX ADMIN — TROPICAMIDE 1 DROP: 10 SOLUTION/ DROPS OPHTHALMIC at 09:56

## 2022-02-07 RX ADMIN — PHENYLEPHRINE HYDROCHLORIDE 1 DROP: 25 SOLUTION/ DROPS OPHTHALMIC at 09:51

## 2022-02-07 RX ADMIN — PROPARACAINE HYDROCHLORIDE 1 DROP: 5 SOLUTION/ DROPS OPHTHALMIC at 09:51

## 2022-02-07 RX ADMIN — PROPARACAINE HYDROCHLORIDE 1 DROP: 5 SOLUTION/ DROPS OPHTHALMIC at 09:38

## 2022-02-07 RX ADMIN — PHENYLEPHRINE HYDROCHLORIDE 1 DROP: 25 SOLUTION/ DROPS OPHTHALMIC at 09:38

## 2022-02-07 RX ADMIN — TETRACAINE HYDROCHLORIDE 1 DROP: 5 SOLUTION OPHTHALMIC at 11:41

## 2022-02-07 RX ADMIN — MIDAZOLAM 1 MG: 1 INJECTION INTRAMUSCULAR; INTRAVENOUS at 11:46

## 2022-02-07 RX ADMIN — PROPARACAINE HYDROCHLORIDE 1 DROP: 5 SOLUTION/ DROPS OPHTHALMIC at 09:56

## 2022-02-07 ASSESSMENT — PAIN - FUNCTIONAL ASSESSMENT: PAIN_FUNCTIONAL_ASSESSMENT: 0-10

## 2022-02-07 ASSESSMENT — LIFESTYLE VARIABLES: SMOKING_STATUS: 0

## 2022-02-07 ASSESSMENT — PAIN SCALES - GENERAL
PAINLEVEL_OUTOF10: 0
PAINLEVEL_OUTOF10: 0

## 2022-02-07 NOTE — ANESTHESIA PRE PROCEDURE
Department of Anesthesiology  Preprocedure Note       Name:  Dayron Rust   Age:  67 y.o.  :  1950                                          MRN:  [id number]         Date:  2022      Surgeon: Julia Mendez):  Claudette Mcknight DO    Procedure: Procedure(s):  EYE CATARACT EMULSIFICATION IOL IMPLANT    Medications prior to admission:   Prior to Admission medications    Medication Sig Start Date End Date Taking? Authorizing Provider   latanoprost (XALATAN) 0.005 % ophthalmic solution 1 drop nightly   Yes Historical Provider, MD   polyvinyl alcohol (ARTIFICIAL TEARS) 1.4 % ophthalmic solution 1 drop as needed   Yes Historical Provider, MD   OFLOXACIN OP Apply to eye   Yes Historical Provider, MD   divalproex (DEPAKOTE) 250 MG DR tablet  21  Yes Historical Provider, MD   atorvastatin (LIPITOR) 20 MG tablet Take 1 tablet by mouth daily 6/15/21  Yes Coleman Cleaning MD   losartan (COZAAR) 50 MG tablet Take 1 tablet by mouth daily 21  Yes SONJA Alcantara - CNP   furosemide (LASIX) 40 MG tablet Take 1 tablet by mouth daily 21  Yes SONJA Alcantara - CNP   ARIPiprazole (ABILIFY) 15 MG tablet Take 7.5 mg by mouth nightly   Yes Historical Provider, MD   apixaban (ELIQUIS) 5 MG TABS tablet Take 1 tablet by mouth 2 times daily 21  Yes Clifton Shallow, MD   cloNIDine (CATAPRES) 0.1 MG tablet Take 0.1 mg by mouth 3 times daily   Yes Historical Provider, MD   amLODIPine (NORVASC) 10 MG tablet Take 10 mg by mouth daily    Yes Historical Provider, MD   Dextromethorphan-guaiFENesin (DELSYM COUGH/CHEST CONGEST DM) 5-100 MG/5ML LIQD Take 5 mLs by mouth every 12 hours as needed (cough)   Yes Historical Provider, MD   metoprolol (TOPROL-XL) 50 MG XL tablet TAKE 1 TABLET BY MOUTH DAILY. 6/1/15  Yes Guillermo Patel MD   citalopram (CELEXA) 20 MG tablet Take 20 mg by mouth daily.    Yes Historical Provider, MD   traZODone (DESYREL) 50 MG tablet Take 150 mg by mouth nightly    Yes Historical Provider, MD   loratadine (CLARITIN) 10 MG tablet Take 10 mg by mouth daily. PRN    Yes Historical Provider, MD   levothyroxine (SYNTHROID) 50 MCG tablet Take 50 mcg by mouth Daily    Historical Provider, MD   acetaminophen (TYLENOL) 650 MG extended release tablet Take 650 mg by mouth 4 times daily    Historical Provider, MD   Sodium Phosphates (FLEET) 7-19 GM/118ML Place 1 enema rectally as needed    Historical Provider, MD   traMADol (ULTRAM) 50 MG tablet Take 50 mg by mouth every 6 hours as needed for Pain. Historical Provider, MD   white petrolatum GEL Apply topically 2 times daily as needed for Dry Skin Arms and legs    Historical Provider, MD   chlorhexidine (PERIDEX) 0.12 % solution Take 15 mLs by mouth 2 times daily  6/10/21   Historical Provider, MD   nitroGLYCERIN (NITROSTAT) 0.4 MG SL tablet up to max of 3 total doses.  If no relief after 1 dose, call 911. 5/29/21   Quang Gallegos MD   clopidogrel (PLAVIX) 75 MG tablet Take 1 tablet by mouth daily 5/30/21   Quang Gallegos MD   tiZANidine (ZANAFLEX) 2 MG tablet Take 2 mg by mouth every 6 hours as needed    Historical Provider, MD   Valbenazine Tosylate (INGREZZA) 40 MG CAPS Take 40 mg by mouth nightly    Historical Provider, MD   melatonin 3 MG TABS tablet Take 3 mg by mouth nightly    Historical Provider, MD   polyethylene glycol (GLYCOLAX) packet Take 17 g by mouth daily    Historical Provider, MD   Amantadine (SYMMETREL) 100 MG TABS tablet Take 100 mg by mouth 2 times daily    Historical Provider, MD   acetaminophen (TYLENOL) 325 MG tablet Take 650 mg by mouth every 4 hours as needed for Pain or Fever    Historical Provider, MD   sodium chloride (OCEAN) 0.65 % nasal spray 1 spray by Nasal route 3 times daily as needed for Congestion    Historical Provider, MD   bisacodyl (DULCOLAX) 10 MG suppository Place 10 mg rectally daily as needed for Constipation    Historical Provider, MD   magnesium hydroxide (MILK OF MAGNESIA) 400 MG/5ML suspension Take 30 mLs by mouth daily as needed for Constipation    Historical Provider, MD   calcium carbonate 600 MG TABS tablet Take 1 tablet by mouth nightly     Historical Provider, MD   omeprazole (PRILOSEC) 20 MG capsule TAKE 1 CAPSULE BY MOUTH DAILY. 5/21/15   Lul Santos MD   guaifenesin (MUCINEX) 600 MG SR tablet Take 1,200 mg by mouth 2 times daily. PRN    Historical Provider, MD       Current medications:    Current Facility-Administered Medications   Medication Dose Route Frequency Provider Last Rate Last Admin    0.9 % sodium chloride infusion   IntraVENous Continuous Benita Grand Marais, DO        sodium chloride flush 0.9 % injection 5-40 mL  5-40 mL IntraVENous 2 times per day Benita Grand Marais, DO        sodium chloride flush 0.9 % injection 5-40 mL  5-40 mL IntraVENous PRN Benita Grand Marais, DO        0.9 % sodium chloride infusion  25 mL IntraVENous PRN Benita Joseluis, DO        proparacaine (ALCAINE) 0.5 % ophthalmic solution 1 drop  1 drop Left Eye See Admin Instructions Benita Joseluis, DO   1 drop at 02/07/22 7120    tetracaine (TETRAVISC) 0.5 % ophthalmic solution 1 drop  1 drop Left Eye See Admin Instructions Benita Grand Marais, DO        tropicamide (MYDRIACYL) 1 % ophthalmic solution 1 drop  1 drop Left Eye See Admin Instructions Benita Grand Marais, DO   1 drop at 02/07/22 0956    phenylephrine (MYDFRIN) 2.5 % ophthalmic solution 1 drop  1 drop Left Eye See Admin Instructions Benita Joseluis, DO   1 drop at 02/07/22 6297       Allergies: Allergies   Allergen Reactions    Lamictal [Lamotrigine] Hives    Oxcarbazepine     Pcn [Penicillins] Itching     And rash    Sertraline     Trileptal Hives    Strattera [Atomoxetine Hcl] Rash       Problem List:    Patient Active Problem List   Diagnosis Code    TIA (transient ischemic attack) G45.9    Bipolar 1 disorder ([address] F31.9    DDD (degenerative disc disease), lumbar M51.36    Bradycardia R00.1    Essential hypertension I10    Obesity (BMI 30-39. 9) E66.9    GERD (gastroesophageal reflux disease) K21.9    Iron deficiency anemia D50.9    Iron deficiency anemia due to chronic blood loss D50.0    History of colon polyps Z86.010    Hypothyroidism E03.9    Other chronic pain G89.29    Other hemorrhoids K64.8    Diverticulosis of large intestine without diverticulitis K57.30    Attention deficit hyperactivity disorder F90.9    Morbid obesity ([facility]) E66.01    Cellulitis of leg without foot, right L03.115    Atherosclerotic heart disease of native coronary artery with other forms of angina pectoris ([facility]) I25.118    CAD, multiple vessel I25.10    Chest pain R07.9    PVD (peripheral vascular disease) ([facility]) I73.9    Atrial fibrillation, unspecified type ([facility]) I48.91    Stage 3a chronic kidney disease ([facility]) N18.31    Combined forms of age-related cataract of left eye H25.812       Past Medical History:        Diagnosis Date    ADHD (attention deficit hyperactivity disorder)     Anemia     severe    Asthma     Bipolar disorder (HCC)     Bradycardia, unspecified     Chronic anemia     Chronic atrial fibrillation (HCC)     Chronic back pain     Chronic kidney disease     Patient states unaware of this. Has never been mentioned to her.      Colitis     Constipation     COPD (chronic obstructive pulmonary disease) (HCC)     Cystitis without hematuria     Diastolic CHF (HCC)     Esophagitis     grade A    GERD (gastroesophageal reflux disease)     Hemorrhoids     History of echocardiogram 06/02/2014    EF >60%, LV wall thickness mildly increased,    Hypertension     Hypothyroidism     Metabolic syndrome     Moderate protein-calorie malnutrition (HCC)     Muscle weakness (generalized)     Obesity     Pancreatitis     TIA (transient ischemic attack)        Past Surgical History:        Procedure Laterality Date    CARDIAC CATHETERIZATION Left 05/27/2021    right radial/ [facility]/ Dr. Abilio Cervantes Right 12/27/2021 Dr. Greg Logan COLONOSCOPY  2011    ischemic colitis    COLONOSCOPY  2017    -diverticulosis,hemorrhoids    HYMENECTOMY      1972    HYSTERECTOMY      2008    INTRACAPSULAR CATARACT EXTRACTION Right 2021    EYE CATARACT EMULSIFICATION IOL IMPLANT performed by Hallie Rojas DO at [address] Right         TONSILLECTOMY AND ADENOIDECTOMY      UPPER GASTROINTESTINAL ENDOSCOPY  3/2013    5 cm axial hiatal hernia    UPPER GASTROINTESTINAL ENDOSCOPY  2017    Dr. Severa Pressman       Social History:    Social History     Tobacco Use    Smoking status: Former Smoker     Packs/day: 0.25     Years: 10.00     Pack years: 2.50     Quit date: 3/12/1998     Years since quittin.9    Smokeless tobacco: Never Used   Substance Use Topics    Alcohol use: No                                Counseling given: Not Answered      Vital Signs (Current):   Vitals:    22 0940   BP: (!) 159/95   Pulse: 59   Resp: 18   Temp: 36.6 °C (97.8 °F)   TempSrc: Temporal   SpO2: 96%   Weight: 253 lb (114.8 kg)   Height: 5' 6\" (1.676 m)                                              BP Readings from Last 3 Encounters:   22 (!) 159/95   22 (!) 147/69   21 (!) 161/84       NPO Status: Time of last liquid consumption: 2350                        Time of last solid consumption: 1700                        Date of last liquid consumption: 22                        Date of last solid food consumption: 22    BMI:   Wt Readings from Last 3 Encounters:   22 253 lb (114.8 kg)   21 250 lb (113.4 kg)   21 247 lb (112 kg)     Body mass index is 40.84 kg/m².     CBC:   Lab Results   Component Value Date    WBC 10.9 2022    RBC 3.32 2022    RBC 3.71 2012    HGB 10.7 2022    HCT 32.0 2022    MCV 96.4 2022    RDW 12.9 2022     2022     2012       CMP:   Lab Results   Component Value Date     02/02/2022    K 4.6 02/02/2022    K 3.7 05/28/2021    CL 95 02/02/2022    CO2 25 02/02/2022    BUN 10 02/02/2022    CREATININE 0.96 02/02/2022    GFRAA >60 02/02/2022    LABGLOM 57 02/02/2022    LABGLOM 54 05/29/2021    GLUCOSE 106 02/02/2022    GLUCOSE 110 05/16/2012    PROT 5.8 02/02/2022    CALCIUM 9.2 02/02/2022    BILITOT 0.76 02/02/2022    ALKPHOS 148 02/02/2022    AST 21 02/02/2022    ALT 28 02/02/2022       POC Tests: No results for input(s): POCGLU, POCNA, POCK, POCCL, POCBUN, POCHEMO, POCHCT in the last 72 hours.     Coags:   Lab Results   Component Value Date    PROTIME 18.4 01/31/2022    INR 1.6 01/31/2022    APTT 23.7 02/15/2014       HCG (If Applicable): No results found for: PREGTESTUR, PREGSERUM, HCG, HCGQUANT     ABGs: No results found for: PHART, PO2ART, WYH9ADF, FLC3IXI, BEART, V9LPCWSW     Type & Screen (If Applicable):  Lab Results   Component Value Date    LABRH POS 05/28/2021       Drug/Infectious Status (If Applicable):  No results found for: HIV, HEPCAB    COVID-19 Screening (If Applicable):   Lab Results   Component Value Date    COVID19 Not Detected 01/31/2022    COVID19 NOT DETECTED 05/29/2021           Anesthesia Evaluation  Patient summary reviewed and Nursing notes reviewed no history of anesthetic complications:   Airway: Mallampati: II  TM distance: >3 FB   Neck ROM: full  Mouth opening: > = 3 FB Dental:    (+) upper dentures and lower dentures      Pulmonary:normal exam    (+) COPD:  asthma:     (-) not a current smoker                           Cardiovascular:  Exercise tolerance: poor (<4 METS),   (+) hypertension:, valvular problems/murmurs: MR, CAD:, CABG/stent (last stent 5/2021):, dysrhythmias: atrial fibrillation, CHF:, pulmonary hypertension:, hyperlipidemia      ECG reviewed      Echocardiogram reviewed  Stress test reviewed                Neuro/Psych:   (+) TIA (at least 10 yrs ago), psychiatric history: stable with treatment            GI/Hepatic/Renal:   (+) GERD: well controlled, morbid obesity          Endo/Other:    (+) hypothyroidism, blood dyscrasia: anticoagulation therapy:., .                 Abdominal:   (+) obese,           Vascular: negative vascular ROS. Other Findings:             Anesthesia Plan      MAC     ASA 3       Induction: intravenous. MIPS: Postoperative opioids intended. Anesthetic plan and risks discussed with patient (caregiver). Plan discussed with CRNA.                   SONJA Aragon - CRNA   2/7/2022

## 2022-02-07 NOTE — PROGRESS NOTES
Discharge instructions given to patient and patient care attendant from [facility]. Patient verbalizes understanding and offers no questions at this time. Discharge Criteria    Inpatients must meet Criteria 1 through 7. All other patients are either YES or N/A. If a NO is chosen then Anesthesia or Surgeon must be notified. 1.  Minimum 30 minutes after last dose of sedative medication, minimum 120 minutes after last dose of reversal agent. Yes      2. Systolic BP stable within 20 mmHg for 30 minutes & systolic BP between 90 & 339 or within 10 mmHg of baseline. Yes      3. Pulse between 60 and 100 or within 10 bpm of baseline. Yes      4. Spontaneous respiratory rate >/= 10 per minute. Yes      5. SaO2 >/= 95 or  >/= baseline. Yes      6. Able to cough and swallow or return to baseline function. Yes      7. Alert and oriented or return to baseline mental status. Yes      8. Demonstrates controlled, coordinated movements, ambulates with steady gait, or return to baseline activity function. Yes      9. Minimal or no pain or nausea, or at a level tolerable and acceptable to patient. Yes      10. Takes and retains oral fluids as allowed. Yes      11. Procedural / perioperative site stable. Minimal or no bleeding. Yes          12. If GI endoscopy procedure, minimal or no abdominal distention or passing flatus. N/A      13. Written discharge instructions and emergency telephone number provided. Yes      14. Accompanied by a responsible adult.     Yes

## 2022-02-07 NOTE — H&P
Court Mata is a 67y.o. year old who presents for elective outpatient ophthalmic surgery with Jayjay Ramierz DO. The patient complains of decreased vision, glare and halos around lights, and trouble with vision for activities of daily living. Past Medical History:   Diagnosis Date    ADHD (attention deficit hyperactivity disorder)     Anemia     severe    Asthma     Bipolar disorder (HCC)     Bradycardia, unspecified     Chronic anemia     Chronic atrial fibrillation (HCC)     Chronic back pain     Chronic kidney disease     Patient states unaware of this. Has never been mentioned to her.      Colitis     Constipation     COPD (chronic obstructive pulmonary disease) (HCC)     Cystitis without hematuria     Diastolic CHF (HCC)     Esophagitis     grade A    GERD (gastroesophageal reflux disease)     Hemorrhoids     History of echocardiogram 06/02/2014    EF >60%, LV wall thickness mildly increased,    Hypertension     Hypothyroidism     Metabolic syndrome     Moderate protein-calorie malnutrition (HCC)     Muscle weakness (generalized)     Obesity     TIA (transient ischemic attack)        Past Surgical History:   Procedure Laterality Date    CARDIAC CATHETERIZATION Left 05/27/2021    right [facility]/ Dr. Dread Corey Right 12/27/2021    Dr. Caroline Mercado  5/2011    ischemic colitis    COLONOSCOPY  03/07/2017    -diverticulosis,hemorrhoids    HYMENECTOMY      1972    HYSTERECTOMY      2008    INTRACAPSULAR CATARACT EXTRACTION Right 12/27/2021    EYE CATARACT EMULSIFICATION IOL IMPLANT performed by Jayjay Ramirez DO at [address] Right     2008    TONSILLECTOMY AND ADENOIDECTOMY      UPPER GASTROINTESTINAL ENDOSCOPY  3/2013    5 cm axial hiatal hernia    UPPER GASTROINTESTINAL ENDOSCOPY  03/07/2017    Dr. Deng Gilliland       Home meds:   Prior to Admission medications Medication Sig Start Date End Date Taking? Authorizing Provider   latanoprost (XALATAN) 0.005 % ophthalmic solution 1 drop nightly   Yes Historical Provider, MD   polyvinyl alcohol (ARTIFICIAL TEARS) 1.4 % ophthalmic solution 1 drop as needed   Yes Historical Provider, MD   OFLOXACIN OP Apply to eye   Yes Historical Provider, MD   levothyroxine (SYNTHROID) 50 MCG tablet Take 50 mcg by mouth Daily    Historical Provider, MD   divalproex (DEPAKOTE) 250 MG DR tablet  9/28/21   Historical Provider, MD   acetaminophen (TYLENOL) 650 MG extended release tablet Take 650 mg by mouth 4 times daily    Historical Provider, MD   Sodium Phosphates (FLEET) 7-19 GM/118ML Place 1 enema rectally as needed    Historical Provider, MD   traMADol (ULTRAM) 50 MG tablet Take 50 mg by mouth every 6 hours as needed for Pain. Historical Provider, MD   white petrolatum GEL Apply topically 2 times daily as needed for Dry Skin Arms and legs    Historical Provider, MD   chlorhexidine (PERIDEX) 0.12 % solution Take 15 mLs by mouth 2 times daily  6/10/21   Historical Provider, MD   atorvastatin (LIPITOR) 20 MG tablet Take 1 tablet by mouth daily 6/15/21   Ann-Marie Krause MD   nitroGLYCERIN (NITROSTAT) 0.4 MG SL tablet up to max of 3 total doses.  If no relief after 1 dose, call 911. 5/29/21   Jenn Marley MD   clopidogrel (PLAVIX) 75 MG tablet Take 1 tablet by mouth daily 5/30/21   Jenn Marley MD   losartan (COZAAR) 50 MG tablet Take 1 tablet by mouth daily 5/29/21   Juana Fitting, APRN - CNP   furosemide (LASIX) 40 MG tablet Take 1 tablet by mouth daily 5/29/21   Juana Fitting, APRN - CNP   ARIPiprazole (ABILIFY) 15 MG tablet Take 7.5 mg by mouth nightly    Historical Provider, MD   apixaban (ELIQUIS) 5 MG TABS tablet Take 1 tablet by mouth 2 times daily 5/13/21   Bella Anand MD   tiZANidine (ZANAFLEX) 2 MG tablet Take 2 mg by mouth every 6 hours as needed    Historical Provider, MD   cloNIDine (CATAPRES) 0.1 MG tablet Take 0.1 mg by mouth 3 times daily    Historical Provider, MD   amLODIPine (NORVASC) 10 MG tablet Take 10 mg by mouth daily     Historical Provider, MD   Valbenazine Tosylate (INGREZZA) 40 MG CAPS Take 40 mg by mouth nightly    Historical Provider, MD   melatonin 3 MG TABS tablet Take 3 mg by mouth nightly    Historical Provider, MD   polyethylene glycol (GLYCOLAX) packet Take 17 g by mouth daily    Historical Provider, MD   Amantadine (SYMMETREL) 100 MG TABS tablet Take 100 mg by mouth 2 times daily    Historical Provider, MD   Dextromethorphan-guaiFENesin (DELSYM COUGH/CHEST CONGEST DM) 5-100 MG/5ML LIQD Take 5 mLs by mouth every 12 hours as needed (cough)    Historical Provider, MD   acetaminophen (TYLENOL) 325 MG tablet Take 650 mg by mouth every 4 hours as needed for Pain or Fever    Historical Provider, MD   sodium chloride (OCEAN) 0.65 % nasal spray 1 spray by Nasal route 3 times daily as needed for Congestion    Historical Provider, MD   bisacodyl (DULCOLAX) 10 MG suppository Place 10 mg rectally daily as needed for Constipation    Historical Provider, MD   magnesium hydroxide (MILK OF MAGNESIA) 400 MG/5ML suspension Take 30 mLs by mouth daily as needed for Constipation    Historical Provider, MD   calcium carbonate 600 MG TABS tablet Take 1 tablet by mouth nightly     Historical Provider, MD   metoprolol (TOPROL-XL) 50 MG XL tablet TAKE 1 TABLET BY MOUTH DAILY. 6/1/15   Rafat Henriquez MD   omeprazole (PRILOSEC) 20 MG capsule TAKE 1 CAPSULE BY MOUTH DAILY. 5/21/15   Rafat Henriquez MD   citalopram (CELEXA) 20 MG tablet Take 20 mg by mouth daily. Historical Provider, MD   traZODone (DESYREL) 50 MG tablet Take 150 mg by mouth nightly     Historical Provider, MD   guaifenesin (MUCINEX) 600 MG SR tablet Take 1,200 mg by mouth 2 times daily. PRN    Historical Provider, MD   loratadine (CLARITIN) 10 MG tablet Take 10 mg by mouth daily.  PRN     Historical Provider, MD     Scheduled Meds:  Continuous Infusions:  PRN Meds:. Allergies   Allergen Reactions    Lamictal [Lamotrigine] Hives    Oxcarbazepine     Pcn [Penicillins] Itching     And rash    Sertraline     Trileptal Hives    Strattera [Atomoxetine Hcl] Rash       There were no vitals filed for this visit. PHYSICAL EXAMINATION    Gen: NAD  HEENT: BCVA= 20/40, Glare testing= NLP, Cataract severity/type-1+ Combined Forms of Age Related Cataract-left eye, Other significant ocular findings= dry eye  Pulm: CTA   Heart: RRR, no C/M/R/G  Abd: S/NT/ND  Neuro: no focal defecits    Assessment:   1. 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 ASA Score-3      Plan:   1. Risks, benefits, alternatives to surgery discussed with the patient and family. 2. All questions were answered to their satisfaction. 3. Ok to proceed with surgery as planned.     Benita Huggins, DO, DO

## 2022-02-07 NOTE — ANESTHESIA POSTPROCEDURE EVALUATION
Department of Anesthesiology  Postprocedure Note    Patient: Prasanna Barros  MRN: [id number]  [place]: 1950  Date of evaluation: 2/7/2022  Time:  2:35 PM     Procedure Summary     Date: 02/07/22 Room / Location: [address] [place] [address]    Anesthesia Start: 8494 Anesthesia Stop: 6839    Procedure: EYE CATARACT EMULSIFICATION IOL IMPLANT (Left Eye) Diagnosis: (COMBINED FORMS OF AGE RELATED CAT. HNS, 1+CS)    Surgeons: Sang Genao DO Responsible Provider: SONJA Jimenez CRNA    Anesthesia Type: MAC ASA Status: 3          Anesthesia Type: MAC    Alexi Phase I:      Alexi Phase II: Alexi Score: 10    Last vitals: Reviewed and per EMR flowsheets.        Anesthesia Post Evaluation    Patient location during evaluation: PACU  Patient participation: complete - patient participated  Level of consciousness: awake and alert  Pain score: 0  Airway patency: patent  Nausea & Vomiting: no nausea and no vomiting  Complications: no  Cardiovascular status: blood pressure returned to baseline  Respiratory status: acceptable and room air  Hydration status: stable

## 2022-02-07 NOTE — PROGRESS NOTES
Report called to [facility] [place] and given to Ольга Lang. Verbalizes understanding and offers no questions at this time.

## 2022-02-07 NOTE — OP NOTE
OPERATIVE NOTE      Patient:  Susan Campbell    Account #- [de-identified]    YOB: 1950    Date:  2/7/2022    Surgeon:  Lorena Santos MD      Preoperative Diagnosis: Combined Forms Age-Related Cataract- left eye    Postoperative Diagnosis: Same    Procedure: Phacoemulsification with intraocular lens implantation, left eye    Anesthesia: Topical anesthesia with intravenous sedation    Complications: none    Specimens: none    Indications for procedure: The patient is a 67y.o. year old female with decreased vision, glare and halos around lights, and trouble with activities of daily living. Examination revealed a visually significant cataract in the left eye. Other eye diseases have been ruled out as the primary cause of decreased visual acuity. Significant improvement in the patient's visual acuity and/or visual function is expected from the removal of the cataract. Risks, benefits, and alternatives to surgery were discussed with the patient and the patient elected to proceed with phacoemulsification with lens implantation. Details of the procedure: Following informed consent, the patient was identified by name and identification tag in the holding area,taken to the operating room and placed in the supine position. A time out was performed by all present in the room to identify the patient, the eye to be operated on, the correct operative procedure, and the correct intraocular lens. Monitoring leads were placed. The patient was positioned. An eyelid prep of half-strength Betadine was performed. The patient was administered intravenous sedation if desired and topical anesthetic was placed in the operative eye. The eye prepped a second time and draped in the usual sterile fashion using aseptic technique for cataract surgery. A lid speculum was then inserted. Ocucoat was placed onto the corneal surface.   A stab incision was made using a disposable blade into the anterior chamber. Amvisc was then used to replace the aqueous of the anterior chamber. A 2.2 mm keratome blade was used to make a 3-plane clear corneal incision into cornea depending on the patient's astigmatism. The cystitome was used to make a tear in the anterior capsule. Fine forceps were used to make a complete curvilinear capsulorrhexis. The lens was hydrodissected and freely rotated using a balanced salt filled syringe. The ultrasound handpiece was then used to emulsify the nucleus and epi nucleus. The residual cortex was then aspirated using the IA handpiece. The posterior capsule was polished. The eye was filled with viscoelastic and a foldable posterior chamber intraocular lens was injected into the capsular bag unless otherwise stated in the addendum. Irrigation/aspiration was used to remove all excess viscoelastic. The eye was pressurized and the wounds were check for leaks and none were found. A collagen shield, which had been soaked in antibiotic drops, was then placed onto the cornea. The lid speculum was removed. The eye was covered with a clear plastic shield. The patient was taken to the recovery area in excellent condition, having tolerated the procedure well, and will follow up with me tomorrow for postop care. ADDENDUM:  The phacoemulsification time 35.0 seconds @ 20% average ultrasound power for an effective phacoemulsification time of 7.14 seconds. The lens inserted was a model MX60 made by [facility] [employer] that was +24.5 diopters in power. The lens was inserted into the capsular bag utilizing the BLIS-X1 injector made by [employer].  The serial number on this lens was [id number]. There was no stitch placed to close this temporal posterior corneal incision. Estimated blood loss was  <1.0 ml.     Donnell Poe DO

## 2022-02-07 NOTE — PROGRESS NOTES
Contact lens soaked in patient's home supply of Ofloxacin solution prior to administration to left eye

## 2022-02-07 NOTE — H&P
I have examined the patient and reviewed the history and physical completed on 2/6/22 and find no relevant changes. I have reviewed with the patient and/or family the risks, benefits, and alternatives to the procedure and they have agreed to proceed.     Dakota Austin DO  2/7/2022  10:29 AM

## 2022-02-11 RX ORDER — NYSTATIN 100000 [USP'U]/G
POWDER TOPICAL PRN
COMMUNITY
End: 2022-07-08

## 2022-02-11 RX ORDER — SIMETHICONE 80 MG
80 TABLET,CHEWABLE ORAL EVERY 6 HOURS PRN
COMMUNITY

## 2022-02-24 ENCOUNTER — HOSPITAL ENCOUNTER (OUTPATIENT)
Age: 72
Setting detail: SPECIMEN
Discharge: HOME | End: 2022-02-24
Payer: MEDICARE

## 2022-02-24 LAB
ANION GAP SERPL CALCULATED.3IONS-SCNC: 11 MMOL/L (ref 9–17)
BUN BLDV-MCNC: 16 MG/DL (ref 8–23)
BUN/CREAT BLD: 16 (ref 9–20)
CALCIUM SERPL-MCNC: 9.6 MG/DL (ref 8.6–10.4)
CHLORIDE BLD-SCNC: 101 MMOL/L (ref 98–107)
CO2: 24 MMOL/L (ref 20–31)
CREAT SERPL-MCNC: 1.01 MG/DL (ref 0.5–0.9)
GFR AFRICAN AMERICAN: >60 ML/MIN
GFR NON-AFRICAN AMERICAN: 54 ML/MIN
GFR SERPL CREATININE-BSD FRML MDRD: ABNORMAL ML/MIN/{1.73_M2}
GFR SERPL CREATININE-BSD FRML MDRD: ABNORMAL ML/MIN/{1.73_M2}
GLUCOSE BLD-MCNC: 155 MG/DL (ref 70–99)
POTASSIUM SERPL-SCNC: 3.9 MMOL/L (ref 3.7–5.3)
SODIUM BLD-SCNC: 136 MMOL/L (ref 135–144)

## 2022-02-24 PROCEDURE — 80048 BASIC METABOLIC PNL TOTAL CA: CPT

## 2022-02-24 PROCEDURE — 36415 COLL VENOUS BLD VENIPUNCTURE: CPT

## 2022-02-24 PROCEDURE — P9603 ONE-WAY ALLOW PRORATED MILES: HCPCS

## 2022-03-09 ENCOUNTER — OUTSIDE SERVICES (OUTPATIENT)
Dept: PRIMARY CARE CLINIC | Age: 72
End: 2022-03-09

## 2022-03-09 DIAGNOSIS — I48.91 ATRIAL FIBRILLATION, UNSPECIFIED TYPE (HCC): ICD-10-CM

## 2022-03-09 DIAGNOSIS — I50.32 CHRONIC DIASTOLIC HEART FAILURE (HCC): Primary | ICD-10-CM

## 2022-03-09 DIAGNOSIS — I10 ESSENTIAL HYPERTENSION: ICD-10-CM

## 2022-03-09 DIAGNOSIS — N18.31 STAGE 3A CHRONIC KIDNEY DISEASE (HCC): ICD-10-CM

## 2022-03-09 NOTE — PROGRESS NOTES
Patient:  Jeferson Smith, 1950  I saw this patient on 03/09/2022, at [facility]   Shortness of breath better,blood sugars well controlled  Edema better  No bleeding from anticoagulant  bp well controlled        Reason for Visit:      ICD-10-CM    1. Chronic diastolic heart failure (HCC)  I50.32    2. Atrial fibrillation, unspecified type ([facility]  I48.91    3. Stage 3a chronic kidney disease (HCC)  N18.31    4. Essential hypertension  I10        Changes since last visit:     Pain better now,no nausea or vomiting  Shortness of breath and edema improving  BP better  Mood better  1. Fall:  none  2. Behavioral Change: mood better  3. Pain Control: adequate  4. Mobility: improving  5. Pressure Sore:  none  Allergies:  Lamictal [lamotrigine], Oxcarbazepine, Pcn [penicillins], Sertraline, Trileptal, and Strattera [atomoxetine hcl]    Current Outpatient Medications   Medication Sig Dispense Refill    simethicone (MYLICON) 80 MG chewable tablet Take 80 mg by mouth every 6 hours as needed for Flatulence      nystatin (MYCOSTATIN) 665556 UNIT/GM powder Apply topically as needed Apply topically to breasts as needed      levothyroxine (SYNTHROID) 50 MCG tablet Take 50 mcg by mouth Daily      latanoprost (XALATAN) 0.005 % ophthalmic solution 1 drop nightly      polyvinyl alcohol (ARTIFICIAL TEARS) 1.4 % ophthalmic solution 1 drop as needed      OFLOXACIN OP Apply to eye Apply to left eye QID      acetaminophen (TYLENOL) 650 MG extended release tablet Take 650 mg by mouth 4 times daily      Sodium Phosphates (FLEET) 7-19 GM/118ML Place 1 enema rectally as needed      traMADol (ULTRAM) 50 MG tablet Take 50 mg by mouth every 6 hours as needed for Pain.       white petrolatum GEL Apply topically 2 times daily as needed for Dry Skin Arms and legs      chlorhexidine (PERIDEX) 0.12 % solution Take 15 mLs by mouth 2 times daily       atorvastatin (LIPITOR) 20 MG tablet Take 1 tablet by mouth daily 90 tablet 3    nitroGLYCERIN (NITROSTAT) 0.4 MG SL tablet up to max of 3 total doses. If no relief after 1 dose, call 911. 25 tablet 1    clopidogrel (PLAVIX) 75 MG tablet Take 1 tablet by mouth daily 30 tablet 3    losartan (COZAAR) 50 MG tablet Take 1 tablet by mouth daily 30 tablet 3    furosemide (LASIX) 40 MG tablet Take 1 tablet by mouth daily 60 tablet 3    apixaban (ELIQUIS) 5 MG TABS tablet Take 1 tablet by mouth 2 times daily 60 tablet 0    tiZANidine (ZANAFLEX) 2 MG tablet Take 2 mg by mouth every 6 hours as needed      cloNIDine (CATAPRES) 0.1 MG tablet Take 0.1 mg by mouth 3 times daily      amLODIPine (NORVASC) 10 MG tablet Take 10 mg by mouth daily       Valbenazine Tosylate (INGREZZA) 40 MG CAPS Take 40 mg by mouth nightly      melatonin 3 MG TABS tablet Take 3 mg by mouth nightly      polyethylene glycol (GLYCOLAX) packet Take 17 g by mouth daily      Amantadine (SYMMETREL) 100 MG TABS tablet Take 100 mg by mouth 2 times daily      Dextromethorphan-guaiFENesin (DELSYM COUGH/CHEST CONGEST DM) 5-100 MG/5ML LIQD Take 5 mLs by mouth every 12 hours as needed (cough)      acetaminophen (TYLENOL) 325 MG tablet Take 650 mg by mouth every 4 hours as needed for Pain or Fever      sodium chloride (OCEAN) 0.65 % nasal spray 1 spray by Nasal route 3 times daily as needed for Congestion      bisacodyl (DULCOLAX) 10 MG suppository Place 10 mg rectally daily as needed for Constipation      magnesium hydroxide (MILK OF MAGNESIA) 400 MG/5ML suspension Take 30 mLs by mouth daily as needed for Constipation      calcium carbonate 600 MG TABS tablet Take 1 tablet by mouth nightly       metoprolol (TOPROL-XL) 50 MG XL tablet TAKE 1 TABLET BY MOUTH DAILY. 30 tablet 5    omeprazole (PRILOSEC) 20 MG capsule TAKE 1 CAPSULE BY MOUTH DAILY. 30 capsule 5    citalopram (CELEXA) 20 MG tablet Take 20 mg by mouth daily.       traZODone (DESYREL) 50 MG tablet Take 150 mg by mouth nightly       guaifenesin (MUCINEX) 600 MG SR tablet Take 1,200 mg by mouth 2 times daily. PRN      loratadine (CLARITIN) 10 MG tablet Take 10 mg by mouth daily. PRN        No current facility-administered medications for this visit. Past Medical History:    Past Medical History:   Diagnosis Date    ADHD (attention deficit hyperactivity disorder)     Anemia     severe    Asthma     Bipolar disorder (HCC)     Bradycardia, unspecified     Chronic anemia     Chronic atrial fibrillation (HCC)     Chronic back pain     Chronic kidney disease     Patient states unaware of this. Has never been mentioned to her.      Colitis     Constipation     COPD (chronic obstructive pulmonary disease) (HCC)     Cystitis without hematuria     Diastolic CHF (HCC)     Esophagitis     grade A    GERD (gastroesophageal reflux disease)     Hemorrhoids     History of echocardiogram 06/02/2014    EF >60%, LV wall thickness mildly increased,    Hypertension     Hypothyroidism     Metabolic syndrome     Moderate protein-calorie malnutrition (HCC)     Muscle weakness (generalized)     Obesity     Pancreatitis     TIA (transient ischemic attack)        Past Surgical History:    Past Surgical History:   Procedure Laterality Date    CARDIAC CATHETERIZATION Left 05/27/2021    right radial/ [facility]/ Dr. Shalini Moore Right 12/27/2021    Dr. Karen Weeks Left 02/07/2022    EYE CATARACT EMULSIFICATION IOL IMPLANT (Left Eye)    COLONOSCOPY  5/2011    ischemic colitis    COLONOSCOPY  03/07/2017    -diverticulosis,hemorrhoids    HYMENECTOMY      1972    HYSTERECTOMY      2008    INTRACAPSULAR CATARACT EXTRACTION Right 12/27/2021    EYE CATARACT EMULSIFICATION IOL IMPLANT performed by Kirk Preston DO at [address] Left 2/7/2022    EYE CATARACT EMULSIFICATION IOL IMPLANT performed by Kirk Preston DO at [facility] Right     2008    TONSILLECTOMY AND ADENOIDECTOMY      UPPER GASTROINTESTINAL ENDOSCOPY  3/2013    5 cm axial hiatal hernia    UPPER GASTROINTESTINAL ENDOSCOPY  2017    Dr. Jenna Worthington       Social History:   Social History     Tobacco Use    Smoking status: Former Smoker     Packs/day: 0.25     Years: 10.00     Pack years: 2.50     Quit date: 3/12/1998     Years since quittin.0    Smokeless tobacco: Never Used   Substance Use Topics    Alcohol use: No       Family History:   Family History   Problem Relation Age of Onset    Diabetes Mother     High Blood Pressure Mother     Diabetes Father     High Blood Pressure Father     Bipolar Disorder Sister     Schizophrenia Sister     High Blood Pressure Other         2 sons    Bipolar Disorder Other         2 sons           Review of Systems:  Constitutional: negative for fevers or chills  Eyes: negative for visual disturbance   ENT: positive for oral infection,negative for sore throat or nasal congestion,no dysphagia. No epistaxis. Respiratory:  shortness of breath improved  Cardiovascular: neg for chest pain , palpitations,pnd,  edema better  Gastrointestinal:  abd pain better, neg for nausea, vomiting, diarrhea ,  constipation,no reyan,no blood in stool  Genitourinary: negative for dysuria, urgency,neg for hematuria , frequency  Integument/breast: negative for skin rash or lesions  Neurological: negative for unilateral weakness, numbness or tingling,has tremors  Muscular Skeletal: denies joint pain   Psych- mood changes better    Objective:    Vitals: BP:134/59 T:97.3 P:53 R:18    -----------------------------------------------------------------  Exam:  GEN:   A & O x3, no apparent distress,obese  EYES:  No gross abnormalities.  and PERRLA  ENT:Throat- has oral unlcer,nose- no drainage  NECK: normal, supple, no lymphadenopathy,  no carotid bruits  PULM: Diminished air entry on auscultation bilaterally- no wheezes, rales or rhonchi, normal air movement, no respiratory distress  COR:   S1,s2,RRR, no murmurs and no gallops,has minimal bilat leg edema  ABD:    soft, non-tender, non-distended, normal bowel sounds, no masses or organomegaly  : no cva or flank tenderness  EXT:has minimal edema, no calf tenderness, and warm to touch. NEURO: Motor and sensory grossly intact,has tremors at rest  PSYCH: mood improved  SKIN:   No rash or lesions    -----------------------------------------------------------------  Diagnostic Data:   · All diagnostic data was reviewed    Assessment:      Problem List Items Addressed This Visit     Essential hypertension    Atrial fibrillation, unspecified type ([facility]    Stage 3a chronic kidney disease ([facility]      Other Visit Diagnoses     Chronic diastolic heart failure ([facility]    -  Primary        Patient Active Problem List   Diagnosis Code    TIA (transient ischemic attack) G45.9    [facility] F31.9    DDD (degenerative disc disease), lumbar M51.36    Bradycardia R00.1    Essential hypertension I10    Obesity (BMI 30-39. 9) E66.9    GERD (gastroesophageal reflux disease) K21.9    Iron deficiency anemia D50.9    Iron deficiency anemia due to chronic blood loss D50.0    History of colon polyps Z86.010    Hypothyroidism E03.9    Other chronic pain G89.29    Other hemorrhoids K64.8    Diverticulosis of large intestine without diverticulitis K57.30    Attention deficit hyperactivity disorder F90.9    Morbid obesity ([facility]) E66.01    Cellulitis of leg without foot, right L03.115    Atherosclerotic heart disease of native coronary artery with other forms of angina pectoris ([facility]) I25.118    CAD, multiple vessel I25.10    Chest pain R07.9    PVD (peripheral vascular disease) ([facility]) I73.9    Atrial fibrillation, unspecified type ([facility]) I48.91    Stage 3a chronic kidney disease ([facility]) N18.31         Plan:      Monitor for bleeding  Psych to manage psychotropics  Encourage activity as tolerated and follow diet  Continue

## 2022-03-22 ENCOUNTER — OFFICE VISIT (OUTPATIENT)
Dept: CARDIOLOGY | Age: 72
End: 2022-03-22
Payer: MEDICARE

## 2022-03-22 ENCOUNTER — HOSPITAL ENCOUNTER (OUTPATIENT)
Dept: NON INVASIVE DIAGNOSTICS | Age: 72
Discharge: HOME | End: 2022-03-22
Payer: MEDICARE

## 2022-03-22 VITALS
HEIGHT: 66 IN | RESPIRATION RATE: 18 BRPM | SYSTOLIC BLOOD PRESSURE: 122 MMHG | HEART RATE: 56 BPM | WEIGHT: 258.4 LBS | DIASTOLIC BLOOD PRESSURE: 72 MMHG | OXYGEN SATURATION: 94 % | BODY MASS INDEX: 41.53 KG/M2

## 2022-03-22 DIAGNOSIS — I25.10 CAD, MULTIPLE VESSEL: ICD-10-CM

## 2022-03-22 DIAGNOSIS — I07.1 SEVERE TRICUSPID REGURGITATION: ICD-10-CM

## 2022-03-22 DIAGNOSIS — I50.812 CHRONIC RIGHT-SIDED HEART FAILURE (HCC): ICD-10-CM

## 2022-03-22 DIAGNOSIS — Z95.820 S/P ANGIOPLASTY WITH STENT: ICD-10-CM

## 2022-03-22 DIAGNOSIS — E66.01 CLASS 3 SEVERE OBESITY WITH BODY MASS INDEX (BMI) OF 40.0 TO 44.9 IN ADULT, UNSPECIFIED OBESITY TYPE, UNSPECIFIED WHETHER SERIOUS COMORBIDITY PRESENT (HCC): ICD-10-CM

## 2022-03-22 DIAGNOSIS — I48.19 PERSISTENT ATRIAL FIBRILLATION (HCC): ICD-10-CM

## 2022-03-22 DIAGNOSIS — I48.19 PERSISTENT ATRIAL FIBRILLATION (HCC): Primary | ICD-10-CM

## 2022-03-22 LAB
LV EF: 60 %
LVEF MODALITY: NORMAL

## 2022-03-22 PROCEDURE — 1090F PRES/ABSN URINE INCON ASSESS: CPT | Performed by: INTERNAL MEDICINE

## 2022-03-22 PROCEDURE — 4040F PNEUMOC VAC/ADMIN/RCVD: CPT | Performed by: INTERNAL MEDICINE

## 2022-03-22 PROCEDURE — G8417 CALC BMI ABV UP PARAM F/U: HCPCS | Performed by: INTERNAL MEDICINE

## 2022-03-22 PROCEDURE — 93005 ELECTROCARDIOGRAM TRACING: CPT | Performed by: INTERNAL MEDICINE

## 2022-03-22 PROCEDURE — G8484 FLU IMMUNIZE NO ADMIN: HCPCS | Performed by: INTERNAL MEDICINE

## 2022-03-22 PROCEDURE — 1123F ACP DISCUSS/DSCN MKR DOCD: CPT | Performed by: INTERNAL MEDICINE

## 2022-03-22 PROCEDURE — 93242 EXT ECG>48HR<7D RECORDING: CPT

## 2022-03-22 PROCEDURE — 3017F COLORECTAL CA SCREEN DOC REV: CPT | Performed by: INTERNAL MEDICINE

## 2022-03-22 PROCEDURE — 93243 EXT ECG>48HR<7D SCAN A/R: CPT

## 2022-03-22 PROCEDURE — 99214 OFFICE O/P EST MOD 30 MIN: CPT | Performed by: INTERNAL MEDICINE

## 2022-03-22 PROCEDURE — 99211 OFF/OP EST MAY X REQ PHY/QHP: CPT | Performed by: INTERNAL MEDICINE

## 2022-03-22 PROCEDURE — 93010 ELECTROCARDIOGRAM REPORT: CPT | Performed by: INTERNAL MEDICINE

## 2022-03-22 PROCEDURE — G8427 DOCREV CUR MEDS BY ELIG CLIN: HCPCS | Performed by: INTERNAL MEDICINE

## 2022-03-22 PROCEDURE — 93306 TTE W/DOPPLER COMPLETE: CPT

## 2022-03-22 PROCEDURE — 1036F TOBACCO NON-USER: CPT | Performed by: INTERNAL MEDICINE

## 2022-03-22 PROCEDURE — G8399 PT W/DXA RESULTS DOCUMENT: HCPCS | Performed by: INTERNAL MEDICINE

## 2022-03-22 RX ORDER — METOPROLOL SUCCINATE 25 MG/1
TABLET, EXTENDED RELEASE ORAL
Qty: 90 TABLET | Refills: 3 | Status: SHIPPED | OUTPATIENT
Start: 2022-03-22

## 2022-03-22 NOTE — PATIENT INSTRUCTIONS
SURVEY:    You may be receiving a survey from [email or web address] regarding your visit today. Please complete the survey to enable us to provide the highest quality of care to you and your family. If you cannot score us a very good on any question, please call the office to discuss how we could have made your experience a very good one. Thank you.

## 2022-03-22 NOTE — PROGRESS NOTES
Devan Chang am scribing for and in the presence of Adal Coats MD, F.A.C.C. Patient: Prasanna Barros  : 1950  Date of Visit: 2022    REASON FOR VISIT / CONSULTATION: 6 Month Follow-Up (HX:persistent AFib, Rt sided heart failure, severe tricuspid regurg, CAD, pVD PT is here for 6 month follow up she states she is doing well some sob when walking. denies:CP, lightheaded/dizziness,palp )      History of Present Illness:         Dear Sean Raines MD    I had the pleasure of seeing Prasanna Barros in my office today. Ms. Aditi Hernandez is a 67 y.o. female who presented today for follow-up. She initially seen in my office to establish care back on 2021. She had a stress test, echocardiogram and a Holter monitor done prior to her initial visit as below due to worsening shortness of breath. She had had hypertension for many years and has been on anti-hypertensive drugs for awhile. She also has had thyorid issue and has been on Levothyroxine as well for many years. She denied every being diagnosied with diabtes or sleep apnea. She is a former smoker. She quit smoking however in  and has not smoked since. Her family history consits of her mother who had caroid artery disease in her 50-60's. Echo done on 2021: Global left ventricular systolic function appears preserved with an estimated ejection fraction of >60%. The left ventricular cavity size is within normal limits and the left ventricular wall thickness is mildly increased. No definite specific wall motion abnormalities were identified. The left atrium is severely dilated (>40), borderline dilated with a left atrial volume index of 57 ml/m2. Normal mitral valve structure with mild to moderate mitral regurgitation. Normal tricuspid valve structure with moderate to severe tricuspid regurgitation. Mild to moderate pulmonary hypertension with an estimated right ventricular systolic pressure of 37 mmHg. Diastology cannot be assessed due to the patients rhythm of what appeared to be atrial fibrillation. Compared to the previous study of 6/2/14, the patient now has moderate to severe tricuspid regurgitation and her mitral regurgitation is now at least mild to moderate. Holter done on 5/17/2021: The rhythm was atrial fibrillation. Average QRS duration 0.09. Maximum R-R 2.19 seconds with 58 pauses greater than 2.0  seconds. 2. Diary returned with entries of \"shortness of breath\" with isolated PVCs noted. 1-Atrial fibrillation throughout with average HR of 72 bpm , ranging between 50 and 142 bpm. well controlled ventricular  rate. No significant pauses. Maximum R-R interval is 2.19 second during typical sleep time. recorded 2- Very frequent PVCs (PVCs burden 10%), mainly isolated with occasional couplets. No ventricular runs. Stress test done on 4/19/2021: Limited quality study with significant motion artifact particularly during stress imaging. Equivocal myocardial perfusion study. There is a small/moderate perfusion defect of mild/moderate intensity in the anterior and inferior regions during stress and rest imaging which is most consistent with artifact, but may be due to a small degree of coronary ischemia. Global left ventricular systolic function was normal with an EF of 68% without regional wall motion abnormalities. Significant ST segment changes at rest that impair accurate ECG interpretation during stress. EKG done in office (5/26/2021): Showed atrial fibrillation with a HR of 70 bpm.    Heart cath done on 5/27/2021-  Moderate to severe single vessel disease involving the LAD coronary artery with moderate disease in a large Cx. Normal left ventricular end diastolic pressure. (LVEDP). Heart cath done at [facility] on 5/28/2021- Successful PCI of the with no with one drug-eluting stent.     Ms. Nazario Meza is here for a six month follow up. She is doing well.   She does have very limited mobility secondary to tardive dyskinesia and morbid obesity. She is trying to do her own physical therapy at home by pushing her wheelchair around. She lives in an assisted living facility. She has been in assited living for about 4-5 years. She said her tremors are secondary to dyskinesia that she is aware of however she does not follow up with a neurologist for this. She states her tremor is improving. She does admit having near falls unsure when and denies losing consciousness. No fever or cough. No stomach pain. No issues moving her bowels. She is able to sleep well through out the night. She denied any lightheaded or dizziness or heart palpitations. She denied any current or recent chest pain, abdominal pain, bleeding problems, problems with her medications or any other concerns at this time. PAST MEDICAL HISTORY:        Past Medical History:   Diagnosis Date    ADHD (attention deficit hyperactivity disorder)     Anemia     severe    Asthma     Bipolar disorder (HCC)     Bradycardia, unspecified     Chronic anemia     Chronic atrial fibrillation (HCC)     Chronic back pain     Chronic kidney disease     Patient states unaware of this. Has never been mentioned to her.  Colitis     Constipation     COPD (chronic obstructive pulmonary disease) (HCC)     Cystitis without hematuria     Diastolic CHF (HCC)     Esophagitis     grade A    GERD (gastroesophageal reflux disease)     Hemorrhoids     History of echocardiogram 06/02/2014    EF >60%, LV wall thickness mildly increased,    Hypertension     Hypothyroidism     Metabolic syndrome     Moderate protein-calorie malnutrition (HCC)     Muscle weakness (generalized)     Obesity     Pancreatitis     TIA (transient ischemic attack)            CURRENT ALLERGIES: Lamictal [lamotrigine], Oxcarbazepine, Pcn [penicillins], Sertraline, Trileptal, and Strattera [atomoxetine hcl] REVIEW OF SYSTEMS: 14 systems were reviewed.  Pertinent positives and negatives as above, all else negative.      Past Surgical History:   Procedure Laterality Date    CARDIAC CATHETERIZATION Left 2021    right \[facility]\""/ [facility]/ Dr. Matilda Weeks Right 2021    Dr. [address] Left 2022    EYE CATARACT EMULSIFICATION IOL IMPLANT (Left Eye)    COLONOSCOPY  2011    ischemic colitis    COLONOSCOPY  2017    -diverticulosis,hemorrhoids    HYMENECTOMY      1972    HYSTERECTOMY          INTRACAPSULAR CATARACT EXTRACTION Right 2021    EYE CATARACT EMULSIFICATION IOL IMPLANT performed by Paolo Perez DO at [address] Left 2022    EYE CATARACT EMULSIFICATION IOL IMPLANT performed by Paolo Perez DO at [address] Right         TONSILLECTOMY AND ADENOIDECTOMY      UPPER GASTROINTESTINAL ENDOSCOPY  3/2013    5 cm axial hiatal hernia    UPPER GASTROINTESTINAL ENDOSCOPY  2017    Dr. Elsa Coello    Social History:  Social History     Tobacco Use    Smoking status: Former Smoker     Packs/day: 0.25     Years: 10.00     Pack years: 2.50     Quit date: 3/12/1998     Years since quittin.0    Smokeless tobacco: Never Used   Substance Use Topics    Alcohol use: No    Drug use: No        CURRENT MEDICATIONS:        Outpatient Medications Marked as Taking for the 3/22/22 encounter (Office Visit) with Andrew Anne MD   Medication Sig Dispense Refill    simethicone (MYLICON) 80 MG chewable tablet Take 80 mg by mouth every 6 hours as needed for Flatulence      nystatin (MYCOSTATIN) 702734 UNIT/GM powder Apply topically as needed Apply topically to breasts as needed      levothyroxine (SYNTHROID) 50 MCG tablet Take 50 mcg by mouth Daily      latanoprost (XALATAN) 0.005 % ophthalmic solution 1 drop nightly      polyvinyl alcohol (ARTIFICIAL TEARS) 1.4 % ophthalmic solution 1 drop as needed      OFLOXACIN OP Apply to eye Apply to left eye QID      acetaminophen (TYLENOL) 650 MG extended release tablet Take 650 mg by mouth 4 times daily      Sodium Phosphates (FLEET) 7-19 GM/118ML Place 1 enema rectally as needed      traMADol (ULTRAM) 50 MG tablet Take 50 mg by mouth every 6 hours as needed for Pain.  white petrolatum GEL Apply topically 2 times daily as needed for Dry Skin Arms and legs      chlorhexidine (PERIDEX) 0.12 % solution Take 15 mLs by mouth 2 times daily       atorvastatin (LIPITOR) 20 MG tablet Take 1 tablet by mouth daily 90 tablet 3    nitroGLYCERIN (NITROSTAT) 0.4 MG SL tablet up to max of 3 total doses.  If no relief after 1 dose, call 911. 25 tablet 1    clopidogrel (PLAVIX) 75 MG tablet Take 1 tablet by mouth daily 30 tablet 3    losartan (COZAAR) 50 MG tablet Take 1 tablet by mouth daily 30 tablet 3    furosemide (LASIX) 40 MG tablet Take 1 tablet by mouth daily 60 tablet 3    apixaban (ELIQUIS) 5 MG TABS tablet Take 1 tablet by mouth 2 times daily 60 tablet 0    tiZANidine (ZANAFLEX) 2 MG tablet Take 2 mg by mouth every 6 hours as needed      cloNIDine (CATAPRES) 0.1 MG tablet Take 0.1 mg by mouth 3 times daily      amLODIPine (NORVASC) 10 MG tablet Take 10 mg by mouth daily       Valbenazine Tosylate (INGREZZA) 40 MG CAPS Take 40 mg by mouth nightly      melatonin 3 MG TABS tablet Take 3 mg by mouth nightly      polyethylene glycol (GLYCOLAX) packet Take 17 g by mouth daily      Amantadine (SYMMETREL) 100 MG TABS tablet Take 100 mg by mouth 2 times daily      Dextromethorphan-guaiFENesin (DELSYM COUGH/CHEST CONGEST DM) 5-100 MG/5ML LIQD Take 5 mLs by mouth every 12 hours as needed (cough)      acetaminophen (TYLENOL) 325 MG tablet Take 650 mg by mouth every 4 hours as needed for Pain or Fever      sodium chloride (OCEAN) 0.65 % nasal spray 1 spray by Nasal route 3 times daily as needed for Congestion      bisacodyl (DULCOLAX) 10 MG suppository Place 10 mg rectally daily as needed for Constipation      magnesium hydroxide (MILK OF MAGNESIA) 400 MG/5ML suspension Take 30 mLs by mouth daily as needed for Constipation      calcium carbonate 600 MG TABS tablet Take 1 tablet by mouth nightly       metoprolol (TOPROL-XL) 50 MG XL tablet TAKE 1 TABLET BY MOUTH DAILY. 30 tablet 5    omeprazole (PRILOSEC) 20 MG capsule TAKE 1 CAPSULE BY MOUTH DAILY. 30 capsule 5    citalopram (CELEXA) 20 MG tablet Take 20 mg by mouth daily.  traZODone (DESYREL) 50 MG tablet Take 150 mg by mouth nightly       guaifenesin (MUCINEX) 600 MG SR tablet Take 1,200 mg by mouth 2 times daily. PRN      loratadine (CLARITIN) 10 MG tablet Take 10 mg by mouth daily. PRN          FAMILY HISTORY: family history includes Bipolar Disorder in her sister and another family member; Diabetes in her father and mother; High Blood Pressure in her father, mother, and another family member; Schizophrenia in her sister. Physical Examination:     /72 (Site: Right Upper Arm, Position: Sitting, Cuff Size: Large Adult)   Pulse 56   Resp 18   Ht 5' 6\" (1.676 m)   Wt 258 lb 6.4 oz (117.2 kg)   SpO2 94%   BMI 41.71 kg/m²  Body mass index is 41.71 kg/m². Constitutional: She appeared oriented to person and place. She appears well-developed and well-nourished. In no acute distress. HEENT: Normocephalic and atraumatic. No JVD present. Carotid bruit is not present. No mass and no thyromegaly present. No lymphadenopathy noted. Cardiovascular: Bradycardic rate, irregularly irregular rhythm, normal heart sounds. Exam reveals no gallop and no friction rubs. 2/6 systolic murmur, 4th intercostal space on the LEFT must lateral to the sternal border  Pulmonary/Chest: Effort normal and breath sounds normal. No respiratory distress. She has no wheezes, rhonchi or rales. Abdominal: Soft, non-tender. She exhibits no organomegaly, mass or bruit. Extremities: Mild bilateral ankle edema. No cyanosis or clubbing.  2+ radial and carotid pulses. Distal extremity pulses: 2+ bilaterally. Compression stockings in place. Neurological: Alertness and orientation as per Constitutional exam. No evidence of gross cranial nerve deficit. Coordination appeared normal.   Skin: Skin is warm and dry. There is no rash or diaphoresis. Psychiatric: She has a normal mood and affect. Her speech is normal and behavior is normal.      MOST RECENT LABS ON RECORD:   Lab Results   Component Value Date    WBC 10.9 01/31/2022    HGB 10.7 (L) 01/31/2022    HCT 32.0 (L) 01/31/2022     01/31/2022    CHOL 133 05/28/2021    TRIG 56 05/28/2021    HDL 62 05/28/2021    ALT 28 02/02/2022    AST 21 02/02/2022     02/24/2022    K 3.9 02/24/2022     02/24/2022    CREATININE 1.01 (H) 02/24/2022    BUN 16 02/24/2022    CO2 24 02/24/2022    TSH 4.29 05/27/2021    INR 1.6 01/31/2022    LABA1C 5.4 05/27/2021    LABMICR CANNOT BE CALCULATED 09/20/2013    BNP NOT REPORTED 03/11/2014       ASSESSMENT:        1. Persistent atrial fibrillation ([address]    2. Chronic right-sided heart failure ([address]    3. Severe tricuspid regurgitation    4. Class 3 severe obesity with body mass index (BMI) of 40.0 to 44.9 in adult, unspecified obesity type, unspecified whether serious comorbidity present ([address]    5. CAD, multiple vessel    6. S/P angioplasty with stent       PLAN:      Persistent Atrial Fibrillation: Rate Control. Currently with a slow ventricular response. Beta Blocker: Decrease metoprolol succinate (Toprol XL) 25 mg once daily. I also discussed the potential side effects of this medication including lightheadedness and dizziness and told her to stop the medication of this occurs and call our office if this occurs. Calcium Channel Blocker: Continue amlodipine (Norvasc)  10 mg daily. I discussed the potential side effects of this medication including lightheadedness and dizziness and told her to call the office if this occurs.   [id number] Score for Atrial Fibrillation Stroke Risk   Risk   Factors  Component Value   C CHF No 0   H HTN Yes 1   A2 Age >= 76 No,  (77 y.o.) 0   D DM No 0   S2 Prior Stroke/TIA Yes 2   V Vascular Disease No 0   A Age 74-69 Yes,  (77 y.o.) 1   Sc Sex female 1    [id number]  Score  5   Score last updated 6/85/26 8:68 PM EDT  Click here for a link to the UpToDate guideline \"Atrial Fibrillation: Anticoagulation therapy to prevent embolization  Disclaimer: Risk Score calculation is dependent on accuracy of patient problem list and past encounter diagnosis. Stroke Risk: Her CHADS2-VASc score is 5/9 (6.7% stroke risk)  Anticoagulation: Continue Apixaban (Eliquis) 5 mg every 12 hours. I also reminded her to watch for signs of blood in her stool or black tarry stools and stop the medication immediately if this develops as this could be life threatening. Dose appropriate, reviewed her most recent blood work. I ordered a CAM for one week to rule out significant bradycardia.  Right Sided Chronic diastolic heart failure: New York Heart Association Class: IIb (Marked symptoms during daily activities)   Beta Blocker: DECREASE to Metoprolol succinate (Toprol XL) 25 mg daily. ACE Inibitor/ARB: Continue losartan (Cozaar) 50 mg daily.  Heart failure counseling: I advised them to try and keep their legs up whenever possible and to limit salt in their diet.  I ordered a repeat echocardiogram giving history of severe tricuspid regurgitation and pulmonary hypertension. Patient is mainly minimally symptomatic because of limited functional capacity. · Moderate to Severe Tricuspid Regurgitation seen on Echo from 4/2021:    · Beta Blocker: DECREASE to Metoprolol succinate (Toprol XL) 25 mg daily. · ACE Inibitor/ARB: Continue losartan (Cozaar) 50 mg daily. · Essential Hypertension: Controlled   Beta Blocker: DECREASE to Metoprolol succinate (Toprol XL) 25 mg daily.  ACE Inibitor/ARB: Continue losartan (Cozaar) 50 mg daily.     Calcium Channel Blocker: Continue amlodipine (Norvasc) 10 mg once daily. Morbid obesity: Body mass index is 41.71 kg/m². I also briefly discussed both diet and exercise strategies for her to continue to loses weight and she was very receptive to this. · Peripheral Vascular Disease: No Stents   · Continue current therapy and medical management as above. · Atherosclerotic Heart Disease: Successful PCI of the LAD with one drug-eluting stent on 5/28/2021.  Antiplatelet Agent: Continue clopidogrel (Plavix): Plavix 75 mg daily.  Beta Blocker: DECREASE to Metoprolol succinate (Toprol XL) 25 mg daily.  Anti-anginal medications: Continue nitroglycerin 0.4 mg tablets as needed for chest pain. and amlodipine (Norvasc) 10 mg once daily.  Cholesterol Reduction Therapy: Continue Atorvastatin (Lipitor) 20 mg daily.  Additional counseling: I advised them to call our office or go to the emergency room if they developed worsening or persistent chest pain or increased shortness of breath as this could be life threatening. In the meantime, I encouraged Ms. Dos Santos to continue to take her other medications. FOLLOW UP:   I told Ms. Alonzo Gavin to call my office if she had any problems, but otherwise I asked her to Return in about 6 months (around 9/22/2022). However, I would be happy to see her sooner should the need arise. Sincerely,  Cholo Ruiz MD, F.A.C.C. [facility] Cardiology Specialist    [address]  Phone: [phone], Fax: [phone]     I believe that the risk of significant morbidity and mortality related to the patient's current medical conditions are: intermediate-high. Between 30 and 44 minutes were spent during prep work, discussion and exam of the patient, and follow up documentation and all of their questions were answered. The documentation recorded by the scribe, accurately and completely reflects the services I personally performed and the decisions made by me. Ulysses Boss MD, F.A.C.C.  March 22, 2022

## 2022-03-23 ENCOUNTER — TELEPHONE (OUTPATIENT)
Dept: CARDIOLOGY | Age: 72
End: 2022-03-23

## 2022-03-23 NOTE — TELEPHONE ENCOUNTER
----- Message from Rodrick Cordero MD sent at 3/23/2022  8:26 AM EDT -----  Echo didn't change from before.

## 2022-03-28 ENCOUNTER — HOSPITAL ENCOUNTER (OUTPATIENT)
Age: 72
Setting detail: SPECIMEN
Discharge: HOME | End: 2022-03-28
Payer: MEDICARE

## 2022-03-28 LAB
ANION GAP SERPL CALCULATED.3IONS-SCNC: 8 MMOL/L (ref 9–17)
BUN BLDV-MCNC: 16 MG/DL (ref 8–23)
BUN/CREAT BLD: 15 (ref 9–20)
CALCIUM SERPL-MCNC: 9.9 MG/DL (ref 8.6–10.4)
CHLORIDE BLD-SCNC: 101 MMOL/L (ref 98–107)
CO2: 28 MMOL/L (ref 20–31)
CREAT SERPL-MCNC: 1.04 MG/DL (ref 0.5–0.9)
GFR AFRICAN AMERICAN: >60 ML/MIN
GFR NON-AFRICAN AMERICAN: 52 ML/MIN
GFR SERPL CREATININE-BSD FRML MDRD: ABNORMAL ML/MIN/{1.73_M2}
GFR SERPL CREATININE-BSD FRML MDRD: ABNORMAL ML/MIN/{1.73_M2}
GLUCOSE BLD-MCNC: 139 MG/DL (ref 70–99)
POTASSIUM SERPL-SCNC: 4.2 MMOL/L (ref 3.7–5.3)
SODIUM BLD-SCNC: 137 MMOL/L (ref 135–144)

## 2022-03-28 PROCEDURE — 80048 BASIC METABOLIC PNL TOTAL CA: CPT

## 2022-03-28 PROCEDURE — 36415 COLL VENOUS BLD VENIPUNCTURE: CPT

## 2022-03-28 PROCEDURE — P9604 ONE-WAY ALLOW PRORATED TRIP: HCPCS

## 2022-04-08 ENCOUNTER — OUTSIDE SERVICES (OUTPATIENT)
Dept: PRIMARY CARE CLINIC | Age: 72
End: 2022-04-08
Payer: MEDICARE

## 2022-04-08 DIAGNOSIS — I48.91 ATRIAL FIBRILLATION, UNSPECIFIED TYPE (HCC): ICD-10-CM

## 2022-04-08 DIAGNOSIS — I50.32 CHRONIC DIASTOLIC HEART FAILURE (HCC): Primary | ICD-10-CM

## 2022-04-08 DIAGNOSIS — I10 ESSENTIAL HYPERTENSION: ICD-10-CM

## 2022-04-08 DIAGNOSIS — N18.31 STAGE 3A CHRONIC KIDNEY DISEASE (HCC): ICD-10-CM

## 2022-04-08 NOTE — PROGRESS NOTES
Patient:  Ricardo Delcid, 1950  I saw this patient on 04/13/2022, at [facility]   Shortness of breath better,blood sugars well controlled  Edema better  No bleeding from anticoagulant  bp well controlled  Had mood chages      Reason for Visit:      ICD-10-CM    1. Chronic diastolic heart failure (HCC)  I50.32    2. Atrial fibrillation, unspecified type ([facility] [address] [address]  I48.91    3. Stage 3a chronic kidney disease (HCC)  N18.31    4. Essential hypertension  I10        Changes since last visit:     Had mood changes,psych managing meds  Shortness of breath and edema better  BP better  1. Fall:  none  2. Behavioral Change: mood improving  3. Pain Control: adequate  4. Mobility: improving  5. Pressure Sore:  none  Allergies:  Lamictal [lamotrigine], Oxcarbazepine, Pcn [penicillins], Sertraline, Trileptal, and Strattera [atomoxetine hcl]    Current Outpatient Medications   Medication Sig Dispense Refill    divalproex (DEPAKOTE SPRINKLE) 125 MG capsule Take 125 mg by mouth 2 times daily      loperamide (IMODIUM) 2 MG capsule Take 4 mg by mouth 4 times daily as needed for Diarrhea Every 4 hours      QUEtiapine (SEROQUEL) 25 MG tablet Take 50 mg by mouth at bedtime      metoprolol succinate (TOPROL XL) 25 MG extended release tablet TAKE 1 TABLET BY MOUTH DAILY.  90 tablet 3    simethicone (MYLICON) 80 MG chewable tablet Take 80 mg by mouth every 6 hours as needed for Flatulence      nystatin (MYCOSTATIN) 229792 UNIT/GM powder Apply topically as needed Apply topically to breasts as needed      levothyroxine (SYNTHROID) 50 MCG tablet Take 50 mcg by mouth Daily      latanoprost (XALATAN) 0.005 % ophthalmic solution Place 1 drop into the right eye nightly       polyvinyl alcohol (ARTIFICIAL TEARS) 1.4 % ophthalmic solution Place 1 drop into both eyes 4 times daily       acetaminophen (TYLENOL) 650 MG extended release tablet Take 650 mg by mouth 4 times daily      Sodium Phosphates (FLEET) 7-19 GM/118ML Place 1 enema rectally as needed      traMADol (ULTRAM) 50 MG tablet Take 50 mg by mouth every 6 hours as needed for Pain.  white petrolatum GEL Apply topically 2 times daily as needed for Dry Skin Arms and legs      chlorhexidine (PERIDEX) 0.12 % solution Take 15 mLs by mouth 2 times daily       atorvastatin (LIPITOR) 20 MG tablet Take 1 tablet by mouth daily 90 tablet 3    nitroGLYCERIN (NITROSTAT) 0.4 MG SL tablet up to max of 3 total doses.  If no relief after 1 dose, call 911. 25 tablet 1    clopidogrel (PLAVIX) 75 MG tablet Take 1 tablet by mouth daily 30 tablet 3    losartan (COZAAR) 50 MG tablet Take 1 tablet by mouth daily 30 tablet 3    furosemide (LASIX) 40 MG tablet Take 1 tablet by mouth daily 60 tablet 3    apixaban (ELIQUIS) 5 MG TABS tablet Take 1 tablet by mouth 2 times daily 60 tablet 0    tiZANidine (ZANAFLEX) 2 MG tablet Take 2 mg by mouth every 6 hours as needed      cloNIDine (CATAPRES) 0.1 MG tablet Take 0.1 mg by mouth 3 times daily      amLODIPine (NORVASC) 10 MG tablet Take 10 mg by mouth daily       Valbenazine Tosylate (INGREZZA) 40 MG CAPS Take 40 mg by mouth nightly      melatonin 3 MG TABS tablet Take 3 mg by mouth nightly      polyethylene glycol (GLYCOLAX) packet Take 17 g by mouth daily      Amantadine (SYMMETREL) 100 MG TABS tablet Take 100 mg by mouth 2 times daily      Dextromethorphan-guaiFENesin (DELSYM COUGH/CHEST CONGEST DM) 5-100 MG/5ML LIQD Take 5 mLs by mouth every 12 hours as needed (cough)      acetaminophen (TYLENOL) 325 MG tablet Take 650 mg by mouth every 4 hours as needed for Pain or Fever      sodium chloride (OCEAN) 0.65 % nasal spray 1 spray by Nasal route 3 times daily as needed for Congestion      bisacodyl (DULCOLAX) 10 MG suppository Place 10 mg rectally daily as needed for Constipation      magnesium hydroxide (MILK OF MAGNESIA) 400 MG/5ML suspension Take 30 mLs by mouth daily as needed for Constipation      calcium carbonate 600 MG TABS tablet Take 1 tablet by mouth nightly       omeprazole (PRILOSEC) 20 MG capsule TAKE 1 CAPSULE BY MOUTH DAILY. 30 capsule 5    traZODone (DESYREL) 50 MG tablet Take 150 mg by mouth nightly       guaifenesin (MUCINEX) 600 MG SR tablet Take 1,200 mg by mouth 2 times daily. PRN      loratadine (CLARITIN) 10 MG tablet Take 10 mg by mouth daily. PRN        No current facility-administered medications for this visit. Past Medical History:    Past Medical History:   Diagnosis Date    ADHD (attention deficit hyperactivity disorder)     Anemia     severe    Asthma     Bipolar disorder (HCC)     Bradycardia, unspecified     Chronic anemia     Chronic atrial fibrillation (HCC)     Chronic back pain     Chronic kidney disease     Patient states unaware of this. Has never been mentioned to her.      Colitis     Constipation     COPD (chronic obstructive pulmonary disease) (HCC)     Cystitis without hematuria     Diastolic CHF (HCC)     Esophagitis     grade A    GERD (gastroesophageal reflux disease)     Hemorrhoids     History of echocardiogram 06/02/2014    EF >60%, LV wall thickness mildly increased,    Hypertension     Hypothyroidism     Metabolic syndrome     Moderate protein-calorie malnutrition (HCC)     Muscle weakness (generalized)     Obesity     Pancreatitis     TIA (transient ischemic attack)        Past Surgical History:    Past Surgical History:   Procedure Laterality Date    CARDIAC CATHETERIZATION Left 05/27/2021    right radial/ [facility]/ Dr. Taylor Richard Right 12/27/2021    Dr. Ning Bertrand Left 02/07/2022    EYE CATARACT EMULSIFICATION IOL IMPLANT (Left Eye)    COLONOSCOPY  5/2011    ischemic colitis    COLONOSCOPY  03/07/2017    -diverticulosis,hemorrhoids    HYMENECTOMY      1972    HYSTERECTOMY      2008    INTRACAPSULAR CATARACT EXTRACTION Right 12/27/2021    EYE CATARACT EMULSIFICATION IOL IMPLANT performed by Ben Hines DO at [address] Left 2022    EYE CATARACT EMULSIFICATION IOL IMPLANT performed by Ben Hines DO at [address] Right         TONSILLECTOMY AND ADENOIDECTOMY      UPPER GASTROINTESTINAL ENDOSCOPY  3/2013    5 cm axial hiatal hernia    UPPER GASTROINTESTINAL ENDOSCOPY  2017    Dr. Jonna Dove       Social History:   Social History     Tobacco Use    Smoking status: Former Smoker     Packs/day: 0.25     Years: 10.00     Pack years: 2.50     Quit date: 3/12/1998     Years since quittin.1    Smokeless tobacco: Never Used   Substance Use Topics    Alcohol use: No       Family History:   Family History   Problem Relation Age of Onset    Diabetes Mother     High Blood Pressure Mother     Diabetes Father     High Blood Pressure Father     Bipolar Disorder Sister     Schizophrenia Sister     High Blood Pressure Other         2 sons    Bipolar Disorder Other         2 sons           Review of Systems:  Constitutional: negative for fevers or chills  Eyes: negative for visual disturbance   ENT: positive for oral infection,negative for sore throat or nasal congestion,no dysphagia. No epistaxis. Respiratory:  shortness of breath improved  Cardiovascular: neg for chest pain , palpitations,pnd,  edema better  Gastrointestinal:  abd pain better, neg for nausea, vomiting, diarrhea ,  constipation,no reyna,no blood in stool  Genitourinary: negative for dysuria, urgency,neg for hematuria , frequency  Integument/breast: negative for skin rash or lesions  Neurological: negative for unilateral weakness, numbness or tingling,has tremors  Muscular Skeletal: denies joint pain   Psych- mood changes improving    Objective:    Vitals: BP:155/78 T:97.7 P:56 R:18    -----------------------------------------------------------------  Exam:  GEN:   A & O x3, no apparent distress,obese  EYES:  No gross abnormalities.  and PERRLA  ENT:Throat- has oral unlcer,nose- no drainage  NECK: normal, supple, no lymphadenopathy,  no carotid bruits  PULM: Diminished air entry on auscultation bilaterally- no wheezes, rales or rhonchi, normal air movement, no respiratory distress  COR:   S1,s2,RRR, no murmurs and no gallops,has minimal bilat leg edema  ABD:    soft, non-tender, non-distended, normal bowel sounds, no masses or organomegaly  : no cva or flank tenderness  EXT:has minimal edema, no calf tenderness, and warm to touch. NEURO: Motor and sensory grossly intact,has tremors at rest  PSYCH: mood better  SKIN:   No rash or lesions    -----------------------------------------------------------------  Diagnostic Data:   · All diagnostic data was reviewed    Assessment:      Problem List Items Addressed This Visit     Essential hypertension    Atrial fibrillation, unspecified type ([facility] [address]    [facility]      Other Visit Diagnoses     Chronic diastolic [facility] failure ([facility]    -  Primary        Patient Active Problem List   Diagnosis Code    TIA (transient ischemic attack) G45.9    Bipolar 1 disorder ([facility] [address] F31.9    DDD (degenerative disc disease), lumbar M51.36    Bradycardia R00.1    Essential hypertension I10    Obesity (BMI 30-39. 9) E66.9    GERD (gastroesophageal reflux disease) K21.9    Iron deficiency anemia D50.9    Iron deficiency anemia due to chronic blood loss D50.0    History of colon polyps Z86.010    Hypothyroidism E03.9    Other chronic pain G89.29    Other hemorrhoids K64.8    Diverticulosis of large intestine without diverticulitis K57.30    Attention deficit hyperactivity disorder F90.9    Morbid obesity ([facility]) E66.01    Cellulitis of leg without foot, right L03.115    Atherosclerotic heart disease of native coronary artery with other forms of angina pectoris ([facility]) I25.118    CAD, multiple vessel I25.10    Chest pain R07.9    PVD (peripheral vascular disease) ([facility]) I73.9    Atrial fibrillation, unspecified type ([facility] I48.91    Stage 3a chronic kidney disease ([facility] N18.31         Plan:      Monitor for bleeding  Psych to manage psychotropics  Encourage activity as tolerated and follow diet  Continue current medications  Monitor bp   Prevent pressure sore  Prevent falls      Electronically signed by Estevan Zhou MD on 4/13/2022 at 6:39 PM

## 2022-04-12 RX ORDER — LOPERAMIDE HYDROCHLORIDE 2 MG/1
4 CAPSULE ORAL 4 TIMES DAILY PRN
COMMUNITY

## 2022-04-12 RX ORDER — QUETIAPINE FUMARATE 25 MG/1
50 TABLET, FILM COATED ORAL NIGHTLY
COMMUNITY
End: 2022-06-17

## 2022-04-12 RX ORDER — DIVALPROEX SODIUM 125 MG/1
125 CAPSULE, COATED PELLETS ORAL DAILY
COMMUNITY
End: 2022-07-19

## 2022-04-12 NOTE — PROCEDURES
[facility], [address] [place] [address]                                 EVENT MONITOR    PATIENT NAME: Patric Burch                :        1950  MED REC NO:   [id number]                              ROOM:  ACCOUNT NO:   [de-identified]                           ADMIT DATE: 2022  PROVIDER:     Sugar Oseguera MD    CARDIOVASCULAR DIAGNOSTIC DEPARTMENT    DATE OF STUDY:  2022    ORDERING PROVIDER:  Sugar Oseguera MD    PRIMARY CARE PROVIDER:  Juana Garcia MD    INTERPRETING PHYSICIAN:  Sugar Oseguera MD    DIAGNOSIS:  Other persistent atrial fibrillation. PHYSICIAN INTERPRETATION:  1.  6 days and 23 hours recorded. 2.  Baseline rhythm is atrial fibrillation throughout with fairly  controlled ventricular rate. Average heart rate is 59 bpm ranging  between 33 and 140 bpm.  No significant pauses. 3.  Rare isolated PVCs noted. 4.  No patient-activated events recorded.         Louise Valentin MD    D: 2022 15:05:09       T: 2022 15:07:31     [email or web address]  Job#: [id number]     Doc#: Unknown    CC:  Fer Farias

## 2022-04-13 PROCEDURE — 99308 SBSQ NF CARE LOW MDM 20: CPT | Performed by: FAMILY MEDICINE

## 2022-04-18 ENCOUNTER — TELEPHONE (OUTPATIENT)
Dept: CARDIOLOGY | Age: 72
End: 2022-04-18

## 2022-04-18 NOTE — TELEPHONE ENCOUNTER
----- Message from Keeley Sams MD sent at 4/15/2022 10:43 AM EDT -----  Heart monitor is okay. Continue current therapy and follow-up. Please call with questions and/or concerns.   Thank you

## 2022-05-12 ENCOUNTER — HOSPITAL ENCOUNTER (OUTPATIENT)
Age: 72
Setting detail: SPECIMEN
Discharge: HOME | End: 2022-05-12
Payer: MEDICARE

## 2022-05-12 LAB
ALBUMIN SERPL-MCNC: 3.7 G/DL (ref 3.5–5.2)
ALBUMIN/GLOBULIN RATIO: 1.3 (ref 1–2.5)
ALP BLD-CCNC: 105 U/L (ref 35–104)
ALT SERPL-CCNC: 21 U/L (ref 5–33)
ANION GAP SERPL CALCULATED.3IONS-SCNC: 8 MMOL/L (ref 9–17)
AST SERPL-CCNC: 16 U/L
BILIRUB SERPL-MCNC: 0.62 MG/DL (ref 0.3–1.2)
BUN BLDV-MCNC: 20 MG/DL (ref 8–23)
BUN/CREAT BLD: 17 (ref 9–20)
CALCIUM SERPL-MCNC: 9.8 MG/DL (ref 8.6–10.4)
CHLORIDE BLD-SCNC: 101 MMOL/L (ref 98–107)
CO2: 27 MMOL/L (ref 20–31)
CREAT SERPL-MCNC: 1.16 MG/DL (ref 0.5–0.9)
ESTIMATED AVERAGE GLUCOSE: 111 MG/DL
GFR AFRICAN AMERICAN: 56 ML/MIN
GFR NON-AFRICAN AMERICAN: 46 ML/MIN
GFR SERPL CREATININE-BSD FRML MDRD: ABNORMAL ML/MIN/{1.73_M2}
GFR SERPL CREATININE-BSD FRML MDRD: ABNORMAL ML/MIN/{1.73_M2}
GLUCOSE BLD-MCNC: 89 MG/DL (ref 70–99)
HBA1C MFR BLD: 5.5 % (ref 4–6)
POTASSIUM SERPL-SCNC: 4.3 MMOL/L (ref 3.7–5.3)
SODIUM BLD-SCNC: 136 MMOL/L (ref 135–144)
TOTAL PROTEIN: 6.5 G/DL (ref 6.4–8.3)
TSH SERPL DL<=0.05 MIU/L-ACNC: 4.44 UIU/ML (ref 0.3–5)

## 2022-05-12 PROCEDURE — P9603 ONE-WAY ALLOW PRORATED MILES: HCPCS

## 2022-05-12 PROCEDURE — 80053 COMPREHEN METABOLIC PANEL: CPT

## 2022-05-12 PROCEDURE — 84443 ASSAY THYROID STIM HORMONE: CPT

## 2022-05-12 PROCEDURE — 36415 COLL VENOUS BLD VENIPUNCTURE: CPT

## 2022-05-12 PROCEDURE — 80061 LIPID PANEL: CPT

## 2022-05-12 PROCEDURE — 83036 HEMOGLOBIN GLYCOSYLATED A1C: CPT

## 2022-05-14 LAB
CHOLESTEROL/HDL RATIO: 2
CHOLESTEROL: 132 MG/DL
HDLC SERPL-MCNC: 67 MG/DL
LDL CHOLESTEROL: 52 MG/DL (ref 0–130)
TRIGL SERPL-MCNC: 63 MG/DL

## 2022-05-17 ENCOUNTER — OUTSIDE SERVICES (OUTPATIENT)
Dept: PRIMARY CARE CLINIC | Age: 72
End: 2022-05-17
Payer: MEDICARE

## 2022-05-17 DIAGNOSIS — I50.32 CHRONIC DIASTOLIC HEART FAILURE (HCC): Primary | ICD-10-CM

## 2022-05-17 DIAGNOSIS — N18.31 STAGE 3A CHRONIC KIDNEY DISEASE (HCC): ICD-10-CM

## 2022-05-17 DIAGNOSIS — I48.91 ATRIAL FIBRILLATION, UNSPECIFIED TYPE (HCC): ICD-10-CM

## 2022-05-17 DIAGNOSIS — I10 ESSENTIAL HYPERTENSION: ICD-10-CM

## 2022-05-17 NOTE — PROGRESS NOTES
Patient:  Sebas Hillman, 1950  I saw this patient on 05/18/2022, at [facility]   Shortness of breath better,blood sugars well controlled,hba1c normal  Edema improving  No bleeding from anticoagulant  bp well controlled        Reason for Visit:      ICD-10-CM    1. Chronic diastolic heart failure (HCC)  I50.32    2. Atrial fibrillation, unspecified type ([facility] [address]  I48.91    3. Stage 3a chronic kidney disease (HCC)  N18.31    4. Essential hypertension  I10        Changes since last visit:      mood changes better,psych managing meds  Shortness of breath and edema better  BP better  1. Fall:  none  2. Behavioral Change: mood improving  3. Pain Control: adequate  4. Mobility: improving  5. Pressure Sore:  none  Allergies:  Lamictal [lamotrigine], Oxcarbazepine, Pcn [penicillins], Sertraline, Trileptal, and Strattera [atomoxetine hcl]    Current Outpatient Medications   Medication Sig Dispense Refill    divalproex (DEPAKOTE SPRINKLE) 125 MG capsule Take 125 mg by mouth daily       loperamide (IMODIUM) 2 MG capsule Take 4 mg by mouth 4 times daily as needed for Diarrhea Every 4 hours      QUEtiapine (SEROQUEL) 25 MG tablet Take 50 mg by mouth at bedtime      metoprolol succinate (TOPROL XL) 25 MG extended release tablet TAKE 1 TABLET BY MOUTH DAILY.  90 tablet 3    simethicone (MYLICON) 80 MG chewable tablet Take 80 mg by mouth every 6 hours as needed for Flatulence      nystatin (MYCOSTATIN) 576980 UNIT/GM powder Apply topically as needed Apply topically to breasts as needed      levothyroxine (SYNTHROID) 50 MCG tablet Take 50 mcg by mouth Daily      latanoprost (XALATAN) 0.005 % ophthalmic solution Place 1 drop into the right eye nightly       polyvinyl alcohol (ARTIFICIAL TEARS) 1.4 % ophthalmic solution Place 1 drop into both eyes 4 times daily       acetaminophen (TYLENOL) 650 MG extended release tablet Take 650 mg by mouth 4 times daily      Sodium Phosphates (FLEET) 7-19 GM/118ML Place 1 enema rectally as needed      traMADol (ULTRAM) 50 MG tablet Take 50 mg by mouth every 6 hours as needed for Pain.  white petrolatum GEL Apply topically 2 times daily as needed for Dry Skin Arms and legs      chlorhexidine (PERIDEX) 0.12 % solution Take 15 mLs by mouth 2 times daily       atorvastatin (LIPITOR) 20 MG tablet Take 1 tablet by mouth daily 90 tablet 3    nitroGLYCERIN (NITROSTAT) 0.4 MG SL tablet up to max of 3 total doses.  If no relief after 1 dose, call 911. 25 tablet 1    clopidogrel (PLAVIX) 75 MG tablet Take 1 tablet by mouth daily 30 tablet 3    losartan (COZAAR) 50 MG tablet Take 1 tablet by mouth daily 30 tablet 3    furosemide (LASIX) 40 MG tablet Take 1 tablet by mouth daily 60 tablet 3    apixaban (ELIQUIS) 5 MG TABS tablet Take 1 tablet by mouth 2 times daily 60 tablet 0    tiZANidine (ZANAFLEX) 2 MG tablet Take 2 mg by mouth every 6 hours as needed      cloNIDine (CATAPRES) 0.1 MG tablet Take 0.1 mg by mouth 3 times daily      amLODIPine (NORVASC) 10 MG tablet Take 10 mg by mouth daily       Valbenazine Tosylate (INGREZZA) 40 MG CAPS Take 40 mg by mouth nightly      melatonin 3 MG TABS tablet Take 3 mg by mouth nightly      polyethylene glycol (GLYCOLAX) packet Take 17 g by mouth daily      Amantadine (SYMMETREL) 100 MG TABS tablet Take 100 mg by mouth 2 times daily      Dextromethorphan-guaiFENesin (DELSYM COUGH/CHEST CONGEST DM) 5-100 MG/5ML LIQD Take 5 mLs by mouth every 12 hours as needed (cough)      acetaminophen (TYLENOL) 325 MG tablet Take 650 mg by mouth every 4 hours as needed for Pain or Fever      sodium chloride (OCEAN) 0.65 % nasal spray 1 spray by Nasal route 3 times daily as needed for Congestion      bisacodyl (DULCOLAX) 10 MG suppository Place 10 mg rectally daily as needed for Constipation      magnesium hydroxide (MILK OF MAGNESIA) 400 MG/5ML suspension Take 30 mLs by mouth daily as needed for Constipation      calcium carbonate 600 MG TABS tablet Take 1 tablet by mouth nightly       omeprazole (PRILOSEC) 20 MG capsule TAKE 1 CAPSULE BY MOUTH DAILY. 30 capsule 5    traZODone (DESYREL) 50 MG tablet Take 150 mg by mouth nightly       guaifenesin (MUCINEX) 600 MG SR tablet Take 1,200 mg by mouth 2 times daily. PRN      loratadine (CLARITIN) 10 MG tablet Take 10 mg by mouth daily. PRN        No current facility-administered medications for this visit. Past Medical History:    Past Medical History:   Diagnosis Date    ADHD (attention deficit hyperactivity disorder)     Anemia     severe    Asthma     Bipolar disorder (HCC)     Bradycardia, unspecified     Chronic anemia     Chronic atrial fibrillation (HCC)     Chronic back pain     Chronic kidney disease     Patient states unaware of this. Has never been mentioned to her.      Colitis     Constipation     COPD (chronic obstructive pulmonary disease) (HCC)     Cystitis without hematuria     Diastolic CHF (HCC)     Esophagitis     grade A    GERD (gastroesophageal reflux disease)     Hemorrhoids     History of echocardiogram 06/02/2014    EF >60%, LV wall thickness mildly increased,    Hypertension     Hypothyroidism     Metabolic syndrome     Moderate protein-calorie malnutrition (HCC)     Muscle weakness (generalized)     Obesity     Pancreatitis     TIA (transient ischemic attack)        Past Surgical History:    Past Surgical History:   Procedure Laterality Date    CARDIAC CATHETERIZATION Left 05/27/2021    right radial/ [facility]/ Dr. Jona Armijo Right 12/27/2021    Dr. Stacey Zuleta Left 02/07/2022    EYE CATARACT EMULSIFICATION IOL IMPLANT (Left Eye)    COLONOSCOPY  5/2011    ischemic colitis    COLONOSCOPY  03/07/2017    -diverticulosis,hemorrhoids    HYMENECTOMY      1972    HYSTERECTOMY      2008    INTRACAPSULAR CATARACT EXTRACTION Right 12/27/2021    EYE CATARACT EMULSIFICATION IOL IMPLANT performed by Brenda Tyson DO at [facility] Left 2022    EYE CATARACT EMULSIFICATION IOL IMPLANT performed by Brenda Tyson DO at [address] Right         TONSILLECTOMY AND ADENOIDECTOMY      UPPER GASTROINTESTINAL ENDOSCOPY  3/2013    5 cm axial hiatal hernia    UPPER GASTROINTESTINAL ENDOSCOPY  2017    Dr. Aditi Vasquez       Social History:   Social History     Tobacco Use    Smoking status: Former Smoker     Packs/day: 0.25     Years: 10.00     Pack years: 2.50     Quit date: 3/12/1998     Years since quittin.2    Smokeless tobacco: Never Used   Substance Use Topics    Alcohol use: No       Family History:   Family History   Problem Relation Age of Onset    Diabetes Mother     High Blood Pressure Mother     Diabetes Father     High Blood Pressure Father     Bipolar Disorder Sister     Schizophrenia Sister     High Blood Pressure Other         2 sons    Bipolar Disorder Other         2 sons           Review of Systems:  Constitutional: negative for fevers or chills  Eyes: negative for visual disturbance   ENT: positive for oral infection,negative for sore throat or nasal congestion,no dysphagia. No epistaxis. Respiratory:  shortness of breath improved  Cardiovascular: neg for chest pain , palpitations,pnd,  edema better  Gastrointestinal:  abd pain better, neg for nausea, vomiting, diarrhea ,  constipation,no reyna,no blood in stool  Genitourinary: negative for dysuria, urgency,neg for hematuria , frequency  Integument/breast: negative for skin rash or lesions  Neurological: negative for unilateral weakness, numbness or tingling,has tremors  Muscular Skeletal: denies joint pain   Psych- mood changes improving    Objective:    Vitals: BP:143/65 T:97.9 P:58 R:18    -----------------------------------------------------------------  Exam:  GEN:   A & O x3, no apparent distress,obese  EYES:  No gross abnormalities.  and PERRLA  ENT:Throat- has oral unlcer,nose- no drainage  NECK: normal, supple, no lymphadenopathy,  no carotid bruits  PULM: Diminished air entry on auscultation bilaterally- no wheezes, rales or rhonchi, normal air movement, no respiratory distress  COR:   S1,s2,RRR, no murmurs and no gallops,has minimal bilat leg edema  ABD:    soft, non-tender, non-distended, normal bowel sounds, no masses or organomegaly  : no cva or flank tenderness  EXT:has minimal edema, no calf tenderness, and warm to touch. NEURO: Motor and sensory grossly intact,has tremors at rest  PSYCH: mood better  SKIN:   No rash or lesions    -----------------------------------------------------------------  Diagnostic Data:   · All diagnostic data was reviewed    Assessment:      Problem List Items Addressed This Visit     Essential hypertension    Atrial fibrillation, unspecified type ([facility]    Stage 3a chronic [facility] ([facility]      Other Visit Diagnoses     Chronic diastolic heart failure ([facility]    -  Primary        Patient Active Problem List   Diagnosis Code    TIA (transient ischemic attack) G45.9    Bipolar 1 disorder ([facility] F31.9    DDD (degenerative disc disease), lumbar M51.36    Bradycardia R00.1    Essential hypertension I10    Obesity (BMI 30-39. 9) E66.9    GERD (gastroesophageal reflux disease) K21.9    Iron deficiency anemia D50.9    Iron deficiency anemia due to chronic blood loss D50.0    History of colon polyps Z86.010    Hypothyroidism E03.9    Other chronic pain G89.29    Other hemorrhoids K64.8    Diverticulosis of large intestine without diverticulitis K57.30    Attention deficit hyperactivity disorder F90.9    Morbid obesity ([facility]) E66.01    Cellulitis of leg without foot, right L03.115    Atherosclerotic heart disease of native coronary artery with other forms of angina pectoris ([facility]) I25.118    CAD, multiple vessel I25.10    Chest pain R07.9    PVD (peripheral vascular disease) ([facility]) I73.9    Atrial fibrillation, unspecified type ([facility] I48.91    Stage 3a chronic kidney disease ([facility] N18.31         Plan:      Monitor for bleeding  Encourage weight loss with diet and exercise  Psych to manage psychotropics  Encourage activity as tolerated and follow diet  Continue current medications  Monitor bp   Prevent pressure sore  Prevent falls      Electronically signed by Sulma Chirinos MD on 5/19/2022 at 9:45 AM

## 2022-05-18 PROCEDURE — 99308 SBSQ NF CARE LOW MDM 20: CPT | Performed by: FAMILY MEDICINE

## 2022-06-08 ENCOUNTER — OUTSIDE SERVICES (OUTPATIENT)
Dept: PRIMARY CARE CLINIC | Age: 72
End: 2022-06-08
Payer: MEDICARE

## 2022-06-08 DIAGNOSIS — I48.91 ATRIAL FIBRILLATION, UNSPECIFIED TYPE (HCC): ICD-10-CM

## 2022-06-08 DIAGNOSIS — I50.32 CHRONIC DIASTOLIC HEART FAILURE (HCC): Primary | ICD-10-CM

## 2022-06-08 DIAGNOSIS — N18.31 STAGE 3A CHRONIC KIDNEY DISEASE (HCC): ICD-10-CM

## 2022-06-08 DIAGNOSIS — I10 ESSENTIAL HYPERTENSION: ICD-10-CM

## 2022-06-08 PROCEDURE — 99308 SBSQ NF CARE LOW MDM 20: CPT | Performed by: FAMILY MEDICINE

## 2022-06-08 NOTE — PROGRESS NOTES
Patient:  Ashlyn Caldera, 1950  I saw this patient on 06/08/2022, at [facility]   Shortness of breath better,blood sugars well controlled,  Edema improving  No bleeding from anticoagulant  bp well controlled        Reason for Visit:      ICD-10-CM    1. Chronic diastolic heart failure (HCC)  I50.32    2. Atrial fibrillation, unspecified type ([facility] [address]  I48.91    3. Stage 3a chronic kidney disease (HCC)  N18.31    4. Essential hypertension  I10        Changes since last visit:      mood changes better,psych managing meds  Shortness of breath and edema better  BP better  1. Fall:  none  2. Behavioral Change: mood improving  3. Pain Control: adequate  4. Mobility: improving  5. Pressure Sore:  none  Allergies:  Lamictal [lamotrigine], Oxcarbazepine, Pcn [penicillins], Sertraline, Trileptal, and Strattera [atomoxetine hcl]    Current Outpatient Medications   Medication Sig Dispense Refill    divalproex (DEPAKOTE SPRINKLE) 125 MG capsule Take 125 mg by mouth daily       loperamide (IMODIUM) 2 MG capsule Take 4 mg by mouth 4 times daily as needed for Diarrhea Every 4 hours      QUEtiapine (SEROQUEL) 25 MG tablet Take 50 mg by mouth at bedtime      metoprolol succinate (TOPROL XL) 25 MG extended release tablet TAKE 1 TABLET BY MOUTH DAILY.  90 tablet 3    simethicone (MYLICON) 80 MG chewable tablet Take 80 mg by mouth every 6 hours as needed for Flatulence      nystatin (MYCOSTATIN) 264372 UNIT/GM powder Apply topically as needed Apply topically to breasts as needed      levothyroxine (SYNTHROID) 50 MCG tablet Take 50 mcg by mouth Daily      latanoprost (XALATAN) 0.005 % ophthalmic solution Place 1 drop into the right eye nightly       polyvinyl alcohol (ARTIFICIAL TEARS) 1.4 % ophthalmic solution Place 1 drop into both eyes 4 times daily       acetaminophen (TYLENOL) 650 MG extended release tablet Take 650 mg by mouth 4 times daily      Sodium Phosphates (FLEET) 7-19 GM/118ML Place 1 enema rectally as needed  traMADol (ULTRAM) 50 MG tablet Take 50 mg by mouth every 6 hours as needed for Pain.  white petrolatum GEL Apply topically 2 times daily as needed for Dry Skin Arms and legs      chlorhexidine (PERIDEX) 0.12 % solution Take 15 mLs by mouth 2 times daily       atorvastatin (LIPITOR) 20 MG tablet Take 1 tablet by mouth daily 90 tablet 3    nitroGLYCERIN (NITROSTAT) 0.4 MG SL tablet up to max of 3 total doses.  If no relief after 1 dose, call 911. 25 tablet 1    clopidogrel (PLAVIX) 75 MG tablet Take 1 tablet by mouth daily 30 tablet 3    losartan (COZAAR) 50 MG tablet Take 1 tablet by mouth daily 30 tablet 3    furosemide (LASIX) 40 MG tablet Take 1 tablet by mouth daily 60 tablet 3    apixaban (ELIQUIS) 5 MG TABS tablet Take 1 tablet by mouth 2 times daily 60 tablet 0    tiZANidine (ZANAFLEX) 2 MG tablet Take 2 mg by mouth every 6 hours as needed      cloNIDine (CATAPRES) 0.1 MG tablet Take 0.1 mg by mouth 3 times daily      amLODIPine (NORVASC) 10 MG tablet Take 10 mg by mouth daily       Valbenazine Tosylate (INGREZZA) 40 MG CAPS Take 40 mg by mouth nightly      melatonin 3 MG TABS tablet Take 3 mg by mouth nightly      polyethylene glycol (GLYCOLAX) packet Take 17 g by mouth daily      Amantadine (SYMMETREL) 100 MG TABS tablet Take 100 mg by mouth 2 times daily      Dextromethorphan-guaiFENesin (DELSYM COUGH/CHEST CONGEST DM) 5-100 MG/5ML LIQD Take 5 mLs by mouth every 12 hours as needed (cough)      acetaminophen (TYLENOL) 325 MG tablet Take 650 mg by mouth every 4 hours as needed for Pain or Fever      sodium chloride (OCEAN) 0.65 % nasal spray 1 spray by Nasal route 3 times daily as needed for Congestion      bisacodyl (DULCOLAX) 10 MG suppository Place 10 mg rectally daily as needed for Constipation      magnesium hydroxide (MILK OF MAGNESIA) 400 MG/5ML suspension Take 30 mLs by mouth daily as needed for Constipation      calcium carbonate 600 MG TABS tablet Take 1 tablet by mouth nightly       omeprazole (PRILOSEC) 20 MG capsule TAKE 1 CAPSULE BY MOUTH DAILY. 30 capsule 5    traZODone (DESYREL) 50 MG tablet Take 150 mg by mouth nightly       guaifenesin (MUCINEX) 600 MG SR tablet Take 1,200 mg by mouth 2 times daily. PRN      loratadine (CLARITIN) 10 MG tablet Take 10 mg by mouth daily. PRN        No current facility-administered medications for this visit. Past Medical History:    Past Medical History:   Diagnosis Date    ADHD (attention deficit hyperactivity disorder)     Anemia     severe    Asthma     Bipolar disorder (HCC)     Bradycardia, unspecified     Chronic anemia     Chronic atrial fibrillation (HCC)     Chronic back pain     Chronic kidney disease     Patient states unaware of this. Has never been mentioned to her.      Colitis     Constipation     COPD (chronic obstructive pulmonary disease) (HCC)     Cystitis without hematuria     Diastolic CHF (HCC)     Esophagitis     grade A    GERD (gastroesophageal reflux disease)     Hemorrhoids     History of echocardiogram 06/02/2014    EF >60%, LV wall thickness mildly increased,    Hypertension     Hypothyroidism     Metabolic syndrome     Moderate protein-calorie malnutrition (HCC)     Muscle weakness (generalized)     Obesity     Pancreatitis     TIA (transient ischemic attack)        Past Surgical History:    Past Surgical History:   Procedure Laterality Date    CARDIAC CATHETERIZATION Left 05/27/2021    right radial/ [facility]/ Dr. Carolyn Mena Right 12/27/2021    Dr. Yen Givens Left 02/07/2022    EYE CATARACT EMULSIFICATION IOL IMPLANT (Left Eye)    COLONOSCOPY  5/2011    ischemic colitis    COLONOSCOPY  03/07/2017    -diverticulosis,hemorrhoids    HYMENECTOMY      1972    HYSTERECTOMY      2008    INTRACAPSULAR CATARACT EXTRACTION Right 12/27/2021    EYE CATARACT EMULSIFICATION IOL IMPLANT performed by Isreal Larsen DO at [facility] Left 2022    EYE CATARACT EMULSIFICATION IOL IMPLANT performed by Isreal Larsen DO at [facility] Right         TONSILLECTOMY AND ADENOIDECTOMY      UPPER GASTROINTESTINAL ENDOSCOPY  3/2013    5 cm axial hiatal hernia    UPPER GASTROINTESTINAL ENDOSCOPY  2017    Dr. Milly Ferreira       Social History:   Social History     Tobacco Use    Smoking status: Former Smoker     Packs/day: 0.25     Years: 10.00     Pack years: 2.50     Quit date: 3/12/1998     Years since quittin.2    Smokeless tobacco: Never Used   Substance Use Topics    Alcohol use: No       Family History:   Family History   Problem Relation Age of Onset    Diabetes Mother     High Blood Pressure Mother     Diabetes Father     High Blood Pressure Father     Bipolar Disorder Sister     Schizophrenia Sister     High Blood Pressure Other         2 sons    Bipolar Disorder Other         2 sons           Review of Systems:  Constitutional: negative for fevers or chills  Eyes: negative for visual disturbance   ENT: positive for oral infection,negative for sore throat or nasal congestion,no dysphagia. No epistaxis. Respiratory:  shortness of breath improved  Cardiovascular: neg for chest pain , palpitations,pnd,  edema better  Gastrointestinal: neg for pain, neg for nausea, vomiting, diarrhea ,  constipation,no reyna,no blood in stool  Genitourinary: negative for dysuria, urgency,neg for hematuria , frequency  Integument/breast: negative for skin rash or lesions  Neurological: negative for unilateral weakness, numbness or tingling,has tremors  Muscular Skeletal: denies joint pain   Psych- mood changes improving    Objective:    Vitals: BP:119/73 T:97.7 P:54 R:18    -----------------------------------------------------------------  Exam:  GEN:   A & O x3, no apparent distress,obese  EYES:  No gross abnormalities. and PERRLA  ENT:Throat- has oral unlcer,nose- no drainage  NECK: normal, supple, no lymphadenopathy,  no carotid bruits  PULM: Diminished air entry on auscultation bilaterally- no wheezes, rales or rhonchi, normal air movement, no respiratory distress  COR:   S1,s2,RRR, no murmurs and no gallops,has minimal bilat leg edema  ABD:    soft, non-tender, non-distended, normal bowel sounds, no masses or organomegaly  : no cva or flank tenderness  EXT:has minimal edema, no calf tenderness, and warm to touch. NEURO: Motor and sensory grossly intact,has tremors at rest  PSYCH: mood better  SKIN:   No rash or lesions    -----------------------------------------------------------------  Diagnostic Data:   · All diagnostic data was reviewed    Assessment:      Problem List Items Addressed This Visit     Essential hypertension    Atrial fibrillation, unspecified type ([facility] [address]    Stage 3a chronic kidney disease ([facility] [address]      Other Visit Diagnoses     [facility]    -  Primary        Patient Active Problem List   Diagnosis Code    TIA (transient ischemic attack) G45.9    Bipolar 1 disorder ([facility] [address] F31.9    DDD (degenerative disc disease), lumbar M51.36    Bradycardia R00.1    Essential hypertension I10    Obesity (BMI 30-39. 9) E66.9    GERD (gastroesophageal reflux disease) K21.9    Iron deficiency anemia D50.9    Iron deficiency anemia due to chronic blood loss D50.0    History of colon polyps Z86.010    Hypothyroidism E03.9    Other chronic pain G89.29    Other hemorrhoids K64.8    Diverticulosis of large intestine without diverticulitis K57.30    Attention deficit hyperactivity disorder F90.9    Morbid obesity ([facility]) E66.01    Cellulitis of leg without foot, right L03.115    Atherosclerotic heart disease of native coronary artery with other forms of angina pectoris ([facility]) I25.118    CAD, multiple vessel I25.10    Chest pain R07.9    PVD (peripheral vascular disease) ([facility]) I73.9    Atrial fibrillation, unspecified type (HCC) I48.91    Stage 3a chronic kidney disease ([facility] [address] N18.31         Plan:      Monitor for bleeding  Encourage weight loss with diet and exercise  Psych to manage psychotropics  Encourage activity as tolerated and follow diet  Continue current medications  Monitor bp   Prevent pressure sore  Prevent falls      Electronically signed by Jen Downs MD on 6/9/2022 at 4:14 PM

## 2022-06-17 ENCOUNTER — APPOINTMENT (OUTPATIENT)
Dept: GENERAL RADIOLOGY | Age: 72
DRG: 291 | End: 2022-06-17
Payer: MEDICARE

## 2022-06-17 ENCOUNTER — HOSPITAL ENCOUNTER (INPATIENT)
Age: 72
LOS: 2 days | Discharge: NURSING, REHAB OR LONG-TERM CARE | DRG: 291 | End: 2022-06-19
Attending: STUDENT IN AN ORGANIZED HEALTH CARE EDUCATION/TRAINING PROGRAM | Admitting: STUDENT IN AN ORGANIZED HEALTH CARE EDUCATION/TRAINING PROGRAM
Payer: MEDICARE

## 2022-06-17 DIAGNOSIS — R07.9 CHEST PAIN, UNSPECIFIED TYPE: Primary | ICD-10-CM

## 2022-06-17 DIAGNOSIS — R06.09 DYSPNEA ON EXERTION: ICD-10-CM

## 2022-06-17 PROBLEM — R06.02 SHORTNESS OF BREATH: Status: ACTIVE | Noted: 2022-06-17

## 2022-06-17 LAB
ABSOLUTE EOS #: 0.28 K/UL (ref 0–0.44)
ABSOLUTE IMMATURE GRANULOCYTE: 0.03 K/UL (ref 0–0.3)
ABSOLUTE LYMPH #: 1.6 K/UL (ref 1.1–3.7)
ABSOLUTE MONO #: 0.53 K/UL (ref 0.1–1.2)
ANION GAP SERPL CALCULATED.3IONS-SCNC: 14 MMOL/L (ref 9–17)
BASOPHILS # BLD: 1 % (ref 0–2)
BASOPHILS ABSOLUTE: 0.05 K/UL (ref 0–0.2)
BUN BLDV-MCNC: 17 MG/DL (ref 8–23)
BUN/CREAT BLD: 15 (ref 9–20)
CALCIUM SERPL-MCNC: 9.2 MG/DL (ref 8.6–10.4)
CHLORIDE BLD-SCNC: 99 MMOL/L (ref 98–107)
CO2: 22 MMOL/L (ref 20–31)
CREAT SERPL-MCNC: 1.14 MG/DL (ref 0.5–0.9)
EOSINOPHILS RELATIVE PERCENT: 4 % (ref 1–4)
GFR AFRICAN AMERICAN: 57 ML/MIN
GFR NON-AFRICAN AMERICAN: 47 ML/MIN
GFR SERPL CREATININE-BSD FRML MDRD: ABNORMAL ML/MIN/{1.73_M2}
GFR SERPL CREATININE-BSD FRML MDRD: ABNORMAL ML/MIN/{1.73_M2}
GLUCOSE BLD-MCNC: 119 MG/DL (ref 70–99)
HCT VFR BLD CALC: 35.9 % (ref 36.3–47.1)
HEMOGLOBIN: 11.7 G/DL (ref 11.9–15.1)
IMMATURE GRANULOCYTES: 0 %
LYMPHOCYTES # BLD: 23 % (ref 24–43)
MCH RBC QN AUTO: 30.7 PG (ref 25.2–33.5)
MCHC RBC AUTO-ENTMCNC: 32.6 G/DL (ref 28.4–34.8)
MCV RBC AUTO: 94.2 FL (ref 82.6–102.9)
MONOCYTES # BLD: 8 % (ref 3–12)
NRBC AUTOMATED: 0 PER 100 WBC
PARTIAL THROMBOPLASTIN TIME: 36.5 SEC (ref 26.8–34.8)
PDW BLD-RTO: 12.9 % (ref 11.8–14.4)
PLATELET # BLD: 253 K/UL (ref 138–453)
PMV BLD AUTO: 9.3 FL (ref 8.1–13.5)
POTASSIUM SERPL-SCNC: 4.2 MMOL/L (ref 3.7–5.3)
PRO-BNP: 3544 PG/ML
RBC # BLD: 3.81 M/UL (ref 3.95–5.11)
SEG NEUTROPHILS: 64 % (ref 36–65)
SEGMENTED NEUTROPHILS ABSOLUTE COUNT: 4.54 K/UL (ref 1.5–8.1)
SODIUM BLD-SCNC: 135 MMOL/L (ref 135–144)
TROPONIN, HIGH SENSITIVITY: 18 NG/L (ref 0–14)
TROPONIN, HIGH SENSITIVITY: 20 NG/L (ref 0–14)
WBC # BLD: 7 K/UL (ref 3.5–11.3)

## 2022-06-17 PROCEDURE — 2500000003 HC RX 250 WO HCPCS: Performed by: STUDENT IN AN ORGANIZED HEALTH CARE EDUCATION/TRAINING PROGRAM

## 2022-06-17 PROCEDURE — 84484 ASSAY OF TROPONIN QUANT: CPT

## 2022-06-17 PROCEDURE — 2580000003 HC RX 258: Performed by: STUDENT IN AN ORGANIZED HEALTH CARE EDUCATION/TRAINING PROGRAM

## 2022-06-17 PROCEDURE — 83880 ASSAY OF NATRIURETIC PEPTIDE: CPT

## 2022-06-17 PROCEDURE — 1200000000 HC SEMI PRIVATE

## 2022-06-17 PROCEDURE — 99285 EMERGENCY DEPT VISIT HI MDM: CPT

## 2022-06-17 PROCEDURE — 94761 N-INVAS EAR/PLS OXIMETRY MLT: CPT

## 2022-06-17 PROCEDURE — 36415 COLL VENOUS BLD VENIPUNCTURE: CPT

## 2022-06-17 PROCEDURE — 93005 ELECTROCARDIOGRAM TRACING: CPT | Performed by: EMERGENCY MEDICINE

## 2022-06-17 PROCEDURE — 96374 THER/PROPH/DIAG INJ IV PUSH: CPT

## 2022-06-17 PROCEDURE — 85025 COMPLETE CBC W/AUTO DIFF WBC: CPT

## 2022-06-17 PROCEDURE — 71045 X-RAY EXAM CHEST 1 VIEW: CPT

## 2022-06-17 PROCEDURE — 85730 THROMBOPLASTIN TIME PARTIAL: CPT

## 2022-06-17 PROCEDURE — 6370000000 HC RX 637 (ALT 250 FOR IP): Performed by: PHYSICIAN ASSISTANT

## 2022-06-17 PROCEDURE — 80048 BASIC METABOLIC PNL TOTAL CA: CPT

## 2022-06-17 PROCEDURE — 6360000002 HC RX W HCPCS: Performed by: PHYSICIAN ASSISTANT

## 2022-06-17 PROCEDURE — 6370000000 HC RX 637 (ALT 250 FOR IP): Performed by: STUDENT IN AN ORGANIZED HEALTH CARE EDUCATION/TRAINING PROGRAM

## 2022-06-17 RX ORDER — DIVALPROEX SODIUM 250 MG/1
250 TABLET, EXTENDED RELEASE ORAL NIGHTLY
COMMUNITY
End: 2022-07-19

## 2022-06-17 RX ORDER — LANOLIN ALCOHOL/MO/W.PET/CERES
3 CREAM (GRAM) TOPICAL NIGHTLY
Status: DISCONTINUED | OUTPATIENT
Start: 2022-06-17 | End: 2022-06-19 | Stop reason: HOSPADM

## 2022-06-17 RX ORDER — CETIRIZINE HYDROCHLORIDE 10 MG/1
10 TABLET ORAL DAILY
Status: DISCONTINUED | OUTPATIENT
Start: 2022-06-18 | End: 2022-06-19 | Stop reason: HOSPADM

## 2022-06-17 RX ORDER — CLONIDINE HYDROCHLORIDE 0.1 MG/1
0.1 TABLET ORAL 3 TIMES DAILY
Status: DISCONTINUED | OUTPATIENT
Start: 2022-06-17 | End: 2022-06-19 | Stop reason: HOSPADM

## 2022-06-17 RX ORDER — POTASSIUM CHLORIDE 7.45 MG/ML
10 INJECTION INTRAVENOUS PRN
Status: DISCONTINUED | OUTPATIENT
Start: 2022-06-17 | End: 2022-06-19 | Stop reason: HOSPADM

## 2022-06-17 RX ORDER — ACETAMINOPHEN 650 MG/1
650 SUPPOSITORY RECTAL EVERY 6 HOURS PRN
Status: DISCONTINUED | OUTPATIENT
Start: 2022-06-17 | End: 2022-06-19 | Stop reason: HOSPADM

## 2022-06-17 RX ORDER — AMANTADINE HYDROCHLORIDE 100 MG/1
100 CAPSULE, GELATIN COATED ORAL 2 TIMES DAILY
Status: DISCONTINUED | OUTPATIENT
Start: 2022-06-17 | End: 2022-06-19 | Stop reason: HOSPADM

## 2022-06-17 RX ORDER — ATORVASTATIN CALCIUM 20 MG/1
20 TABLET, FILM COATED ORAL DAILY
Status: DISCONTINUED | OUTPATIENT
Start: 2022-06-18 | End: 2022-06-19 | Stop reason: HOSPADM

## 2022-06-17 RX ORDER — LATANOPROST 50 UG/ML
1 SOLUTION/ DROPS OPHTHALMIC NIGHTLY
Status: DISCONTINUED | OUTPATIENT
Start: 2022-06-17 | End: 2022-06-19 | Stop reason: HOSPADM

## 2022-06-17 RX ORDER — QUETIAPINE FUMARATE 50 MG/1
50 TABLET, FILM COATED ORAL NIGHTLY
COMMUNITY
Start: 2022-06-10 | End: 2022-07-08

## 2022-06-17 RX ORDER — CHLORHEXIDINE GLUCONATE 0.12 MG/ML
15 RINSE ORAL 2 TIMES DAILY
Status: DISCONTINUED | OUTPATIENT
Start: 2022-06-17 | End: 2022-06-18

## 2022-06-17 RX ORDER — FUROSEMIDE 10 MG/ML
40 INJECTION INTRAMUSCULAR; INTRAVENOUS ONCE
Status: COMPLETED | OUTPATIENT
Start: 2022-06-17 | End: 2022-06-17

## 2022-06-17 RX ORDER — CALCIUM CARBONATE 200(500)MG
500 TABLET,CHEWABLE ORAL NIGHTLY
Status: DISCONTINUED | OUTPATIENT
Start: 2022-06-17 | End: 2022-06-19 | Stop reason: HOSPADM

## 2022-06-17 RX ORDER — SODIUM PHOSPHATE, DIBASIC AND SODIUM PHOSPHATE, MONOBASIC 7; 19 G/133ML; G/133ML
1 ENEMA RECTAL PRN
Status: DISCONTINUED | OUTPATIENT
Start: 2022-06-17 | End: 2022-06-19 | Stop reason: HOSPADM

## 2022-06-17 RX ORDER — AMLODIPINE BESYLATE 10 MG/1
10 TABLET ORAL DAILY
Status: DISCONTINUED | OUTPATIENT
Start: 2022-06-18 | End: 2022-06-19 | Stop reason: HOSPADM

## 2022-06-17 RX ORDER — MAGNESIUM SULFATE IN WATER 40 MG/ML
2000 INJECTION, SOLUTION INTRAVENOUS PRN
Status: DISCONTINUED | OUTPATIENT
Start: 2022-06-17 | End: 2022-06-19 | Stop reason: HOSPADM

## 2022-06-17 RX ORDER — LOSARTAN POTASSIUM 50 MG/1
50 TABLET ORAL DAILY
Status: DISCONTINUED | OUTPATIENT
Start: 2022-06-18 | End: 2022-06-19 | Stop reason: HOSPADM

## 2022-06-17 RX ORDER — CLOPIDOGREL BISULFATE 75 MG/1
75 TABLET ORAL DAILY
Status: DISCONTINUED | OUTPATIENT
Start: 2022-06-18 | End: 2022-06-19 | Stop reason: HOSPADM

## 2022-06-17 RX ORDER — QUETIAPINE FUMARATE 25 MG/1
50 TABLET, FILM COATED ORAL NIGHTLY
Status: DISCONTINUED | OUTPATIENT
Start: 2022-06-17 | End: 2022-06-19 | Stop reason: HOSPADM

## 2022-06-17 RX ORDER — METOPROLOL SUCCINATE 25 MG/1
25 TABLET, EXTENDED RELEASE ORAL DAILY
Status: DISCONTINUED | OUTPATIENT
Start: 2022-06-18 | End: 2022-06-19 | Stop reason: HOSPADM

## 2022-06-17 RX ORDER — LEVOTHYROXINE SODIUM 0.05 MG/1
50 TABLET ORAL DAILY
Status: DISCONTINUED | OUTPATIENT
Start: 2022-06-18 | End: 2022-06-19 | Stop reason: HOSPADM

## 2022-06-17 RX ORDER — SODIUM CHLORIDE 9 MG/ML
INJECTION, SOLUTION INTRAVENOUS PRN
Status: DISCONTINUED | OUTPATIENT
Start: 2022-06-17 | End: 2022-06-19 | Stop reason: HOSPADM

## 2022-06-17 RX ORDER — ONDANSETRON 2 MG/ML
4 INJECTION INTRAMUSCULAR; INTRAVENOUS EVERY 6 HOURS PRN
Status: DISCONTINUED | OUTPATIENT
Start: 2022-06-17 | End: 2022-06-19 | Stop reason: HOSPADM

## 2022-06-17 RX ORDER — FUROSEMIDE 10 MG/ML
20 INJECTION INTRAMUSCULAR; INTRAVENOUS ONCE
Status: DISCONTINUED | OUTPATIENT
Start: 2022-06-17 | End: 2022-06-17

## 2022-06-17 RX ORDER — TRAMADOL HYDROCHLORIDE 50 MG/1
50 TABLET ORAL EVERY 6 HOURS PRN
Status: DISCONTINUED | OUTPATIENT
Start: 2022-06-17 | End: 2022-06-19 | Stop reason: HOSPADM

## 2022-06-17 RX ORDER — ASPIRIN 325 MG
325 TABLET ORAL ONCE
Status: COMPLETED | OUTPATIENT
Start: 2022-06-17 | End: 2022-06-17

## 2022-06-17 RX ORDER — PANTOPRAZOLE SODIUM 40 MG/1
40 TABLET, DELAYED RELEASE ORAL
Status: DISCONTINUED | OUTPATIENT
Start: 2022-06-18 | End: 2022-06-19 | Stop reason: HOSPADM

## 2022-06-17 RX ORDER — TIZANIDINE 4 MG/1
2 TABLET ORAL EVERY 6 HOURS PRN
Status: DISCONTINUED | OUTPATIENT
Start: 2022-06-17 | End: 2022-06-18

## 2022-06-17 RX ORDER — ONDANSETRON 4 MG/1
4 TABLET, ORALLY DISINTEGRATING ORAL EVERY 8 HOURS PRN
Status: DISCONTINUED | OUTPATIENT
Start: 2022-06-17 | End: 2022-06-19 | Stop reason: HOSPADM

## 2022-06-17 RX ORDER — GUAIFENESIN/DEXTROMETHORPHAN 100-10MG/5
5 SYRUP ORAL EVERY 12 HOURS PRN
Status: DISCONTINUED | OUTPATIENT
Start: 2022-06-17 | End: 2022-06-19 | Stop reason: HOSPADM

## 2022-06-17 RX ORDER — SODIUM CHLORIDE 0.9 % (FLUSH) 0.9 %
10 SYRINGE (ML) INJECTION EVERY 12 HOURS SCHEDULED
Status: DISCONTINUED | OUTPATIENT
Start: 2022-06-17 | End: 2022-06-19 | Stop reason: HOSPADM

## 2022-06-17 RX ORDER — ACETAMINOPHEN 325 MG/1
650 TABLET ORAL EVERY 6 HOURS PRN
Status: DISCONTINUED | OUTPATIENT
Start: 2022-06-17 | End: 2022-06-19 | Stop reason: HOSPADM

## 2022-06-17 RX ORDER — DIVALPROEX SODIUM 125 MG/1
125 CAPSULE, COATED PELLETS ORAL DAILY
Status: DISCONTINUED | OUTPATIENT
Start: 2022-06-18 | End: 2022-06-19 | Stop reason: HOSPADM

## 2022-06-17 RX ORDER — SIMETHICONE 80 MG
80 TABLET,CHEWABLE ORAL EVERY 6 HOURS PRN
Status: DISCONTINUED | OUTPATIENT
Start: 2022-06-17 | End: 2022-06-19 | Stop reason: HOSPADM

## 2022-06-17 RX ORDER — POTASSIUM CHLORIDE 20 MEQ/1
40 TABLET, EXTENDED RELEASE ORAL PRN
Status: DISCONTINUED | OUTPATIENT
Start: 2022-06-17 | End: 2022-06-19 | Stop reason: HOSPADM

## 2022-06-17 RX ORDER — GUAIFENESIN 600 MG/1
1200 TABLET, EXTENDED RELEASE ORAL 2 TIMES DAILY
Status: DISCONTINUED | OUTPATIENT
Start: 2022-06-17 | End: 2022-06-18

## 2022-06-17 RX ORDER — POLYETHYLENE GLYCOL 3350 17 G/17G
17 POWDER, FOR SOLUTION ORAL DAILY PRN
Status: DISCONTINUED | OUTPATIENT
Start: 2022-06-17 | End: 2022-06-19 | Stop reason: HOSPADM

## 2022-06-17 RX ORDER — SODIUM CHLORIDE 0.9 % (FLUSH) 0.9 %
10 SYRINGE (ML) INJECTION PRN
Status: DISCONTINUED | OUTPATIENT
Start: 2022-06-17 | End: 2022-06-19 | Stop reason: HOSPADM

## 2022-06-17 RX ADMIN — LATANOPROST 1 DROP: 50 SOLUTION/ DROPS OPHTHALMIC at 22:33

## 2022-06-17 RX ADMIN — APIXABAN 5 MG: 5 TABLET, FILM COATED ORAL at 22:33

## 2022-06-17 RX ADMIN — MICONAZOLE NITRATE: 20 POWDER TOPICAL at 22:33

## 2022-06-17 RX ADMIN — CLONIDINE HYDROCHLORIDE 0.1 MG: 0.1 TABLET ORAL at 22:33

## 2022-06-17 RX ADMIN — AMANTADINE HYDROCHLORIDE 100 MG: 100 CAPSULE ORAL at 22:33

## 2022-06-17 RX ADMIN — ANTACID TABLETS 500 MG: 500 TABLET, CHEWABLE ORAL at 22:32

## 2022-06-17 RX ADMIN — MELATONIN 3 MG ORAL TABLET 3 MG: 3 TABLET ORAL at 22:33

## 2022-06-17 RX ADMIN — QUETIAPINE FUMARATE 50 MG: 25 TABLET ORAL at 22:33

## 2022-06-17 RX ADMIN — FUROSEMIDE 40 MG: 10 INJECTION, SOLUTION INTRAMUSCULAR; INTRAVENOUS at 20:07

## 2022-06-17 RX ADMIN — ASPIRIN 325 MG: 325 TABLET, FILM COATED ORAL at 20:06

## 2022-06-17 RX ADMIN — SODIUM CHLORIDE, PRESERVATIVE FREE 10 ML: 5 INJECTION INTRAVENOUS at 22:33

## 2022-06-17 ASSESSMENT — ENCOUNTER SYMPTOMS: SHORTNESS OF BREATH: 1

## 2022-06-17 ASSESSMENT — HEART SCORE: ECG: 1

## 2022-06-17 ASSESSMENT — LIFESTYLE VARIABLES: HOW OFTEN DO YOU HAVE A DRINK CONTAINING ALCOHOL: NEVER

## 2022-06-17 NOTE — Clinical Note
Discharge Plan[de-identified] Other/[facility])   Telemetry/Cardiac Monitoring Required?: Yes

## 2022-06-17 NOTE — ED PROVIDER NOTES
[facility] ED  EMERGENCY DEPARTMENT ENCOUNTER      Pt Name: Kota Villalpando  MRN: [id number]  [place] 1950  Date of evaluation: 6/17/2022  Provider: [address]       Chief Complaint   Patient presents with    Chest Pain     pt states that has had pressure in her chest for 3-4 months    Weight Gain     statest that she gained approx. 20 pounds over the last month         HISTORY OF PRESENT ILLNESS   (Location/Symptom, Timing/Onset, Context/Setting, Quality, Duration, Modifying Factors, Severity)  Note limiting factors. Kota Villalpando is a 67 y.o. female who presents to the emergency department via EMS from home for evaluation of intermittent chest heaviness midsternal nonradiating with associated shortness of breath that worsened today at around 2 PM while cooking. Reports history of A. fib which is rate controlled in emergency department HTN DM and is a prior smoker. Patient denies radiation of pain history of syncopal events chest pain at present nausea vomiting diaphoresis abdominal pain fever or other complaints. Patient reports her last stress test was approximately 1 year ago followed by cardiologist Lee Barnett. She reports she is compliant with her Eliquis and Plavix. HPI    Nursing Notes were reviewed. REVIEW OF SYSTEMS    (2-9 systems for level 4, 10 or more for level 5)     Review of Systems   Constitutional: Negative. Respiratory: Positive for shortness of breath. Cardiovascular: Positive for chest pain. Genitourinary: Negative. Musculoskeletal: Negative. Skin: Negative. Neurological: Negative. Psychiatric/Behavioral: Negative. Except as noted above the remainder of the review of systems was reviewed and negative.        PAST MEDICAL HISTORY     Past Medical History:   Diagnosis Date    ADHD (attention deficit hyperactivity disorder)     Anemia     severe    Asthma     Bipolar disorder (HCC)     Bradycardia, unspecified     Chronic anemia     Chronic atrial fibrillation (HCC)     Chronic back pain     Chronic kidney disease     Patient states unaware of this. Has never been mentioned to her.      Colitis     Constipation     COPD (chronic obstructive pulmonary disease) (HCC)     Cystitis without hematuria     Diastolic CHF (HCC)     Esophagitis     grade A    GERD (gastroesophageal reflux disease)     Hemorrhoids     History of echocardiogram 06/02/2014    EF >60%, LV wall thickness mildly increased,    Hypertension     Hypothyroidism     Metabolic syndrome     Moderate protein-calorie malnutrition (HCC)     Muscle weakness (generalized)     Obesity     Pancreatitis     TIA (transient ischemic attack)          SURGICAL HISTORY       Past Surgical History:   Procedure Laterality Date    CARDIAC CATHETERIZATION Left 05/27/2021    right radial/ [facility]/ Dr. Ilda Lopez Right 12/27/2021    Dr. Cheryl Pool Left 02/07/2022    EYE CATARACT EMULSIFICATION IOL IMPLANT (Left Eye)    COLONOSCOPY  5/2011    ischemic colitis    COLONOSCOPY  03/07/2017    -diverticulosis,hemorrhoids    HYMENECTOMY      1972    HYSTERECTOMY (CERVIX STATUS UNKNOWN)      2008    INTRACAPSULAR CATARACT EXTRACTION Right 12/27/2021    EYE CATARACT EMULSIFICATION IOL IMPLANT performed by Bernardo Orr DO at [facility] Left 2/7/2022    EYE CATARACT EMULSIFICATION IOL IMPLANT performed by Bernardo Orr DO at [facility] Right     2008    TONSILLECTOMY AND ADENOIDECTOMY      UPPER GASTROINTESTINAL ENDOSCOPY  3/2013    5 cm axial hiatal hernia    UPPER GASTROINTESTINAL ENDOSCOPY  03/07/2017    Dr. Kaushal Smith         CURRENT MEDICATIONS       Previous Medications    ACETAMINOPHEN (TYLENOL) 325 MG TABLET    Take 650 mg by mouth every 4 hours as needed for Pain or Fever    ACETAMINOPHEN (TYLENOL) 650 MG EXTENDED RELEASE TABLET    Take 650 mg by mouth 4 times daily    AMANTADINE (SYMMETREL) 100 MG TABS TABLET    Take 100 mg by mouth 2 times daily    AMLODIPINE (NORVASC) 10 MG TABLET    Take 10 mg by mouth daily     APIXABAN (ELIQUIS) 5 MG TABS TABLET    Take 1 tablet by mouth 2 times daily    ATORVASTATIN (LIPITOR) 20 MG TABLET    Take 1 tablet by mouth daily    BISACODYL (DULCOLAX) 10 MG SUPPOSITORY    Place 10 mg rectally daily as needed for Constipation    CALCIUM CARBONATE 600 MG TABS TABLET    Take 1 tablet by mouth nightly     CHLORHEXIDINE (PERIDEX) 0.12 % SOLUTION    Take 15 mLs by mouth 2 times daily     CLONIDINE (CATAPRES) 0.1 MG TABLET    Take 0.1 mg by mouth 3 times daily    CLOPIDOGREL (PLAVIX) 75 MG TABLET    Take 1 tablet by mouth daily    DEXTROMETHORPHAN-GUAIFENESIN (DELSYM COUGH/CHEST CONGEST DM) 5-100 MG/5ML LIQD    Take 5 mLs by mouth every 12 hours as needed (cough)    DIVALPROEX (DEPAKOTE SPRINKLE) 125 MG CAPSULE    Take 125 mg by mouth daily     FUROSEMIDE (LASIX) 40 MG TABLET    Take 1 tablet by mouth daily    GUAIFENESIN (MUCINEX) 600 MG SR TABLET    Take 1,200 mg by mouth 2 times daily. PRN    LATANOPROST (XALATAN) 0.005 % OPHTHALMIC SOLUTION    Place 1 drop into the right eye nightly     LEVOTHYROXINE (SYNTHROID) 50 MCG TABLET    Take 50 mcg by mouth Daily    LOPERAMIDE (IMODIUM) 2 MG CAPSULE    Take 4 mg by mouth 4 times daily as needed for Diarrhea Every 4 hours    LORATADINE (CLARITIN) 10 MG TABLET    Take 10 mg by mouth daily. PRN     LOSARTAN (COZAAR) 50 MG TABLET    Take 1 tablet by mouth daily    MAGNESIUM HYDROXIDE (MILK OF MAGNESIA) 400 MG/5ML SUSPENSION    Take 30 mLs by mouth daily as needed for Constipation    MELATONIN 3 MG TABS TABLET    Take 3 mg by mouth nightly    METOPROLOL SUCCINATE (TOPROL XL) 25 MG EXTENDED RELEASE TABLET    TAKE 1 TABLET BY MOUTH DAILY. NITROGLYCERIN (NITROSTAT) 0.4 MG SL TABLET    up to max of 3 total doses. If no relief after 1 dose, call 911. NYSTATIN (MYCOSTATIN) 557197 UNIT/GM POWDER    Apply topically as needed Apply topically to breasts as needed    OMEPRAZOLE (PRILOSEC) 20 MG CAPSULE    TAKE 1 CAPSULE BY MOUTH DAILY. POLYETHYLENE GLYCOL (GLYCOLAX) PACKET    Take 17 g by mouth daily    POLYVINYL ALCOHOL (ARTIFICIAL TEARS) 1.4 % OPHTHALMIC SOLUTION    Place 1 drop into both eyes 4 times daily     QUETIAPINE (SEROQUEL) 25 MG TABLET    Take 50 mg by mouth at bedtime    SIMETHICONE (MYLICON) 80 MG CHEWABLE TABLET    Take 80 mg by mouth every 6 hours as needed for Flatulence    SODIUM CHLORIDE (OCEAN) 0.65 % NASAL SPRAY    1 spray by Nasal route 3 times daily as needed for Congestion    SODIUM PHOSPHATES (FLEET) 7-19 GM/118ML    Place 1 enema rectally as needed    TIZANIDINE (ZANAFLEX) 2 MG TABLET    Take 2 mg by mouth every 6 hours as needed    TRAMADOL (ULTRAM) 50 MG TABLET    Take 50 mg by mouth every 6 hours as needed for Pain.     TRAZODONE (DESYREL) 50 MG TABLET    Take 150 mg by mouth nightly     VALBENAZINE TOSYLATE (INGREZZA) 40 MG CAPS    Take 40 mg by mouth nightly    WHITE PETROLATUM GEL    Apply topically 2 times daily as needed for Dry Skin Arms and legs       ALLERGIES     Lamictal [lamotrigine], Oxcarbazepine, Pcn [penicillins], Sertraline, Trileptal, and Strattera [atomoxetine hcl]    FAMILY HISTORY       Family History   Problem Relation Age of Onset    Diabetes Mother     High Blood Pressure Mother     Diabetes Father     High Blood Pressure Father     Bipolar Disorder Sister     Schizophrenia Sister     High Blood Pressure Other         2 sons    Bipolar Disorder Other         2 sons          SOCIAL HISTORY       Social History     Socioeconomic History    Marital status:      Spouse name: None    Number of children: None    Years of education: None    Highest education level: None   Occupational History    None   Tobacco Use    Smoking status: Former Smoker     Packs/day: 0.25     Years: 10.00     Pack years: 2.50     Quit date: 3/12/1998     Years since quittin.2    Smokeless tobacco: Never Used   Substance and Sexual Activity    Alcohol use: No    Drug use: No    Sexual activity: None   Other Topics Concern    None   Social History Narrative    None     Social Determinants of Health     Financial Resource Strain:     Difficulty of Paying Living Expenses: Not on file   Food Insecurity:     Worried About Running Out of Food in the Last Year: Not on file    Gordon of Food in the Last Year: Not on file   Transportation Needs:     Lack of Transportation (Medical): Not on file    Lack of Transportation (Non-Medical):  Not on file   Physical Activity:     Days of Exercise per Week: Not on file    Minutes of Exercise per Session: Not on file   Stress:     Feeling of Stress : Not on file   Social Connections:     Frequency of Communication with Friends and Family: Not on file    Frequency of Social Gatherings with Friends and Family: Not on file    Attends Yazdanism Services: Not on file    Active Member of [address] [place] [address] or Organizations: Not on file    Attends Club or Organization Meetings: Not on file    Marital Status: Not on file   Intimate Partner Violence:     Fear of Current or Ex-Partner: Not on file    Emotionally Abused: Not on file    Physically Abused: Not on file    Sexually Abused: Not on file   Housing Stability:     Unable to Pay for Housing in the Last Year: Not on file    Number of [place] in the Last Year: Not on file    Unstable Housing in the Last Year: Not on file       SCREENINGS         Magnus Coma Scale  Eye Opening: Spontaneous  Best Verbal Response: Oriented  Best Motor Response: Obeys commands  Mabie Coma Scale Score: 15     Heart Score for chest pain patients  History: Highly Suspicious  ECG: Non-Specifc repolarization disturbance/LBTB/PM  Patient Age: > 65 years  *Risk factors for Atherosclerotic disease: Diabetes Mellitus,Hypertension,Obesity  Risk Factors: > 3 Risk factors or history of atherosclerotic disease*  Troponin: > 1 and < 3X normal limit  Heart Score Total: 8               CIWA Assessment  BP: 119/68  Heart Rate: 65                 PHYSICAL EXAM    (up to 7 for level 4, 8 or more for level 5)     ED Triage Vitals   BP Temp Temp Source Heart Rate Resp SpO2 Height Weight   06/17/22 1750 06/17/22 1750 06/17/22 1750 06/17/22 1744 06/17/22 1744 06/17/22 1744 -- 06/17/22 1744   102/63 97.6 °F (36.4 °C) Tympanic 72 15 98 %  276 lb (125.2 kg)       Physical Exam  Vitals and nursing note reviewed. Constitutional:       Appearance: Normal appearance. HENT:      Head: Normocephalic and atraumatic. Mouth/Throat:      Mouth: Mucous membranes are moist.   Cardiovascular:      Rate and Rhythm: Normal rate and regular rhythm. Pulmonary:      Effort: Pulmonary effort is normal.      Breath sounds: Normal breath sounds. Abdominal:      General: Abdomen is flat. Bowel sounds are normal.      Palpations: Abdomen is soft. Musculoskeletal:         General: Normal range of motion. Cervical back: Normal range of motion and neck supple. Skin:     Capillary Refill: Capillary refill takes less than 2 seconds. Neurological:      General: No focal deficit present. Mental Status: She is alert and oriented to person, place, and time.    Psychiatric:         Mood and Affect: Mood normal.         Behavior: Behavior normal.         DIAGNOSTIC RESULTS     EKG: All EKG's are interpreted by the Emergency Department Physician who either signs or Co-signs this chart in the absence of a cardiologist.    Rate controlled A. Fib, rate of 65 bpm    RADIOLOGY:   Non-plain film images such as CT, Ultrasound and MRI are read by the radiologist. Plain radiographic images are visualized and preliminarily interpreted by the emergency physician with the below findings:      Interpretation per the Radiologist below, if available at the time of this note:    XR CHEST PORTABLE   Final Result Mild pulmonary vascular congestion               ED BEDSIDE ULTRASOUND:   Performed by ED Physician - none    LABS:  Labs Reviewed   BASIC METABOLIC PANEL - Abnormal; Notable for the following components:       Result Value    Glucose 119 (*)     CREATININE 1.14 (*)     GFR Non- 47 (*)     GFR  57 (*)     All other components within normal limits   CBC WITH AUTO DIFFERENTIAL - Abnormal; Notable for the following components:    RBC 3.81 (*)     Hemoglobin 11.7 (*)     Hematocrit 35.9 (*)     Lymphocytes 23 (*)     All other components within normal limits   TROPONIN - Abnormal; Notable for the following components:    Troponin, High Sensitivity 18 (*)     All other components within normal limits   APTT - Abnormal; Notable for the following components:    PTT 36.5 (*)     All other components within normal limits   BRAIN NATRIURETIC PEPTIDE - Abnormal; Notable for the following components:    Pro-BNP 3,544 (*)     All other components within normal limits       All other labs were within normal range or not returned as of this dictation. EMERGENCY DEPARTMENT COURSE and DIFFERENTIAL DIAGNOSIS/MDM:   Vitals:    Vitals:    06/17/22 1744 06/17/22 1750 06/17/22 1815 06/17/22 1826   BP:  102/63 119/68    Pulse: 72  63 65   Resp: 15  23    Temp:  97.6 °F (36.4 °C)     TempSrc:  Tympanic     SpO2: 98%  97%    Weight: 276 lb (125.2 kg)              MDM  60-year-old elderly female presents to emergency department with multiple CAD risk factors chest pain shortness of breath described as heaviness over the past few months that recently got worse. Patient's initial EKG shows controlled A. fib with a rate of 65, CBC comprehensive cardiac markers BNP and chest x-ray will be ordered. Patient denies pain at present.     REASSESSMENT          CRITICAL CARE TIME   CONSULTS:  Discussed case with Dr. Karrie Diaz history clinical exam laboratory and imaging findings interventions in emergency department EKG findings who assumed care patient      FINAL IMPRESSION      1. Chest pain, unspecified type Stable   2. Dyspnea on exertion Stable         DISPOSITION/PLAN   DISPOSITION        PATIENT REFERRED TO:  No follow-up provider specified. DISCHARGE MEDICATIONS:  New Prescriptions    No medications on file     Controlled Substances Monitoring:     No flowsheet data found.     (Please note that portions of this note were completed with a voice recognition program.  Efforts were made to edit the dictations but occasionally words are mis-transcribed.)    Barbara Oliveros PA-C (electronically signed)  Attending Emergency Physician            Barbara Oliveros PA-C  06/17/22 1950

## 2022-06-18 LAB
ABSOLUTE EOS #: 0.28 K/UL (ref 0–0.44)
ABSOLUTE IMMATURE GRANULOCYTE: <0.03 K/UL (ref 0–0.3)
ABSOLUTE LYMPH #: 1.76 K/UL (ref 1.1–3.7)
ABSOLUTE MONO #: 0.68 K/UL (ref 0.1–1.2)
ALBUMIN SERPL-MCNC: 3.8 G/DL (ref 3.5–5.2)
ALBUMIN/GLOBULIN RATIO: 1.4 (ref 1–2.5)
ALP BLD-CCNC: 133 U/L (ref 35–104)
ALT SERPL-CCNC: 16 U/L (ref 5–33)
ANION GAP SERPL CALCULATED.3IONS-SCNC: 10 MMOL/L (ref 9–17)
AST SERPL-CCNC: 12 U/L
BASOPHILS # BLD: 1 % (ref 0–2)
BASOPHILS ABSOLUTE: 0.04 K/UL (ref 0–0.2)
BILIRUB SERPL-MCNC: 0.89 MG/DL (ref 0.3–1.2)
BUN BLDV-MCNC: 17 MG/DL (ref 8–23)
BUN/CREAT BLD: 15 (ref 9–20)
CALCIUM SERPL-MCNC: 9.4 MG/DL (ref 8.6–10.4)
CHLORIDE BLD-SCNC: 102 MMOL/L (ref 98–107)
CO2: 27 MMOL/L (ref 20–31)
CREAT SERPL-MCNC: 1.12 MG/DL (ref 0.5–0.9)
EKG ATRIAL RATE: 241 BPM
EKG Q-T INTERVAL: 392 MS
EKG QRS DURATION: 88 MS
EKG QTC CALCULATION (BAZETT): 407 MS
EKG R AXIS: -13 DEGREES
EKG T AXIS: -19 DEGREES
EKG VENTRICULAR RATE: 65 BPM
EOSINOPHILS RELATIVE PERCENT: 4 % (ref 1–4)
GFR AFRICAN AMERICAN: 58 ML/MIN
GFR NON-AFRICAN AMERICAN: 48 ML/MIN
GFR SERPL CREATININE-BSD FRML MDRD: ABNORMAL ML/MIN/{1.73_M2}
GFR SERPL CREATININE-BSD FRML MDRD: ABNORMAL ML/MIN/{1.73_M2}
GLUCOSE BLD-MCNC: 85 MG/DL (ref 70–99)
HCT VFR BLD CALC: 35.4 % (ref 36.3–47.1)
HEMOGLOBIN: 11.5 G/DL (ref 11.9–15.1)
IMMATURE GRANULOCYTES: 0 %
LYMPHOCYTES # BLD: 26 % (ref 24–43)
MCH RBC QN AUTO: 30.6 PG (ref 25.2–33.5)
MCHC RBC AUTO-ENTMCNC: 32.5 G/DL (ref 28.4–34.8)
MCV RBC AUTO: 94.1 FL (ref 82.6–102.9)
MONOCYTES # BLD: 10 % (ref 3–12)
NRBC AUTOMATED: 0 PER 100 WBC
PDW BLD-RTO: 12.9 % (ref 11.8–14.4)
PLATELET # BLD: 246 K/UL (ref 138–453)
PMV BLD AUTO: 9.2 FL (ref 8.1–13.5)
POTASSIUM SERPL-SCNC: 4 MMOL/L (ref 3.7–5.3)
RBC # BLD: 3.76 M/UL (ref 3.95–5.11)
SEG NEUTROPHILS: 59 % (ref 36–65)
SEGMENTED NEUTROPHILS ABSOLUTE COUNT: 4.04 K/UL (ref 1.5–8.1)
SODIUM BLD-SCNC: 139 MMOL/L (ref 135–144)
TOTAL PROTEIN: 6.5 G/DL (ref 6.4–8.3)
TROPONIN, HIGH SENSITIVITY: 19 NG/L (ref 0–14)
WBC # BLD: 6.8 K/UL (ref 3.5–11.3)

## 2022-06-18 PROCEDURE — 97162 PT EVAL MOD COMPLEX 30 MIN: CPT

## 2022-06-18 PROCEDURE — 2580000003 HC RX 258: Performed by: STUDENT IN AN ORGANIZED HEALTH CARE EDUCATION/TRAINING PROGRAM

## 2022-06-18 PROCEDURE — 6360000002 HC RX W HCPCS: Performed by: STUDENT IN AN ORGANIZED HEALTH CARE EDUCATION/TRAINING PROGRAM

## 2022-06-18 PROCEDURE — 94761 N-INVAS EAR/PLS OXIMETRY MLT: CPT

## 2022-06-18 PROCEDURE — 85025 COMPLETE CBC W/AUTO DIFF WBC: CPT

## 2022-06-18 PROCEDURE — 1200000000 HC SEMI PRIVATE

## 2022-06-18 PROCEDURE — 84484 ASSAY OF TROPONIN QUANT: CPT

## 2022-06-18 PROCEDURE — 80053 COMPREHEN METABOLIC PANEL: CPT

## 2022-06-18 PROCEDURE — 97166 OT EVAL MOD COMPLEX 45 MIN: CPT

## 2022-06-18 PROCEDURE — 6370000000 HC RX 637 (ALT 250 FOR IP): Performed by: STUDENT IN AN ORGANIZED HEALTH CARE EDUCATION/TRAINING PROGRAM

## 2022-06-18 PROCEDURE — 36415 COLL VENOUS BLD VENIPUNCTURE: CPT

## 2022-06-18 PROCEDURE — 93010 ELECTROCARDIOGRAM REPORT: CPT | Performed by: INTERNAL MEDICINE

## 2022-06-18 RX ORDER — QUETIAPINE FUMARATE 25 MG/1
50 TABLET, FILM COATED ORAL NIGHTLY
Status: DISCONTINUED | OUTPATIENT
Start: 2022-06-18 | End: 2022-06-18 | Stop reason: SDUPTHER

## 2022-06-18 RX ORDER — GUAIFENESIN 600 MG/1
1200 TABLET, EXTENDED RELEASE ORAL 2 TIMES DAILY PRN
Status: DISCONTINUED | OUTPATIENT
Start: 2022-06-18 | End: 2022-06-19 | Stop reason: HOSPADM

## 2022-06-18 RX ORDER — TIZANIDINE 4 MG/1
2 TABLET ORAL 3 TIMES DAILY
Status: DISCONTINUED | OUTPATIENT
Start: 2022-06-18 | End: 2022-06-19 | Stop reason: HOSPADM

## 2022-06-18 RX ORDER — CHLORHEXIDINE GLUCONATE 0.12 MG/ML
15 RINSE ORAL 2 TIMES DAILY PRN
Status: DISCONTINUED | OUTPATIENT
Start: 2022-06-18 | End: 2022-06-19 | Stop reason: HOSPADM

## 2022-06-18 RX ORDER — ACETAMINOPHEN 325 MG/1
650 TABLET ORAL EVERY 8 HOURS SCHEDULED
Status: DISCONTINUED | OUTPATIENT
Start: 2022-06-18 | End: 2022-06-19 | Stop reason: HOSPADM

## 2022-06-18 RX ORDER — DIVALPROEX SODIUM 250 MG/1
250 TABLET, EXTENDED RELEASE ORAL NIGHTLY
Status: DISCONTINUED | OUTPATIENT
Start: 2022-06-18 | End: 2022-06-19 | Stop reason: HOSPADM

## 2022-06-18 RX ORDER — FUROSEMIDE 10 MG/ML
40 INJECTION INTRAMUSCULAR; INTRAVENOUS 2 TIMES DAILY
Status: DISCONTINUED | OUTPATIENT
Start: 2022-06-18 | End: 2022-06-19 | Stop reason: HOSPADM

## 2022-06-18 RX ADMIN — MELATONIN 3 MG ORAL TABLET 3 MG: 3 TABLET ORAL at 20:31

## 2022-06-18 RX ADMIN — ATORVASTATIN CALCIUM 20 MG: 20 TABLET, FILM COATED ORAL at 09:12

## 2022-06-18 RX ADMIN — CLONIDINE HYDROCHLORIDE 0.1 MG: 0.1 TABLET ORAL at 20:31

## 2022-06-18 RX ADMIN — DIVALPROEX SODIUM 125 MG: 125 CAPSULE ORAL at 09:12

## 2022-06-18 RX ADMIN — CLOPIDOGREL BISULFATE 75 MG: 75 TABLET ORAL at 09:11

## 2022-06-18 RX ADMIN — ACETAMINOPHEN 650 MG: 325 TABLET ORAL at 23:15

## 2022-06-18 RX ADMIN — AMLODIPINE BESYLATE 10 MG: 10 TABLET ORAL at 09:11

## 2022-06-18 RX ADMIN — LOSARTAN POTASSIUM 50 MG: 50 TABLET, FILM COATED ORAL at 09:12

## 2022-06-18 RX ADMIN — METOPROLOL SUCCINATE 25 MG: 25 TABLET, EXTENDED RELEASE ORAL at 09:11

## 2022-06-18 RX ADMIN — AMANTADINE HYDROCHLORIDE 100 MG: 100 CAPSULE ORAL at 09:11

## 2022-06-18 RX ADMIN — ACETAMINOPHEN 650 MG: 325 TABLET ORAL at 13:20

## 2022-06-18 RX ADMIN — FUROSEMIDE 40 MG: 10 INJECTION, SOLUTION INTRAMUSCULAR; INTRAVENOUS at 09:13

## 2022-06-18 RX ADMIN — TIZANIDINE 2 MG: 4 TABLET ORAL at 09:12

## 2022-06-18 RX ADMIN — TIZANIDINE 2 MG: 4 TABLET ORAL at 20:31

## 2022-06-18 RX ADMIN — LEVOTHYROXINE SODIUM 50 MCG: 50 TABLET ORAL at 07:21

## 2022-06-18 RX ADMIN — AMANTADINE HYDROCHLORIDE 100 MG: 100 CAPSULE ORAL at 20:31

## 2022-06-18 RX ADMIN — LATANOPROST 1 DROP: 50 SOLUTION/ DROPS OPHTHALMIC at 20:32

## 2022-06-18 RX ADMIN — APIXABAN 5 MG: 5 TABLET, FILM COATED ORAL at 20:31

## 2022-06-18 RX ADMIN — FUROSEMIDE 40 MG: 10 INJECTION, SOLUTION INTRAMUSCULAR; INTRAVENOUS at 17:32

## 2022-06-18 RX ADMIN — APIXABAN 5 MG: 5 TABLET, FILM COATED ORAL at 09:11

## 2022-06-18 RX ADMIN — DIVALPROEX SODIUM 250 MG: 250 TABLET, FILM COATED, EXTENDED RELEASE ORAL at 20:31

## 2022-06-18 RX ADMIN — TIZANIDINE 2 MG: 4 TABLET ORAL at 13:20

## 2022-06-18 RX ADMIN — PANTOPRAZOLE SODIUM 40 MG: 40 TABLET, DELAYED RELEASE ORAL at 07:21

## 2022-06-18 RX ADMIN — CLONIDINE HYDROCHLORIDE 0.1 MG: 0.1 TABLET ORAL at 09:11

## 2022-06-18 RX ADMIN — CETIRIZINE HYDROCHLORIDE 10 MG: 10 TABLET, FILM COATED ORAL at 09:12

## 2022-06-18 RX ADMIN — CLONIDINE HYDROCHLORIDE 0.1 MG: 0.1 TABLET ORAL at 13:20

## 2022-06-18 RX ADMIN — SODIUM CHLORIDE, PRESERVATIVE FREE 10 ML: 5 INJECTION INTRAVENOUS at 20:35

## 2022-06-18 RX ADMIN — ANTACID TABLETS 500 MG: 500 TABLET, CHEWABLE ORAL at 20:31

## 2022-06-18 RX ADMIN — QUETIAPINE FUMARATE 50 MG: 25 TABLET ORAL at 20:31

## 2022-06-18 ASSESSMENT — PAIN DESCRIPTION - LOCATION
LOCATION: BACK
LOCATION: BACK
LOCATION: KNEE;SHOULDER
LOCATION: LEG

## 2022-06-18 ASSESSMENT — PAIN SCALES - GENERAL
PAINLEVEL_OUTOF10: 1
PAINLEVEL_OUTOF10: 1
PAINLEVEL_OUTOF10: 4
PAINLEVEL_OUTOF10: 2

## 2022-06-18 ASSESSMENT — PAIN DESCRIPTION - DESCRIPTORS
DESCRIPTORS: ACHING
DESCRIPTORS: ACHING
DESCRIPTORS: CRAMPING
DESCRIPTORS: ACHING

## 2022-06-18 ASSESSMENT — PAIN - FUNCTIONAL ASSESSMENT
PAIN_FUNCTIONAL_ASSESSMENT: ACTIVITIES ARE NOT PREVENTED

## 2022-06-18 ASSESSMENT — PAIN DESCRIPTION - ORIENTATION
ORIENTATION: LOWER;MID
ORIENTATION: RIGHT;LEFT
ORIENTATION: RIGHT;LEFT
ORIENTATION: LOWER

## 2022-06-18 ASSESSMENT — PAIN DESCRIPTION - PAIN TYPE: TYPE: CHRONIC PAIN

## 2022-06-18 NOTE — PROGRESS NOTES
Patient arrived at [facility] floor at this time via wheelchair from ED. Patient ambulated to the bed with minimal assistance. Patient alert and oriented x4. Vitals and assessment completed at this time, as charted. Admission navigator completed. Patient oriented to room and call light at this time. Patient denies further needs. Call light within reach, will continue to monitor.

## 2022-06-18 NOTE — PROGRESS NOTES
Physical Therapy  Facility/Department: [facility] AT THE [facility] MED SURG  Physical Therapy Initial Assessment    Name: Cassie Zafar  : 1950  MRN: [id number]  Date of Service: 2022    Discharge Recommendations:  Continue to assess pending progress,Subacute/Skilled Nursing Facility          Patient Diagnosis(es): The primary encounter diagnosis was Chest pain, unspecified type. A diagnosis of Dyspnea on exertion was also pertinent to this visit. Past Medical History:  has a past medical history of ADHD (attention deficit hyperactivity disorder), Anemia, Asthma, Bipolar disorder ([address] Bradycardia, unspecified, Chronic anemia, Chronic atrial fibrillation ([address] Chronic back pain, Chronic kidney disease, Colitis, Constipation, COPD (chronic obstructive pulmonary disease) ([address] Cystitis without hematuria, Diastolic CHF ([address] Esophagitis, GERD (gastroesophageal reflux disease), Hemorrhoids, History of echocardiogram, Hypertension, Hypothyroidism, Metabolic syndrome, Moderate protein-calorie malnutrition ([address] Muscle weakness (generalized), Obesity, Pancreatitis, and TIA (transient ischemic attack). Past Surgical History:  has a past surgical history that includes Hysterectomy; meniscectomy (Right); Tonsillectomy and adenoidectomy; Hymenectomy; Upper gastrointestinal endoscopy (3/2013); Colonoscopy (2011); Upper gastrointestinal endoscopy (2017); Colonoscopy (2017); Cardiac catheterization (Left, 2021); Cataract removal with implant (Right, 2021); Intracapsular cataract extraction (Right, 2021); Cataract removal with implant (Left, 2022); and Intracapsular cataract extraction (Left, 2022).     Assessment   Assessment: Patient is 67year old female with dx of shortness of breath who presents with decreased B LE strength, decreased functional mobility and endurance and decreased safety and balance during transfers and ambulation and would benefit from physical therapy to address all concerns and safely return to OF. Treatment Diagnosis: Difficulty walking  Therapy Prognosis: Good  Decision Making: Medium Complexity  Requires PT Follow-Up: Yes  Activity Tolerance  Activity Tolerance: Patient tolerated evaluation without incident     Plan   Plan  Plan: 2 times a day 7 days a week (daily on weekends)  Current Treatment Recommendations: Strengthening,ROM,Balance training,Functional mobility training,Transfer training,Endurance training,Gait training,Neuromuscular re-education,Home exercise program,Safety education & training,Patient/Caregiver education & training,Therapeutic activities  Safety Devices  Type of Devices: All fall risk precautions in place,Patient at risk for falls,Call light within reach,Left in chair     Restrictions  Restrictions/Precautions  Restrictions/Precautions: Fall Risk,General Precautions     Subjective   General  Chart Reviewed: Yes  Patient assessed for rehabilitation services?: Yes  Response To Previous Treatment: Not applicable  Family / Caregiver Present: No  Referring Practitioner: Dr. Paige Jimenez MD  Referral Date : 06/17/22  Diagnosis: Shortness of breath, R06.02  Follows Commands: Within Functional Limits  Subjective  Subjective: Patient reports low back pain but does not rate at this time. Social/Functional History  Social/Functional History  Lives With: Other (comment) (Pt lives at [facility])  Type of Home: Facility  Home Layout: One level  Home Access: Level entry  Home Equipment: Walker, 4 wheeled,Wheelchair-manual  ADL Assistance: Needs assistance  Homemaking Responsibilities: No  Ambulation Assistance: Independent  Transfer Assistance: Independent  Additional Comments: Pt reports furniture walking in her room and pushes a w/c when in the halls so she can sit down when tired.   Vision/Hearing  Vision  Vision: Within Functional Limits  Hearing  Hearing: Within functional limits    Cognition   Orientation  Overall Orientation Status: Within Normal Limits  Cognition  Overall Cognitive Status: WNL     Objective   Heart Rate: 70  Heart Rate Source: Monitor; Apical  BP: (!) 171/73  BP Location: Left upper arm  Patient Position: Semi fowlers  MAP (Calculated): 105.67  Resp: 16  SpO2: 95 %  O2 Device: None (Room air)        Gross Assessment  AROM: Within functional limits  Strength: Generally decreased, functional                    Bed mobility  Bed Mobility Comments: Pt up in chair upon arrival.  Transfers  Sit to Stand: Contact guard assistance;Stand by assistance  Stand to sit: Contact guard assistance;Stand by assistance  Ambulation  Surface: level tile  Device: No Device  Assistance: Stand by assistance  Distance: Pt amb 30ft with no AD, occasionally grabbing furniture in room for support, SBA     Balance  Sitting - Static: Good  Sitting - Dynamic: Good  Standing - Static: Fair;+  Standing - Dynamic: Fair           Goals  Short Term Goals  Time Frame for Short term goals: 20 days  Short term goal 1: Patient to complete all transfers mod. Ind with no LOB to decrease fall risk. Short term goal 2: Patient to ambulate 150ft with LRAD and no LOB or fatigue to improve mobility. Short term goal 3: Patient to tolerate 20-30 min of ther ex/act to improve functional strength. Short term goal 4: Patient to have good static standing balance to decrease fall risk.        Education  Patient Education  Education Given To: Patient  Education Provided: Role of Therapy;Plan of Care  Education Method: Verbal  Barriers to Learning: None  Education Outcome: Verbalized understanding      Therapy Time   Individual Concurrent Group Co-treatment   Time In 0830         Time Out 0840         Minutes 10         Timed Code Treatment Minutes: [address], PT, DPT

## 2022-06-18 NOTE — PROGRESS NOTES
Patient up in chair playing on phone. Vitals and assessment completed. Vitals stable. Medications administered. Patient denies having needs. Water supplied to patient. Call light within reach. Will continue to monitor.

## 2022-06-18 NOTE — H&P
[place] [address]    History & Physical Examination Note              Date:   6/18/2022  Patient name:  Natalie Alicia  Date of admission:  6/17/2022  5:39 PM  MRN:   [id number]  YOB: 1950    CHIEF COMPLAINT:     Chief Complaint   Patient presents with    Chest Pain     pt states that has had pressure in her chest for 3-4 months    Weight Gain     statest that she gained approx. 20 pounds over the last month       History Obtained From:  Patient and chart review. HPI:     The patient is a 67 y.o.  female who presents to the ED from home with concerns for intermittent chest heaviness and shortness of breath that had been worsening over the day. Patient states that her shortness of breath worsened around 2:00pm.  She was not doing anything in particular at the time. Patient notes that she has gained about 20+ pounds over the past 2 months. She states that she recently had increased the Depakote level. Patient does have a history of A. fib and is rate controlled. She denies any nausea vomiting or diarrhea. Patient states she also has a history of chronic leg edema that is not worse than her typical baseline. The patient does follow with cardiology. She notes a history of tardive dyskinesia from being on medications. In the ED labs and imaging were obtained, there is concern for acute on chronic CHF. Chest x-ray indicated mild pulmonary vascular congestion. Kerri Michelle      PAST MEDICAL HISTORY:        has a past medical history of ADHD (attention deficit hyperactivity disorder), Anemia, Asthma, Bipolar disorder ([address] Bradycardia, unspecified, Chronic anemia, Chronic atrial fibrillation ([address] Chronic back pain, Chronic kidney disease, Colitis, Constipation, COPD (chronic obstructive pulmonary disease) ([address] Cystitis without hematuria, Diastolic CHF ([address] Esophagitis, GERD (gastroesophageal reflux disease), Hemorrhoids, History of echocardiogram, Hypertension, Hypothyroidism, Metabolic syndrome, Moderate protein-calorie malnutrition ([address] Muscle weakness (generalized), Obesity, Pancreatitis, and TIA (transient ischemic attack). Diagnosis Date    ADHD (attention deficit hyperactivity disorder)     Anemia     severe    Asthma     Bipolar disorder (HCC)     Bradycardia, unspecified     Chronic anemia     Chronic atrial fibrillation (HCC)     Chronic back pain     Chronic kidney disease     Patient states unaware of this. Has never been mentioned to her.  Colitis     Constipation     COPD (chronic obstructive pulmonary disease) (HCC)     Cystitis without hematuria     Diastolic CHF (HCC)     Esophagitis     grade A    GERD (gastroesophageal reflux disease)     Hemorrhoids     History of echocardiogram 06/02/2014    EF >60%, LV wall thickness mildly increased,    Hypertension     Hypothyroidism     Metabolic syndrome     Moderate protein-calorie malnutrition (HCC)     Muscle weakness (generalized)     Obesity     Pancreatitis     TIA (transient ischemic attack)        PAST SURGICAL HISTORY:      has a past surgical history that includes Hysterectomy; meniscectomy (Right); Tonsillectomy and adenoidectomy; Hymenectomy; Upper gastrointestinal endoscopy (3/2013); Colonoscopy (5/2011); Upper gastrointestinal endoscopy (03/07/2017); Colonoscopy (03/07/2017); Cardiac catheterization (Left, 05/27/2021); Cataract removal with implant (Right, 12/27/2021); Intracapsular cataract extraction (Right, 12/27/2021); Cataract removal with implant (Left, 02/07/2022); and Intracapsular cataract extraction (Left, 2/7/2022).        Procedure Laterality Date    CARDIAC CATHETERIZATION Left 05/27/2021    right \[facility] [place] [facility]\""/ [facility]/ Dr. Daphne Ambriz Right 12/27/2021    Dr. Agus English Left 02/07/2022    EYE CATARACT EMULSIFICATION IOL IMPLANT (Left Eye)    COLONOSCOPY  5/2011    ischemic colitis    COLONOSCOPY  03/07/2017    -diverticulosis,hemorrhoids    HYMENECTOMY      1972    HYSTERECTOMY (CERVIX STATUS UNKNOWN)      2008    INTRACAPSULAR CATARACT EXTRACTION Right 12/27/2021    EYE CATARACT EMULSIFICATION IOL IMPLANT performed by Bernardo Orr DO at [address] Left 2/7/2022    EYE CATARACT EMULSIFICATION IOL IMPLANT performed by Bernardo Orr DO at [address] Right     2008    TONSILLECTOMY AND ADENOIDECTOMY      UPPER GASTROINTESTINAL ENDOSCOPY  3/2013    5 cm axial hiatal hernia    UPPER GASTROINTESTINAL ENDOSCOPY  03/07/2017    Dr. Kaushal Smith       FAMILY HISTORY:     family history includes Bipolar Disorder in her sister and another family member; Diabetes in her father and mother; High Blood Pressure in her father, mother, and another family member; Schizophrenia in her sister. Problem Relation Age of Onset    Diabetes Mother     High Blood Pressure Mother     Diabetes Father     High Blood Pressure Father     Bipolar Disorder Sister     Schizophrenia Sister     High Blood Pressure Other         2 sons    Bipolar Disorder Other         2 sons     HOME MEDICATIONS:     Prior to Admission medications    Medication Sig Start Date End Date Taking? Authorizing Provider   divalproex (DEPAKOTE ER) 250 MG extended release tablet Take 250 mg by mouth nightly   Yes Historical Provider, MD   QUEtiapine (SEROQUEL) 50 MG tablet Take 50 mg by mouth nightly 6/10/22   Historical Provider, MD   divalproex (DEPAKOTE SPRINKLE) 125 MG capsule Take 125 mg by mouth daily     Historical Provider, MD   loperamide (IMODIUM) 2 MG capsule Take 4 mg by mouth 4 times daily as needed for Diarrhea Every 4 hours    Historical Provider, MD   metoprolol succinate (TOPROL XL) 25 MG extended release tablet TAKE 1 TABLET BY MOUTH DAILY.  3/22/22   Monse Oliver MD   simethicone (MYLICON) 80 MG chewable tablet Take 80 mg by mouth every 6 hours as needed for Flatulence    Historical Provider, MD   nystatin (MYCOSTATIN) 889890 UNIT/GM powder Apply topically as needed Apply topically to breasts as needed    Historical Provider, MD   levothyroxine (SYNTHROID) 50 MCG tablet Take 50 mcg by mouth Daily    Historical Provider, MD   latanoprost (XALATAN) 0.005 % ophthalmic solution Place 1 drop into the right eye nightly     Historical Provider, MD   polyvinyl alcohol (ARTIFICIAL TEARS) 1.4 % ophthalmic solution Place 1 drop into both eyes 4 times daily     Historical Provider, MD   acetaminophen (TYLENOL) 650 MG extended release tablet Take 650 mg by mouth 4 times daily    Historical Provider, MD   Sodium Phosphates (FLEET) 7-19 GM/118ML Place 1 enema rectally as needed    Historical Provider, MD   traMADol (ULTRAM) 50 MG tablet Take 50 mg by mouth every 6 hours as needed for Pain. Historical Provider, MD   white petrolatum GEL Apply topically 2 times daily as needed for Dry Skin Arms and legs    Historical Provider, MD   chlorhexidine (PERIDEX) 0.12 % solution Take 15 mLs by mouth 2 times daily  6/10/21   Historical Provider, MD   atorvastatin (LIPITOR) 20 MG tablet Take 1 tablet by mouth daily 6/15/21   Amna Murray MD   nitroGLYCERIN (NITROSTAT) 0.4 MG SL tablet up to max of 3 total doses.  If no relief after 1 dose, call 911. 5/29/21   Leon Espinoza MD   clopidogrel (PLAVIX) 75 MG tablet Take 1 tablet by mouth daily 5/30/21   Leon Espinoza MD   losartan (COZAAR) 50 MG tablet Take 1 tablet by mouth daily 5/29/21   SONJA Sosa CNP   furosemide (LASIX) 40 MG tablet Take 1 tablet by mouth daily 5/29/21   SONJA Sosa - CNP   apixaban (ELIQUIS) 5 MG TABS tablet Take 1 tablet by mouth 2 times daily 5/13/21   Estevan Zhou MD   tiZANidine (ZANAFLEX) 2 MG tablet Take 2 mg by mouth every 6 hours as needed    Historical Provider, MD   cloNIDine (CATAPRES) 0.1 MG tablet Take 0.1 mg by mouth 3 times daily    Historical Provider, MD   amLODIPine (NORVASC) 10 MG tablet Take 10 mg by mouth daily     Historical Provider, MD   Valbenazine Tosylate (INGREZZA) 40 MG CAPS Take 40 mg by mouth nightly    Historical Provider, MD   melatonin 3 MG TABS tablet Take 3 mg by mouth nightly    Historical Provider, MD   polyethylene glycol (GLYCOLAX) packet Take 17 g by mouth daily    Historical Provider, MD   Amantadine (SYMMETREL) 100 MG TABS tablet Take 100 mg by mouth 2 times daily    Historical Provider, MD   Dextromethorphan-guaiFENesin (DELSYM COUGH/CHEST CONGEST DM) 5-100 MG/5ML LIQD Take 5 mLs by mouth every 12 hours as needed (cough)    Historical Provider, MD   acetaminophen (TYLENOL) 325 MG tablet Take 650 mg by mouth every 4 hours as needed for Pain or Fever    Historical Provider, MD   sodium chloride (OCEAN) 0.65 % nasal spray 1 spray by Nasal route 3 times daily as needed for Congestion    Historical Provider, MD   bisacodyl (DULCOLAX) 10 MG suppository Place 10 mg rectally daily as needed for Constipation    Historical Provider, MD   magnesium hydroxide (MILK OF MAGNESIA) 400 MG/5ML suspension Take 30 mLs by mouth daily as needed for Constipation    Historical Provider, MD   calcium carbonate 600 MG TABS tablet Take 1 tablet by mouth nightly     Historical Provider, MD   omeprazole (PRILOSEC) 20 MG capsule TAKE 1 CAPSULE BY MOUTH DAILY. 5/21/15   Kathy Hughes MD   traZODone (DESYREL) 50 MG tablet Take 150 mg by mouth nightly   Patient not taking: Reported on 6/10/2022    Historical Provider, MD   guaifenesin (MUCINEX) 600 MG SR tablet Take 1,200 mg by mouth 2 times daily. PRN    Historical Provider, MD   loratadine (CLARITIN) 10 MG tablet Take 10 mg by mouth daily. PRN     Historical Provider, MD       ALLERGIES:      Lamictal [lamotrigine], Oxcarbazepine, Pcn [penicillins], Sertraline, Trileptal, and Strattera [atomoxetine hcl]    SOCIAL HISTORY:      reports that she quit smoking about 24 years ago.  She has a 2.50 pack-year smoking history. She has never used smokeless tobacco. She reports that she does not drink alcohol and does not use drugs. TOBACCO:   reports that she quit smoking about 24 years ago. She has a 2.50 pack-year smoking history. She has never used smokeless tobacco.  ETOH:   reports no history of alcohol use. REVIEW OF SYSTEMS:     CONSTITUTIONAL:  no fevers  EYES: negative for double vision  HEENT: No headaches, no rhinorrhea, no nasal congestion, no sore throat, no difficulty swallowing  RESPIRATORY: positive for dyspnea, no wheezing. CARDIOVASCULAR: negative for chest pain, no palpitations, no fatigue, no edema   GASTROINTESTINAL: no nausea, no vomiting, no change in bowel habits, no abdominal pain   GENITOURINARY: negative for frequency, no dysuria, no nocturia   INTEGUMENT: negative for rash, no easy bruising   HEMATOLOGIC/LYMPHATIC: positive for swelling/edema   ALLERGIC/IMMUNOLOGIC: negative for urticaria   ENDOCRINE: no polydipsia, no polyuria, no hot or cold intolerance  MUSCULOSKELETAL: no joint pains, no muscle aches, no swelling of joints or extremities  NEUROLOGICAL: no numbness, no tingling  BEHAVIOR/PSYCH: negative    PHYSICAL EXAM:     Vitals:    06/18/22 0021 06/18/22 0400 06/18/22 0707 06/18/22 0911   BP: (!) 168/73  (!) 171/73 (!) 142/66   Pulse: 68  70 73   Resp: 16  16    Temp: 97.5 °F (36.4 °C)  96.9 °F (36.1 °C)    TempSrc: Temporal  Temporal    SpO2: 96%  95%    Weight:  278 lb 12.8 oz (126.5 kg)     Height:             Intake/Output Summary (Last 24 hours) at 6/18/2022 1219  Last data filed at 6/18/2022 1140  Gross per 24 hour   Intake --   Output 5000 ml   Net -5000 ml      General Appearance  Alert , awake , oriented x 3, not in acute distress   HEENT - Head is normocephalic, atraumatic.    Eye - no icterus no redness, EOMI   Ear- NO ear pain, normal external ear , no discharge   Lungs - Bilateral equal air entry diminished with wheezing in the posterior lung fields bilaterally in the bases, no rales or rhonchi, aeration good   Cardiovascular - Heart sounds are normal.  Regular rhythm, normal rate without murmur, gallop or rub.  Abdomen - Soft, nontender, nondistended, no masses or organomegaly   Neurologic - There are no new focal motor or sensory deficits, muscle strength 5/5 in all extremities, normal muscle tone and bulk, no abnormal sensation.    Skin - No bruising or bleeding on exposed skin area   Extremities - No cyanosis, clubbing, trace edema with chronic erythema (per patient)   Psych - normal affect     DIAGNOSTICS:      Laboratory Testing:    Recent Results (from the past 24 hour(s))   EKG 12 Lead    Collection Time: 06/17/22  5:54 PM   Result Value Ref Range    Ventricular Rate 65 BPM    Atrial Rate 241 BPM    QRS Duration 88 ms    Q-T Interval 392 ms    QTc Calculation (Bazett) 407 ms    R Axis -13 degrees    T Axis -19 degrees   Basic Metabolic Panel    Collection Time: 06/17/22  6:05 PM   Result Value Ref Range    Glucose 119 (H) 70 - 99 mg/dL    BUN 17 8 - 23 mg/dL    CREATININE 1.14 (H) 0.50 - 0.90 mg/dL    Bun/Cre Ratio 15 9 - 20    Calcium 9.2 8.6 - 10.4 mg/dL    Sodium 135 135 - 144 mmol/L    Potassium 4.2 3.7 - 5.3 mmol/L    Chloride 99 98 - 107 mmol/L    CO2 22 20 - 31 mmol/L    Anion Gap 14 9 - 17 mmol/L    GFR Non-African American 47 (L) >60 mL/min    GFR  57 (L) >60 mL/min    GFR Comment          GFR Staging         CBC with Auto Differential    Collection Time: 06/17/22  6:05 PM   Result Value Ref Range    WBC 7.0 3.5 - 11.3 k/uL    RBC 3.81 (L) 3.95 - 5.11 m/uL    Hemoglobin 11.7 (L) 11.9 - 15.1 g/dL    Hematocrit 35.9 (L) 36.3 - 47.1 %    MCV 94.2 82.6 - 102.9 fL    MCH 30.7 25.2 - 33.5 pg    MCHC 32.6 28.4 - 34.8 g/dL    RDW 12.9 11.8 - 14.4 %    Platelets 784 087 - 830 k/uL    MPV 9.3 8.1 - 13.5 fL    NRBC Automated 0.0 0.0 per 100 WBC    Seg Neutrophils 64 36 - 65 %    Lymphocytes 23 (L) 24 - 43 %    Monocytes 8 3 - 12 %    Eosinophils % 4 1 - 4 %    Basophils 1 0 - 2 %    Immature Granulocytes 0 0 %    Segs Absolute 4.54 1.50 - 8.10 k/uL    Absolute Lymph # 1.60 1.10 - 3.70 k/uL    Absolute Mono # 0.53 0.10 - 1.20 k/uL    Absolute Eos # 0.28 0.00 - 0.44 k/uL    Basophils Absolute 0.05 0.00 - 0.20 k/uL    Absolute Immature Granulocyte 0.03 0.00 - 0.30 k/uL   Troponin    Collection Time: 06/17/22  6:05 PM   Result Value Ref Range    Troponin, High Sensitivity 18 (H) 0 - 14 ng/L   APTT    Collection Time: 06/17/22  6:05 PM   Result Value Ref Range    PTT 36.5 (H) 26.8 - 34.8 sec   Brain Natriuretic Peptide    Collection Time: 06/17/22  6:05 PM   Result Value Ref Range    Pro-BNP 3,544 (H) <300 pg/mL   Troponin    Collection Time: 06/17/22 10:50 PM   Result Value Ref Range    Troponin, High Sensitivity 20 (H) 0 - 14 ng/L   CBC auto differential    Collection Time: 06/18/22  6:20 AM   Result Value Ref Range    WBC 6.8 3.5 - 11.3 k/uL    RBC 3.76 (L) 3.95 - 5.11 m/uL    Hemoglobin 11.5 (L) 11.9 - 15.1 g/dL    Hematocrit 35.4 (L) 36.3 - 47.1 %    MCV 94.1 82.6 - 102.9 fL    MCH 30.6 25.2 - 33.5 pg    MCHC 32.5 28.4 - 34.8 g/dL    RDW 12.9 11.8 - 14.4 %    Platelets 538 651 - 533 k/uL    MPV 9.2 8.1 - 13.5 fL    NRBC Automated 0.0 0.0 per 100 WBC    Seg Neutrophils 59 36 - 65 %    Lymphocytes 26 24 - 43 %    Monocytes 10 3 - 12 %    Eosinophils % 4 1 - 4 %    Basophils 1 0 - 2 %    Immature Granulocytes 0 0 %    Segs Absolute 4.04 1.50 - 8.10 k/uL    Absolute Lymph # 1.76 1.10 - 3.70 k/uL    Absolute Mono # 0.68 0.10 - 1.20 k/uL    Absolute Eos # 0.28 0.00 - 0.44 k/uL    Basophils Absolute 0.04 0.00 - 0.20 k/uL    Absolute Immature Granulocyte <0.03 0.00 - 0.30 k/uL   Comprehensive Metabolic Panel w/ Reflex to MG    Collection Time: 06/18/22  6:20 AM   Result Value Ref Range    Glucose 85 70 - 99 mg/dL    BUN 17 8 - 23 mg/dL    CREATININE 1.12 (H) 0.50 - 0.90 mg/dL    Bun/Cre Ratio 15 9 - 20    Calcium 9.4 8.6 - 10.4 mg/dL    Sodium 139 135 - 144 mmol/L Potassium 4.0 3.7 - 5.3 mmol/L    Chloride 102 98 - 107 mmol/L    CO2 27 20 - 31 mmol/L    Anion Gap 10 9 - 17 mmol/L    Alkaline Phosphatase 133 (H) 35 - 104 U/L    ALT 16 5 - 33 U/L    AST 12 <32 U/L    Total Bilirubin 0.89 0.3 - 1.2 mg/dL    Total Protein 6.5 6.4 - 8.3 g/dL    Albumin 3.8 3.5 - 5.2 g/dL    Albumin/Globulin Ratio 1.4 1.0 - 2.5    GFR Non- 48 (L) >60 mL/min    GFR  58 (L) >60 mL/min    GFR Comment          GFR Staging         Troponin    Collection Time: 06/18/22  6:20 AM   Result Value Ref Range    Troponin, High Sensitivity 19 (H) 0 - 14 ng/L         Imaging/Diagonstics:  XR CHEST PORTABLE    Result Date: 6/17/2022  EXAMINATION: ONE XRAY VIEW OF THE CHEST 6/17/2022 2:59 pm COMPARISON: 01/18/2017 HISTORY: ORDERING SYSTEM PROVIDED HISTORY: cp TECHNOLOGIST PROVIDED HISTORY: cp FINDINGS: Cardiac size is enlarged. No acute infiltrates are seen . The pulmonary vascularity is hazy and indistinct. No pneumothorax. No pleural effusions identified. Posttraumatic deformity of the proximal right humerus. Mild pulmonary vascular congestion         ASSESSMENT:       Principal Problem:    Shortness of breath  Active Problems:    Bipolar 1 disorder (HCC)    DDD (degenerative disc disease), lumbar    Essential hypertension    Iron deficiency anemia    Hypothyroidism    Other chronic pain    Morbid obesity (HCC)    CAD, multiple vessel    Chest pain    PVD (peripheral vascular disease) ([facility])    Atrial fibrillation, unspecified type ([address]    Stage 3a chronic kidney disease ([facility]  Resolved Problems:    * No resolved hospital problems.  *      PLAN:     Patient status: Admit the patient as Observation    Shortness of breath, Weight Gain, Chest Pain, Afib  Lasix 40mg BID  Monitor CBC/CMP, K/Mg replacement  Intake/output  Trop trend, 18, 20, 19; EKG reviewed  CXR reviewed  Resume norvasc,Catapres, losartan, Toprol XL  Resume atorvastatin  Resume home tramadol  Resume Seroquel, Depakote, Symmetrel, Zanaflex  Synthroid 50mcg, TSH WNL 5/12/2022  On Plavix    DVT prophylaxis: already anticoagulated with Eliquis 5mg BID  GI prophylaxis: Protonix 40 mg daily    Consultations:   Consults: IP CONSULT TO CASE MANAGEMENT  PT/OT    Nursing:  Vital signs per unit routine  Continuous Pulse Oximetry  Spot check  RT Evaluation & Treat   DIET   Daily weights  Fall precautions  Incentive spirometry  Intake and output   Neurovascular checks  Place intermittent pneumatic compression device  Telemetry monitoring    Advance Directive Addressed: Yes    Above plan discussed with the patient who agreed to the above plan     CORE MEASURES  Core measures including DVT prophylaxis, Code Status/Advanced Directives, Nutrition, Therapy Options as well as prior records, imaging, and labs were reviewed at this encounter. Please note that this chart was generated using voice recognition Dragon dictation software. Although every effort was made to ensure the accuracy of this automated transcription, some errors in transcription may have occurred.     Lashonda Mathis MD  6/18/2022 12:19 PM

## 2022-06-18 NOTE — PLAN OF CARE
Problem: Safety - Adult  Goal: Free from fall injury  Outcome: Progressing  Flowsheets (Taken 6/18/2022 0129)  Free From Fall Injury: Instruct family/caregiver on patient safety     Problem: Skin/Tissue Integrity  Goal: Absence of new skin breakdown  Description: 1. Monitor for areas of redness and/or skin breakdown  2. Assess vascular access sites hourly  3. Every 4-6 hours minimum:  Change oxygen saturation probe site  4. Every 4-6 hours:  If on nasal continuous positive airway pressure, respiratory therapy assess nares and determine need for appliance change or resting period. Outcome: Progressing  Note: Danish scale monitoring per protocol. Inspect skin for breakdown frequently. Encourage pt to make frequent large adjustments in position or assist patient with turning. Document all areas of breakdown.         Problem: Pain  Goal: Verbalizes/displays adequate comfort level or baseline comfort level  Outcome: Progressing  Flowsheets (Taken 6/18/2022 0129)  Verbalizes/displays adequate comfort level or baseline comfort level:   Encourage patient to monitor pain and request assistance   Assess pain using appropriate pain scale

## 2022-06-18 NOTE — PROGRESS NOTES
Writer at bedside for second assessment. Patient sitting up in bed, respirations are even and unlabored while on room air. Vitals obtained and assessment completed, see flowsheet for details. Patient assisted to the bedside commode and back to bed. pt denies further needs at this time. Call light in reach, will continue to monitor.

## 2022-06-18 NOTE — PROGRESS NOTES
Patient called out needed to use the commode. writer assisted patient to bedside commode to void. Assessment and vitals completed. Blood pressure elevated, will administer morning medications at 0800. Patient stated she had some slight back discomfort and wanted to sit up in the chair. Patient returned to chair from commode. Call light within reach. Will continue to monitor.

## 2022-06-18 NOTE — PROGRESS NOTES
Occupational Therapy  Facility/Department: [facility] AT THE [facility] MED SURG  Occupational Therapy Initial Assessment    Name: Elaine Soto  : 1950  MRN: [id number]  Date of Service: 2022    Discharge Recommendations:  Continue to assess pending progress      Patient Diagnosis(es): The primary encounter diagnosis was Chest pain, unspecified type. A diagnosis of Dyspnea on exertion was also pertinent to this visit. Past Medical History:  has a past medical history of ADHD (attention deficit hyperactivity disorder), Anemia, Asthma, Bipolar disorder ([address] Bradycardia, unspecified, Chronic anemia, Chronic atrial fibrillation ([address] Chronic back pain, Chronic kidney disease, Colitis, Constipation, COPD (chronic obstructive pulmonary disease) ([address] Cystitis without hematuria, Diastolic CHF ([address] Esophagitis, GERD (gastroesophageal reflux disease), Hemorrhoids, History of echocardiogram, Hypertension, Hypothyroidism, Metabolic syndrome, Moderate protein-calorie malnutrition ([address] Muscle weakness (generalized), Obesity, Pancreatitis, and TIA (transient ischemic attack). Past Surgical History:  has a past surgical history that includes Hysterectomy; meniscectomy (Right); Tonsillectomy and adenoidectomy; Hymenectomy; Upper gastrointestinal endoscopy (3/2013); Colonoscopy (2011); Upper gastrointestinal endoscopy (2017); Colonoscopy (2017); Cardiac catheterization (Left, 2021); Cataract removal with implant (Right, 2021); Intracapsular cataract extraction (Right, 2021); Cataract removal with implant (Left, 2022); and Intracapsular cataract extraction (Left, 2022). Assessment   Performance deficits / Impairments: Decreased functional mobility ; Decreased ADL status; Decreased endurance;Decreased strength  Prognosis: Good  Decision Making: Medium Complexity  REQUIRES OT FOLLOW-UP: Yes  Activity Tolerance  Activity Tolerance: Patient Tolerated treatment well        Plan Plan  Times per Week: 7x/wk  Times per Day: Daily  Current Treatment Recommendations: Strengthening,Functional mobility training,Safety education & training,Self-Care / ADL     Restrictions  Restrictions/Precautions  Restrictions/Precautions: Fall Risk,General Precautions    Subjective   General  Chart Reviewed: Yes  Patient assessed for rehabilitation services?: Yes  Diagnosis: SOB     Social/Functional History  Social/Functional History  Lives With: Other (comment)  Type of Home: Facility [facility])  Home Layout: One level  Home Access: Level entry  Bathroom Toilet: Handicap height  Bathroom Equipment: Grab bars in shower,Shower chair,Grab bars around toilet  Home Equipment: Walker, 4 wheeled,Wheelchair-manual  ADL Assistance: Needs assistance  Homemaking Responsibilities: No  Ambulation Assistance: Independent  Transfer Assistance: Independent  Additional Comments: Pt reports furniture walking in her room and pushes a w/c when in the halls so she can sit down when tired. Pt. normally able to ambulate full distance of [facility] without SOB/fatigue. Pt. reports she completes majority of ADL's (I), staff assists with applying DAVID hose/gripper socks only.        Objective   Vision Exceptions: Wears glasses at all times  Hearing: Within functional limits          Safety Devices  Type of Devices: Left in chair;Call light within reach     ADL  Feeding: Independent  Grooming: Stand by assistance  UE Bathing: Stand by assistance  LE Bathing: Contact guard assistance  UE Dressing: Stand by assistance  LE Dressing: Minimal assistance  Toileting: Contact guard assistance     Activity Tolerance  Activity Tolerance: Patient tolerated evaluation without incident     Transfers  Stand Pivot Transfers: Stand by assistance;Contact guard assistance     Cognition  Overall Cognitive Status: WFL     Education Given To: Patient  Education Provided: Role of Therapy;Plan of Care  Education Method: Verbal  Barriers to Learning: None  Education Outcome: Verbalized understanding  LUE AROM (degrees)  LUE AROM : WFL  RUE AROM (degrees)  RUE AROM : WFL  RUE General AROM: Pt. reports hx of R humeral fracture \"that bothers me at times\", however, demo's ROM WFL     Goals  Short Term Goals  Time Frame for Short term goals: 20 visits  Short Term Goal 1: Pt. will tolerate 15 mins of BUE ther ex/act with decreased SOB to increase overall activity tolerance for functional tasks. Short Term Goal 2: Pt. will demo standing tolerance of >5 mins w/o fatigue or LOB to increase safety/participation with ADL tasks. Short Term Goal 3: Pt. will complete ADL routine with SUp/MI (excluding footwear) w/o SOB/fatigue for safe return to PLOF. Short Term Goal 4: Pt. will complete functional ADL transfer/mobility with SUP/MI and G safety with reduced risk for falls.        Therapy Time   Individual Concurrent Group Co-treatment   Time In 1135         Time Out 1148         Minutes 1 [place], [place]

## 2022-06-18 NOTE — PROGRESS NOTES
Writer at bedside to complete evening assessment. Upon entry to room, pt awake and in bed, respirations normal and unlabored while on room air. Vitals obtained and assessment completed, see flow sheet for details. Pt denies needs from writer at this time. Call light in reach. Will continue to monitor.

## 2022-06-19 VITALS
SYSTOLIC BLOOD PRESSURE: 152 MMHG | BODY MASS INDEX: 43.75 KG/M2 | HEIGHT: 66 IN | WEIGHT: 272.2 LBS | TEMPERATURE: 97.3 F | DIASTOLIC BLOOD PRESSURE: 62 MMHG | HEART RATE: 64 BPM | RESPIRATION RATE: 18 BRPM | OXYGEN SATURATION: 95 %

## 2022-06-19 LAB
ABSOLUTE EOS #: 0.31 K/UL (ref 0–0.44)
ABSOLUTE IMMATURE GRANULOCYTE: <0.03 K/UL (ref 0–0.3)
ABSOLUTE LYMPH #: 1.85 K/UL (ref 1.1–3.7)
ABSOLUTE MONO #: 0.75 K/UL (ref 0.1–1.2)
ALBUMIN SERPL-MCNC: 4.2 G/DL (ref 3.5–5.2)
ALBUMIN/GLOBULIN RATIO: 1.6 (ref 1–2.5)
ALP BLD-CCNC: 143 U/L (ref 35–104)
ALT SERPL-CCNC: 16 U/L (ref 5–33)
ANION GAP SERPL CALCULATED.3IONS-SCNC: 11 MMOL/L (ref 9–17)
AST SERPL-CCNC: 13 U/L
BASOPHILS # BLD: 1 % (ref 0–2)
BASOPHILS ABSOLUTE: 0.04 K/UL (ref 0–0.2)
BILIRUB SERPL-MCNC: 1.1 MG/DL (ref 0.3–1.2)
BUN BLDV-MCNC: 20 MG/DL (ref 8–23)
BUN/CREAT BLD: 17 (ref 9–20)
CALCIUM SERPL-MCNC: 9.8 MG/DL (ref 8.6–10.4)
CHLORIDE BLD-SCNC: 99 MMOL/L (ref 98–107)
CO2: 27 MMOL/L (ref 20–31)
CREAT SERPL-MCNC: 1.2 MG/DL (ref 0.5–0.9)
EOSINOPHILS RELATIVE PERCENT: 5 % (ref 1–4)
GFR AFRICAN AMERICAN: 53 ML/MIN
GFR NON-AFRICAN AMERICAN: 44 ML/MIN
GFR SERPL CREATININE-BSD FRML MDRD: ABNORMAL ML/MIN/{1.73_M2}
GFR SERPL CREATININE-BSD FRML MDRD: ABNORMAL ML/MIN/{1.73_M2}
GLUCOSE BLD-MCNC: 89 MG/DL (ref 70–99)
HCT VFR BLD CALC: 38.1 % (ref 36.3–47.1)
HEMOGLOBIN: 12.3 G/DL (ref 11.9–15.1)
IMMATURE GRANULOCYTES: 0 %
LYMPHOCYTES # BLD: 27 % (ref 24–43)
MCH RBC QN AUTO: 30 PG (ref 25.2–33.5)
MCHC RBC AUTO-ENTMCNC: 32.3 G/DL (ref 28.4–34.8)
MCV RBC AUTO: 92.9 FL (ref 82.6–102.9)
MONOCYTES # BLD: 11 % (ref 3–12)
NRBC AUTOMATED: 0 PER 100 WBC
PDW BLD-RTO: 12.7 % (ref 11.8–14.4)
PLATELET # BLD: 283 K/UL (ref 138–453)
PMV BLD AUTO: 9.4 FL (ref 8.1–13.5)
POTASSIUM SERPL-SCNC: 3.9 MMOL/L (ref 3.7–5.3)
RBC # BLD: 4.1 M/UL (ref 3.95–5.11)
SARS-COV-2, RAPID: NOT DETECTED
SEG NEUTROPHILS: 56 % (ref 36–65)
SEGMENTED NEUTROPHILS ABSOLUTE COUNT: 3.8 K/UL (ref 1.5–8.1)
SODIUM BLD-SCNC: 137 MMOL/L (ref 135–144)
SPECIMEN DESCRIPTION: NORMAL
TOTAL PROTEIN: 6.9 G/DL (ref 6.4–8.3)
WBC # BLD: 6.8 K/UL (ref 3.5–11.3)

## 2022-06-19 PROCEDURE — 97110 THERAPEUTIC EXERCISES: CPT

## 2022-06-19 PROCEDURE — 6360000002 HC RX W HCPCS: Performed by: STUDENT IN AN ORGANIZED HEALTH CARE EDUCATION/TRAINING PROGRAM

## 2022-06-19 PROCEDURE — 94761 N-INVAS EAR/PLS OXIMETRY MLT: CPT

## 2022-06-19 PROCEDURE — 6370000000 HC RX 637 (ALT 250 FOR IP): Performed by: STUDENT IN AN ORGANIZED HEALTH CARE EDUCATION/TRAINING PROGRAM

## 2022-06-19 PROCEDURE — C9803 HOPD COVID-19 SPEC COLLECT: HCPCS

## 2022-06-19 PROCEDURE — 36415 COLL VENOUS BLD VENIPUNCTURE: CPT

## 2022-06-19 PROCEDURE — 85025 COMPLETE CBC W/AUTO DIFF WBC: CPT

## 2022-06-19 PROCEDURE — 97116 GAIT TRAINING THERAPY: CPT

## 2022-06-19 PROCEDURE — 97530 THERAPEUTIC ACTIVITIES: CPT

## 2022-06-19 PROCEDURE — 80053 COMPREHEN METABOLIC PANEL: CPT

## 2022-06-19 PROCEDURE — 87635 SARS-COV-2 COVID-19 AMP PRB: CPT

## 2022-06-19 PROCEDURE — 2580000003 HC RX 258: Performed by: STUDENT IN AN ORGANIZED HEALTH CARE EDUCATION/TRAINING PROGRAM

## 2022-06-19 RX ORDER — FUROSEMIDE 40 MG/1
40 TABLET ORAL 2 TIMES DAILY
Qty: 28 TABLET | Refills: 0 | Status: SHIPPED | OUTPATIENT
Start: 2022-06-19 | End: 2023-01-26

## 2022-06-19 RX ADMIN — ATORVASTATIN CALCIUM 20 MG: 20 TABLET, FILM COATED ORAL at 09:26

## 2022-06-19 RX ADMIN — SODIUM CHLORIDE, PRESERVATIVE FREE 10 ML: 5 INJECTION INTRAVENOUS at 09:25

## 2022-06-19 RX ADMIN — TIZANIDINE 2 MG: 4 TABLET ORAL at 09:26

## 2022-06-19 RX ADMIN — DIVALPROEX SODIUM 125 MG: 125 CAPSULE ORAL at 09:26

## 2022-06-19 RX ADMIN — APIXABAN 5 MG: 5 TABLET, FILM COATED ORAL at 09:26

## 2022-06-19 RX ADMIN — METOPROLOL SUCCINATE 25 MG: 25 TABLET, EXTENDED RELEASE ORAL at 09:26

## 2022-06-19 RX ADMIN — AMLODIPINE BESYLATE 10 MG: 10 TABLET ORAL at 09:26

## 2022-06-19 RX ADMIN — CLONIDINE HYDROCHLORIDE 0.1 MG: 0.1 TABLET ORAL at 09:26

## 2022-06-19 RX ADMIN — LOSARTAN POTASSIUM 50 MG: 50 TABLET, FILM COATED ORAL at 09:25

## 2022-06-19 RX ADMIN — PANTOPRAZOLE SODIUM 40 MG: 40 TABLET, DELAYED RELEASE ORAL at 06:49

## 2022-06-19 RX ADMIN — CLOPIDOGREL BISULFATE 75 MG: 75 TABLET ORAL at 09:25

## 2022-06-19 RX ADMIN — ACETAMINOPHEN 650 MG: 325 TABLET ORAL at 04:50

## 2022-06-19 RX ADMIN — FUROSEMIDE 40 MG: 10 INJECTION, SOLUTION INTRAMUSCULAR; INTRAVENOUS at 09:26

## 2022-06-19 RX ADMIN — LEVOTHYROXINE SODIUM 50 MCG: 50 TABLET ORAL at 06:49

## 2022-06-19 RX ADMIN — AMANTADINE HYDROCHLORIDE 100 MG: 100 CAPSULE ORAL at 09:26

## 2022-06-19 RX ADMIN — CETIRIZINE HYDROCHLORIDE 10 MG: 10 TABLET, FILM COATED ORAL at 09:25

## 2022-06-19 ASSESSMENT — PAIN SCALES - GENERAL
PAINLEVEL_OUTOF10: 0
PAINLEVEL_OUTOF10: 0

## 2022-06-19 NOTE — PROGRESS NOTES
Patient up in chair playing on phone. Vitals and assessment completed. Vitals stable. Patient on room air. Lungs clear. Legs contain 1+ pitting edema. Patient denies having needs. Call light within reach. Will continue to monitor.

## 2022-06-19 NOTE — PROGRESS NOTES
Occupational Therapy  Facility/Department: [facility] AT THE [facility] MED SURG  Daily Treatment Note  NAME: Natalie Alicia  : 1950  MRN: [id number]    Date of Service: 2022    Discharge Recommendations:  Continue to assess pending progress         Patient Diagnosis(es): The primary encounter diagnosis was Chest pain, unspecified type. A diagnosis of Dyspnea on exertion was also pertinent to this visit. Assessment    Activity Tolerance: Patient tolerated treatment well  Discharge Recommendations: Continue to assess pending progress      Plan         Restrictions       Subjective   Subjective  Subjective: Pt sitting up in bedside chair upon arrival. Pt agreed to participate in therapy session this date. Pain: Pt reported pain in R shoulder this date d/t previous injury. Objective    Vitals              OT Exercises  Exercise Treatment: Pt tolerated BUE ther ex with 1# dumbbell x 7 planes x 15 reps x 1 set, yellow flex bar x 3 variations x 15 reps x 1 set to increase UE strength and endurance in order to ease completion of ADL tasks. pt required RBs as needed secondary to fatigue. Safety Devices  Type of Devices: Call light within reach; Left in chair     Patient Education  Education Given To: Patient  Education Provided: Role of Therapy;Plan of Care  Education Method: Verbal  Barriers to Learning: None  Education Outcome: Verbalized understanding    Goals  Short Term Goals  Time Frame for Short term goals: 20 visits  Short Term Goal 1: Pt. will tolerate 15 mins of BUE ther ex/act with decreased SOB to increase overall activity tolerance for functional tasks. Short Term Goal 2: Pt. will demo standing tolerance of >5 mins w/o fatigue or LOB to increase safety/participation with ADL tasks. Short Term Goal 3: Pt. will complete ADL routine with SUp/MI (excluding footwear) w/o SOB/fatigue for safe return to PLOF.   Short Term Goal 4: Pt. will complete functional ADL transfer/mobility with SUP/MI and G safety with reduced risk for falls.        Therapy Time   Individual Concurrent Group Co-treatment   Time In 0748         Time Out 0811         Minutes 23                 EDOUARD Bañuelos/MARIJA

## 2022-06-19 NOTE — PROGRESS NOTES
[address] , [address]    Progress Note    Date:   6/19/2022  Patient name:  Kiran Watson  Date of admission:  6/17/2022  5:39 PM  MRN:   [id number]  YOB: 1950    SUBJECTIVE/Last 24 hours update:     Patient seen and examined at the bed side , no new acute events overnight, no new complains except that she had some leg cramps last night which resolved by taking tylenol. She states her breathing is back to her baseline this morning. Notes from nursing staff and Consults had been reviewed, and the overnight progress had been checked with the nursing staff as well. REVIEW OF SYSTEMS:      CONSTITUTIONAL:  no fevers, no headcahes  EYES: negative for blury vision  HEENT: No headaches, No nasal congestion, no difficulty swallowing  RESPIRATORY:negative for dyspnea, no wheezing, no Cough  CARDIOVASCULAR: negative for chest pain, no palpitations  GASTROINTESTINAL: no nausea, no vomiting, no change in bowel habits, no abdominal pain   GENITOURINARY: negative for dysuria, no hematuria   MUSCULOSKELETAL: no joint pains, no muscle aches, no swelling of joints or extremities  NEUROLOGICAL: No  Weakness or numbness      PAST MEDICAL HISTORY:      has a past medical history of ADHD (attention deficit hyperactivity disorder), Anemia, Asthma, Bipolar disorder ([address] Bradycardia, unspecified, Chronic anemia, Chronic atrial fibrillation (HCC), Chronic back pain, Chronic kidney disease, Colitis, Constipation, COPD (chronic obstructive pulmonary disease) ([address] Cystitis without hematuria, Diastolic CHF ([address] Esophagitis, GERD (gastroesophageal reflux disease), Hemorrhoids, History of echocardiogram, Hypertension, Hypothyroidism, Metabolic syndrome, Moderate protein-calorie malnutrition (HCC), Muscle weakness (generalized), Obesity, Pancreatitis, and TIA (transient ischemic attack).     PAST SURGICAL HISTORY:      has a past surgical history that includes Hysterectomy; meniscectomy (Right); Tonsillectomy and adenoidectomy; Hymenectomy; Upper gastrointestinal endoscopy (3/2013); Colonoscopy (5/2011); Upper gastrointestinal endoscopy (03/07/2017); Colonoscopy (03/07/2017); Cardiac catheterization (Left, 05/27/2021); Cataract removal with implant (Right, 12/27/2021); Intracapsular cataract extraction (Right, 12/27/2021); Cataract removal with implant (Left, 02/07/2022); and Intracapsular cataract extraction (Left, 2/7/2022). SOCIAL HISTORY:      reports that she quit smoking about 24 years ago. She has a 2.50 pack-year smoking history. She has never used smokeless tobacco. She reports that she does not drink alcohol and does not use drugs. TOBACCO:   reports that she quit smoking about 24 years ago. She has a 2.50 pack-year smoking history. She has never used smokeless tobacco.  ETOH:   reports no history of alcohol use. Reviewed and non-contributory or as noted above and/or in the HPI    FAMILY HISTORY:     family history includes Bipolar Disorder in her sister and another family member; Diabetes in her father and mother; High Blood Pressure in her father, mother, and another family member; Schizophrenia in her sister. Problem Relation Age of Onset    Diabetes Mother     High Blood Pressure Mother     Diabetes Father     High Blood Pressure Father     Bipolar Disorder Sister     Schizophrenia Sister     High Blood Pressure Other         2 sons    Bipolar Disorder Other         2 sons     Reviewed and non-contributory or as noted above and/or in the HPI    HOME MEDICATIONS:      Prior to Admission medications    Medication Sig Start Date End Date Taking?  Authorizing Provider   furosemide (LASIX) 40 MG tablet Take 1 tablet by mouth 2 times daily for 14 days 6/19/22 7/3/22 Yes David Davis MD   divalproex (DEPAKOTE ER) 250 MG extended release tablet Take 250 mg by mouth nightly   Yes Historical Provider, MD   QUEtiapine (SEROQUEL) 50 MG tablet Take 50 mg by mouth nightly 6/10/22   Historical Provider, MD   divalproex (DEPAKOTE SPRINKLE) 125 MG capsule Take 125 mg by mouth daily     Historical Provider, MD   loperamide (IMODIUM) 2 MG capsule Take 4 mg by mouth 4 times daily as needed for Diarrhea Every 4 hours    Historical Provider, MD   metoprolol succinate (TOPROL XL) 25 MG extended release tablet TAKE 1 TABLET BY MOUTH DAILY. 3/22/22   Fabiana Moss MD   simethicone (MYLICON) 80 MG chewable tablet Take 80 mg by mouth every 6 hours as needed for Flatulence    Historical Provider, MD   nystatin (MYCOSTATIN) 693167 UNIT/GM powder Apply topically as needed Apply topically to breasts as needed    Historical Provider, MD   levothyroxine (SYNTHROID) 50 MCG tablet Take 50 mcg by mouth Daily    Historical Provider, MD   latanoprost (XALATAN) 0.005 % ophthalmic solution Place 1 drop into the right eye nightly     Historical Provider, MD   polyvinyl alcohol (ARTIFICIAL TEARS) 1.4 % ophthalmic solution Place 1 drop into both eyes 4 times daily     Historical Provider, MD   acetaminophen (TYLENOL) 650 MG extended release tablet Take 650 mg by mouth 4 times daily    Historical Provider, MD   Sodium Phosphates (FLEET) 7-19 GM/118ML Place 1 enema rectally as needed    Historical Provider, MD   traMADol (ULTRAM) 50 MG tablet Take 50 mg by mouth every 6 hours as needed for Pain. Historical Provider, MD   white petrolatum GEL Apply topically 2 times daily as needed for Dry Skin Arms and legs    Historical Provider, MD   chlorhexidine (PERIDEX) 0.12 % solution Take 15 mLs by mouth 2 times daily  6/10/21   Historical Provider, MD   atorvastatin (LIPITOR) 20 MG tablet Take 1 tablet by mouth daily 6/15/21   Fabiana Moss MD   nitroGLYCERIN (NITROSTAT) 0.4 MG SL tablet up to max of 3 total doses.  If no relief after 1 dose, call 911. 5/29/21   [place] Ayala MD   clopidogrel (PLAVIX) 75 MG tablet Take 1 tablet by mouth daily 5/30/21   [place] Ayala MD   losartan (COZAAR) 50 MG tablet Take 1 tablet by mouth daily 5/29/21   Jolie Ramos APRN - CNP   apixaban (ELIQUIS) 5 MG TABS tablet Take 1 tablet by mouth 2 times daily 5/13/21   [address], MD   tiZANidine (ZANAFLEX) 2 MG tablet Take 2 mg by mouth every 6 hours as needed    Historical Provider, MD   cloNIDine (CATAPRES) 0.1 MG tablet Take 0.1 mg by mouth 3 times daily    Historical Provider, MD   amLODIPine (NORVASC) 10 MG tablet Take 10 mg by mouth daily     Historical Provider, MD   Valbenazine Tosylate (INGREZZA) 40 MG CAPS Take 40 mg by mouth nightly    Historical Provider, MD   melatonin 3 MG TABS tablet Take 3 mg by mouth nightly    Historical Provider, MD   polyethylene glycol (GLYCOLAX) packet Take 17 g by mouth daily    Historical Provider, MD   Amantadine (SYMMETREL) 100 MG TABS tablet Take 100 mg by mouth 2 times daily    Historical Provider, MD   Dextromethorphan-guaiFENesin (DELSYM COUGH/CHEST CONGEST DM) 5-100 MG/5ML LIQD Take 5 mLs by mouth every 12 hours as needed (cough)    Historical Provider, MD   acetaminophen (TYLENOL) 325 MG tablet Take 650 mg by mouth every 4 hours as needed for Pain or Fever    Historical Provider, MD   sodium chloride (OCEAN) 0.65 % nasal spray 1 spray by Nasal route 3 times daily as needed for Congestion    Historical Provider, MD   bisacodyl (DULCOLAX) 10 MG suppository Place 10 mg rectally daily as needed for Constipation    Historical Provider, MD   magnesium hydroxide (MILK OF MAGNESIA) 400 MG/5ML suspension Take 30 mLs by mouth daily as needed for Constipation    Historical Provider, MD   calcium carbonate 600 MG TABS tablet Take 1 tablet by mouth nightly     Historical Provider, MD   omeprazole (PRILOSEC) 20 MG capsule TAKE 1 CAPSULE BY MOUTH DAILY. 5/21/15   Salvatore Oates MD   guaifenesin (MUCINEX) 600 MG SR tablet Take 1,200 mg by mouth 2 times daily. PRN    Historical Provider, MD   loratadine (CLARITIN) 10 MG tablet Take 10 mg by mouth daily.  PRN     Historical Provider, MD       ALLERGIES:     Lamictal [lamotrigine], Oxcarbazepine, Pcn [penicillins], Sertraline, Trileptal, and Strattera [atomoxetine hcl]      OBJECTIVE:       Vitals:    06/18/22 2312 06/19/22 0528 06/19/22 0645 06/19/22 0925   BP: (!) 150/72  (!) 148/76 (!) 152/62   Pulse: 60  75 64   Resp: 16  18    Temp: 96.9 °F (36.1 °C)  97.3 °F (36.3 °C)    TempSrc: Temporal  Temporal    SpO2: 96%  95%    Weight:  272 lb 3.2 oz (123.5 kg)     Height:             Intake/Output Summary (Last 24 hours) at 6/19/2022 1251  Last data filed at 6/19/2022 1051  Gross per 24 hour   Intake 250 ml   Output 3100 ml   Net -2850 ml       PHYSICAL EXAM:  General Appearance  Alert , awake , not in acute distress  HEENT - Head is normocephalic, atraumatic. Lungs - Bilateral equal air entry , no wheezes, rales or rhonchi, aeration good  Cardiovascular - Heart sounds are normal.  Regular rhythm, normal rate without murmur, gallop or rub.   Abdomen - Soft, nontender, nondistended, no masses or organomegaly  Neurologic - There are no new focal motor or sensory deficits  Skin - No bruising or bleeding on exposed skin area  Extremities - No cyanosis, clubbing and minimal edema      DIAGNOSTICS:     Laboratory Testing:    Recent Results (from the past 24 hour(s))   CBC auto differential    Collection Time: 06/19/22  6:40 AM   Result Value Ref Range    WBC 6.8 3.5 - 11.3 k/uL    RBC 4.10 3.95 - 5.11 m/uL    Hemoglobin 12.3 11.9 - 15.1 g/dL    Hematocrit 38.1 36.3 - 47.1 %    MCV 92.9 82.6 - 102.9 fL    MCH 30.0 25.2 - 33.5 pg    MCHC 32.3 28.4 - 34.8 g/dL    RDW 12.7 11.8 - 14.4 %    Platelets 997 542 - 787 k/uL    MPV 9.4 8.1 - 13.5 fL    NRBC Automated 0.0 0.0 per 100 WBC    Seg Neutrophils 56 36 - 65 %    Lymphocytes 27 24 - 43 %    Monocytes 11 3 - 12 %    Eosinophils % 5 (H) 1 - 4 %    Basophils 1 0 - 2 %    Immature Granulocytes 0 0 %    Segs Absolute 3.80 1.50 - 8.10 k/uL    Absolute Lymph # 1.85 1.10 - 3.70 k/uL    Absolute Mono # 0. 75 0.10 - 1.20 k/uL    Absolute Eos # 0.31 0.00 - 0.44 k/uL    Basophils Absolute 0.04 0.00 - 0.20 k/uL    Absolute Immature Granulocyte <0.03 0.00 - 0.30 k/uL   Comprehensive Metabolic Panel w/ Reflex to MG    Collection Time: 06/19/22  6:40 AM   Result Value Ref Range    Glucose 89 70 - 99 mg/dL    BUN 20 8 - 23 mg/dL    CREATININE 1.20 (H) 0.50 - 0.90 mg/dL    Bun/Cre Ratio 17 9 - 20    Calcium 9.8 8.6 - 10.4 mg/dL    Sodium 137 135 - 144 mmol/L    Potassium 3.9 3.7 - 5.3 mmol/L    Chloride 99 98 - 107 mmol/L    CO2 27 20 - 31 mmol/L    Anion Gap 11 9 - 17 mmol/L    Alkaline Phosphatase 143 (H) 35 - 104 U/L    ALT 16 5 - 33 U/L    AST 13 <32 U/L    Total Bilirubin 1.10 0.3 - 1.2 mg/dL    Total Protein 6.9 6.4 - 8.3 g/dL    Albumin 4.2 3.5 - 5.2 g/dL    Albumin/Globulin Ratio 1.6 1.0 - 2.5    GFR Non- 44 (L) >60 mL/min    GFR  53 (L) >60 mL/min    GFR Comment          GFR Staging         COVID-19, Rapid    Collection Time: 06/19/22 11:21 AM    Specimen: Nasopharyngeal Swab   Result Value Ref Range    Specimen Description . NASOPHARYNGEAL SWAB     SARS-CoV-2, Rapid Not Detected Not Detected       Current Facility-Administered Medications   Medication Dose Route Frequency Provider Last Rate Last Admin    divalproex (DEPAKOTE ER) extended release tablet 250 mg  250 mg Oral Nightly Erickson Blackwood MD   250 mg at 06/18/22 2031    tiZANidine (ZANAFLEX) tablet 2 mg  2 mg Oral TID Erickson Blackwood MD   2 mg at 06/19/22 0926    acetaminophen (TYLENOL) tablet 650 mg  650 mg Oral 3 times per day Erickson Blackwood MD   650 mg at 06/19/22 0450    furosemide (LASIX) injection 40 mg  40 mg IntraVENous BID Erickson Blackwood MD   40 mg at 06/19/22 0926    chlorhexidine (PERIDEX) 0.12 % solution 15 mL  15 mL Mouth/Throat BID PRN Erickson Blackwood, MD        guaiFENesin [facility]) extended release tablet 1,200 mg  1,200 mg Oral BID JULIEN Blackwood MD        amantadine (SYMMETREL) capsule 100 mg  100 mg Oral BID Delroy Benz MD   100 mg at 06/19/22 0926    amLODIPine (NORVASC) tablet 10 mg  10 mg Oral Daily Delroy Benz MD   10 mg at 06/19/22 0926    apixaban (ELIQUIS) tablet 5 mg  5 mg Oral BID Delroy Benz MD   5 mg at 06/19/22 0926    atorvastatin (LIPITOR) tablet 20 mg  20 mg Oral Daily Delroy Benz MD   20 mg at 06/19/22 0926    calcium carbonate (TUMS) chewable tablet 500 mg  500 mg Oral Nightly Delroy Benz MD   500 mg at 06/18/22 2031    cloNIDine (CATAPRES) tablet 0.1 mg  0.1 mg Oral TID Delroy Benz MD   0.1 mg at 06/19/22 0926    clopidogrel (PLAVIX) tablet 75 mg  75 mg Oral Daily Delroy Benz MD   75 mg at 06/19/22 0925    guaiFENesin-dextromethorphan (ROBITUSSIN DM) 100-10 MG/5ML syrup 5 mL  5 mL Oral Q12H PRN Delroy Benz MD        divalproex (DEPAKOTE SPRINKLE) capsule 125 mg  125 mg Oral Daily Delroy Benz MD   125 mg at 06/19/22 0926    latanoprost (XALATAN) 0.005 % ophthalmic solution 1 drop  1 drop Right Eye Nightly Delroy Benz MD   1 drop at 06/18/22 2032    levothyroxine (SYNTHROID) tablet 50 mcg  50 mcg Oral Daily Delroy Benz MD   50 mcg at 06/19/22 0649    cetirizine (ZYRTEC) tablet 10 mg  10 mg Oral Daily Delroy Benz MD   10 mg at 06/19/22 0925    losartan (COZAAR) tablet 50 mg  50 mg Oral Daily Delroy Benz MD   50 mg at 06/19/22 0925    magnesium hydroxide (MILK OF MAGNESIA) 400 MG/5ML suspension 30 mL  30 mL Oral Daily PRN Delroy Benz MD        melatonin tablet 3 mg  3 mg Oral Nightly Delroy Benz MD   3 mg at 06/18/22 2031    metoprolol succinate (TOPROL XL) extended release tablet 25 mg  25 mg Oral Daily Delroy Benz MD   25 mg at 06/19/22 0926    miconazole (MICOTIN) 2 % powder   Topical BID Refugalexis Benz MD   Given at 06/17/22 2233    pantoprazole (PROTONIX) tablet 40 mg  40 mg Oral QAM AC Refaubrie Benz MD   40 mg at 06/19/22 0649    QUEtiapine (SEROQUEL) tablet 50 mg  50 mg Oral Nightly Refaubrie Benz MD   50 mg at 06/18/22 2031    simethicone (MYLICON) chewable tablet 80 mg  80 mg Oral Q6H PRN Kayleen Burnham MD        sodium chloride (OCEAN, BABY AYR) 0.65 % nasal spray 1 spray  1 spray Nasal TID PRN Kayleen Burnham MD        fleet rectal enema 1 enema  1 enema Rectal PRN Kayleen Burnham MD        traMADol Rexine Lj) tablet 50 mg  50 mg Oral Q6H PRN Kayleen Burnham MD        Valbenazine Tosylate CAPS 40 mg  40 mg Oral Nightly Kayleen Burnham MD        sodium chloride flush 0.9 % injection 10 mL  10 mL IntraVENous 2 times per day Kayleen Burnham MD   10 mL at 06/19/22 0925    sodium chloride flush 0.9 % injection 10 mL  10 mL IntraVENous PRN Kayleen Burnham MD        0.9 % sodium chloride infusion   IntraVENous PRN Kayleen Burnham MD        ondansetron (ZOFRAN-ODT) disintegrating tablet 4 mg  4 mg Oral Q8H PRN Kayleen Burnham MD        Or    ondansetron (ZOFRAN) injection 4 mg  4 mg IntraVENous Q6H PRN Kayleen Burnham MD        polyethylene glycol (GLYCOLAX) packet 17 g  17 g Oral Daily PRN Kayleen Burnham MD        acetaminophen (TYLENOL) tablet 650 mg  650 mg Oral Q6H PRN Kayleen Burnham MD        Or    acetaminophen (TYLENOL) suppository 650 mg  650 mg Rectal Q6H PRN Kayleen Burnham MD        potassium chloride (KLOR-CON M) extended release tablet 40 mEq  40 mEq Oral PRN Kayleen Burnham MD        Or    potassium bicarb-citric acid (EFFER-K) effervescent tablet 40 mEq  40 mEq Oral PRN Kayleen Burnham MD        Or    potassium chloride 10 mEq/100 mL IVPB (Peripheral Line)  10 mEq IntraVENous PRN Kayleen Burnham MD        magnesium sulfate 2000 mg in 50 mL IVPB premix  2,000 mg IntraVENous PRN Kayleen Burnham MD           ASSESSMENT:     Principal Problem:    Shortness of breath  Active Problems:    Bipolar 1 disorder (HCC)    DDD (degenerative disc disease), lumbar    Essential hypertension    Iron deficiency anemia    Hypothyroidism    Other chronic pain    Morbid obesity ([facility])    CAD, multiple vessel    Chest pain    PVD (peripheral vascular disease) ([facility])    Atrial fibrillation, unspecified type ([facility]    Stage 3a chronic kidney disease ([facility]  Resolved Problems:    * No resolved hospital problems. *      PLAN:     Shortness of breath/Acute on Chronic CHF, improved, Weight Gain, Chest Pain, Afib  Pt is net negative -7.650cc since admit. Case was discussed with the patient's Cardiologist, Marisol Feliz, with plans for Lasix 40mg BID and then follow-up at CHF clinic in 2 days. She will also be evaluated by Qasim Hutchins this upcoming Wednesday at [facility]. Lasix 40mg BID  Monitor CBC/CMP, K/Mg replacement  Intake/output  Resume norvasc,Catapres, losartan, Toprol XL  Resume atorvastatin  Resume home tramadol  Resume Seroquel, Depakote, Symmetrel, Zanaflex  Synthroid 50mcg, TSH WNL 5/12/2022  Continue w/ Plavix    Dispo - d/c today. Discussed care plan with nurse after getting their input. Above plan discussed with the patient who agreed to the above plan     Please note that this chart was generated using voice recognition Dragon dictation software. Although every effort was made to ensure the accuracy of this automated transcription, some errors in transcription may have occurred.     Mckenzie Mitchell MD  6/19/2022 12:51 PM

## 2022-06-19 NOTE — PLAN OF CARE
Problem: Discharge Planning  Goal: Discharge to home or other facility with appropriate resources  Outcome: Progressing  Flowsheets (Taken 6/18/2022 2116)  Discharge to home or other facility with appropriate resources:   Identify barriers to discharge with patient and caregiver   Identify discharge learning needs (meds, wound care, etc)     Problem: Safety - Adult  Goal: Free from fall injury  Outcome: Progressing  Flowsheets (Taken 6/18/2022 2116)  Free From Fall Injury: Instruct family/caregiver on patient safety     Problem: Skin/Tissue Integrity  Goal: Absence of new skin breakdown  Description: 1. Monitor for areas of redness and/or skin breakdown  2. Assess vascular access sites hourly  3. Every 4-6 hours minimum:  Change oxygen saturation probe site  4. Every 4-6 hours:  If on nasal continuous positive airway pressure, respiratory therapy assess nares and determine need for appliance change or resting period. Outcome: Progressing  Note: Danish scale monitoring per protocol. Inspect skin for breakdown frequently. Encourage pt to make frequent large adjustments in position or assist patient with turning. Document all areas of breakdown.         Problem: Pain  Goal: Verbalizes/displays adequate comfort level or baseline comfort level  Outcome: Progressing  Flowsheets (Taken 6/18/2022 2116)  Verbalizes/displays adequate comfort level or baseline comfort level:   Administer analgesics based on type and severity of pain and evaluate response   Encourage patient to monitor pain and request assistance   Assess pain using appropriate pain scale   Implement non-pharmacological measures as appropriate and evaluate response

## 2022-06-19 NOTE — DISCHARGE INSTR - COC
Continuity of Care Form    Patient Name: Chandrakant Hernandez   :  1950  MRN:  [id number]    Admit date:  2022  Discharge date:  22    Code Status Order: DNR-CC   Advance Directives:      Admitting Physician:  Paulino Sweeney MD  PCP: [address], MD    Discharging Nurse: Jacklyn Zepeda.  [facility] Unit/Room#: 4975/7965-62  Discharging Unit Phone Number: [phone]    Emergency Contact:   Extended Emergency Contact Information  Primary Emergency Contact: Varsha Roa, [address] [facility] [address]  [address] Phone: [phone]  Relation: Child    Past Surgical History:  Past Surgical History:   Procedure Laterality Date    CARDIAC CATHETERIZATION Left 2021    right radial/ [facility]/ Dr. Shen Tristan Right 2021    Dr. Yanique Agudelo Left 2022    EYE CATARACT EMULSIFICATION IOL IMPLANT (Left Eye)    COLONOSCOPY  2011    ischemic colitis    COLONOSCOPY  2017    -diverticulosis,hemorrhoids    [address] (CERVIX STATUS UNKNOWN)          INTRACAPSULAR CATARACT EXTRACTION Right 2021    EYE CATARACT EMULSIFICATION IOL IMPLANT performed by Amy Simon DO at [address] [place] [address] Left 2022    EYE CATARACT EMULSIFICATION IOL IMPLANT performed by Amy Simon DO at [facility] Right         TONSILLECTOMY AND ADENOIDECTOMY      UPPER GASTROINTESTINAL ENDOSCOPY  3/2013    5 cm axial hiatal hernia    UPPER GASTROINTESTINAL ENDOSCOPY  2017    Dr. Gamaliel Coe       Immunization History:   Immunization History   Administered Date(s) Administered    COVID-19, Pfizer Purple top, DILUTE for use, 12+ yrs, 30mcg/0.3mL dose 2021, 2021, 10/07/2021    Influenza Virus Vaccine 10/19/2020, 10/26/2021    Influenza, Quadv, IM, PF (6 mo and older Fluzone, Flulaval, Fluarix, and 3 yrs and older Afluria) 2017    Pneumococcal Conjugate 13-valent Grzegorz Glassing) 01/21/2017, 01/24/2018    Pneumococcal Polysaccharide (Rbjpcwczz19) 02/25/2014    Tdap (Boostrix, Adacel) 01/11/2014       Active Problems:  Patient Active Problem List   Diagnosis Code    TIA (transient ischemic attack) G45.9    Bipolar 1 disorder ([facility] F31.9    DDD (degenerative disc disease), lumbar M51.36    Bradycardia R00.1    Essential hypertension I10    Obesity (BMI 30-39. 9) E66.9    GERD (gastroesophageal reflux disease) K21.9    Iron deficiency anemia D50.9    Iron deficiency anemia due to chronic blood loss D50.0    History of colon polyps Z86.010    Hypothyroidism E03.9    Other chronic pain G89.29    Other hemorrhoids K64.8    Diverticulosis of large intestine without diverticulitis K57.30    Attention deficit hyperactivity disorder F90.9    Morbid obesity ([facility]) E66.01    Cellulitis of leg without foot, right L03.115    Atherosclerotic heart disease of native coronary artery with other forms of angina pectoris ([facility]) I25.118    CAD, multiple vessel I25.10    Chest pain R07.9    PVD (peripheral vascular disease) ([facility]) I73.9    Atrial fibrillation, unspecified type ([facility]) I48.91    Stage 3a chronic kidney disease ([facility]) N18.31    Shortness of breath R06.02       Isolation/Infection:   Isolation            No Isolation          Patient Infection Status       Infection Onset Added Last Indicated Last Indicated By Review Planned Expiration Resolved Resolved By    None active    Resolved    COVID-19 (Rule Out) 01/31/22 01/31/22 01/31/22 COVID-19, Rapid (Ordered)   01/31/22 Rule-Out Test Resulted            Nurse Assessment:  Last Vital Signs: BP (!) 152/62   Pulse 64   Temp 97.3 °F (36.3 °C) (Temporal)   Resp 18   Ht 5' 6\" (1.676 m)   Wt 272 lb 3.2 oz (123.5 kg)   SpO2 95%   BMI 43.93 kg/m²     Last documented pain score (0-10 scale): Pain Level: 0  Last Weight:   Wt Readings from Last 1 Encounters:   06/19/22 272 lb 3.2 oz (123.5 kg)     Mental Status:  oriented and alert    IV Access:  - None    Nursing Mobility/ADLs:  Walking   Assisted  Transfer  Assisted  Bathing  Independent  Dressing  Independent  Toileting  Assisted  Feeding  [address]  Independent  Med Delivery   whole    Wound Care Documentation and Therapy:        Elimination:  Continence: Bowel: Yes  Bladder: Yes  Urinary Catheter: {Urinary Catheter:707351305}   Colostomy/Ileostomy/Ileal Conduit: No       Date of Last BM: 6/18/2022    Intake/Output Summary (Last 24 hours) at 6/19/2022 1047  Last data filed at 6/19/2022 0934  Gross per 24 hour   Intake 550 ml   Output 3600 ml   Net -3050 ml     I/O last 3 completed shifts: In: 620 [P.O.:620]  Out: 8100 [Urine:8100]    Safety Concerns: At Risk for Falls    Impairments/Disabilities:      Vision    Nutrition Therapy:  Current Nutrition Therapy:   - Oral Diet:  General, Cardiac, and Low Sodium (3-4gm)    Routes of Feeding: Oral  Liquids: No Restrictions  Daily Fluid Restriction: yes - amount 2,000  Last Modified Barium Swallow with Video (Video Swallowing Test): not done    Treatments at the Time of Hospital Discharge:   Respiratory Treatments:   Oxygen Therapy:  is not on home oxygen therapy.   Ventilator:    - No ventilator support    Rehab Therapies: Physical Therapy and Occupational Therapy  Weight Bearing Status/Restrictions: No weight bearing restrictions  Other Medical Equipment (for information only, NOT a DME order):  walker  Other Treatments:     Patient's personal belongings (please select all that are sent with patient):  Glasses, Dentures upper and lower, Jewelry    RN SIGNATURE:  Electronically signed by Meka Rendon RN on 6/19/22 at 10:58 AM EDT    CASE MANAGEMENT/SOCIAL WORK SECTION    Inpatient Status Date: ***    Readmission Risk Assessment Score:  Readmission Risk              Risk of Unplanned Readmission:  24           Discharging to Facility/ Agency   Name:   Address:  Phone:  Fax:    Dialysis Facility (if applicable)   Name:  Address:  Dialysis Schedule:  Phone:  Fax:    / signature: {Esignature:176639344}    PHYSICIAN SECTION    Prognosis: {Prognosis:5535279996}    Condition at Discharge: [address]Juan Muniz Patient Condition:265038686}    Rehab Potential (if transferring to Rehab): {Prognosis:7161702567}    Recommended Labs or Other Treatments After Discharge: ***    Physician Certification: I certify the above information and transfer of Cheryl Langston  is necessary for the continuing treatment of the diagnosis listed and that she requires {Admit to Appropriate Level of Care:66337} for {GREATER/LESS:147455023} 30 days.      Update Admission H&P: {CHP DME Changes in HQJMA:473284802}    PHYSICIAN SIGNATURE:  Electronically signed by Orestes Huertas MD on 6/19/22 at 10:47 AM EDT

## 2022-06-19 NOTE — DISCHARGE INSTR - DIET
Good nutrition is important when healing from an illness, injury, or surgery. Follow any nutrition recommendations given to you during your hospital stay. If you were given an oral nutrition supplement while in the hospital, continue to take this supplement at home. You can take it with meals, in-between meals, and/or before bedtime. These supplements can be purchased at most local grocery stores, pharmacies, and chain [employer]-stores. If you have any questions about your diet or nutrition, call the hospital and ask for the dietitian.   Cardiac low sodium diet

## 2022-06-19 NOTE — PROGRESS NOTES
Writer at bedside to complete second shift assessment. Assessment and vital signs completed, see flow sheet for details. Pt denies any needs at this time. Will continue to monitor.

## 2022-06-19 NOTE — PROGRESS NOTES
Physical Therapy  Facility/Department: [facility] MED SURG  Daily Treatment Note  NAME: Michael Diana  : 1950  MRN: [id number]    Date of Service: 2022    Discharge Recommendations:  Continue to assess pending progress,Subacute/Skilled Nursing Facility        Patient Diagnosis(es): The primary encounter diagnosis was Chest pain, unspecified type. A diagnosis of Dyspnea on exertion was also pertinent to this visit. Assessment   Activity Tolerance: Patient tolerated treatment well     Plan    Plan  Plan: 2 times a day 7 days a week (1x daily on weekends.)  Current Treatment Recommendations: Strengthening;ROM;Balance training;Functional mobility training;Transfer training; Endurance training;Gait training;Neuromuscular re-education;Home exercise program;Safety education & training;Patient/Caregiver education & training; Therapeutic activities     Restrictions  Restrictions/Precautions  Restrictions/Precautions: Fall Risk,General Precautions     Subjective    Subjective  Subjective: Pt reported feeling \"sore\" in bilat shlds that she rated at 6/10. Orientation  Overall Orientation Status: Within Functional Limits  Cognition  Overall Cognitive Status: WFL     Objective   Vitals     Bed Mobility Training  Bed Mobility Training: No (Pt up in chair upon arrival.)  Transfer Training  Transfer Training: Yes  Overall Level of Assistance: Contact-guard assistance  Interventions: Verbal cues  Sit to Stand: Contact-guard assistance  Stand to Sit: Contact-guard assistance  Gait Training  Gait Training: Yes  Gait  Overall Level of Assistance: Contact-guard assistance  Interventions: Verbal cues (Pt needed verbal cues for posture with fair to good understanding noted.  Pt occasionally used furniture for external support.)  Distance (ft): 40 Feet  Assistive Device: Other (comment) (No AD.)     PT Exercises  Exercise Treatment: Reclined ther ex x 20 of: ankle pumps, quad sets, glut sets, hip abd, SLR, heel slides, and SAQ. Pt needed 3 short rest breaks d/t fatigue. Safety Devices  Type of Devices: Call light within reach; Left in chair;Nurse notified;Gait belt (No alarm upon entry.)       Goals  Short Term Goals  Time Frame for Short term goals: 20 days  Short term goal 1: Patient to complete all transfers mod. Ind with no LOB to decrease fall risk. Short term goal 2: Patient to ambulate 150ft with LRAD and no LOB or fatigue to improve mobility. Short term goal 3: Patient to tolerate 20-30 min of ther ex/act to improve functional strength. Short term goal 4: Patient to have good static standing balance to decrease fall risk. Education  Patient Education  Education Given To: Patient  Education Provided: Role of Therapy;Plan of Care;Transfer Training  Education Provided Comments: Also educated on safety with amb.   Education Method: Verbal  Barriers to Learning: None  Education Outcome: Verbalized understanding    Therapy Time   Individual Concurrent Group Co-treatment   Time In 7584         Time Out [address]

## 2022-06-19 NOTE — DISCHARGE SUMMARY
[address], [facility]    Discharge Summary      NAME:  Myra Mcgovern  :  1950  MRN:  [id number]    Admit date:  2022  Discharge date:   22      Admitting Physician:  Katie Salgado MD    Primary Diagnosis on Admission:   Present on Admission:   Shortness of breath   Chest pain   PVD (peripheral vascular disease) ([facility] [address]   Atrial fibrillation, unspecified type ([address]   Stage 3a chronic kidney disease ([address]   CAD, multiple vessel   Other chronic pain   Morbid obesity ([address]   Essential hypertension   Hypothyroidism   Iron deficiency anemia   Bipolar 1 disorder ([facility] [address] [facility]   DDD (degenerative disc disease), lumbar      Secondary Diagnoses:  has Bipolar 1 disorder ([facility] [address] on their pertinent problem list.    Admission Condition:  stable     Discharged Condition: good    Hospital Course: The patient was admitted for the management of shortness of breath, weight gain 2/2 Acute on Chronic CHF. Chest x-ray indicated mild pulmonary vascular congestion. She was provided with Lasix 40 mg IV twice daily, labs and intake/output was monitored. Troponins were trended and EKG was reviewed. Home medications were resumed Today on day of discharge pt feels better with no further complaints. She feels back to her baseline (breathing) and is requesting to be discharged today. She denied any chest pain, pressure, discomfort. Vitals and Labs are at pts baseline. Case was discussed with the patient's Cardiologist. Close OP follow-up with Cardiology and patient's PCP. All consultants involved during this admission are agreeable to d/c. Consults: Cardiology    Significant Diagnostic/theraputic interventions: IV Lasix      Disposition:   [place]/SNF as per prior    Instructions to Patient:      Follow up with Albert Hill MD in  1-2 weeks    Follow up with Rodolfo Cuellar in 2 days.     Discharge Medications:       Medication List      CHANGE how you take these medications    furosemide 40 MG tablet  Commonly known as: LASIX  Take 1 tablet by mouth 2 times daily for 14 days  What changed: when to take this     QUEtiapine 50 MG tablet  Commonly known as: SEROQUEL  What changed: Another medication with the same name was removed. Continue taking this medication, and follow the directions you see here.         CONTINUE taking these medications    * acetaminophen 650 MG extended release tablet  Commonly known as: TYLENOL     * acetaminophen 325 MG tablet  Commonly known as: TYLENOL     Amantadine 100 MG Tabs tablet  Commonly known as: SYMMETREL     amLODIPine 10 MG tablet  Commonly known as: NORVASC     apixaban 5 MG Tabs tablet  Commonly known as: Eliquis  Take 1 tablet by mouth 2 times daily     Artificial Tears 1.4 % ophthalmic solution  Generic drug: polyvinyl alcohol     atorvastatin 20 MG tablet  Commonly known as: LIPITOR  Take 1 tablet by mouth daily     bisacodyl 10 MG suppository  Commonly known as: DULCOLAX     calcium carbonate 600 MG Tabs tablet     chlorhexidine 0.12 % solution  Commonly known as: PERIDEX     cloNIDine 0.1 MG tablet  Commonly known as: CATAPRES     clopidogrel 75 MG tablet  Commonly known as: PLAVIX  Take 1 tablet by mouth daily     Delsym Cough/Chest Congest DM 5-100 MG/5ML Liqd  Generic drug: Dextromethorphan-guaiFENesin     * divalproex 125 MG capsule  Commonly known as: DEPAKOTE SPRINKLE     * divalproex 250 MG extended release tablet  Commonly known as: DEPAKOTE ER     fleet 7-19 GM/118ML     Ingrezza 40 MG Caps  Generic drug: Valbenazine Tosylate     latanoprost 0.005 % ophthalmic solution  Commonly known as: XALATAN     levothyroxine 50 MCG tablet  Commonly known as: SYNTHROID     loperamide 2 MG capsule  Commonly known as: IMODIUM     loratadine 10 MG tablet  Commonly known as: CLARITIN     losartan 50 MG tablet  Commonly known as: COZAAR  Take 1 tablet by mouth daily     magnesium hydroxide 400 MG/5ML suspension  Commonly known as: MILK OF MAGNESIA     melatonin 3 MG Tabs tablet     metoprolol succinate 25 MG extended release tablet  Commonly known as: TOPROL XL  TAKE 1 TABLET BY MOUTH DAILY. Mucinex 600 MG extended release tablet  Generic drug: guaiFENesin     nitroGLYCERIN 0.4 MG SL tablet  Commonly known as: NITROSTAT  up to max of 3 total doses. If no relief after 1 dose, call 911.     nystatin 422755 UNIT/GM powder  Commonly known as: MYCOSTATIN     omeprazole 20 MG delayed release capsule  Commonly known as: PRILOSEC  TAKE 1 CAPSULE BY MOUTH DAILY. polyethylene glycol 17 g packet  Commonly known as: GLYCOLAX     simethicone 80 MG chewable tablet  Commonly known as: MYLICON     sodium chloride 0.65 % nasal spray  Commonly known as: OCEAN     tiZANidine 2 MG tablet  Commonly known as: ZANAFLEX     traMADol 50 MG tablet  Commonly known as: ULTRAM     white petrolatum Gel         * This list has 4 medication(s) that are the same as other medications prescribed for you. Read the directions carefully, and ask your doctor or other care provider to review them with you.             STOP taking these medications    traZODone 50 MG tablet  Commonly known as: DESYREL           Where to Get Your Medications      These medications were sent to [facility]/pharmacy #[id number] - [place], [place] - [address]    Phone: [phone]   · furosemide 40 MG tablet         Send Copies to: Kisha Mar MD,     Patient Instructions:   · Activity: activity as tolerated  · Diet: cardiac diet  · Follow up with Kisha Mar MD in 1-2 weeks   · Follow-up with Dr. Magdaleno Luque in 2 days as directed    Total time spent on discharge services: 35 minutes  Including the following activities:  · Evaluation and Management of patient  · Discussion with patient and/or surrogate about current care plan  · Coordination with Case Management and/or   · Coordination of care with Consultants (if applicable)   · Coordination of care with Receiving Facility Physician (if applicable)  · Completion of DME forms (if applicable)  · Preparation of Discharge Summary  · Preparation of Medication Reconciliation  · Preparation of Discharge Prescriptions    Please note that this chart was generated using voice recognition Dragon dictation software. Although every effort was made to ensure the accuracy of this automated transcription, some errors in transcription may have occurred.     Jasson Sparks MD  6/19/2022 12:54 PM

## 2022-06-19 NOTE — PLAN OF CARE
Problem: Discharge Planning  Goal: Discharge to home or other facility with appropriate resources  Outcome: Completed     Problem: Safety - Adult  Goal: Free from fall injury  Outcome: Completed     Problem: Skin/Tissue Integrity  Goal: Absence of new skin breakdown  Description: 1. Monitor for areas of redness and/or skin breakdown  2. Assess vascular access sites hourly  3. Every 4-6 hours minimum:  Change oxygen saturation probe site  4. Every 4-6 hours:  If on nasal continuous positive airway pressure, respiratory therapy assess nares and determine need for appliance change or resting period.   Outcome: Completed     Problem: Pain  Goal: Verbalizes/displays adequate comfort level or baseline comfort level  Outcome: Completed

## 2022-06-20 ENCOUNTER — CARE COORDINATION (OUTPATIENT)
Dept: CASE MANAGEMENT | Age: 72
End: 2022-06-20

## 2022-06-20 NOTE — CARE COORDINATION
[facility] Transitions Initial Follow Up Call    Call within 2 business days of discharge: Yes    Patient: Shelby Edouard Patient : 1950   MRN: [id number]  Reason for Admission: chest pain, dyspnea on exertion   Discharge Date: 22 RARS: Readmission Risk Score: 15.7 ( )      Last Discharge [address] [address]       Complaint Diagnosis Description Type Department Provider    22 Chest Pain; Weight Gain Chest pain, unspecified type . .. ED to Hosp-Admission (Discharged) (Dusty Harper) Tr Cruz MD; [facility] [place]. .. Date/Time:  2022 2:26 PM  Attempted to reach SNF by telephone. Call within 2 business days of discharge: Yes. Sent secure email to facility . Will attempt to reach facility again.       Follow Up  Future Appointments   Date Time Provider Asya Grier   2022 11:00 AM Jake Grier [facility]   2022 10:00 AM Francois French MD [facility]       Eugenia Phelps RN

## 2022-06-20 NOTE — PROGRESS NOTES
Patient was admitted to the hospital with congestive heart failure. Please have her see Lorenza or Dr. Ann Fajardo for follow-up in 1 to 2 weeks.

## 2022-06-21 ENCOUNTER — CARE COORDINATION (OUTPATIENT)
Dept: CASE MANAGEMENT | Age: 72
End: 2022-06-21

## 2022-06-21 NOTE — CARE COORDINATION
[facility] Trans[facility] Initial Follow Up Call- Post-Acute follow up       Call within 2 business days of discharge: Yes    Patient: Cassie Zafar Patient : 1950   MRN: [id number]  Reason for Admission: Chest pain, dyspnea on exertion   Discharge Date: 22 RARS: Readmission Risk Score: 15.7 ( )      Last Discharge [facility]       Complaint Diagnosis Description Type Department Provider    22 Chest Pain; Weight Gain Chest pain, unspecified type . .. ED to Hosp-Admission (Discharged) (Esteban Welch) Delle Lesches, MD; Jazmin Cotton. .. Spoke with: Kareen Memory with [facility] [address] list sent via secure email from [employer] with [address].  Will continue to follow up with facility for patient status updates      Follow Up  Future Appointments   Date Time Provider Asya Grier   2022 11:00 AM Jake Dinh [facility]   2022 10:00 AM [facility]       Emmett Ibarra RN

## 2022-06-23 ENCOUNTER — CARE COORDINATION (OUTPATIENT)
Dept: CASE MANAGEMENT | Age: 72
End: 2022-06-23

## 2022-06-23 NOTE — CARE COORDINATION
[facility] Trans[facility] Initial Follow Up Call- Post-Acute follow up       Call within 2 business days of discharge: Yes    Patient: Clarita Bianchi Patient : 1950   MRN: [id number]  Reason for Admission: chest pain, dyspnea on exertion   Discharge Date: 22 RARS: Readmission Risk Score: 15.7 ( )      Last Discharge [facility]       Complaint Diagnosis Description Type Department Provider    22 Chest Pain; Weight Gain Chest pain, unspecified type . .. ED to Hosp-Admission (Discharged) (Madi Payer) Kyleigh Serrano MD; Syl Boateng. .. Spoke with: [address]    Care Transitions Post Acute Facility Update    Care Transitions Interventions  Post Acute Facility Update  Reported Nursing Issues: Patient is under Medicaid Part B services, therapies 3 days a week. She is doing well nursing wise. Pt has no complaints of chest pain/ SOB, edema (hospital had taken off the weight she had gained prior to her admission (20# +). ADLs: Stand by Assist - Presence and Cueing (Comment: UB and safety concerns)   Bed Mobility: Independent   Transfer Assistance: Stand by Assist - Presence and Cueing   Ambulation Assistance: Independent, Stand by Assist - Presence and Cueing   How far (in feet) is the patient ambulating?: 100   Does patient use an assistive device?: Yes   Assistive Devices: Uses wheelchair (to push) to get around going from room to dining room   Barriers to Discharge: None    Anticipated discharge services: Will transition back to [facility] when done with therapy            Next IDT Planned Review: 2022    Future Appointments   Date Time Provider Asya Grier   2022 11:00 AM Michelle Champagne PA-C [facility]   2022 10:00 AM David Romo MD [facility]       SN Notes:   o Diet: - Oral Diet:  Cardiac and Low Sodium (3-4gm)  o Wounds: none  o Medications:  Other: facility  o Other:    Post-acute CC Notes: patient has Medicaid Part B services for therapy thus getting therapy three times a week. Patient has cardiology appt 6/29 and should be attending CHF clinic per cardiology notes    Writer discovered patient is normally an ECF patient at [facility] and will transition after therapy days are complete.     Luis Angel Melo RN        Follow Up  Future Appointments   Date Time Provider Asya Grier   6/29/2022 11:00 AM Jake Ann [facility]   9/21/2022 10:00 AM Lewis Machuca MD [facility]       Luis Angel Melo RN

## 2022-06-28 ENCOUNTER — CARE COORDINATION (OUTPATIENT)
Dept: CASE MANAGEMENT | Age: 72
End: 2022-06-28

## 2022-06-28 NOTE — CARE COORDINATION
[facility] Transitions Follow Up Call- Post-Acute follow up       2022    Patient: Author Morales  Patient : 1950   MRN: [id number]  Reason for Admission: chest pain, dyspnea on exertion   Discharge Date: 22 RARS: Readmission Risk Score: 15.7 ( )         Spoke with: [address] Update    Care Transitions Post Acute Facility Update    Care Transitions Interventions  Post Acute Facility Update  Reported Nursing Issues: No clinical updates. She is doing well with OT and upper body strength, working on balance and safety. No complaints of CP or SOB   ADLs: Minimal Assistance   Bed Mobility: Minimal Assistance   Transfer Assistance: Minimal Assistance   Ambulation Assistance: Minimal Assistance   How far (in feet) is the patient ambulating?: 25   Does patient use an assistive device?: Yes   Assistive Devices: Uses wheelchair to push while ambulating   Anticipated discharge services: LTC            Next IDT Planned Review: 2022    Future Appointments   Date Time Provider Asya Grier   2022 11:00 AM Daryl Lerner PA-C [facility]   2022 10:00 AM Yoly Alejandre MD TIFF CARD [facility]       SN Notes:   o Diet: - Oral Diet:  Cardiac and Low Sodium (3-4gm)  o Wounds: none  o Medications:  Other: facility  o Other:    Post-acute CC Notes:     Kristie Don RN        Follow Up  Future Appointments   Date Time Provider Asya Grier   2022 11:00 AM Jake Lopez [facility]   2022 10:00 AM Yoly Alejandre MD TIFF CARD [facility]       Kristie Don RN

## 2022-06-29 ENCOUNTER — HOSPITAL ENCOUNTER (OUTPATIENT)
Dept: GENERAL RADIOLOGY | Age: 72
Discharge: HOME | End: 2022-07-01
Payer: MEDICARE

## 2022-06-29 ENCOUNTER — TELEPHONE (OUTPATIENT)
Dept: CARDIOLOGY | Age: 72
End: 2022-06-29

## 2022-06-29 ENCOUNTER — OFFICE VISIT (OUTPATIENT)
Dept: CARDIOLOGY | Age: 72
End: 2022-06-29
Payer: MEDICARE

## 2022-06-29 ENCOUNTER — HOSPITAL ENCOUNTER (OUTPATIENT)
Age: 72
Discharge: HOME | End: 2022-06-29
Payer: MEDICARE

## 2022-06-29 ENCOUNTER — HOSPITAL ENCOUNTER (OUTPATIENT)
Age: 72
Discharge: HOME | End: 2022-07-01
Payer: MEDICARE

## 2022-06-29 VITALS
BODY MASS INDEX: 42.91 KG/M2 | RESPIRATION RATE: 18 BRPM | OXYGEN SATURATION: 98 % | SYSTOLIC BLOOD PRESSURE: 114 MMHG | HEIGHT: 66 IN | DIASTOLIC BLOOD PRESSURE: 79 MMHG | WEIGHT: 267 LBS | HEART RATE: 80 BPM

## 2022-06-29 DIAGNOSIS — I07.1 SEVERE TRICUSPID REGURGITATION: ICD-10-CM

## 2022-06-29 DIAGNOSIS — R06.02 SOB (SHORTNESS OF BREATH): ICD-10-CM

## 2022-06-29 DIAGNOSIS — I25.10 CAD, MULTIPLE VESSEL: ICD-10-CM

## 2022-06-29 DIAGNOSIS — E66.01 CLASS 3 SEVERE OBESITY WITH BODY MASS INDEX (BMI) OF 40.0 TO 44.9 IN ADULT, UNSPECIFIED OBESITY TYPE, UNSPECIFIED WHETHER SERIOUS COMORBIDITY PRESENT (HCC): ICD-10-CM

## 2022-06-29 DIAGNOSIS — Z95.820 S/P ANGIOPLASTY WITH STENT: ICD-10-CM

## 2022-06-29 DIAGNOSIS — I50.812 CHRONIC RIGHT-SIDED HEART FAILURE (HCC): ICD-10-CM

## 2022-06-29 DIAGNOSIS — I48.19 PERSISTENT ATRIAL FIBRILLATION (HCC): ICD-10-CM

## 2022-06-29 DIAGNOSIS — I10 ESSENTIAL HYPERTENSION: ICD-10-CM

## 2022-06-29 LAB
ANION GAP SERPL CALCULATED.3IONS-SCNC: 11 MMOL/L (ref 9–17)
BUN BLDV-MCNC: 25 MG/DL (ref 8–23)
BUN/CREAT BLD: 20 (ref 9–20)
CALCIUM SERPL-MCNC: 9.6 MG/DL (ref 8.6–10.4)
CHLORIDE BLD-SCNC: 100 MMOL/L (ref 98–107)
CO2: 26 MMOL/L (ref 20–31)
CREAT SERPL-MCNC: 1.28 MG/DL (ref 0.5–0.9)
GFR AFRICAN AMERICAN: 50 ML/MIN
GFR NON-AFRICAN AMERICAN: 41 ML/MIN
GFR SERPL CREATININE-BSD FRML MDRD: ABNORMAL ML/MIN/{1.73_M2}
GFR SERPL CREATININE-BSD FRML MDRD: ABNORMAL ML/MIN/{1.73_M2}
GLUCOSE BLD-MCNC: 112 MG/DL (ref 70–99)
POTASSIUM SERPL-SCNC: 4.5 MMOL/L (ref 3.7–5.3)
PRO-BNP: 3082 PG/ML
SODIUM BLD-SCNC: 137 MMOL/L (ref 135–144)

## 2022-06-29 PROCEDURE — 99214 OFFICE O/P EST MOD 30 MIN: CPT | Performed by: PHYSICIAN ASSISTANT

## 2022-06-29 PROCEDURE — G8427 DOCREV CUR MEDS BY ELIG CLIN: HCPCS | Performed by: PHYSICIAN ASSISTANT

## 2022-06-29 PROCEDURE — 71046 X-RAY EXAM CHEST 2 VIEWS: CPT

## 2022-06-29 PROCEDURE — 83880 ASSAY OF NATRIURETIC PEPTIDE: CPT

## 2022-06-29 PROCEDURE — 1111F DSCHRG MED/CURRENT MED MERGE: CPT | Performed by: PHYSICIAN ASSISTANT

## 2022-06-29 PROCEDURE — 1090F PRES/ABSN URINE INCON ASSESS: CPT | Performed by: PHYSICIAN ASSISTANT

## 2022-06-29 PROCEDURE — G8399 PT W/DXA RESULTS DOCUMENT: HCPCS | Performed by: PHYSICIAN ASSISTANT

## 2022-06-29 PROCEDURE — 1124F ACP DISCUSS-NO DSCNMKR DOCD: CPT | Performed by: PHYSICIAN ASSISTANT

## 2022-06-29 PROCEDURE — 80048 BASIC METABOLIC PNL TOTAL CA: CPT

## 2022-06-29 PROCEDURE — 1036F TOBACCO NON-USER: CPT | Performed by: PHYSICIAN ASSISTANT

## 2022-06-29 PROCEDURE — G8417 CALC BMI ABV UP PARAM F/U: HCPCS | Performed by: PHYSICIAN ASSISTANT

## 2022-06-29 PROCEDURE — 3017F COLORECTAL CA SCREEN DOC REV: CPT | Performed by: PHYSICIAN ASSISTANT

## 2022-06-29 PROCEDURE — 36415 COLL VENOUS BLD VENIPUNCTURE: CPT

## 2022-06-29 PROCEDURE — 99211 OFF/OP EST MAY X REQ PHY/QHP: CPT | Performed by: PHYSICIAN ASSISTANT

## 2022-06-29 NOTE — PROGRESS NOTES
Norm Correa am scribing for and in the presence of Smiley Quiñones, [address]     Patient: Ten Campo  : 1950  Date of Visit: 2022    REASON FOR VISIT / CONSULTATION: Follow-up (Hx: afib, CAD, severe tricuspid regurg. hosp f/u. SOb when walking but better Denies: CP, dizziness, lightheaded, palps)      History of Present Illness:         Dear Loretta Kent MD    I had the pleasure of seeing Ten Campo in my office today. Ms. North Guillory is a 67 y.o. female who presented today for follow-up. She initially seen in my office to establish care back on 2021. She had a stress test, echocardiogram and a Holter monitor done prior to her initial visit as below due to worsening shortness of breath. She had had hypertension for many years and has been on anti-hypertensive drugs for awhile. She also has had thyorid issue and has been on Levothyroxine as well for many years. She denied every being diagnosied with diabtes or sleep apnea. She is a former smoker. She quit smoking however in  and has not smoked since. Her family history consits of her mother who had caroid artery disease in her 50-60's. Echo done on 2021: Global left ventricular systolic function appears preserved with an estimated ejection fraction of >60%. The left ventricular cavity size is within normal limits and the left ventricular wall thickness is mildly increased. No definite specific wall motion abnormalities were identified. The left atrium is severely dilated (>40), borderline dilated with a left atrial volume index of 57 ml/m2. Normal mitral valve structure with mild to moderate mitral regurgitation. Normal tricuspid valve structure with moderate to severe tricuspid regurgitation. Mild to moderate pulmonary hypertension with an estimated right ventricular systolic pressure of 37 mmHg.  Diastology cannot be assessed due to the patients rhythm of what appeared to be atrial fibrillation. Compared to the previous study of 6/2/14, the patient now has moderate to severe tricuspid regurgitation and her mitral regurgitation is now at least mild to moderate. Holter done on 5/17/2021: The rhythm was atrial fibrillation. Average QRS duration 0.09. Maximum R-R 2.19 seconds with 58 pauses greater than 2.0  seconds. 2. Diary returned with entries of \"shortness of breath\" with isolated PVCs noted. 1-Atrial fibrillation throughout with average HR of 72 bpm , ranging between 50 and 142 bpm. well controlled ventricular  rate. No significant pauses. Maximum R-R interval is 2.19 second during typical sleep time. recorded 2- Very frequent PVCs (PVCs burden 10%), mainly isolated with occasional couplets. No ventricular runs. Stress test done on 4/19/2021: Limited quality study with significant motion artifact particularly during stress imaging. Equivocal myocardial perfusion study. There is a small/moderate perfusion defect of mild/moderate intensity in the anterior and inferior regions during stress and rest imaging which is most consistent with artifact, but may be due to a small degree of coronary ischemia. Global left ventricular systolic function was normal with an EF of 68% without regional wall motion abnormalities. Significant ST segment changes at rest that impair accurate ECG interpretation during stress. EKG done in office (5/26/2021): Showed atrial fibrillation with a HR of 70 bpm.    Heart cath done on 5/27/2021-  Moderate to severe single vessel disease involving the LAD coronary artery with moderate disease in a large Cx. Normal left ventricular end diastolic pressure. (LVEDP). Heart cath done at [facility] on 5/28/2021- Successful PCI of the with no with one drug-eluting stent.     Ms. Rock Amezcua is here for a hospital follow up. She is overall doing better. She does continue to have shortness of breath that worsens the farther she walks. No shortness of breath when sitting.  She is down 5 pounds since discharge. She used to be able to walk to the end of the morrison, now she has to stop at the nurses station while walking. However this is farther than prior to admission. She lives in an assisted living facility. She has been in assited living for about 4-5 years. No fever or cough. No stomach pain. No issues moving her bowels. She is able to sleep well through out the night. She denied any lightheaded or dizziness or heart palpitations. She denied any current or recent chest pain, abdominal pain, bleeding problems, problems with her medications or any other concerns at this time. She does have leg swelling, they get worse if she gets up. She does wear compression socks. She is eating and drinking okay. She denies blood in her urine or stool. PAST MEDICAL HISTORY:        Past Medical History:   Diagnosis Date    ADHD (attention deficit hyperactivity disorder)     Anemia     severe    Asthma     Bipolar disorder (HCC)     Bradycardia, unspecified     Chronic anemia     Chronic atrial fibrillation (HCC)     Chronic back pain     Chronic kidney disease     Patient states unaware of this. Has never been mentioned to her.  Colitis     Constipation     COPD (chronic obstructive pulmonary disease) (HCC)     Cystitis without hematuria     Diastolic CHF (HCC)     Esophagitis     grade A    GERD (gastroesophageal reflux disease)     Hemorrhoids     History of echocardiogram 06/02/2014    EF >60%, LV wall thickness mildly increased,    Hypertension     Hypothyroidism     Metabolic syndrome     Moderate protein-calorie malnutrition (HCC)     Muscle weakness (generalized)     Obesity     Pancreatitis     TIA (transient ischemic attack)            CURRENT ALLERGIES: Lamictal [lamotrigine], Oxcarbazepine, Pcn [penicillins], Sertraline, Trileptal, and Strattera [atomoxetine hcl] REVIEW OF SYSTEMS: 14 systems were reviewed.  Pertinent positives and negatives as above, all else negative.      Past Surgical History:   Procedure Laterality Date    CARDIAC CATHETERIZATION Left 2021    right [facility]/ Dr. Nicholas Parrish Right 2021    Dr. Najma Feldman Left 2022    EYE CATARACT EMULSIFICATION IOL IMPLANT (Left Eye)    COLONOSCOPY  2011    ischemic colitis    COLONOSCOPY  2017    -diverticulosis,hemorrhoids    HYMENECTOMY      1972    HYSTERECTOMY (CERVIX STATUS UNKNOWN)          INTRACAPSULAR CATARACT EXTRACTION Right 2021    EYE CATARACT EMULSIFICATION IOL IMPLANT performed by Antonette Villalta DO at [address] Left 2022    EYE CATARACT EMULSIFICATION IOL IMPLANT performed by Antonette Villalta DO at [facility] Right         TONSILLECTOMY AND ADENOIDECTOMY      UPPER GASTROINTESTINAL ENDOSCOPY  3/2013    5 cm axial hiatal hernia    UPPER GASTROINTESTINAL ENDOSCOPY  2017    Dr. Kike iKng    Social History:  Social History     Tobacco Use    Smoking status: Former Smoker     Packs/day: 0.25     Years: 10.00     Pack years: 2.50     Quit date: 3/12/1998     Years since quittin.3    Smokeless tobacco: Never Used   Substance Use Topics    Alcohol use: No    Drug use: No        CURRENT MEDICATIONS:        Outpatient Medications Marked as Taking for the 22 encounter (Office Visit) with Flaco Goff PA-C   Medication Sig Dispense Refill    furosemide (LASIX) 40 MG tablet Take 1 tablet by mouth 2 times daily for 14 days 28 tablet 0    QUEtiapine (SEROQUEL) 50 MG tablet Take 50 mg by mouth nightly      divalproex (DEPAKOTE ER) 250 MG extended release tablet Take 250 mg by mouth nightly      divalproex (DEPAKOTE SPRINKLE) 125 MG capsule Take 125 mg by mouth daily       loperamide (IMODIUM) 2 MG capsule Take 4 mg by mouth 4 times daily as needed for Diarrhea Every 4 hours      metoprolol succinate (TOPROL XL) 25 MG extended release tablet TAKE 1 TABLET BY MOUTH DAILY. 90 tablet 3    simethicone (MYLICON) 80 MG chewable tablet Take 80 mg by mouth every 6 hours as needed for Flatulence      nystatin (MYCOSTATIN) 875586 UNIT/GM powder Apply topically as needed Apply topically to breasts as needed      levothyroxine (SYNTHROID) 50 MCG tablet Take 50 mcg by mouth Daily      latanoprost (XALATAN) 0.005 % ophthalmic solution Place 1 drop into the right eye nightly       polyvinyl alcohol (ARTIFICIAL TEARS) 1.4 % ophthalmic solution Place 1 drop into both eyes 4 times daily       acetaminophen (TYLENOL) 650 MG extended release tablet Take 650 mg by mouth 4 times daily      Sodium Phosphates (FLEET) 7-19 GM/118ML Place 1 enema rectally as needed      white petrolatum GEL Apply topically 2 times daily as needed for Dry Skin Arms and legs      chlorhexidine (PERIDEX) 0.12 % solution Take 15 mLs by mouth 2 times daily       atorvastatin (LIPITOR) 20 MG tablet Take 1 tablet by mouth daily 90 tablet 3    nitroGLYCERIN (NITROSTAT) 0.4 MG SL tablet up to max of 3 total doses.  If no relief after 1 dose, call 911. 25 tablet 1    clopidogrel (PLAVIX) 75 MG tablet Take 1 tablet by mouth daily 30 tablet 3    losartan (COZAAR) 50 MG tablet Take 1 tablet by mouth daily 30 tablet 3    apixaban (ELIQUIS) 5 MG TABS tablet Take 1 tablet by mouth 2 times daily 60 tablet 0    tiZANidine (ZANAFLEX) 2 MG tablet Take 2 mg by mouth every 6 hours as needed      cloNIDine (CATAPRES) 0.1 MG tablet Take 0.1 mg by mouth 3 times daily      amLODIPine (NORVASC) 10 MG tablet Take 10 mg by mouth daily       Valbenazine Tosylate (INGREZZA) 40 MG CAPS Take 40 mg by mouth nightly      melatonin 3 MG TABS tablet Take 3 mg by mouth nightly      polyethylene glycol (GLYCOLAX) packet Take 17 g by mouth daily      Amantadine (SYMMETREL) 100 MG TABS tablet Take 100 mg by mouth 2 times daily      Dextromethorphan-guaiFENesin (DELSYM COUGH/CHEST CONGEST DM) 5-100 MG/5ML LIQD Take 5 mLs by mouth every 12 hours as needed (cough)      acetaminophen (TYLENOL) 325 MG tablet Take 650 mg by mouth every 4 hours as needed for Pain or Fever      sodium chloride (OCEAN) 0.65 % nasal spray 1 spray by Nasal route 3 times daily as needed for Congestion      bisacodyl (DULCOLAX) 10 MG suppository Place 10 mg rectally daily as needed for Constipation      magnesium hydroxide (MILK OF MAGNESIA) 400 MG/5ML suspension Take 30 mLs by mouth daily as needed for Constipation      calcium carbonate 600 MG TABS tablet Take 1 tablet by mouth nightly       omeprazole (PRILOSEC) 20 MG capsule TAKE 1 CAPSULE BY MOUTH DAILY. 30 capsule 5    guaifenesin (MUCINEX) 600 MG SR tablet Take 1,200 mg by mouth 2 times daily. PRN      loratadine (CLARITIN) 10 MG tablet Take 10 mg by mouth daily. PRN          FAMILY HISTORY: family history includes Bipolar Disorder in her sister and another family member; Diabetes in her father and mother; High Blood Pressure in her father, mother, and another family member; Schizophrenia in her sister. Physical Examination:     /79 (Site: Right Upper Arm, Position: Sitting, Cuff Size: Large Adult)   Pulse 80   Resp 18   Ht 5' 5.98\" (1.676 m)   Wt 267 lb (121.1 kg)   SpO2 98%   BMI 43.12 kg/m²  Body mass index is 43.12 kg/m². Constitutional: She appeared oriented to person and place. She appears well-developed and well-nourished. In no acute distress. HEENT: Normocephalic and atraumatic. No JVD present. Carotid bruit is not present. No mass and no thyromegaly present. No lymphadenopathy noted. Cardiovascular: Normal rate, irregularly irregular rhythm, normal heart sounds. Exam reveals no gallop and no friction rubs. 2/6 systolic murmur, 4th intercostal space on the LEFT must lateral to the sternal border  Pulmonary/Chest: Effort normal and breath sounds normal. No respiratory distress. She has no wheezes, rhonchi or rales.  Diminished lung sounds on the right. Abdominal: Soft, non-tender. She exhibits no organomegaly, mass or bruit. Extremities: Trace bilateral ankle edema. No cyanosis or clubbing. 2+ radial and carotid pulses. Distal extremity pulses: 2+ bilaterally. Compression stockings in place. Neurological: Alertness and orientation as per Constitutional exam. No evidence of gross cranial nerve deficit. Coordination appeared normal.   Skin: Skin is warm and dry. There is no rash or diaphoresis. Psychiatric: She has a normal mood and affect. Her speech is normal and behavior is normal.      MOST RECENT LABS ON RECORD:   Lab Results   Component Value Date    WBC 6.8 06/19/2022    HGB 12.3 06/19/2022    HCT 38.1 06/19/2022     06/19/2022    CHOL 132 05/12/2022    TRIG 63 05/12/2022    HDL 67 05/12/2022    ALT 16 06/19/2022    AST 13 06/19/2022     06/19/2022    K 3.9 06/19/2022    CL 99 06/19/2022    CREATININE 1.20 (H) 06/19/2022    BUN 20 06/19/2022    CO2 27 06/19/2022    TSH 4.44 05/12/2022    INR 1.6 01/31/2022    LABA1C 5.5 05/12/2022    LABMICR CANNOT BE CALCULATED 09/20/2013    BNP NOT REPORTED 03/11/2014       ASSESSMENT:        1. Persistent atrial fibrillation ([address]    2. Chronic right-sided heart failure ([address]    3. SOB (shortness of breath)    4. Severe tricuspid regurgitation    5. Essential hypertension    6. Class 3 severe obesity with body mass index (BMI) of 40.0 to 44.9 in adult, unspecified obesity type, unspecified whether serious comorbidity present ([address]    7. CAD, multiple vessel    8. S/P angioplasty with stent       PLAN:      Persistent Atrial Fibrillation: Rate Control. Beta Blocker: Continue metoprolol succinate (Toprol XL) 25 mg once daily. I also discussed the potential side effects of this medication including lightheadedness and dizziness and told her to stop the medication of this occurs and call our office if this occurs.   Calcium Channel Blocker: Continue amlodipine (Norvasc)  10 mg daily. I discussed the potential side effects of this medication including lightheadedness and dizziness and told her to call the office if this occurs. [id number] Score for Atrial Fibrillation Stroke Risk   Risk   Factors  Component Value   C CHF No 0   H HTN Yes 1   A2 Age >= 76 No,  (77 y.o.) 0   D DM No 0   S2 Prior Stroke/TIA Yes 2   V Vascular Disease No 0   A Age 74-69 Yes,  (77 y.o.) 1   Sc Sex female 1    [id number]  Score  5   Score last updated 7/09/75 6:66 PM EDT  Click here for a link to the UpToDate guideline \"Atrial Fibrillation: Anticoagulation therapy to prevent embolization  Disclaimer: Risk Score calculation is dependent on accuracy of patient problem list and past encounter diagnosis. Stroke Risk: Her CHADS2-VASc score is 5/9 (6.7% stroke risk)  Anticoagulation: Continue Apixaban (Eliquis) 5 mg every 12 hours. I also reminded her to watch for signs of blood in her stool or black tarry stools and stop the medication immediately if this develops as this could be life threatening. Dose appropriate, reviewed her most recent blood work.  Right Sided Chronic diastolic heart failure: New York Heart Association Class: IIb (Marked symptoms during daily activities)   Beta Blocker: Continue Metoprolol succinate (Toprol XL) 25 mg daily. ACE Inibitor/ARB: Continue losartan (Cozaar) 50 mg daily. Diuretics: Continue furosemide (Lasix) 40 mg 2 times daily.  Heart failure counseling: I advised them to try and keep their legs up whenever possible and to limit salt in their diet. · Shortness of breath with mild exertion:   Additional Testing List: I took the liberty of ordering a BMP for today to assess their potassium and renal function. I told them that they could get their lab work performed at the location of their choosing, unfortunately, if the lab work was not performed at a [facility]) facility I could not guarantee my ability to follow up with them on their results.      I also ordered a chest xray due to decreased lung sounds on the right side. · Moderate to Severe Tricuspid Regurgitation seen on Echo from 4/2021:    · Beta Blocker: Continue Metoprolol succinate (Toprol XL) 25 mg daily. · ACE Inibitor/ARB: Continue losartan (Cozaar) 50 mg daily. · Essential Hypertension: Controlled   Beta Blocker: Continue Metoprolol succinate (Toprol XL) 25 mg daily.  ACE Inibitor/ARB: Continue losartan (Cozaar) 50 mg daily.  Calcium Channel Blocker: Continue amlodipine (Norvasc) 10 mg once daily. Morbid obesity: Body mass index is 43.12 kg/m². I also briefly discussed both diet and exercise strategies for her to continue to loses weight and she was very receptive to this. · Peripheral Vascular Disease: No Stents   · Continue current therapy and medical management as above. · Atherosclerotic Heart Disease: Successful PCI of the LAD with one drug-eluting stent on 5/28/2021.  Antiplatelet Agent: Continue clopidogrel (Plavix): Plavix 75 mg daily.  Beta Blocker: Continue Metoprolol succinate (Toprol XL) 25 mg daily.  Anti-anginal medications: Continue nitroglycerin 0.4 mg tablets as needed for chest pain. and amlodipine (Norvasc) 10 mg once daily.  Cholesterol Reduction Therapy: Continue Atorvastatin (Lipitor) 20 mg daily.  Additional counseling: I advised them to call our office or go to the emergency room if they developed worsening or persistent chest pain or increased shortness of breath as this could be life threatening. In the meantime, I encouraged Ms. Dos Santos to continue to take her other medications. FOLLOW UP:   I told Ms. Lucrecia Ibrahim to call my office if she had any problems, but otherwise I asked her to Return in about 3 weeks (around 7/20/2022). However, I would be happy to see her sooner should the need arise.      Sincerely,  Judy Basilio PA-C  [facility]    [address], [address] [facility]  Phone: [phone], Fax: [phone]     I believe that the risk of significant morbidity and mortality related to the patient's current medical conditions are: intermediate-high. Approximately 35 minutes were spent during prep work, discussion and exam of the patient, and follow up documentation and all of their questions were answered. The documentation recorded by the scribe, accurately and completely reflects the services I personally performed and the decisions made by me.  Nadine Clayton PA-C June 29, 2022

## 2022-06-29 NOTE — TELEPHONE ENCOUNTER
----- Message from Nestor Willard PA-C sent at 6/29/2022  1:16 PM EDT -----  Please notify patient that their lab results are staying stable, chest XR looked good. Please continue current treatment and follow up.

## 2022-06-29 NOTE — PATIENT INSTRUCTIONS
SURVEY:    You may be receiving a survey from [employer] regarding your visit today. Please complete the survey to enable us to provide the highest quality of care to you and your family. If you cannot score us a very good on any question, please call the office to discuss how we could have made your experience a very good one. Thank you.

## 2022-06-29 NOTE — RESULT ENCOUNTER NOTE
Please notify patient that their lab results are staying stable, chest XR looked good. Please continue current treatment and follow up.

## 2022-06-30 ENCOUNTER — CARE COORDINATION (OUTPATIENT)
Dept: CASE MANAGEMENT | Age: 72
End: 2022-06-30

## 2022-06-30 ENCOUNTER — TELEPHONE (OUTPATIENT)
Dept: CARDIOLOGY | Age: 72
End: 2022-06-30

## 2022-06-30 NOTE — TELEPHONE ENCOUNTER
Is the doctor going to increase the lasix and did you say wgt gain 3pounds in 2day or 5in a week notify doctor thank you

## 2022-06-30 NOTE — TELEPHONE ENCOUNTER
Keep the lasix the same dose. If she gains more than 3 pounds in 1 day or 5 pounds in a week or worsening shortness of breath I asked her to call. Thank you.

## 2022-06-30 NOTE — CARE COORDINATION
[facility] Transitions Follow Up Call- Post-Acute follow up       2022    Patient: Bonnie Valderrama  Patient : 1950   MRN: [id number]  Reason for Admission: chest pain, dyspnea on exertion   Discharge Date: 22 RARS: Readmission Risk Score: 15.7 ( )         Spoke with:  with Marcus Munroe zoom meeting this morning. No updates or changes on patient. Will follow up with them on 2022    Care Transitions Subsequent and Final Call    Subsequent and Final Calls  Care Transitions Interventions  Other Interventions:            Follow Up  Future Appointments   Date Time Provider Asya Grier   2022 11:20 AM [facility]   2022 10:00 AM Gretel Romero MD [facility]       Candance Crate, RN

## 2022-07-01 ENCOUNTER — TELEPHONE (OUTPATIENT)
Dept: PRIMARY CARE CLINIC | Age: 72
End: 2022-07-01

## 2022-07-01 NOTE — TELEPHONE ENCOUNTER
Extra lasix times two.  Weeks then dc the two weeks will be up Monday does she want me to go back to daily or bid

## 2022-07-01 NOTE — TELEPHONE ENCOUNTER
Please ask her weight, if it is back to baseline we will have her go back to her normal dose on Monday. I reviewed her blood work and it looks stable.

## 2022-07-05 ENCOUNTER — CARE COORDINATION (OUTPATIENT)
Dept: CASE MANAGEMENT | Age: 72
End: 2022-07-05

## 2022-07-05 DIAGNOSIS — I50.812 CHRONIC RIGHT-SIDED HEART FAILURE (HCC): Primary | ICD-10-CM

## 2022-07-05 NOTE — TELEPHONE ENCOUNTER
Please have her get a BMP done to access kidney function and we will decide what dose she should be taking based on her kidney function. Limit fluids to 1500 mL daily. Please advise her to elevate her legs and wear compression stockings in addition to limiting sodium in her diet. Order for BMP has been placed.

## 2022-07-05 NOTE — CARE COORDINATION
[facility] Transitions Follow Up Call- final Post-Acute follow up       2022    Patient: Mika Baumann  Patient : 1950   MRN: [id number]  Reason for Admission: chest pain, dyspnea on exertion   Discharge Date: 22 RARS: Readmission Risk Score: 15.7 ( )         Spoke with: Marcus IDENMANUEL    Post Acute Facility Update    Care Transitions Post Acute Facility Update    Care Transitions Interventions  Post Acute Facility Update  Reported Nursing Issues: OT only services using part B medicare services. She will resume LTC status this week    ADLs: Stand by Assist - Presence and Cueing   Bed Mobility: Contact Guard Assist - Hands on patient for balance   Transfer Assistance: Contact Guard Assist - Hands on patient for balance   Ambulation Assistance: Contact Guard Assist - Hands on patient for balance   Barriers to Discharge: none   Anticipated discharge services: Resuming LTC status. Next IDT Planned Review: n/a    Future Appointments   Date Time Provider Asya Grier   2022 11:20 AM Latasha Giang PA-C [facility]   2022 10:00 AM Sol Grimm MD TIFF CARD [facility]       SN Notes:   o Diet: - Oral Diet:  Cardiac and Low Sodium (3-4gm)  o Wounds: none  o Medications: Other: facility  o Other:    Post-acute CC Notes: they have been working on upper body strength and balance.  She has been able to stand 20-25 minutes     Cleo Louis RN              Follow Up  Future Appointments   Date Time Provider Asya Grier   2022 11:20 AM Latasha Giang PA-C [facility]   2022 10:00 AM [facility]       Cleo Louis RN

## 2022-07-05 NOTE — TELEPHONE ENCOUNTER
Left detailed message for nurse at [address] [place] [address] to call back with weight and orders.

## 2022-07-06 ENCOUNTER — TELEPHONE (OUTPATIENT)
Dept: CARDIOLOGY | Age: 72
End: 2022-07-06

## 2022-07-06 NOTE — TELEPHONE ENCOUNTER
Nurse from [facility] called us back and I released the message via Lorenza about what she would like Ms. Pasha Zurita to do. I let her nurse no that per Lorenza KIM she would like to get a repeat BMP done to access her kidney function at this time. Also to limit her fluids to 1500 mL daily. Also to advise her to keep her legs up when possible and to wear her compression stockings in addition to limiting her sodium in her diet. Nurse verbalized understanding and stated that the lab will draw her BMP tomorrow morning and Ms. Dos Santos current weight as of this morning was 268 lbs.

## 2022-07-07 ENCOUNTER — HOSPITAL ENCOUNTER (OUTPATIENT)
Age: 72
Setting detail: SPECIMEN
Discharge: HOME | End: 2022-07-07
Payer: MEDICARE

## 2022-07-07 LAB
ANION GAP SERPL CALCULATED.3IONS-SCNC: 14 MMOL/L (ref 9–17)
BUN BLDV-MCNC: 26 MG/DL (ref 8–23)
BUN/CREAT BLD: 16 (ref 9–20)
CALCIUM SERPL-MCNC: 9.5 MG/DL (ref 8.6–10.4)
CHLORIDE BLD-SCNC: 101 MMOL/L (ref 98–107)
CO2: 22 MMOL/L (ref 20–31)
CREAT SERPL-MCNC: 1.59 MG/DL (ref 0.5–0.9)
GFR AFRICAN AMERICAN: 39 ML/MIN
GFR NON-AFRICAN AMERICAN: 32 ML/MIN
GFR SERPL CREATININE-BSD FRML MDRD: ABNORMAL ML/MIN/{1.73_M2}
GFR SERPL CREATININE-BSD FRML MDRD: ABNORMAL ML/MIN/{1.73_M2}
GLUCOSE BLD-MCNC: 133 MG/DL (ref 70–99)
POTASSIUM SERPL-SCNC: 4.9 MMOL/L (ref 3.7–5.3)
SODIUM BLD-SCNC: 137 MMOL/L (ref 135–144)

## 2022-07-07 PROCEDURE — 80048 BASIC METABOLIC PNL TOTAL CA: CPT

## 2022-07-08 ENCOUNTER — HOSPITAL ENCOUNTER (OUTPATIENT)
Age: 72
Setting detail: SPECIMEN
Discharge: HOME | End: 2022-07-08

## 2022-07-12 ENCOUNTER — OUTSIDE SERVICES (OUTPATIENT)
Dept: PRIMARY CARE CLINIC | Age: 72
End: 2022-07-12
Payer: MEDICARE

## 2022-07-12 ENCOUNTER — TELEPHONE (OUTPATIENT)
Dept: CARDIOLOGY | Age: 72
End: 2022-07-12

## 2022-07-12 DIAGNOSIS — N18.31 STAGE 3A CHRONIC KIDNEY DISEASE (HCC): ICD-10-CM

## 2022-07-12 DIAGNOSIS — I50.32 CHRONIC DIASTOLIC HEART FAILURE (HCC): Primary | ICD-10-CM

## 2022-07-12 DIAGNOSIS — I10 ESSENTIAL HYPERTENSION: ICD-10-CM

## 2022-07-12 DIAGNOSIS — I48.91 ATRIAL FIBRILLATION, UNSPECIFIED TYPE (HCC): ICD-10-CM

## 2022-07-12 DIAGNOSIS — N18.31 STAGE 3A CHRONIC KIDNEY DISEASE (HCC): Primary | ICD-10-CM

## 2022-07-12 NOTE — PROGRESS NOTES
Patient:  Martin Viramontes, 1950  I saw this patient on 07/13/2022, at [facility]   Shortness of breath better,blood sugars well controlled,  Edema improving  No bleeding from anticoagulant  bp well controlled  Renal function worse,awaiting nephrology      Reason for Visit:      ICD-10-CM    1. Chronic diastolic heart failure (HCC)  I50.32       2. Atrial fibrillation, unspecified type ([facility]  I48.91       3. Essential hypertension  I10       4. Stage 3a chronic kidney disease (HCC)  N18.31           Changes since last visit:   Renal function worsening   mood changes better,psych managing meds  Shortness of breath and edema better  BP better  1. Fall:  none  2. Behavioral Change: mood improving  3. Pain Control: adequate  4. Mobility: improving  5. Pressure Sore:  none  Allergies:  Lamictal [lamotrigine], Oxcarbazepine, Pcn [penicillins], Sertraline, Trileptal, and Strattera [atomoxetine hcl]    Current Outpatient Medications   Medication Sig Dispense Refill    furosemide (LASIX) 40 MG tablet Take 1 tablet by mouth 2 times daily for 14 days 28 tablet 0    divalproex (DEPAKOTE ER) 250 MG extended release tablet Take 250 mg by mouth nightly      divalproex (DEPAKOTE SPRINKLE) 125 MG capsule Take 125 mg by mouth daily       loperamide (IMODIUM) 2 MG capsule Take 4 mg by mouth 4 times daily as needed for Diarrhea Every 4 hours      metoprolol succinate (TOPROL XL) 25 MG extended release tablet TAKE 1 TABLET BY MOUTH DAILY.  90 tablet 3    simethicone (MYLICON) 80 MG chewable tablet Take 80 mg by mouth every 6 hours as needed for Flatulence      levothyroxine (SYNTHROID) 50 MCG tablet Take 50 mcg by mouth Daily      latanoprost (XALATAN) 0.005 % ophthalmic solution Place 1 drop into the right eye nightly       polyvinyl alcohol (ARTIFICIAL TEARS) 1.4 % ophthalmic solution Place 1 drop into both eyes 4 times daily       acetaminophen (TYLENOL) 650 MG extended release tablet Take 650 mg by mouth 4 times daily Sodium Phosphates (FLEET) 7-19 GM/118ML Place 1 enema rectally as needed      traMADol (ULTRAM) 50 MG tablet Take 50 mg by mouth every 6 hours as needed for Pain.      white petrolatum GEL Apply topically 2 times daily as needed for Dry Skin Arms and legs      chlorhexidine (PERIDEX) 0.12 % solution Take 15 mLs by mouth 2 times daily       atorvastatin (LIPITOR) 20 MG tablet Take 1 tablet by mouth daily 90 tablet 3    nitroGLYCERIN (NITROSTAT) 0.4 MG SL tablet up to max of 3 total doses.  If no relief after 1 dose, call 911. 25 tablet 1    clopidogrel (PLAVIX) 75 MG tablet Take 1 tablet by mouth daily 30 tablet 3    losartan (COZAAR) 50 MG tablet Take 1 tablet by mouth daily 30 tablet 3    apixaban (ELIQUIS) 5 MG TABS tablet Take 1 tablet by mouth 2 times daily 60 tablet 0    tiZANidine (ZANAFLEX) 2 MG tablet Take 2 mg by mouth every 6 hours as needed      cloNIDine (CATAPRES) 0.1 MG tablet Take 0.1 mg by mouth 3 times daily      amLODIPine (NORVASC) 10 MG tablet Take 10 mg by mouth daily       Valbenazine Tosylate (INGREZZA) 40 MG CAPS Take 40 mg by mouth nightly      melatonin 3 MG TABS tablet Take 3 mg by mouth nightly      polyethylene glycol (GLYCOLAX) packet Take 17 g by mouth daily      Amantadine (SYMMETREL) 100 MG TABS tablet Take 100 mg by mouth 2 times daily      Dextromethorphan-guaiFENesin (DELSYM COUGH/CHEST CONGEST DM) 5-100 MG/5ML LIQD Take 5 mLs by mouth every 12 hours as needed (cough)      acetaminophen (TYLENOL) 325 MG tablet Take 650 mg by mouth every 4 hours as needed for Pain or Fever      sodium chloride (OCEAN) 0.65 % nasal spray 1 spray by Nasal route 3 times daily as needed for Congestion      bisacodyl (DULCOLAX) 10 MG suppository Place 10 mg rectally daily as needed for Constipation      magnesium hydroxide (MILK OF MAGNESIA) 400 MG/5ML suspension Take 30 mLs by mouth daily as needed for Constipation      calcium carbonate 600 MG TABS tablet Take 1 tablet by mouth nightly       omeprazole (PRILOSEC) 20 MG capsule TAKE 1 CAPSULE BY MOUTH DAILY. 30 capsule 5    guaifenesin (MUCINEX) 600 MG SR tablet Take 1,200 mg by mouth 2 times daily. PRN      loratadine (CLARITIN) 10 MG tablet Take 10 mg by mouth daily. PRN        No current facility-administered medications for this visit. Past Medical History:    Past Medical History:   Diagnosis Date    ADHD (attention deficit hyperactivity disorder)     Anemia     severe    Asthma     Bipolar disorder (HCC)     Bradycardia, unspecified     Chronic anemia     Chronic atrial fibrillation ([address]     Chronic back pain     Chronic kidney disease     Patient states unaware of this. Has never been mentioned to her.      Colitis     Constipation     COPD (chronic obstructive pulmonary disease) (HCC)     Cystitis without hematuria     Diastolic CHF (HCC)     Esophagitis     grade A    GERD (gastroesophageal reflux disease)     Hemorrhoids     History of echocardiogram 06/02/2014    EF >60%, LV wall thickness mildly increased,    Hypertension     Hypothyroidism     Metabolic syndrome     Moderate protein-calorie malnutrition (HCC)     Muscle weakness (generalized)     Obesity     Pancreatitis     TIA (transient ischemic attack)        Past Surgical History:    Past Surgical History:   Procedure Laterality Date    CARDIAC CATHETERIZATION Left 05/27/2021    right radial/ [facility]/ Dr. Chinedu Navarrete Right 12/27/2021    Dr. Patricia Nolasco Left 02/07/2022    EYE CATARACT EMULSIFICATION IOL IMPLANT (Left Eye)    COLONOSCOPY  5/2011    ischemic colitis    COLONOSCOPY  03/07/2017    -diverticulosis,hemorrhoids    HYMENECTOMY      1972    HYSTERECTOMY (CERVIX STATUS UNKNOWN)      2008    INTRACAPSULAR CATARACT EXTRACTION Right 12/27/2021    EYE CATARACT EMULSIFICATION IOL IMPLANT performed by Vanessa Pollack DO at [facility] Left 2/7/2022    EYE CATARACT EMULSIFICATION IOL IMPLANT performed by John Paul Canela DO at [facility]         TONSILLECTOMY AND ADENOIDECTOMY      UPPER GASTROINTESTINAL ENDOSCOPY  3/2013    5 cm axial hiatal hernia    UPPER GASTROINTESTINAL ENDOSCOPY  2017    Dr. Sabina Tomlin       Social History:   Social History     Tobacco Use    Smoking status: Former     Packs/day: 0.25     Years: 10.00     Pack years: 2.50     Types: Cigarettes     Quit date: 3/12/1998     Years since quittin.3    Smokeless tobacco: Never   Substance Use Topics    Alcohol use: No       Family History:   Family History   Problem Relation Age of Onset    Diabetes Mother     High Blood Pressure Mother     Diabetes Father     High Blood Pressure Father     Bipolar Disorder Sister     Schizophrenia Sister     High Blood Pressure Other         2 sons    Bipolar Disorder Other         2 sons           Review of Systems:  Constitutional: negative for fevers or chills  Eyes: negative for visual disturbance   ENT: positive for oral infection,negative for sore throat or nasal congestion,no dysphagia. No epistaxis. Respiratory:  shortness of breath improved  Cardiovascular: neg for chest pain , palpitations,pnd,  edema better  Gastrointestinal: neg for pain, neg for nausea, vomiting, diarrhea ,  constipation,no reyna,no blood in stool  Genitourinary: negative for dysuria, urgency,neg for hematuria , frequency  Integument/breast: negative for skin rash or lesions  Neurological: negative for unilateral weakness, numbness or tingling,has tremors  Muscular Skeletal: denies joint pain   Psych- mood changes improving    Objective:    Vitals: BP:131/64 T:97.7 P:59 R:16    -----------------------------------------------------------------  Exam:  GEN:   A & O x3, no apparent distress,obese  EYES:  No gross abnormalities.  and PERRLA  ENT:Throat- has oral unlcer,nose- no drainage  NECK: normal, supple, no lymphadenopathy,  no carotid bruits  PULM: Diminished air entry on auscultation bilaterally- no wheezes, rales or rhonchi, normal air movement, no respiratory distress  COR:   S1,s2,RRR, no murmurs and no gallops,has minimal bilat leg edema  ABD:    soft, non-tender, non-distended, normal bowel sounds, no masses or organomegaly  : no cva or flank tenderness  EXT:has minimal edema, no calf tenderness, and warm to touch. NEURO: Motor and sensory grossly intact,has tremors at rest  PSYCH: mood better  SKIN:   No rash or lesions    -----------------------------------------------------------------  Diagnostic Data:   All diagnostic data was reviewed    Assessment:      Problem List Items Addressed This Visit       Essential hypertension    Atrial fibrillation, unspecified type ([facility]    Stage 3a chronic kidney disease ([facility]     Other Visit Diagnoses       Chronic diastolic heart failure ([facility]    -  Primary          Patient Active Problem List   Diagnosis Code    TIA (transient ischemic attack) G45.9    Bipolar 1 disorder ([facility] F31.9    DDD (degenerative disc disease), lumbar M51.36    Bradycardia R00.1    Essential hypertension I10    Obesity (BMI 30-39. 9) E66.9    GERD (gastroesophageal reflux disease) K21.9    Iron deficiency anemia D50.9    Iron deficiency anemia due to chronic blood loss D50.0    History of colon polyps Z86.010    Hypothyroidism E03.9    Other chronic pain G89.29    Other hemorrhoids K64.8    Diverticulosis of large intestine without diverticulitis K57.30    Attention deficit hyperactivity disorder F90.9    Morbid obesity ([facility]) E66.01    Cellulitis of leg without foot, right L03.115    Atherosclerotic heart disease of native coronary artery with other forms of angina pectoris ([facility]) I25.118    CAD, multiple vessel I25.10    Chest pain R07.9    PVD (peripheral vascular disease) ([facility]) I73.9    Atrial fibrillation, unspecified type ([facility]) I48.91    Stage 3a chronic kidney disease ([facility]) N18.31    Shortness of breath R06.02         Plan:      Monitor for bleeding  Nephrology eval  Encourage weight loss with diet and exercise  Psych to manage psychotropics  Encourage activity as tolerated and follow diet  Continue current medications  Monitor bp   Prevent pressure sore  Prevent falls      Electronically signed by Flores James MD on 7/15/2022 at 9:36 AM

## 2022-07-13 PROCEDURE — 99308 SBSQ NF CARE LOW MDM 20: CPT | Performed by: FAMILY MEDICINE

## 2022-07-19 ENCOUNTER — OFFICE VISIT (OUTPATIENT)
Dept: CARDIOLOGY | Age: 72
End: 2022-07-19
Payer: MEDICARE

## 2022-07-19 VITALS
SYSTOLIC BLOOD PRESSURE: 132 MMHG | OXYGEN SATURATION: 97 % | BODY MASS INDEX: 45.15 KG/M2 | RESPIRATION RATE: 18 BRPM | HEART RATE: 69 BPM | HEIGHT: 65 IN | WEIGHT: 271 LBS | DIASTOLIC BLOOD PRESSURE: 75 MMHG

## 2022-07-19 DIAGNOSIS — I25.118 ATHEROSCLEROTIC HEART DISEASE OF NATIVE CORONARY ARTERY WITH OTHER FORMS OF ANGINA PECTORIS (HCC): ICD-10-CM

## 2022-07-19 DIAGNOSIS — I48.0 PAF (PAROXYSMAL ATRIAL FIBRILLATION) (HCC): ICD-10-CM

## 2022-07-19 DIAGNOSIS — I10 ESSENTIAL HYPERTENSION: ICD-10-CM

## 2022-07-19 DIAGNOSIS — I50.33 ACUTE ON CHRONIC DIASTOLIC CONGESTIVE HEART FAILURE (HCC): Primary | ICD-10-CM

## 2022-07-19 DIAGNOSIS — I38 VALVULAR HEART DISEASE: ICD-10-CM

## 2022-07-19 DIAGNOSIS — I50.813 ACUTE ON CHRONIC RIGHT-SIDED HEART FAILURE (HCC): ICD-10-CM

## 2022-07-19 PROCEDURE — 1090F PRES/ABSN URINE INCON ASSESS: CPT | Performed by: PHYSICIAN ASSISTANT

## 2022-07-19 PROCEDURE — 1111F DSCHRG MED/CURRENT MED MERGE: CPT | Performed by: PHYSICIAN ASSISTANT

## 2022-07-19 PROCEDURE — G8417 CALC BMI ABV UP PARAM F/U: HCPCS | Performed by: PHYSICIAN ASSISTANT

## 2022-07-19 PROCEDURE — 1124F ACP DISCUSS-NO DSCNMKR DOCD: CPT | Performed by: PHYSICIAN ASSISTANT

## 2022-07-19 PROCEDURE — 99211 OFF/OP EST MAY X REQ PHY/QHP: CPT

## 2022-07-19 PROCEDURE — 3017F COLORECTAL CA SCREEN DOC REV: CPT | Performed by: PHYSICIAN ASSISTANT

## 2022-07-19 PROCEDURE — 1036F TOBACCO NON-USER: CPT | Performed by: PHYSICIAN ASSISTANT

## 2022-07-19 PROCEDURE — G8427 DOCREV CUR MEDS BY ELIG CLIN: HCPCS | Performed by: PHYSICIAN ASSISTANT

## 2022-07-19 PROCEDURE — 99214 OFFICE O/P EST MOD 30 MIN: CPT | Performed by: PHYSICIAN ASSISTANT

## 2022-07-19 PROCEDURE — G8399 PT W/DXA RESULTS DOCUMENT: HCPCS | Performed by: PHYSICIAN ASSISTANT

## 2022-07-19 NOTE — PROGRESS NOTES
Bola Herbert am scribing for and in the presence of Kaylan Gomes [place]     Patient: Susan Odonnell  : 1950  Date of Visit: 2022    REASON FOR VISIT / CONSULTATION: Follow-up (HX: PAF, CHF, Valvular heart disease. Pt states she is doing ok. C/o: SOB and dry mouth. Denies: CP, Palpitations, Lightheaded/dizziness. )      History of Present Illness:         Dear Cherry Desai MD    I had the pleasure of seeing Ssuan Odonnell in my office today. Ms. Leonardo Hannon is a 67 y.o. female who presented today for follow-up. She initially seen in my office to establish care back on 2021. She had a stress test, echocardiogram and a Holter monitor done prior to her initial visit as below due to worsening shortness of breath. She had had hypertension for many years and has been on anti-hypertensive drugs for awhile. She also has had thyorid issue and has been on Levothyroxine as well for many years. She denied every being diagnosied with diabtes or sleep apnea. She is a former smoker. She quit smoking however in  and has not smoked since. Her family history consits of her mother who had caroid artery disease in her 50-60's. Echo done on 2021: Global left ventricular systolic function appears preserved with an estimated ejection fraction of >60%. The left ventricular cavity size is within normal limits and the left ventricular wall thickness is mildly increased. No definite specific wall motion abnormalities were identified. The left atrium is severely dilated (>40), borderline dilated with a left atrial volume index of 57 ml/m2. Normal mitral valve structure with mild to moderate mitral regurgitation. Normal tricuspid valve structure with moderate to severe tricuspid regurgitation. Mild to moderate pulmonary hypertension with an estimated right ventricular systolic pressure of 37 mmHg.  Diastology cannot be assessed due to the patients rhythm of what appeared to be atrial fibrillation. Compared to the previous study of 6/2/14, the patient now has moderate to severe tricuspid regurgitation and her mitral regurgitation is now at least mild to moderate. Holter done on 5/17/2021: The rhythm was atrial fibrillation. Average QRS duration 0.09. Maximum R-R 2.19 seconds with 58 pauses greater than 2.0  seconds. 2. Diary returned with entries of \"shortness of breath\" with isolated PVCs noted. 1-Atrial fibrillation throughout with average HR of 72 bpm , ranging between 50 and 142 bpm. well controlled ventricular  rate. No significant pauses. Maximum R-R interval is 2.19 second during typical sleep time. recorded 2- Very frequent PVCs (PVCs burden 10%), mainly isolated with occasional couplets. No ventricular runs. Stress test done on 4/19/2021: Limited quality study with significant motion artifact particularly during stress imaging. Equivocal myocardial perfusion study. There is a small/moderate perfusion defect of mild/moderate intensity in the anterior and inferior regions during stress and rest imaging which is most consistent with artifact, but may be due to a small degree of coronary ischemia. Global left ventricular systolic function was normal with an EF of 68% without regional wall motion abnormalities. Significant ST segment changes at rest that impair accurate ECG interpretation during stress. EKG done in office (5/26/2021): Showed atrial fibrillation with a HR of 70 bpm.    Heart cath done on 5/27/2021-  Moderate to severe single vessel disease involving the LAD coronary artery with moderate disease in a large Cx. Normal left ventricular end diastolic pressure. (LVEDP). Heart cath done at [facility] on 5/28/2021- Successful PCI of the with no with one drug-eluting stent. Echo done on 3/22/2022: Global left ventricular systolic function appears preserved with an  estimated ejection fraction of >60%.  The left ventricular cavity size is within normal limits and the left  ventricular wall thickness is mildly increased. No definite specific wall motion abnormalities were identified. The left atrium is severely dilated (>40), left atrial volume index of 50 ml/m2. The right atrium appears at least moderately dilated. Mild aortic regurgitation. Mild to moderate mitral regurgitation. Moderate tricuspid regurgitation. Moderate pulmonary hypertension with an estimated right ventricular systolic pressure of 45 mmHg. Diastolic function cannot be assessed due atrial fibrillation. CAM done on 3/22/2022: 6 days and 23 hours recorded. Baseline rhythm is atrial fibrillation throughout with fairly controlled ventricular rate. Average heart rate is 59 bpm ranging between 33 and 140 bpm.  No significant pauses. Rare isolated PVCs noted. No patient-activated events recorded. Ms. Fredrick Mars is here for her follow up. She reports doing ok at this time. She does still feel some shortness of breath. She also has noticed that she can not walk as far as she used to. She used to be able to walk to the second dinning room vs now only being able to walk to the first dinning room. She is still living in an assisted living facility. She is no longer doing physical therapy because she is done doing this. No fever or cough. No stomach pain. No issues moving her bowels. She denied any lightheaded or dizziness or heart palpitations. She denied any current or recent chest pain, abdominal pain, bleeding problems, problems with her medications or any other concerns at this time. She is eating and drinking okay. She denies blood in her urine or stool. 271 lb (122.9 kg) today, previously was 267 lb    Bleeding Risks: Ms. Fredrick Mars denies any current or recent bleeding problems including a history of a GI bleed, ulcers, recent or upcoming surgeries, blood in her stool or black tarry stools or blood in her urine.     PAST MEDICAL HISTORY:        Past Medical History:   Diagnosis Date    ADHD (attention deficit hyperactivity disorder)     Anemia     severe    Asthma     Bipolar disorder (HCC)     Bradycardia, unspecified     Chronic anemia     Chronic atrial fibrillation (HCC)     Chronic back pain     Chronic kidney disease     Patient states unaware of this. Has never been mentioned to her. Colitis     Constipation     COPD (chronic obstructive pulmonary disease) (HCC)     Cystitis without hematuria     Diastolic CHF (HCC)     Esophagitis     grade A    GERD (gastroesophageal reflux disease)     Hemorrhoids     History of echocardiogram 06/02/2014    EF >60%, LV wall thickness mildly increased,    Hypertension     Hypothyroidism     Metabolic syndrome     Moderate protein-calorie malnutrition (HCC)     Muscle weakness (generalized)     Obesity     Pancreatitis     TIA (transient ischemic attack)            CURRENT ALLERGIES: Lamictal [lamotrigine], Oxcarbazepine, Pcn [penicillins], Sertraline, Trileptal, and Strattera [atomoxetine hcl] REVIEW OF SYSTEMS: 14 systems were reviewed. Pertinent positives and negatives as above, all else negative.      Past Surgical History:   Procedure Laterality Date    CARDIAC CATHETERIZATION Left 05/27/2021    right [facility]/ Dr. Caitie Nava Right 12/27/2021    Dr. Miguel Ángel Reyes Left 02/07/2022    EYE CATARACT EMULSIFICATION IOL IMPLANT (Left Eye)    COLONOSCOPY  5/2011    ischemic colitis    COLONOSCOPY  03/07/2017    -diverticulosis,hemorrhoids    [address] (CERVIX STATUS UNKNOWN)      2008    INTRACAPSULAR CATARACT EXTRACTION Right 12/27/2021    EYE CATARACT EMULSIFICATION IOL IMPLANT performed by Arjun Rees DO at [facility]  Left 2/7/2022    EYE CATARACT EMULSIFICATION IOL IMPLANT performed by Arjun Rees DO at [facility] Right     2008    [facility] ENDOSCOPY  3/2013    5 cm axial hiatal hernia    UPPER GASTROINTESTINAL ENDOSCOPY  2017    Dr. Ford Casiano    Social History:  Social History     Tobacco Use    Smoking status: Former     Packs/day: 0.25     Years: 10.00     Pack years: 2.50     Types: Cigarettes     Quit date: 3/12/1998     Years since quittin.3    Smokeless tobacco: Never   Substance Use Topics    Alcohol use: No    Drug use: No        CURRENT MEDICATIONS:        Outpatient Medications Marked as Taking for the 22 encounter (Office Visit) with Cristy Clark PA-C   Medication Sig Dispense Refill    furosemide (LASIX) 40 MG tablet Take 1 tablet by mouth 2 times daily for 14 days 28 tablet 0    loperamide (IMODIUM) 2 MG capsule Take 4 mg by mouth 4 times daily as needed for Diarrhea Every 4 hours      metoprolol succinate (TOPROL XL) 25 MG extended release tablet TAKE 1 TABLET BY MOUTH DAILY. 90 tablet 3    simethicone (MYLICON) 80 MG chewable tablet Take 80 mg by mouth every 6 hours as needed for Flatulence      levothyroxine (SYNTHROID) 50 MCG tablet Take 50 mcg by mouth Daily      latanoprost (XALATAN) 0.005 % ophthalmic solution Place 1 drop into the right eye nightly       polyvinyl alcohol (LIQUIFILM TEARS) 1.4 % ophthalmic solution Place 1 drop into both eyes 4 times daily       acetaminophen (TYLENOL) 650 MG extended release tablet Take 650 mg by mouth 4 times daily      Sodium Phosphates (FLEET) 7-19 GM/118ML Place 1 enema rectally as needed      traMADol (ULTRAM) 50 MG tablet Take 50 mg by mouth every 6 hours as needed for Pain.      white petrolatum GEL Apply topically 2 times daily as needed for Dry Skin Arms and legs      chlorhexidine (PERIDEX) 0.12 % solution Take 15 mLs by mouth 2 times daily       atorvastatin (LIPITOR) 20 MG tablet Take 1 tablet by mouth daily 90 tablet 3    nitroGLYCERIN (NITROSTAT) 0.4 MG SL tablet up to max of 3 total doses.  If no relief after 1 dose, call 911. 25 tablet 1    clopidogrel (PLAVIX) 75 MG tablet Take 1 tablet by mouth daily 30 tablet 3    losartan (COZAAR) 50 MG tablet Take 1 tablet by mouth daily 30 tablet 3    apixaban (ELIQUIS) 5 MG TABS tablet Take 1 tablet by mouth 2 times daily 60 tablet 0    tiZANidine (ZANAFLEX) 2 MG tablet Take 2 mg by mouth every 6 hours as needed      cloNIDine (CATAPRES) 0.1 MG tablet Take 0.1 mg by mouth 3 times daily      amLODIPine (NORVASC) 10 MG tablet Take 10 mg by mouth daily       Valbenazine Tosylate 40 MG CAPS Take 40 mg by mouth nightly      melatonin 3 MG TABS tablet Take 3 mg by mouth nightly      polyethylene glycol (GLYCOLAX) packet Take 17 g by mouth daily      Amantadine (SYMMETREL) 100 MG TABS tablet Take 100 mg by mouth 2 times daily      Dextromethorphan-guaiFENesin 5-100 MG/5ML LIQD Take 5 mLs by mouth every 12 hours as needed (cough)      acetaminophen (TYLENOL) 325 MG tablet Take 650 mg by mouth every 4 hours as needed for Pain or Fever      sodium chloride (OCEAN) 0.65 % nasal spray 1 spray by Nasal route 3 times daily as needed for Congestion      bisacodyl (DULCOLAX) 10 MG suppository Place 10 mg rectally daily as needed for Constipation      magnesium hydroxide (MILK OF MAGNESIA) 400 MG/5ML suspension Take 30 mLs by mouth daily as needed for Constipation      calcium carbonate 600 MG TABS tablet Take 1 tablet by mouth nightly       omeprazole (PRILOSEC) 20 MG capsule TAKE 1 CAPSULE BY MOUTH DAILY. 30 capsule 5    guaiFENesin (MUCINEX) 600 MG extended release tablet Take 1,200 mg by mouth 2 times daily. PRN      loratadine (CLARITIN) 10 MG tablet Take 10 mg by mouth daily. PRN          FAMILY HISTORY: family history includes Bipolar Disorder in her sister and another family member; Diabetes in her father and mother; High Blood Pressure in her father, mother, and another family member; Schizophrenia in her sister.      Physical Examination:     /75 (Site: Left Upper Arm, Position: Sitting, Cuff Size: Large Adult)   Pulse 69   Resp 18 Ht 5' 5\" (1.651 m)   Wt 271 lb (122.9 kg) Comment: Weighted on 7/2/2022  SpO2 97%   BMI 45.10 kg/m²  Body mass index is 45.1 kg/m². Constitutional: She appeared oriented to person and place. She appears well-developed and well-nourished. In no acute distress. HEENT: Normocephalic and atraumatic. No JVD present. Carotid bruit is not present. No mass and no thyromegaly present. No lymphadenopathy noted. Cardiovascular: Normal rate, irregularly irregular rhythm, normal heart sounds. Exam reveals no gallop and no friction rubs. 2/6 systolic murmur, 4th intercostal space on the LEFT must lateral to the sternal border  Pulmonary/Chest: Effort normal and breath sounds normal. No respiratory distress. She has no wheezes, rhonchi or rales. Diminished lung sounds on the right. Abdominal: Soft, non-tender. She exhibits no organomegaly, mass or bruit. Extremities: Trace bilateral ankle edema. No cyanosis or clubbing. 2+ radial and carotid pulses. Distal extremity pulses: 2+ bilaterally. Compression stockings in place. Neurological: Alertness and orientation as per Constitutional exam. No evidence of gross cranial nerve deficit. Coordination appeared normal.   Skin: Skin is warm and dry. There is no rash or diaphoresis. Psychiatric: She has a normal mood and affect. Her speech is normal and behavior is normal.      MOST RECENT LABS ON RECORD:   Lab Results   Component Value Date    WBC 6.8 06/19/2022    HGB 12.3 06/19/2022    HCT 38.1 06/19/2022     06/19/2022    CHOL 132 05/12/2022    TRIG 63 05/12/2022    HDL 67 05/12/2022    ALT 16 06/19/2022    AST 13 06/19/2022     07/07/2022    K 4.9 07/07/2022     07/07/2022    CREATININE 1.59 (H) 07/07/2022    BUN 26 (H) 07/07/2022    CO2 22 07/07/2022    TSH 4.44 05/12/2022    INR 1.6 01/31/2022    LABA1C 5.5 05/12/2022    LABMICR CANNOT BE CALCULATED 09/20/2013    BNP NOT REPORTED 03/11/2014       ASSESSMENT:        1.  Acute on chronic diastolic congestive heart failure ([facility]    2. Acute on chronic right-sided heart failure ([facility]    3. PAF (paroxysmal atrial fibrillation) ([facility]    4. Atherosclerotic heart disease of native coronary artery with other forms of angina pectoris ([facility]    5. Valvular heart disease    6. Essential hypertension      PLAN:        Acute on chronic diastolic heart failure: New York Heart Association Class: IIb (Marked symptoms during daily activities)/ Right Sided Heart Failure   Beta Blocker: Continue Metoprolol succinate (Toprol XL) 25 mg daily. ACE Inibitor/ARB: Continue losartan (Cozaar) 50 mg daily. Diuretics: Continue furosemide (Lasix) 40 mg 2 times daily. Heart failure counseling: I told them to continue wearing lower extremity compression stockings and I advised them to try and keep their legs up whenever possible and to limit salt in their diet. START Farxiga 10 mg daily. I did advise her that the most common side effect of this medication is UTI's and to be aware of any sighs or symptoms of this. I took the liberty of ordering a BMP in 5-7 days to assess their potassium and renal function. I told them that they could get their lab work performed at the location of their choosing, unfortunately, if the lab [facility] I could not guarantee my ability to follow up with them on their results. I would like them to call with an update on daily weights. We did discuss I anticipate decreasing her Lasix in the future. Paroxysmal Atrial Fibrillation: Rate Control. Beta Blocker: Continue metoprolol succinate (Toprol XL) 25 mg once daily. I also discussed the potential side effects of this medication including lightheadedness and dizziness and told her to stop the medication of this occurs and call our office if this occurs. Calcium Channel Blocker: Continue amlodipine (Norvasc)  10 mg daily.  I discussed the potential side effects of this medication including lightheadedness and dizziness and told her to call the office if this occurs. [id number] Score for Atrial Fibrillation Stroke Risk   Risk   Factors  Component Value   C CHF No 0   H HTN Yes 1   A2 Age >= 76 No,  (77 y.o.) 0   D DM No 0   S2 Prior Stroke/TIA Yes 2   V Vascular Disease No 0   A Age 74-69 Yes,  (77 y.o.) 1   Sc Sex female 1    [id number]  Score  5   Score last updated 6/94/33 4:31 PM EDT  Click here for a link to the UpToDate guideline \"Atrial Fibrillation: Anticoagulation therapy to prevent embolization  Disclaimer: Risk Score calculation is dependent on accuracy of patient problem list and past encounter diagnosis. Stroke Risk: Her CHADS2-VASc score is 5/9 (6.7% stroke risk)  Anticoagulation: Continue Apixaban (Eliquis) 5 mg every 12 hours. I also reminded her to watch for signs of blood in her stool or black tarry stools and stop the medication immediately if this develops as this could be life threatening. Dose appropriate, reviewed her most recent blood work. Atherosclerotic Heart Disease: Successful PCI of the LAD with one drug-eluting stent on 5/28/2021. Antiplatelet Agent: Continue clopidogrel (Plavix): Plavix 75 mg daily. Beta Blocker: Continue Metoprolol succinate (Toprol XL) 25 mg daily. Anti-anginal medications: Continue nitroglycerin 0.4 mg tablets as needed for chest pain. and amlodipine (Norvasc) 10 mg once daily. Cholesterol Reduction Therapy: Continue Atorvastatin (Lipitor) 20 mg daily. Additional counseling: I advised them to call our office or go to the emergency room if they developed worsening or persistent chest pain or increased shortness of breath as this could be life threatening. Moderate Tricuspid Regurgitation: possibly symptomatic  Beta Blocker: Continue Metoprolol succinate (Toprol XL) 25 mg daily. ACE Inibitor/ARB: Continue losartan (Cozaar) 50 mg daily. Diuretics: Continue furosemide (Lasix) 40 mg 2 times daily.     Essential Hypertension: Controlled  Beta Blocker:

## 2022-07-19 NOTE — PATIENT INSTRUCTIONS
SURVEY:    You may be receiving a survey from [employer] regarding your visit today. Please complete the survey to enable us to provide the highest quality of care to you and your family. If you cannot score us a very good on any question, please call the office to discuss how we could have made your experience a very good one. Thank you.

## 2022-07-27 ENCOUNTER — HOSPITAL ENCOUNTER (OUTPATIENT)
Age: 72
Setting detail: SPECIMEN
Discharge: HOME | End: 2022-07-27
Payer: MEDICARE

## 2022-07-27 LAB
ANION GAP SERPL CALCULATED.3IONS-SCNC: 12 MMOL/L (ref 9–17)
BUN BLDV-MCNC: 23 MG/DL (ref 8–23)
BUN/CREAT BLD: 17 (ref 9–20)
CALCIUM SERPL-MCNC: 9.4 MG/DL (ref 8.6–10.4)
CHLORIDE BLD-SCNC: 105 MMOL/L (ref 98–107)
CO2: 24 MMOL/L (ref 20–31)
CREAT SERPL-MCNC: 1.36 MG/DL (ref 0.5–0.9)
GFR AFRICAN AMERICAN: 46 ML/MIN
GFR NON-AFRICAN AMERICAN: 38 ML/MIN
GFR SERPL CREATININE-BSD FRML MDRD: ABNORMAL ML/MIN/{1.73_M2}
GFR SERPL CREATININE-BSD FRML MDRD: ABNORMAL ML/MIN/{1.73_M2}
GLUCOSE BLD-MCNC: 131 MG/DL (ref 70–99)
POTASSIUM SERPL-SCNC: 4.2 MMOL/L (ref 3.7–5.3)
SODIUM BLD-SCNC: 141 MMOL/L (ref 135–144)

## 2022-07-27 PROCEDURE — 36415 COLL VENOUS BLD VENIPUNCTURE: CPT

## 2022-07-27 PROCEDURE — 80048 BASIC METABOLIC PNL TOTAL CA: CPT

## 2022-07-27 PROCEDURE — P9604 ONE-WAY ALLOW PRORATED TRIP: HCPCS

## 2022-08-05 ENCOUNTER — OUTSIDE SERVICES (OUTPATIENT)
Dept: PRIMARY CARE CLINIC | Age: 72
End: 2022-08-05
Payer: MEDICARE

## 2022-08-05 DIAGNOSIS — I50.32 CHRONIC DIASTOLIC HEART FAILURE (HCC): ICD-10-CM

## 2022-08-05 DIAGNOSIS — I10 ESSENTIAL HYPERTENSION: ICD-10-CM

## 2022-08-05 DIAGNOSIS — U07.1 COVID-19 VIRUS INFECTION: Primary | ICD-10-CM

## 2022-08-05 DIAGNOSIS — I48.91 ATRIAL FIBRILLATION, UNSPECIFIED TYPE (HCC): ICD-10-CM

## 2022-08-05 NOTE — PROGRESS NOTES
Patient:  Idalmis Cuevas, 1950  I saw this patient on 08/17/2022, at [facility] (formerly [facility])[3]   Has covid,received antibody infusion  Shortness of breath better,blood sugars well controlled,  Edema improving  No bleeding from anticoagulant  bp well controlled        Reason for Visit:      ICD-10-CM    1. COVID-19 virus infection  U07.1       2. Chronic diastolic heart failure (HCC)  I50.32       3. Atrial fibrillation, unspecified type ([address]  I48.91       4. Essential hypertension  I10             Changes since last visit:   Has covid,has cough   mood changes better,psych managing meds  Shortness of breath and edema better  BP better  1. Fall:  none  2. Behavioral Change: mood improving  3. Pain Control: adequate  4. Mobility: improving  5. Pressure Sore:  none  Allergies:  Lamictal [lamotrigine], Oxcarbazepine, Pcn [penicillins], Sertraline, Trileptal, and Strattera [atomoxetine hcl]    Current Outpatient Medications   Medication Sig Dispense Refill    nystatin (MYCOSTATIN) 431958 UNIT/GM powder Apply topically as needed Breast,folds      QUEtiapine (SEROQUEL) 25 MG tablet Take 50 mg by mouth in the morning and 50 mg before bedtime. divalproex (DEPAKOTE) 250 MG DR tablet Take 250 mg by mouth in the morning and 250 mg before bedtime. dapagliflozin (FARXIGA) 10 MG tablet Take 1 tablet by mouth every morning 90 tablet 3    furosemide (LASIX) 40 MG tablet Take 1 tablet by mouth 2 times daily for 14 days 28 tablet 0    loperamide (IMODIUM) 2 MG capsule Take 4 mg by mouth 4 times daily as needed for Diarrhea Every 4 hours      metoprolol succinate (TOPROL XL) 25 MG extended release tablet TAKE 1 TABLET BY MOUTH DAILY.  90 tablet 3    simethicone (MYLICON) 80 MG chewable tablet Take 80 mg by mouth every 6 hours as needed for Flatulence      levothyroxine (SYNTHROID) 50 MCG tablet Take 50 mcg by mouth Daily      latanoprost (XALATAN) 0.005 % ophthalmic solution Place 1 drop into the right eye nightly       polyvinyl alcohol (LIQUIFILM TEARS) 1.4 % ophthalmic solution Place 1 drop into both eyes 4 times daily       acetaminophen (TYLENOL) 650 MG extended release tablet Take 650 mg by mouth 4 times daily      Sodium Phosphates (FLEET) 7-19 GM/118ML Place 1 enema rectally as needed      traMADol (ULTRAM) 50 MG tablet Take 50 mg by mouth every 6 hours as needed for Pain.      white petrolatum GEL Apply topically 2 times daily as needed for Dry Skin Arms and legs      chlorhexidine (PERIDEX) 0.12 % solution Take 15 mLs by mouth 2 times daily       atorvastatin (LIPITOR) 20 MG tablet Take 1 tablet by mouth daily 90 tablet 3    nitroGLYCERIN (NITROSTAT) 0.4 MG SL tablet up to max of 3 total doses.  If no relief after 1 dose, call 911. 25 tablet 1    clopidogrel (PLAVIX) 75 MG tablet Take 1 tablet by mouth daily 30 tablet 3    losartan (COZAAR) 50 MG tablet Take 1 tablet by mouth daily 30 tablet 3    apixaban (ELIQUIS) 5 MG TABS tablet Take 1 tablet by mouth 2 times daily 60 tablet 0    tiZANidine (ZANAFLEX) 2 MG tablet Take 2 mg by mouth every 6 hours as needed      cloNIDine (CATAPRES) 0.1 MG tablet Take 0.1 mg by mouth 3 times daily      amLODIPine (NORVASC) 10 MG tablet Take 10 mg by mouth daily       Valbenazine Tosylate 40 MG CAPS Take 40 mg by mouth nightly      melatonin 3 MG TABS tablet Take 3 mg by mouth nightly      polyethylene glycol (GLYCOLAX) packet Take 17 g by mouth daily      Amantadine (SYMMETREL) 100 MG TABS tablet Take 100 mg by mouth 2 times daily      Dextromethorphan-guaiFENesin 5-100 MG/5ML LIQD Take 5 mLs by mouth every 12 hours as needed (cough)      acetaminophen (TYLENOL) 325 MG tablet Take 650 mg by mouth every 4 hours as needed for Pain or Fever      sodium chloride (OCEAN) 0.65 % nasal spray 1 spray by Nasal route 3 times daily as needed for Congestion      bisacodyl (DULCOLAX) 10 MG suppository Place 10 mg rectally daily as needed for Constipation      magnesium hydroxide (MILK OF MAGNESIA) 400 MG/5ML suspension Take 30 mLs by mouth daily as needed for Constipation      calcium carbonate 600 MG TABS tablet Take 1 tablet by mouth nightly       omeprazole (PRILOSEC) 20 MG capsule TAKE 1 CAPSULE BY MOUTH DAILY. 30 capsule 5    guaiFENesin (MUCINEX) 600 MG extended release tablet Take 1,200 mg by mouth 2 times daily. PRN      loratadine (CLARITIN) 10 MG tablet Take 10 mg by mouth daily. PRN        No current facility-administered medications for this visit. Past Medical History:    Past Medical History:   Diagnosis Date    ADHD (attention deficit hyperactivity disorder)     Anemia     severe    Asthma     Bipolar disorder (HCC)     Bradycardia, unspecified     Chronic anemia     Chronic atrial fibrillation ([facility]     Chronic back pain     Chronic kidney disease     Patient states unaware of this. Has never been mentioned to her.      Colitis     Constipation     COPD (chronic obstructive pulmonary disease) (HCC)     Cystitis without hematuria     Diastolic CHF (HCC)     Esophagitis     grade A    GERD (gastroesophageal reflux disease)     Hemorrhoids     History of echocardiogram 06/02/2014    EF >60%, LV wall thickness mildly increased,    Hypertension     Hypothyroidism     Metabolic syndrome     Moderate protein-calorie malnutrition (HCC)     Muscle weakness (generalized)     Obesity     Pancreatitis     TIA (transient ischemic attack)        Past Surgical History:    Past Surgical History:   Procedure Laterality Date    CARDIAC CATHETERIZATION Left 05/27/2021    right radial/ [id number] [facility]/ Dr. Cem Maurice Right 12/27/2021    Dr. Stella Tucker Left 02/07/2022    EYE CATARACT EMULSIFICATION IOL IMPLANT (Left Eye)    COLONOSCOPY  5/2011    ischemic colitis    COLONOSCOPY  03/07/2017    -diverticulosis,hemorrhoids    HYMENECTOMY      1972    HYSTERECTOMY (CERVIX STATUS UNKNOWN)      2008    INTRACAPSULAR CATARACT EXTRACTION Right 12/27/2021 abnormalities. and PERRLA  ENT:Throat- throat-normal,nose- no drainage  NECK: normal, supple, no lymphadenopathy,  no carotid bruits  PULM: Diminished air entry on auscultation bilaterally- no wheezes, rales or rhonchi, normal air movement, no respiratory distress  COR:   S1,s2,RRR, no murmurs and no gallops,has minimal bilat leg edema  ABD:    soft, non-tender, non-distended, normal bowel sounds, no masses or organomegaly  : no cva or flank tenderness  EXT:has minimal edema, no calf tenderness, and warm to touch. NEURO: Motor and sensory grossly intact,has tremors at rest  PSYCH: mood better  SKIN:   No rash or lesions    -----------------------------------------------------------------  Diagnostic Data:   All diagnostic data was reviewed    Assessment:      Problem List Items Addressed This Visit       Essential hypertension    Atrial fibrillation, unspecified type ([facility]     Other Visit Diagnoses       COVID-19 virus infection    -  Primary    Chronic diastolic heart failure ([facility]                Patient Active Problem List   Diagnosis Code    TIA (transient ischemic attack) G45.9    Bipolar 1 disorder ([facility] F31.9    DDD (degenerative disc disease), lumbar M51.36    Bradycardia R00.1    Essential hypertension I10    Obesity (BMI 30-39. 9) E66.9    GERD (gastroesophageal reflux disease) K21.9    Iron deficiency anemia D50.9    Iron deficiency anemia due to chronic blood loss D50.0    History of colon polyps Z86.010    Hypothyroidism E03.9    Other chronic pain G89.29    Other hemorrhoids K64.8    Diverticulosis of large intestine without diverticulitis K57.30    Attention deficit hyperactivity disorder F90.9    Morbid obesity ([facility]) E66.01    Cellulitis of leg without foot, right L03.115    Atherosclerotic heart disease of native coronary artery with other forms of angina pectoris ([facility]) I25.118    CAD, multiple vessel I25.10    Chest pain R07.9    PVD (peripheral vascular disease) ([facility]) I73.9    Atrial fibrillation, unspecified type ([facility] I48.91    Stage 3a chronic kidney disease ([facility] N18.31    Shortness of breath R06.02    COVID-19 U07.1         Plan:      Monitor for bleeding  Supportive care for covid  Psych to manage psychotropics  Encourage activity as tolerated and follow diet  Continue current medications  Monitor bp   Prevent pressure sore  Prevent falls      Electronically signed by Memo Solitario MD on 8/17/2022 at 7:49 PM

## 2022-08-08 DIAGNOSIS — U07.1 COVID-19: ICD-10-CM

## 2022-08-08 RX ORDER — ONDANSETRON 2 MG/ML
8 INJECTION INTRAMUSCULAR; INTRAVENOUS
OUTPATIENT
Start: 2022-08-08

## 2022-08-08 RX ORDER — SODIUM CHLORIDE 9 MG/ML
5-250 INJECTION, SOLUTION INTRAVENOUS PRN
OUTPATIENT
Start: 2022-08-08

## 2022-08-08 RX ORDER — HEPARIN SODIUM (PORCINE) LOCK FLUSH IV SOLN 100 UNIT/ML 100 UNIT/ML
500 SOLUTION INTRAVENOUS PRN
OUTPATIENT
Start: 2022-08-08

## 2022-08-08 RX ORDER — SODIUM CHLORIDE 9 MG/ML
INJECTION, SOLUTION INTRAVENOUS CONTINUOUS
Status: CANCELLED | OUTPATIENT
Start: 2022-08-08

## 2022-08-08 RX ORDER — EPINEPHRINE 1 MG/ML
0.3 INJECTION, SOLUTION, CONCENTRATE INTRAVENOUS PRN
OUTPATIENT
Start: 2022-08-08

## 2022-08-08 RX ORDER — BEBTELOVIMAB 87.5 MG/ML
175 INJECTION, SOLUTION INTRAVENOUS ONCE
Status: CANCELLED | OUTPATIENT
Start: 2022-08-08 | End: 2022-08-08

## 2022-08-08 RX ORDER — SODIUM CHLORIDE 9 MG/ML
INJECTION, SOLUTION INTRAVENOUS CONTINUOUS
OUTPATIENT
Start: 2022-08-08

## 2022-08-08 RX ORDER — ALBUTEROL SULFATE 90 UG/1
4 AEROSOL, METERED RESPIRATORY (INHALATION) PRN
OUTPATIENT
Start: 2022-08-08

## 2022-08-08 RX ORDER — DIPHENHYDRAMINE HYDROCHLORIDE 50 MG/ML
50 INJECTION INTRAMUSCULAR; INTRAVENOUS
OUTPATIENT
Start: 2022-08-08

## 2022-08-08 RX ORDER — ACETAMINOPHEN 325 MG/1
650 TABLET ORAL
OUTPATIENT
Start: 2022-08-08

## 2022-08-08 RX ORDER — SODIUM CHLORIDE 0.9 % (FLUSH) 0.9 %
5-40 SYRINGE (ML) INJECTION PRN
Status: CANCELLED | OUTPATIENT
Start: 2022-08-08

## 2022-08-09 ENCOUNTER — HOSPITAL ENCOUNTER (OUTPATIENT)
Dept: INFUSION THERAPY | Age: 72
Discharge: HOME | End: 2022-08-09
Payer: MEDICARE

## 2022-08-09 VITALS
HEART RATE: 74 BPM | TEMPERATURE: 98 F | SYSTOLIC BLOOD PRESSURE: 129 MMHG | RESPIRATION RATE: 20 BRPM | DIASTOLIC BLOOD PRESSURE: 80 MMHG

## 2022-08-09 DIAGNOSIS — U07.1 COVID-19: Primary | ICD-10-CM

## 2022-08-09 PROCEDURE — M0222 HC BEBTELOVIMAB INJECTION: HCPCS

## 2022-08-09 PROCEDURE — 6360000002 HC RX W HCPCS: Performed by: FAMILY MEDICINE

## 2022-08-09 PROCEDURE — 2580000003 HC RX 258: Performed by: FAMILY MEDICINE

## 2022-08-09 RX ORDER — DIVALPROEX SODIUM 250 MG/1
250 TABLET, DELAYED RELEASE ORAL 2 TIMES DAILY
COMMUNITY

## 2022-08-09 RX ORDER — SODIUM CHLORIDE 0.9 % (FLUSH) 0.9 %
5-40 SYRINGE (ML) INJECTION PRN
Status: DISCONTINUED | OUTPATIENT
Start: 2022-08-09 | End: 2022-08-10 | Stop reason: HOSPADM

## 2022-08-09 RX ORDER — BEBTELOVIMAB 87.5 MG/ML
175 INJECTION, SOLUTION INTRAVENOUS ONCE
Status: COMPLETED | OUTPATIENT
Start: 2022-08-09 | End: 2022-08-09

## 2022-08-09 RX ORDER — SODIUM CHLORIDE 0.9 % (FLUSH) 0.9 %
5-40 SYRINGE (ML) INJECTION PRN
Status: CANCELLED | OUTPATIENT
Start: 2022-08-09

## 2022-08-09 RX ORDER — HEPARIN SODIUM (PORCINE) LOCK FLUSH IV SOLN 100 UNIT/ML 100 UNIT/ML
500 SOLUTION INTRAVENOUS PRN
OUTPATIENT
Start: 2022-08-09

## 2022-08-09 RX ORDER — NYSTATIN 100000 [USP'U]/G
POWDER TOPICAL PRN
COMMUNITY

## 2022-08-09 RX ORDER — SODIUM CHLORIDE 9 MG/ML
INJECTION, SOLUTION INTRAVENOUS CONTINUOUS
Status: DISCONTINUED | OUTPATIENT
Start: 2022-08-09 | End: 2022-08-10 | Stop reason: HOSPADM

## 2022-08-09 RX ORDER — BEBTELOVIMAB 87.5 MG/ML
175 INJECTION, SOLUTION INTRAVENOUS ONCE
Status: CANCELLED | OUTPATIENT
Start: 2022-08-09 | End: 2022-08-09

## 2022-08-09 RX ORDER — DIPHENHYDRAMINE HYDROCHLORIDE 50 MG/ML
50 INJECTION INTRAMUSCULAR; INTRAVENOUS
OUTPATIENT
Start: 2022-08-09

## 2022-08-09 RX ORDER — ACETAMINOPHEN 325 MG/1
650 TABLET ORAL
OUTPATIENT
Start: 2022-08-09

## 2022-08-09 RX ORDER — SODIUM CHLORIDE 9 MG/ML
INJECTION, SOLUTION INTRAVENOUS CONTINUOUS
Status: CANCELLED | OUTPATIENT
Start: 2022-08-09

## 2022-08-09 RX ORDER — ONDANSETRON 2 MG/ML
8 INJECTION INTRAMUSCULAR; INTRAVENOUS
OUTPATIENT
Start: 2022-08-09

## 2022-08-09 RX ORDER — SODIUM CHLORIDE 9 MG/ML
INJECTION, SOLUTION INTRAVENOUS CONTINUOUS
OUTPATIENT
Start: 2022-08-09

## 2022-08-09 RX ORDER — QUETIAPINE FUMARATE 25 MG/1
50 TABLET, FILM COATED ORAL NIGHTLY
COMMUNITY

## 2022-08-09 RX ORDER — ALBUTEROL SULFATE 90 UG/1
4 AEROSOL, METERED RESPIRATORY (INHALATION) PRN
OUTPATIENT
Start: 2022-08-09

## 2022-08-09 RX ORDER — EPINEPHRINE 1 MG/ML
0.3 INJECTION, SOLUTION, CONCENTRATE INTRAVENOUS PRN
OUTPATIENT
Start: 2022-08-09

## 2022-08-09 RX ORDER — SODIUM CHLORIDE 9 MG/ML
5-250 INJECTION, SOLUTION INTRAVENOUS PRN
OUTPATIENT
Start: 2022-08-09

## 2022-08-09 RX ADMIN — BEBTELOVIMAB 175 MG: 87.5 INJECTION, SOLUTION INTRAVENOUS at 10:25

## 2022-08-09 RX ADMIN — SODIUM CHLORIDE: 9 INJECTION, SOLUTION INTRAVENOUS at 10:14

## 2022-08-09 RX ADMIN — SODIUM CHLORIDE, PRESERVATIVE FREE 10 ML: 5 INJECTION INTRAVENOUS at 10:10

## 2022-08-09 NOTE — DISCHARGE INSTRUCTIONS
Reviewed FDA EUA Fact sheet and After Infusion Information sheet for discharge. Written copies provided to patient. Verbalized understanding. Encouraged PO fluids and rest. DC home with . .. You received an infusion of  authorized for emergency use by the FDA. You should continue to self-isolate and use infection control measures (wear mask, isolate, social distance, avoid sharing personal items, clean and disinfect \"high touch\" surfaces, and frequent handwashing) according to CDC guidelines. Report all changes in your health to your doctors as soon as possible. Symptoms to report to your physician immediately or seek emergency medical care:    Fever, Chills, Shakes, Hives, Rash, Itching, Swelling of lips, mouth or throat    Shortness of breath, difficulty breathing or wheezing, Chest pain    Cough, Sneezing    Fatigue, muscle or joint pain/aching,    Headache    Weakness, numbness, tingling is any part of your body    Low blood pressure/Oxygen saturation levels   Dizziness, feeling faint    Nausea        These are not all of the possible side effects. Serious and unexpected side effects may happen.

## 2022-08-17 PROCEDURE — 99308 SBSQ NF CARE LOW MDM 20: CPT | Performed by: FAMILY MEDICINE

## 2022-08-23 ENCOUNTER — OFFICE VISIT (OUTPATIENT)
Dept: CARDIOLOGY | Age: 72
End: 2022-08-23
Payer: MEDICARE

## 2022-08-23 VITALS
SYSTOLIC BLOOD PRESSURE: 106 MMHG | HEIGHT: 65 IN | WEIGHT: 264 LBS | OXYGEN SATURATION: 97 % | DIASTOLIC BLOOD PRESSURE: 66 MMHG | RESPIRATION RATE: 20 BRPM | BODY MASS INDEX: 43.99 KG/M2 | HEART RATE: 74 BPM

## 2022-08-23 DIAGNOSIS — E78.2 MIXED HYPERLIPIDEMIA: ICD-10-CM

## 2022-08-23 DIAGNOSIS — I50.32 CHRONIC DIASTOLIC CONGESTIVE HEART FAILURE (HCC): ICD-10-CM

## 2022-08-23 DIAGNOSIS — I25.10 CORONARY ARTERY DISEASE INVOLVING NATIVE CORONARY ARTERY OF NATIVE HEART WITHOUT ANGINA PECTORIS: ICD-10-CM

## 2022-08-23 DIAGNOSIS — I07.1 MODERATE TRICUSPID REGURGITATION BY PRIOR ECHOCARDIOGRAM: ICD-10-CM

## 2022-08-23 DIAGNOSIS — I48.0 PAF (PAROXYSMAL ATRIAL FIBRILLATION) (HCC): ICD-10-CM

## 2022-08-23 DIAGNOSIS — I10 ESSENTIAL HYPERTENSION: ICD-10-CM

## 2022-08-23 DIAGNOSIS — R53.83 TIREDNESS: ICD-10-CM

## 2022-08-23 DIAGNOSIS — I38 VALVULAR HEART DISEASE: ICD-10-CM

## 2022-08-23 DIAGNOSIS — Z95.820 S/P ANGIOPLASTY WITH STENT: ICD-10-CM

## 2022-08-23 PROCEDURE — 99211 OFF/OP EST MAY X REQ PHY/QHP: CPT | Performed by: PHYSICIAN ASSISTANT

## 2022-08-23 PROCEDURE — G8427 DOCREV CUR MEDS BY ELIG CLIN: HCPCS | Performed by: PHYSICIAN ASSISTANT

## 2022-08-23 PROCEDURE — G8399 PT W/DXA RESULTS DOCUMENT: HCPCS | Performed by: PHYSICIAN ASSISTANT

## 2022-08-23 PROCEDURE — G8417 CALC BMI ABV UP PARAM F/U: HCPCS | Performed by: PHYSICIAN ASSISTANT

## 2022-08-23 PROCEDURE — 1124F ACP DISCUSS-NO DSCNMKR DOCD: CPT | Performed by: PHYSICIAN ASSISTANT

## 2022-08-23 PROCEDURE — 1090F PRES/ABSN URINE INCON ASSESS: CPT | Performed by: PHYSICIAN ASSISTANT

## 2022-08-23 PROCEDURE — 3017F COLORECTAL CA SCREEN DOC REV: CPT | Performed by: PHYSICIAN ASSISTANT

## 2022-08-23 PROCEDURE — 1036F TOBACCO NON-USER: CPT | Performed by: PHYSICIAN ASSISTANT

## 2022-08-23 PROCEDURE — 99214 OFFICE O/P EST MOD 30 MIN: CPT | Performed by: PHYSICIAN ASSISTANT

## 2022-08-23 NOTE — PATIENT INSTRUCTIONS
SURVEY:    You may be receiving a survey from [employer] regarding your visit today. Please complete the survey to enable us to provide the highest quality of care to you and your family. If you cannot score us a very good on any question, please call the office to discuss how we could have made your experience a very good one. Thank you.

## 2022-08-23 NOTE — PROGRESS NOTES
Trevin Means am scribing for and in the presence of Darlin Roberts [place]     Patient: Titus Chung  : 1950  Date of Visit: 2022    REASON FOR VISIT / CONSULTATION: Follow-up (HX: CHF, PAF, CAD. Pt states she is doing well. C/o: Tired. Denies: CP, Lightheaded/ dizziness, Palpitations, SOB better then last visit. )      History of Present Illness:         Dear Santa Farmer MD    I had the pleasure of seeing Titus Chung in my office today. Ms. Orly Elizabeth is a 67 y.o. female who presented today for follow-up. She initially seen in my office to establish care back on 2021. She had a stress test, echocardiogram and a Holter monitor done prior to her initial visit as below due to worsening shortness of breath. She had had hypertension for many years and has been on anti-hypertensive drugs for awhile. She also has had thyorid issue and has been on Levothyroxine as well for many years. She denied every being diagnosied with diabtes or sleep apnea. She is a former smoker. She quit smoking however in  and has not smoked since. Her family history consits of her mother who had caroid artery disease in her 50-60's. Echo done on 2021: Global left ventricular systolic function appears preserved with an estimated ejection fraction of >60%. The left ventricular cavity size is within normal limits and the left ventricular wall thickness is mildly increased. No definite specific wall motion abnormalities were identified. The left atrium is severely dilated (>40), borderline dilated with a left atrial volume index of 57 ml/m2. Normal mitral valve structure with mild to moderate mitral regurgitation. Normal tricuspid valve structure with moderate to severe tricuspid regurgitation. Mild to moderate pulmonary hypertension with an estimated right ventricular systolic pressure of 37 mmHg.  Diastology cannot be assessed due to the patients rhythm of what appeared to be atrial fibrillation. Compared to the previous study of 6/2/14, the patient now has moderate to severe tricuspid regurgitation and her mitral regurgitation is now at least mild to moderate. Holter done on 5/17/2021: The rhythm was atrial fibrillation. Average QRS duration 0.09. Maximum R-R 2.19 seconds with 58 pauses greater than 2.0  seconds. 2. Diary returned with entries of \"shortness of breath\" with isolated PVCs noted. 1-Atrial fibrillation throughout with average HR of 72 bpm , ranging between 50 and 142 bpm. well controlled ventricular  rate. No significant pauses. Maximum R-R interval is 2.19 second during typical sleep time. recorded 2- Very frequent PVCs (PVCs burden 10%), mainly isolated with occasional couplets. No ventricular runs. Stress test done on 4/19/2021: Limited quality study with significant motion artifact particularly during stress imaging. Equivocal myocardial perfusion study. There is a small/moderate perfusion defect of mild/moderate intensity in the anterior and inferior regions during stress and rest imaging which is most consistent with artifact, but may be due to a small degree of coronary ischemia. Global left ventricular systolic function was normal with an EF of 68% without regional wall motion abnormalities. Significant ST segment changes at rest that impair accurate ECG interpretation during stress. EKG done in office (5/26/2021): Showed atrial fibrillation with a HR of 70 bpm.    Heart cath done on 5/27/2021-  Moderate to severe single vessel disease involving the LAD coronary artery with moderate disease in a large Cx. Normal left ventricular end diastolic pressure. (LVEDP). Heart cath done at [facility] on 5/28/2021- Successful PCI of the with no with one drug-eluting stent. Echo done on 3/22/2022: Global left ventricular systolic function appears preserved with an  estimated ejection fraction of >60%.  The left ventricular cavity size is within normal limits and the left  ventricular wall thickness is mildly increased. No definite specific wall motion abnormalities were identified. The left atrium is severely dilated (>40), left atrial volume index of 50 ml/m2. The right atrium appears at least moderately dilated. Mild aortic regurgitation. Mild to moderate mitral regurgitation. Moderate tricuspid regurgitation. Moderate pulmonary hypertension with an estimated right ventricular systolic pressure of 45 mmHg. Diastolic function cannot be assessed due atrial fibrillation. CAM done on 3/22/2022: 6 days and 23 hours recorded. Baseline rhythm is atrial fibrillation throughout with fairly controlled ventricular rate. Average heart rate is 59 bpm ranging between 33 and 140 bpm.  No significant pauses. Rare isolated PVCs noted. No patient-activated events recorded. Ms. Nora Lyn is here for her follow up after starting the farixga. She reports doing ok at this time, she is down 7 pounds since last being seen. She does feel an increase in tiredness and fatigue, however she had COVID last week. She does have a slight cough but overall feels better in regards to her recent COVID diagnosis, she did receive an antibody infusion. She does report her shortness of breath has improved since her last visit. She is able to still walk to the dinner room and she is able to recover quicker after she rests. Her swelling her legs are better as well. She is still living in an assisted living facility. No fever or cough. No stomach pain. No issues moving her bowels. She denied any lightheaded or dizziness or heart palpitations. She denied any current or recent chest pain, abdominal pain, bleeding problems, problems with her medications or any other concerns at this time. She is eating and drinking okay. She denies blood in her urine or stool.     Bleeding Risks: Ms. Nora Lyn denies any current or recent bleeding problems including a history of a GI bleed, ulcers, recent or upcoming IOL IMPLANT performed by Linda Loredo DO at [facility] Right     2008    TONSILLECTOMY AND ADENOIDECTOMY      UPPER GASTROINTESTINAL ENDOSCOPY  3/2013    5 cm axial hiatal hernia    UPPER GASTROINTESTINAL ENDOSCOPY  2017    Dr. Bryan Limon    Social History:  Social History     Tobacco Use    Smoking status: Former     Packs/day: 0.25     Years: 10.00     Pack years: 2.50     Types: Cigarettes     Quit date: 3/12/1998     Years since quittin.4    Smokeless tobacco: Never   Substance Use Topics    Alcohol use: No    Drug use: No        CURRENT MEDICATIONS:        Outpatient Medications Marked as Taking for the 22 encounter (Office Visit) with Ludwin Pride PA-C   Medication Sig Dispense Refill    nystatin (MYCOSTATIN) 650602 UNIT/GM powder Apply topically as needed Breast,folds      QUEtiapine (SEROQUEL) 25 MG tablet Take 50 mg by mouth in the morning and 50 mg before bedtime. divalproex (DEPAKOTE) 250 MG DR tablet Take 250 mg by mouth in the morning and 250 mg before bedtime. dapagliflozin (FARXIGA) 10 MG tablet Take 1 tablet by mouth every morning 90 tablet 3    furosemide (LASIX) 40 MG tablet Take 1 tablet by mouth 2 times daily for 14 days (Patient taking differently: Take 40 mg by mouth daily) 28 tablet 0    loperamide (IMODIUM) 2 MG capsule Take 4 mg by mouth 4 times daily as needed for Diarrhea Every 4 hours      metoprolol succinate (TOPROL XL) 25 MG extended release tablet TAKE 1 TABLET BY MOUTH DAILY.  90 tablet 3    simethicone (MYLICON) 80 MG chewable tablet Take 80 mg by mouth every 6 hours as needed for Flatulence      levothyroxine (SYNTHROID) 50 MCG tablet Take 50 mcg by mouth Daily      latanoprost (XALATAN) 0.005 % ophthalmic solution Place 1 drop into the right eye nightly       polyvinyl alcohol (LIQUIFILM TEARS) 1.4 % ophthalmic solution Place 1 drop into both eyes 4 times daily       acetaminophen (TYLENOL) 650 MG extended release tablet Take 650 mg by mouth 4 times daily      Sodium Phosphates (FLEET) 7-19 GM/118ML Place 1 enema rectally as needed      traMADol (ULTRAM) 50 MG tablet Take 50 mg by mouth every 6 hours as needed for Pain.      white petrolatum GEL Apply topically 2 times daily as needed for Dry Skin Arms and legs      chlorhexidine (PERIDEX) 0.12 % solution Take 15 mLs by mouth 2 times daily       atorvastatin (LIPITOR) 20 MG tablet Take 1 tablet by mouth daily 90 tablet 3    nitroGLYCERIN (NITROSTAT) 0.4 MG SL tablet up to max of 3 total doses.  If no relief after 1 dose, call 911. 25 tablet 1    clopidogrel (PLAVIX) 75 MG tablet Take 1 tablet by mouth daily 30 tablet 3    losartan (COZAAR) 50 MG tablet Take 1 tablet by mouth daily 30 tablet 3    apixaban (ELIQUIS) 5 MG TABS tablet Take 1 tablet by mouth 2 times daily 60 tablet 0    tiZANidine (ZANAFLEX) 2 MG tablet Take 2 mg by mouth every 6 hours as needed      cloNIDine (CATAPRES) 0.1 MG tablet Take 0.1 mg by mouth 3 times daily      amLODIPine (NORVASC) 10 MG tablet Take 10 mg by mouth daily       Valbenazine Tosylate 40 MG CAPS Take 40 mg by mouth nightly      melatonin 3 MG TABS tablet Take 3 mg by mouth nightly      polyethylene glycol (GLYCOLAX) packet Take 17 g by mouth daily      Amantadine (SYMMETREL) 100 MG TABS tablet Take 100 mg by mouth 2 times daily      Dextromethorphan-guaiFENesin 5-100 MG/5ML LIQD Take 5 mLs by mouth every 12 hours as needed (cough)      acetaminophen (TYLENOL) 325 MG tablet Take 650 mg by mouth every 4 hours as needed for Pain or Fever      sodium chloride (OCEAN) 0.65 % nasal spray 1 spray by Nasal route 3 times daily as needed for Congestion      bisacodyl (DULCOLAX) 10 MG suppository Place 10 mg rectally daily as needed for Constipation      magnesium hydroxide (MILK OF MAGNESIA) 400 MG/5ML suspension Take 30 mLs by mouth daily as needed for Constipation      calcium carbonate 600 MG TABS tablet Take 1 tablet by mouth nightly       omeprazole (PRILOSEC) 20 MG capsule TAKE 1 CAPSULE BY MOUTH DAILY. 30 capsule 5    guaiFENesin (MUCINEX) 600 MG extended release tablet Take 1,200 mg by mouth 2 times daily. PRN      loratadine (CLARITIN) 10 MG tablet Take 10 mg by mouth daily. PRN          FAMILY HISTORY: family history includes Bipolar Disorder in her sister and another family member; Diabetes in her father and mother; High Blood Pressure in her father, mother, and another family member; Schizophrenia in her sister. Physical Examination:     /66 (Site: Left Upper Arm, Position: Sitting, Cuff Size: Large Adult)   Pulse 74   Resp 20   Ht 5' 5\" (1.651 m)   Wt 264 lb (119.7 kg)   SpO2 97%   BMI 43.93 kg/m²  Body mass index is 43.93 kg/m². Constitutional: She appeared oriented to person and place. She appears well-developed and well-nourished. In no acute distress. HEENT: Normocephalic and atraumatic. No JVD present. Carotid bruit is not present. No mass and no thyromegaly present. No lymphadenopathy noted. Cardiovascular: Normal rate, irregularly irregular rhythm, normal heart sounds. Exam reveals no gallop and no friction rubs. 2/6 systolic murmur, 4th intercostal space on the LEFT must lateral to the sternal border  Pulmonary/Chest: Effort normal and breath sounds normal. No respiratory distress. She has no wheezes, rhonchi or rales. Abdominal: Soft, non-tender. She exhibits no organomegaly, mass or bruit. Extremities: Trace bilateral ankle edema. No cyanosis or clubbing. 2+ radial and carotid pulses. Distal extremity pulses: 2+ bilaterally. Compression stockings in place. Neurological: Alertness and orientation as per Constitutional exam. No evidence of gross cranial nerve deficit. Coordination appeared normal.   Skin: Skin is warm and dry. There is no rash or diaphoresis. Psychiatric: She has a normal mood and affect.  Her speech is normal and behavior is normal.      MOST RECENT LABS ON RECORD:   Lab Results   Component Value Date    WBC 6.8 06/19/2022    HGB 12.3 06/19/2022    HCT 38.1 06/19/2022     06/19/2022    CHOL 132 05/12/2022    TRIG 63 05/12/2022    HDL 67 05/12/2022    ALT 16 06/19/2022    AST 13 06/19/2022     07/27/2022    K 4.2 07/27/2022     07/27/2022    CREATININE 1.36 (H) 07/27/2022    BUN 23 07/27/2022    CO2 24 07/27/2022    TSH 4.44 05/12/2022    INR 1.6 01/31/2022    LABA1C 5.5 05/12/2022    LABMICR CANNOT BE CALCULATED 09/20/2013    BNP NOT REPORTED 03/11/2014       ASSESSMENT:        1. Tiredness    2. Chronic diastolic congestive heart failure ([facility] [address]    3. PAF (paroxysmal atrial fibrillation) ([facility] [address]    4. Coronary artery disease involving native coronary artery of native heart without angina pectoris    5. S/P angioplasty with stent    6. Valvular heart disease    7. Moderate tricuspid regurgitation by prior echocardiogram    8. Essential hypertension    9. Mixed hyperlipidemia        PLAN:        Tiredness:   Recently getting over COVID most likely her increase tiredness is due to this. Reviewed recent blood work. Chronic diastolic heart failure: New York Heart Association Class: IIb (Marked symptoms during daily activities)/ Right Sided Heart Failure   Beta Blocker: Continue Metoprolol succinate (Toprol XL) 25 mg daily. ACE Inibitor/ARB: Continue losartan (Cozaar) 50 mg daily. Diuretics: Continue furosemide (Lasix) 40 mg every morning. Heart failure counseling: I told them to continue wearing lower extremity compression stockings and I advised them to try and keep their legs up whenever possible and to limit salt in their diet. Continue Farxiga 10 mg daily. I did advise her that the most common side effect of this medication is UTI's and to be aware of any sighs or symptoms of this. Paroxysmal Atrial Fibrillation: Rate Control   Beta Blocker: Continue metoprolol succinate (Toprol XL) 25 mg once daily.  I also discussed the potential side effects of this medication including lightheadedness and dizziness and told her to stop the medication of this occurs and call our office if this occurs. Calcium Channel Blocker: Continue amlodipine (Norvasc)  10 mg daily. I discussed the potential side effects of this medication including lightheadedness and dizziness and told her to call the office if this occurs. [id number] Score for Atrial Fibrillation Stroke Risk   Risk   Factors  Component Value   C CHF Yes 1   H HTN Yes 1   A2 Age >= 76 No,  (77 y.o.) 0   D DM No 0   S2 Prior Stroke/TIA Yes 2   V Vascular Disease No 0   A Age 74-69 Yes,  (77 y.o.) 1   Sc Sex female 1    [id number]  Score  6   Score last updated 9/69/78 4:40 PM EDT  Click here for a link to the UpToDate guideline \"Atrial Fibrillation: Anticoagulation therapy to prevent embolization  Disclaimer: Risk Score calculation is dependent on accuracy of patient problem list and past encounter diagnosis. Stroke Risk: Her CHADS2-VASc score is 6/9 (9.8% stroke risk)  Anticoagulation: Continue Apixaban (Eliquis) 5 mg every 12 hours. I also reminded her to watch for signs of blood in her stool or black tarry stools and stop the medication immediately if this develops as this could be life threatening. Dose appropriate, reviewed her most recent blood work. Atherosclerotic Heart Disease: Successful PCI of the LAD with one drug-eluting stent on 5/28/2021. Antiplatelet Agent: Continue clopidogrel (Plavix): Plavix 75 mg daily. Beta Blocker: Continue Metoprolol succinate (Toprol XL) 25 mg daily. Anti-anginal medications: Continue nitroglycerin 0.4 mg tablets as needed for chest pain. and amlodipine (Norvasc) 10 mg once daily. Cholesterol Reduction Therapy: Continue Atorvastatin (Lipitor) 20 mg daily. Additional counseling: I advised them to call our office or go to the emergency room if they developed worsening or persistent chest pain or increased shortness of breath as this could be life threatening.      Moderate Tricuspid Regurgitation: asymptomatic Moderate pulmonary hypertension with an estimated right ventricular systolic pressure of 45 mmHg. Beta Blocker: Continue Metoprolol succinate (Toprol XL) 25 mg daily. ACE Inibitor/ARB: Continue losartan (Cozaar) 50 mg daily. Diuretics: Continue furosemide (Lasix) 40 mg every morning. Essential Hypertension: Controlled  Beta Blocker: Continue Metoprolol succinate (Toprol XL) 25 mg daily. ACE Inibitor/ARB: Continue losartan (Cozaar) 50 mg daily. Calcium Channel Blocker: Continue amlodipine (Norvasc) 10 mg once daily. Diuretics: Continue furosemide (Lasix) 40 mg every morning. Hyperlipidemia: Mixed, LDL done on 5/12/2022 was 52 mg/d L  Cholesterol Reduction Therapy: Continue Atorvastatin (Lipitor) 20 mg daily. In the meantime, I encouraged Ms. Dos Santos to continue to take her other medications. FOLLOW UP:   I told Ms. Pasha Zurita to call my office if she had any problems, but otherwise I asked her to Return in about 6 months (around 2/23/2023). However, I would be happy to see her sooner should the need arise. Sincerely,  Keya Julian PA-C  [facility] Cardiology Specialist    [address]  Phone: [phone], Fax: [phone]     I believe that the risk of significant morbidity and mortality related to the patient's current medical conditions are: intermediate-high. Approximately 35 minutes were spent during prep work, discussion and exam of the patient, and follow up documentation and all of their questions were answered. The documentation recorded by the scribe, accurately and completely reflects the services I personally performed and the decisions made by me.  Keya Julian PA-C August 23, 2022

## 2022-08-25 ENCOUNTER — HOSPITAL ENCOUNTER (OUTPATIENT)
Age: 72
Setting detail: SPECIMEN
Discharge: HOME | End: 2022-08-25
Payer: MEDICARE

## 2022-08-25 LAB
FOLATE: 5.7 NG/ML
PROLACTIN: 14.82 NG/ML (ref 4.79–23.3)
THYROXINE, FREE: 1.14 NG/DL (ref 0.93–1.7)
TSH SERPL DL<=0.05 MIU/L-ACNC: 3.94 UIU/ML (ref 0.3–5)
VALPROIC ACID LEVEL: 36 UG/ML (ref 50–125)
VITAMIN B-12: 934 PG/ML (ref 232–1245)

## 2022-08-25 PROCEDURE — 82607 VITAMIN B-12: CPT

## 2022-08-25 PROCEDURE — 36415 COLL VENOUS BLD VENIPUNCTURE: CPT

## 2022-08-25 PROCEDURE — 80164 ASSAY DIPROPYLACETIC ACD TOT: CPT

## 2022-08-25 PROCEDURE — 84146 ASSAY OF PROLACTIN: CPT

## 2022-08-25 PROCEDURE — P9603 ONE-WAY ALLOW PRORATED MILES: HCPCS

## 2022-08-25 PROCEDURE — 82746 ASSAY OF FOLIC ACID SERUM: CPT

## 2022-08-25 PROCEDURE — 84439 ASSAY OF FREE THYROXINE: CPT

## 2022-08-25 PROCEDURE — 84443 ASSAY THYROID STIM HORMONE: CPT

## 2022-10-11 ENCOUNTER — OUTSIDE SERVICES (OUTPATIENT)
Dept: PRIMARY CARE CLINIC | Age: 72
End: 2022-10-11
Payer: MEDICARE

## 2022-10-11 DIAGNOSIS — I10 ESSENTIAL HYPERTENSION: ICD-10-CM

## 2022-10-11 DIAGNOSIS — I48.91 ATRIAL FIBRILLATION, UNSPECIFIED TYPE (HCC): ICD-10-CM

## 2022-10-11 DIAGNOSIS — I50.32 CHRONIC DIASTOLIC HEART FAILURE (HCC): Primary | ICD-10-CM

## 2022-10-11 DIAGNOSIS — N18.31 STAGE 3A CHRONIC KIDNEY DISEASE (HCC): ICD-10-CM

## 2022-10-11 NOTE — PROGRESS NOTES
Patient:  Farrah Barragan, 1950  I saw this patient on 10/19/2022, at [facility]   Shortness of breath better,blood sugars well controlled,  Edema improving  No bleeding from anticoagulant  bp well controlled        Reason for Visit:      ICD-10-CM    1. Chronic diastolic heart failure (HCC)  I50.32       2. Atrial fibrillation, unspecified type ([facility] [address]  I48.91       3. Essential hypertension  I10       4. Stage 3a chronic kidney disease (HCC)  N18.31               Changes since last visit:    Mood changes better,psych managing meds  Shortness of breath and edema better  BP better  1. Fall:  none  2. Behavioral Change: mood better  3. Pain Control: adequate  4. Mobility: improving  5. Pressure Sore:  none  Allergies:  Lamictal [lamotrigine], Oxcarbazepine, Pcn [penicillins], Sertraline, Trileptal, and Strattera [atomoxetine hcl]    Current Outpatient Medications   Medication Sig Dispense Refill    nystatin (MYCOSTATIN) 527323 UNIT/GM powder Apply topically as needed Breast,folds      QUEtiapine (SEROQUEL) 25 MG tablet Take 50 mg by mouth at bedtime      divalproex (DEPAKOTE) 250 MG DR tablet Take 250 mg by mouth in the morning and 250 mg before bedtime. dapagliflozin (FARXIGA) 10 MG tablet Take 1 tablet by mouth every morning 90 tablet 3    furosemide (LASIX) 40 MG tablet Take 1 tablet by mouth 2 times daily for 14 days (Patient taking differently: Take 40 mg by mouth daily) 28 tablet 0    loperamide (IMODIUM) 2 MG capsule Take 4 mg by mouth 4 times daily as needed for Diarrhea Every 4 hours      metoprolol succinate (TOPROL XL) 25 MG extended release tablet TAKE 1 TABLET BY MOUTH DAILY.  90 tablet 3    simethicone (MYLICON) 80 MG chewable tablet Take 80 mg by mouth every 6 hours as needed for Flatulence      levothyroxine (SYNTHROID) 50 MCG tablet Take 50 mcg by mouth Daily      latanoprost (XALATAN) 0.005 % ophthalmic solution Place 1 drop into the right eye nightly       polyvinyl alcohol (LIQUIFILM TEARS) 1.4 % ophthalmic solution Place 1 drop into both eyes 4 times daily       acetaminophen (TYLENOL) 650 MG extended release tablet Take 650 mg by mouth 4 times daily      Sodium Phosphates (FLEET) 7-19 GM/118ML Place 1 enema rectally as needed      traMADol (ULTRAM) 50 MG tablet Take 50 mg by mouth every 6 hours as needed for Pain.      white petrolatum GEL Apply topically 2 times daily as needed for Dry Skin Arms and legs      chlorhexidine (PERIDEX) 0.12 % solution Take 15 mLs by mouth 2 times daily       atorvastatin (LIPITOR) 20 MG tablet Take 1 tablet by mouth daily 90 tablet 3    nitroGLYCERIN (NITROSTAT) 0.4 MG SL tablet up to max of 3 total doses.  If no relief after 1 dose, call 911. 25 tablet 1    clopidogrel (PLAVIX) 75 MG tablet Take 1 tablet by mouth daily 30 tablet 3    losartan (COZAAR) 50 MG tablet Take 1 tablet by mouth daily 30 tablet 3    apixaban (ELIQUIS) 5 MG TABS tablet Take 1 tablet by mouth 2 times daily 60 tablet 0    tiZANidine (ZANAFLEX) 2 MG tablet Take 2 mg by mouth 4 times daily      cloNIDine (CATAPRES) 0.1 MG tablet Take 0.1 mg by mouth 3 times daily      amLODIPine (NORVASC) 10 MG tablet Take 10 mg by mouth daily       Valbenazine Tosylate 40 MG CAPS Take 40 mg by mouth nightly      melatonin 3 MG TABS tablet Take 3 mg by mouth nightly      polyethylene glycol (GLYCOLAX) packet Take 17 g by mouth daily      Amantadine (SYMMETREL) 100 MG TABS tablet Take 100 mg by mouth 2 times daily      Dextromethorphan-guaiFENesin 5-100 MG/5ML LIQD Take 5 mLs by mouth every 12 hours as needed (cough)      acetaminophen (TYLENOL) 325 MG tablet Take 650 mg by mouth every 4 hours as needed for Pain or Fever      sodium chloride (OCEAN) 0.65 % nasal spray 1 spray by Nasal route 3 times daily as needed for Congestion      bisacodyl (DULCOLAX) 10 MG suppository Place 10 mg rectally daily as needed for Constipation      magnesium hydroxide (MILK OF MAGNESIA) 400 MG/5ML suspension Take 30 mLs by mouth daily as needed for Constipation      calcium carbonate 600 MG TABS tablet Take 1 tablet by mouth nightly       omeprazole (PRILOSEC) 20 MG capsule TAKE 1 CAPSULE BY MOUTH DAILY. 30 capsule 5    guaiFENesin (MUCINEX) 600 MG extended release tablet Take 1,200 mg by mouth 2 times daily. PRN      loratadine (CLARITIN) 10 MG tablet Take 10 mg by mouth daily. PRN        No current facility-administered medications for this visit. Past Medical History:    Past Medical History:   Diagnosis Date    ADHD (attention deficit hyperactivity disorder)     Anemia     severe    Asthma     Bipolar disorder (HCC)     Bradycardia, unspecified     Chronic anemia     Chronic atrial fibrillation ([facility] [address]     Chronic back pain     Chronic kidney disease     Patient states unaware of this. Has never been mentioned to her.      Colitis     Constipation     COPD (chronic obstructive pulmonary disease) (HCC)     Cystitis without hematuria     Diastolic CHF (HCC)     Esophagitis     grade A    GERD (gastroesophageal reflux disease)     Hemorrhoids     History of echocardiogram 06/02/2014    EF >60%, LV wall thickness mildly increased,    Hypertension     Hypothyroidism     Metabolic syndrome     Moderate protein-calorie malnutrition (HCC)     Muscle weakness (generalized)     Obesity     Pancreatitis     TIA (transient ischemic attack)        Past Surgical History:    Past Surgical History:   Procedure Laterality Date    CARDIAC CATHETERIZATION Left 05/27/2021    right radial/ [facility]/ Dr. Jose Monroe Right 12/27/2021    Dr. Killian Aldridge Left 02/07/2022    EYE CATARACT EMULSIFICATION IOL IMPLANT (Left Eye)    COLONOSCOPY  5/2011    ischemic colitis    COLONOSCOPY  03/07/2017    -diverticulosis,hemorrhoids    HYMENECTOMY      1972    HYSTERECTOMY (CERVIX STATUS UNKNOWN)      2008    INTRACAPSULAR CATARACT EXTRACTION Right 12/27/2021    EYE CATARACT EMULSIFICATION IOL IMPLANT performed by Jia Gasca DO at [facility] Left 2022    EYE CATARACT EMULSIFICATION IOL IMPLANT performed by Jia Gasca DO at [facility] Right         TONSILLECTOMY AND ADENOIDECTOMY      UPPER GASTROINTESTINAL ENDOSCOPY  3/2013    5 cm axial hiatal hernia    UPPER GASTROINTESTINAL ENDOSCOPY  2017    Dr. Felix Lawrence       Social History:   Social History     Tobacco Use    Smoking status: Former     Packs/day: 0.25     Years: 10.00     Pack years: 2.50     Types: Cigarettes     Quit date: 3/12/1998     Years since quittin.6    Smokeless tobacco: Never   Substance Use Topics    Alcohol use: No       Family History:   Family History   Problem Relation Age of Onset    Diabetes Mother     High Blood Pressure Mother     Diabetes Father     High Blood Pressure Father     Bipolar Disorder Sister     Schizophrenia Sister     High Blood Pressure Other         2 sons    Bipolar Disorder Other         2 sons           Review of Systems:  Constitutional: negative for fevers or chills  Eyes: negative for visual disturbance   ENT: positive for oral infection,negative for sore throat or nasal congestion,no dysphagia. No epistaxis. Respiratory:  No shortness of breath   Cardiovascular: neg for chest pain , palpitations,pnd,  edema better  Gastrointestinal: neg for pain, neg for nausea, vomiting, diarrhea ,  constipation,no reyna,no blood in stool  Genitourinary: negative for dysuria, urgency,neg for hematuria , frequency  Integument/breast: negative for skin rash or lesions  Neurological: negative for unilateral weakness, numbness or tingling,has tremors  Muscular Skeletal: denies joint pain   Psych- mood changes improving    Objective:    Vitals: BP:132/71 T:97.7 P:97 R:18    -----------------------------------------------------------------  Exam:  GEN:   A & O x3, no apparent distress,obese  EYES:  No gross abnormalities.  and PERRLA  ENT:Throat- throat-normal,nose- no drainage  NECK: normal, supple, no lymphadenopathy,  no carotid bruits  PULM: Diminished air entry on auscultation bilaterally- no wheezes, rales or rhonchi, normal air movement, no respiratory distress  COR:   S1,s2,RRR, no murmurs and no gallops,has minimal bilat leg edema  ABD:    soft, non-tender, non-distended, normal bowel sounds, no masses or organomegaly  : no cva or flank tenderness  EXT:has minimal edema, no calf tenderness, and warm to touch. NEURO: Motor and sensory grossly intact,has tremors at rest  PSYCH: mood better  SKIN:   No rash or lesions    -----------------------------------------------------------------  Diagnostic Data:   All diagnostic data was reviewed    Assessment:      Problem List Items Addressed This Visit       Essential hypertension    Atrial fibrillation, unspecified type ([facility]    Stage 3a chronic kidney disease ([facility]     Other Visit Diagnoses       Chronic diastolic heart failure ([facility]    -  Primary              Patient Active Problem List   Diagnosis Code    TIA (transient ischemic attack) G45.9    [facility] F31.9    DDD (degenerative disc disease), lumbar M51.36    Bradycardia R00.1    Essential hypertension I10    Obesity (BMI 30-39. 9) E66.9    GERD (gastroesophageal reflux disease) K21.9    Iron deficiency anemia D50.9    Iron deficiency anemia due to chronic blood loss D50.0    History of colon polyps Z86.010    Hypothyroidism E03.9    Other chronic pain G89.29    Other hemorrhoids K64.8    Diverticulosis of large intestine without diverticulitis K57.30    Attention deficit hyperactivity disorder F90.9    Morbid obesity ([facility]) E66.01    Cellulitis of leg without foot, right L03.115    Atherosclerotic heart disease of native coronary artery with other forms of angina pectoris ([facility]) I25.118    CAD, multiple vessel I25.10    Chest pain R07.9    PVD (peripheral vascular disease) ([facility]) I73.9    Atrial fibrillation, unspecified type ([facility] I48.91    Stage 3a chronic kidney disease (HCC) N18.31    Shortness of breath R06.02    COVID-19 U07.1         Plan:      Monitor for bleeding  Psych to manage psychotropics  Encourage activity as tolerated and follow diet  Continue current medications  Monitor bp   Prevent pressure sore  Prevent falls      Electronically signed by Indra Villavicencio MD on 10/19/2022 at 5:44 PM

## 2022-10-17 ENCOUNTER — HOSPITAL ENCOUNTER (OUTPATIENT)
Age: 72
Setting detail: SPECIMEN
Discharge: HOME | End: 2022-10-17
Payer: MEDICARE

## 2022-10-17 LAB — VALPROIC ACID LEVEL: 49 UG/ML (ref 50–125)

## 2022-10-17 PROCEDURE — 80164 ASSAY DIPROPYLACETIC ACD TOT: CPT

## 2022-10-17 PROCEDURE — P9603 ONE-WAY ALLOW PRORATED MILES: HCPCS

## 2022-10-17 PROCEDURE — 36415 COLL VENOUS BLD VENIPUNCTURE: CPT

## 2022-10-19 PROCEDURE — 99308 SBSQ NF CARE LOW MDM 20: CPT | Performed by: FAMILY MEDICINE

## 2022-10-26 RX ORDER — TRAMADOL HYDROCHLORIDE 50 MG/1
50 TABLET ORAL EVERY 6 HOURS PRN
OUTPATIENT
Start: 2022-10-26

## 2022-11-09 PROCEDURE — 99308 SBSQ NF CARE LOW MDM 20: CPT | Performed by: FAMILY MEDICINE

## 2022-11-10 ENCOUNTER — OUTSIDE SERVICES (OUTPATIENT)
Dept: PRIMARY CARE CLINIC | Age: 72
End: 2022-11-10
Payer: MEDICARE

## 2022-11-10 DIAGNOSIS — N18.31 STAGE 3A CHRONIC KIDNEY DISEASE (HCC): ICD-10-CM

## 2022-11-10 DIAGNOSIS — I50.32 CHRONIC DIASTOLIC HEART FAILURE (HCC): Primary | ICD-10-CM

## 2022-11-10 DIAGNOSIS — G25.2 COARSE TREMOR: ICD-10-CM

## 2022-11-10 DIAGNOSIS — I10 ESSENTIAL HYPERTENSION: ICD-10-CM

## 2022-11-10 DIAGNOSIS — I48.91 ATRIAL FIBRILLATION, UNSPECIFIED TYPE (HCC): ICD-10-CM

## 2022-11-10 NOTE — PROGRESS NOTES
Patient:  Dayron Settler, 1950  I saw this patient on 11/09/2022, at [facility]   Shortness of breath better,blood sugars well controlled,  Edema improving  No bleeding from anticoagulant  bp well controlled  Has tremors,worse on activity,no fall      Reason for Visit:      ICD-10-CM    1. Chronic diastolic heart failure (HCC)  I50.32       2. Essential hypertension  I10       3. Atrial fibrillation, unspecified type ([facility] [address]  I48.91       4. Stage 3a chronic kidney disease (HCC)  N18.31       5. Coarse tremor  G25.2                 Changes since last visit:   Has tremors   Mood changes better,psych managing meds  Shortness of breath and edema better  BP better  1. Fall:  none  2. Behavioral Change: mood better  3. Pain Control: adequate  4. Mobility: improving  5. Pressure Sore:  none  Allergies:  Lamictal [lamotrigine], Oxcarbazepine, Pcn [penicillins], Sertraline, Trileptal, and Strattera [atomoxetine hcl]    Current Outpatient Medications   Medication Sig Dispense Refill    nystatin (MYCOSTATIN) 706707 UNIT/GM powder Apply topically as needed Breast,folds      QUEtiapine (SEROQUEL) 25 MG tablet Take 50 mg by mouth at bedtime      divalproex (DEPAKOTE) 250 MG DR tablet Take 250 mg by mouth in the morning and 250 mg before bedtime. dapagliflozin (FARXIGA) 10 MG tablet Take 1 tablet by mouth every morning 90 tablet 3    furosemide (LASIX) 40 MG tablet Take 1 tablet by mouth 2 times daily for 14 days (Patient taking differently: Take 40 mg by mouth daily) 28 tablet 0    loperamide (IMODIUM) 2 MG capsule Take 4 mg by mouth 4 times daily as needed for Diarrhea Every 4 hours      metoprolol succinate (TOPROL XL) 25 MG extended release tablet TAKE 1 TABLET BY MOUTH DAILY.  90 tablet 3    simethicone (MYLICON) 80 MG chewable tablet Take 80 mg by mouth every 6 hours as needed for Flatulence      levothyroxine (SYNTHROID) 50 MCG tablet Take 50 mcg by mouth Daily      latanoprost (XALATAN) 0.005 % ophthalmic solution Place 1 drop into the right eye nightly       polyvinyl alcohol (LIQUIFILM TEARS) 1.4 % ophthalmic solution Place 1 drop into both eyes 4 times daily       acetaminophen (TYLENOL) 650 MG extended release tablet Take 650 mg by mouth 4 times daily      Sodium Phosphates (FLEET) 7-19 GM/118ML Place 1 enema rectally as needed      traMADol (ULTRAM) 50 MG tablet Take 50 mg by mouth every 6 hours as needed for Pain.      white petrolatum GEL Apply topically 2 times daily as needed for Dry Skin Arms and legs      chlorhexidine (PERIDEX) 0.12 % solution Take 15 mLs by mouth 2 times daily       atorvastatin (LIPITOR) 20 MG tablet Take 1 tablet by mouth daily 90 tablet 3    nitroGLYCERIN (NITROSTAT) 0.4 MG SL tablet up to max of 3 total doses.  If no relief after 1 dose, call 911. 25 tablet 1    clopidogrel (PLAVIX) 75 MG tablet Take 1 tablet by mouth daily 30 tablet 3    losartan (COZAAR) 50 MG tablet Take 1 tablet by mouth daily 30 tablet 3    apixaban (ELIQUIS) 5 MG TABS tablet Take 1 tablet by mouth 2 times daily 60 tablet 0    tiZANidine (ZANAFLEX) 2 MG tablet Take 2 mg by mouth 4 times daily      cloNIDine (CATAPRES) 0.1 MG tablet Take 0.1 mg by mouth 3 times daily      amLODIPine (NORVASC) 10 MG tablet Take 10 mg by mouth daily       Valbenazine Tosylate 40 MG CAPS Take 40 mg by mouth nightly      melatonin 3 MG TABS tablet Take 3 mg by mouth nightly      polyethylene glycol (GLYCOLAX) packet Take 17 g by mouth daily      Amantadine (SYMMETREL) 100 MG TABS tablet Take 100 mg by mouth 2 times daily      Dextromethorphan-guaiFENesin 5-100 MG/5ML LIQD Take 5 mLs by mouth every 12 hours as needed (cough)      acetaminophen (TYLENOL) 325 MG tablet Take 650 mg by mouth every 4 hours as needed for Pain or Fever      sodium chloride (OCEAN) 0.65 % nasal spray 1 spray by Nasal route 3 times daily as needed for Congestion      bisacodyl (DULCOLAX) 10 MG suppository Place 10 mg rectally daily as needed for Constipation magnesium hydroxide (MILK OF MAGNESIA) 400 MG/5ML suspension Take 30 mLs by mouth daily as needed for Constipation      calcium carbonate 600 MG TABS tablet Take 1 tablet by mouth nightly       omeprazole (PRILOSEC) 20 MG capsule TAKE 1 CAPSULE BY MOUTH DAILY. 30 capsule 5    guaiFENesin (MUCINEX) 600 MG extended release tablet Take 1,200 mg by mouth 2 times daily. PRN      loratadine (CLARITIN) 10 MG tablet Take 10 mg by mouth daily. PRN        No current facility-administered medications for this visit. Past Medical History:    Past Medical History:   Diagnosis Date    ADHD (attention deficit hyperactivity disorder)     Anemia     severe    Asthma     Bipolar disorder (HCC)     Bradycardia, unspecified     Chronic anemia     Chronic atrial fibrillation ([facility] [address]     Chronic back pain     Chronic kidney disease     Patient states unaware of this. Has never been mentioned to her.      Colitis     Constipation     COPD (chronic obstructive pulmonary disease) (HCC)     Cystitis without hematuria     Diastolic CHF (HCC)     Esophagitis     grade A    GERD (gastroesophageal reflux disease)     Hemorrhoids     History of echocardiogram 06/02/2014    EF >60%, LV wall thickness mildly increased,    Hypertension     Hypothyroidism     Metabolic syndrome     Moderate protein-calorie malnutrition (HCC)     Muscle weakness (generalized)     Obesity     Pancreatitis     TIA (transient ischemic attack)        Past Surgical History:    Past Surgical History:   Procedure Laterality Date    CARDIAC CATHETERIZATION Left 05/27/2021    right radial/ [facility]/ Dr. Jd Sadler Right 12/27/2021    Dr. Petra Medrano Left 02/07/2022    EYE CATARACT EMULSIFICATION IOL IMPLANT (Left Eye)    COLONOSCOPY  5/2011    ischemic colitis    COLONOSCOPY  03/07/2017    -diverticulosis,hemorrhoids    HYMENECTOMY      1972    HYSTERECTOMY (CERVIX STATUS UNKNOWN)     INTRACAPSULAR CATARACT EXTRACTION Right 2021    EYE CATARACT EMULSIFICATION IOL IMPLANT performed by Lucius Romero DO at [facility] Left 2022    EYE CATARACT EMULSIFICATION IOL IMPLANT performed by Lucius Romero DO at [facility] Right         TONSILLECTOMY AND ADENOIDECTOMY      UPPER GASTROINTESTINAL ENDOSCOPY  3/2013    5 cm axial hiatal hernia    UPPER GASTROINTESTINAL ENDOSCOPY  2017    Dr. Feliciano Resendiz       Social History:   Social History     Tobacco Use    Smoking status: Former     Packs/day: 0.25     Years: 10.00     Pack years: 2.50     Types: Cigarettes     Quit date: 3/12/1998     Years since quittin.6    Smokeless tobacco: Never   Substance Use Topics    Alcohol use: No       Family History:   Family History   Problem Relation Age of Onset    Diabetes Mother     High Blood Pressure Mother     Diabetes Father     High Blood Pressure Father     Bipolar Disorder Sister     Schizophrenia Sister     High Blood Pressure Other         2 sons    Bipolar Disorder Other         2 sons           Review of Systems:  Constitutional: negative for fevers or chills  Eyes: negative for visual disturbance   ENT: positive for oral infection,negative for sore throat or nasal congestion,no dysphagia. No epistaxis.   Respiratory:  No shortness of breath   Cardiovascular: neg for chest pain , palpitations,pnd,  edema better  Gastrointestinal: neg for pain, neg for nausea, vomiting, diarrhea ,  constipation,no reyna,no blood in stool  Genitourinary: negative for dysuria, urgency,neg for hematuria , frequency  Integument/breast: negative for skin rash or lesions  Neurological: negative for unilateral weakness, numbness or tingling,has tremors-worse lately  Muscular Skeletal: denies joint pain   Psych- mood changes improving    Objective:    Vitals: BP:143/87 T:97.5 P:59 R:18 -----------------------------------------------------------------  Exam:  GEN:   A & O x3, no apparent distress,obese  EYES:  No gross abnormalities. and PERRLA  ENT:Throat- throat-normal,nose- no drainage  NECK: normal, supple, no lymphadenopathy,  no carotid bruits  PULM: Diminished air entry on auscultation bilaterally- no wheezes, rales or rhonchi, normal air movement, no respiratory distress  COR:   S1,s2,RRR, no murmurs and no gallops,has minimal bilat leg edema  ABD:    soft, non-tender, non-distended, normal bowel sounds, no masses or organomegaly  : no cva or flank tenderness  EXT:has minimal edema, no calf tenderness, and warm to touch. NEURO: Motor and sensory grossly intact,has tremors at rest  PSYCH: mood better  SKIN:   No rash or lesions    -----------------------------------------------------------------  Diagnostic Data:   All diagnostic data was reviewed    Assessment:      Problem List Items Addressed This Visit       Essential hypertension    [facility], [facility]    Stage 3a chronic kidney disease ([facility]     Other Visit Diagnoses       Chronic diastolic heart failure (HCC)    -  Primary    Coarse tremor                    Patient Active Problem List   Diagnosis Code    TIA (transient ischemic attack) G45.9    Bipolar 1 disorder ([facility] F31.9    DDD (degenerative disc disease), lumbar M51.36    Bradycardia R00.1    Essential hypertension I10    Obesity (BMI 30-39. 9) E66.9    GERD (gastroesophageal reflux disease) K21.9    Iron deficiency anemia D50.9    Iron deficiency anemia due to chronic blood loss D50.0    History of colon polyps Z86.010    Hypothyroidism E03.9    Other chronic pain G89.29    Other hemorrhoids K64.8    Diverticulosis of large intestine without diverticulitis K57.30    Attention deficit hyperactivity disorder F90.9    Morbid obesity (HCC) E66.01    Cellulitis of leg without foot, right L03.115    Atherosclerotic heart disease of native coronary artery with other forms of angina pectoris ([facility] I25.118    CAD, multiple vessel I25.10    Chest pain R07.9    PVD (peripheral vascular disease) (HCC) I73.9    Atrial fibrillation, unspecified type ([facility] I48.91    Stage 3a chronic kidney disease ([facility] N18.31    Shortness of breath R06.02    COVID-19 U07.1         Plan:      Monitor for bleeding  Refer to neurology  Psych to manage psychotropics  Encourage activity as tolerated and follow diet  Continue current medications  Monitor bp   Prevent pressure sore  Prevent falls      Electronically signed by Claire Alegre MD on 11/11/2022 at 9:04 AM

## 2022-11-11 RX ORDER — DIVALPROEX SODIUM 125 MG/1
TABLET, DELAYED RELEASE ORAL
COMMUNITY
Start: 2022-10-26

## 2022-12-07 ENCOUNTER — OUTSIDE SERVICES (OUTPATIENT)
Dept: PRIMARY CARE CLINIC | Age: 72
End: 2022-12-07

## 2022-12-07 DIAGNOSIS — I50.32 CHRONIC DIASTOLIC HEART FAILURE (HCC): ICD-10-CM

## 2022-12-07 DIAGNOSIS — I10 ESSENTIAL HYPERTENSION: Primary | ICD-10-CM

## 2022-12-07 DIAGNOSIS — I48.91 ATRIAL FIBRILLATION, UNSPECIFIED TYPE (HCC): ICD-10-CM

## 2022-12-07 DIAGNOSIS — N18.31 STAGE 3A CHRONIC KIDNEY DISEASE (HCC): ICD-10-CM

## 2022-12-07 NOTE — PROGRESS NOTES
Patient:  Hollice Angelucci, 1950  I saw this patient on 12/07/2022, at [facility]   Has open area rt great toe  Shortness of breath better,blood sugars well controlled,  Edema improving  bp well controlled  Has tremors,worse on activity,no fall      Reason for Visit:      ICD-10-CM    1. Essential hypertension  I10       2. Chronic diastolic heart failure (HCC)  I50.32       3. Atrial fibrillation, unspecified type ([facility]  I48.91       4. Stage 3a chronic kidney disease (HCC)  N18.31                   Changes since last visit:   Has tremors   Mood changes better,psych managing meds  Shortness of breath and edema better  BP better  1. Fall:  none  2. Behavioral Change: mood better  3. Pain Control: adequate  4. Mobility: improving  5. Pressure Sore:  none  Allergies:  Lamictal [lamotrigine], Oxcarbazepine, Pcn [penicillins], Sertraline, Trileptal, and Strattera [atomoxetine hcl]    Current Outpatient Medications   Medication Sig Dispense Refill    divalproex (DEPAKOTE) 125 MG DR tablet       nystatin (MYCOSTATIN) 048320 UNIT/GM powder Apply topically as needed Breast,folds      QUEtiapine (SEROQUEL) 25 MG tablet Take 50 mg by mouth at bedtime      divalproex (DEPAKOTE) 250 MG DR tablet Take 125 mg by mouth Daily Indications: Manic-Depression Give 3 tablets PO one time a day. dapagliflozin (FARXIGA) 10 MG tablet Take 1 tablet by mouth every morning 90 tablet 3    furosemide (LASIX) 40 MG tablet Take 1 tablet by mouth 2 times daily for 14 days (Patient taking differently: Take 40 mg by mouth daily) 28 tablet 0    loperamide (IMODIUM) 2 MG capsule Take 4 mg by mouth 4 times daily as needed for Diarrhea Every 4 hours      metoprolol succinate (TOPROL XL) 25 MG extended release tablet TAKE 1 TABLET BY MOUTH DAILY.  90 tablet 3    simethicone (MYLICON) 80 MG chewable tablet Take 80 mg by mouth every 6 hours as needed for Flatulence      levothyroxine (SYNTHROID) 50 MCG tablet Take 50 mcg by mouth Daily latanoprost (XALATAN) 0.005 % ophthalmic solution Place 1 drop into the right eye nightly       polyvinyl alcohol (LIQUIFILM TEARS) 1.4 % ophthalmic solution Place 1 drop into both eyes 4 times daily       acetaminophen (TYLENOL) 650 MG extended release tablet Take 650 mg by mouth 4 times daily      Sodium Phosphates (FLEET) 7-19 GM/118ML Place 1 enema rectally as needed      traMADol (ULTRAM) 50 MG tablet Take 50 mg by mouth every 6 hours as needed for Pain.      white petrolatum GEL Apply topically 2 times daily as needed for Dry Skin Arms and legs      chlorhexidine (PERIDEX) 0.12 % solution Take 15 mLs by mouth 2 times daily       atorvastatin (LIPITOR) 20 MG tablet Take 1 tablet by mouth daily 90 tablet 3    nitroGLYCERIN (NITROSTAT) 0.4 MG SL tablet up to max of 3 total doses.  If no relief after 1 dose, call 911. 25 tablet 1    clopidogrel (PLAVIX) 75 MG tablet Take 1 tablet by mouth daily 30 tablet 3    losartan (COZAAR) 50 MG tablet Take 1 tablet by mouth daily 30 tablet 3    apixaban (ELIQUIS) 5 MG TABS tablet Take 1 tablet by mouth 2 times daily 60 tablet 0    tiZANidine (ZANAFLEX) 2 MG tablet Take 2 mg by mouth 4 times daily      cloNIDine (CATAPRES) 0.1 MG tablet Take 0.1 mg by mouth 3 times daily      amLODIPine (NORVASC) 10 MG tablet Take 10 mg by mouth daily       Valbenazine Tosylate 40 MG CAPS Take 40 mg by mouth nightly      melatonin 3 MG TABS tablet Take 3 mg by mouth nightly      polyethylene glycol (GLYCOLAX) packet Take 17 g by mouth daily      Amantadine (SYMMETREL) 100 MG TABS tablet Take 100 mg by mouth 2 times daily      Dextromethorphan-guaiFENesin 5-100 MG/5ML LIQD Take 5 mLs by mouth every 12 hours as needed (cough)      acetaminophen (TYLENOL) 325 MG tablet Take 650 mg by mouth every 4 hours as needed for Pain or Fever      sodium chloride (OCEAN) 0.65 % nasal spray 1 spray by Nasal route 3 times daily as needed for Congestion      bisacodyl (DULCOLAX) 10 MG suppository Place 10 mg rectally daily as needed for Constipation      magnesium hydroxide (MILK OF MAGNESIA) 400 MG/5ML suspension Take 30 mLs by mouth daily as needed for Constipation      calcium carbonate 600 MG TABS tablet Take 1 tablet by mouth nightly       omeprazole (PRILOSEC) 20 MG capsule TAKE 1 CAPSULE BY MOUTH DAILY. 30 capsule 5    guaiFENesin (MUCINEX) 600 MG extended release tablet Take 1,200 mg by mouth 2 times daily. PRN      loratadine (CLARITIN) 10 MG tablet Take 10 mg by mouth daily. PRN        No current facility-administered medications for this visit. Past Medical History:    Past Medical History:   Diagnosis Date    ADHD (attention deficit hyperactivity disorder)     Anemia     severe    Asthma     Bipolar disorder (HCC)     Bradycardia, unspecified     Chronic anemia     Chronic atrial fibrillation ([facility] [address]     Chronic back pain     Chronic kidney disease     Patient states unaware of this. Has never been mentioned to her.      Colitis     Constipation     COPD (chronic obstructive pulmonary disease) (HCC)     Cystitis without hematuria     Diastolic CHF (HCC)     Esophagitis     grade A    GERD (gastroesophageal reflux disease)     Hemorrhoids     History of echocardiogram 06/02/2014    EF >60%, LV wall thickness mildly increased,    Hypertension     Hypothyroidism     Metabolic syndrome     Moderate protein-calorie malnutrition (HCC)     Muscle weakness (generalized)     Obesity     Pancreatitis     TIA (transient ischemic attack)        Past Surgical History:    Past Surgical History:   Procedure Laterality Date    CARDIAC CATHETERIZATION Left 05/27/2021    right radial/ [facility]/ Dr. Karrie Brian Right 12/27/2021    Dr. Damaso Johnson Left 02/07/2022    EYE CATARACT EMULSIFICATION IOL IMPLANT (Left Eye)    COLONOSCOPY  5/2011    ischemic colitis    COLONOSCOPY  03/07/2017    -diverticulosis,hemorrhoids    HYMENECTOMY      1972 HYSTERECTOMY (CERVIX STATUS UNKNOWN)          INTRACAPSULAR CATARACT EXTRACTION Right 2021    EYE CATARACT EMULSIFICATION IOL IMPLANT performed by Lucie Solorzano DO at [facility] Left 2022    EYE CATARACT EMULSIFICATION IOL IMPLANT performed by Lucie Solorzano DO at [facility] Right         TONSILLECTOMY AND ADENOIDECTOMY      UPPER GASTROINTESTINAL ENDOSCOPY  3/2013    5 cm axial hiatal hernia    UPPER GASTROINTESTINAL ENDOSCOPY  2017    Dr. Drea Patel       Social History:   Social History     Tobacco Use    Smoking status: Former     Packs/day: 0.25     Years: 10.00     Pack years: 2.50     Types: Cigarettes     Quit date: 3/12/1998     Years since quittin.7    Smokeless tobacco: Never   Substance Use Topics    Alcohol use: No       Family History:   Family History   Problem Relation Age of Onset    Diabetes Mother     High Blood Pressure Mother     Diabetes Father     High Blood Pressure Father     Bipolar Disorder Sister     Schizophrenia Sister     High Blood Pressure Other         2 sons    Bipolar Disorder Other         2 sons           Review of Systems:  Constitutional: negative for fevers or chills  Eyes: negative for visual disturbance   ENT: positive for oral infection,negative for sore throat or nasal congestion,no dysphagia. No epistaxis.   Respiratory:  No shortness of breath   Cardiovascular: neg for chest pain , palpitations,pnd,  edema better  Gastrointestinal: neg for pain, neg for nausea, vomiting, diarrhea ,  constipation,no reyna,no blood in stool  Genitourinary: negative for dysuria, urgency,neg for hematuria , frequency  Integument/breast: has open area rt great toe,negative for skin rash or lesions  Neurological: negative for unilateral weakness, numbness or tingling,has tremors-worse lately  Muscular Skeletal: denies joint pain   Psych- mood changes improving    Objective:    Vitals: BP:129/75 T:97.3 P:57 R:18 -----------------------------------------------------------------  Exam:  GEN:   A & O x3, no apparent distress,obese  EYES:  No gross abnormalities. and PERRLA  ENT:Throat- throat-normal,nose- no drainage  NECK: normal, supple, no lymphadenopathy,  no carotid bruits  PULM: Diminished air entry on auscultation bilaterally- no wheezes, rales or rhonchi, normal air movement, no respiratory distress  COR:   S1,s2,RRR, no murmurs and no gallops,has minimal bilat leg edema  ABD:    soft, non-tender, non-distended, normal bowel sounds, no masses or organomegaly  : no cva or flank tenderness  EXT:has minimal edema, no calf tenderness, and warm to touch. Has open area rt great toe  NEURO: Motor and sensory grossly intact,has tremors at rest  PSYCH: mood better  SKIN:   No rash or lesions    -----------------------------------------------------------------  Diagnostic Data:   All diagnostic data was reviewed    Assessment:      Problem List Items Addressed This Visit       Essential hypertension - Primary    Atrial fibrillation, unspecified type ([facility]    Stage 3a chronic kidney disease ([facility]     Other Visit Diagnoses       Chronic diastolic heart failure ([facility]                      Patient Active Problem List   Diagnosis Code    TIA (transient ischemic attack) G45.9    Bipolar 1 disorder ([facility] F31.9    DDD (degenerative disc disease), lumbar M51.36    Bradycardia R00.1    Essential hypertension I10    Obesity (BMI 30-39. 9) E66.9    GERD (gastroesophageal reflux disease) K21.9    Iron deficiency anemia D50.9    Iron deficiency anemia due to chronic blood loss D50.0    History of colon polyps Z86.010    Hypothyroidism E03.9    Other chronic pain G89.29    Other hemorrhoids K64.8    Diverticulosis of large intestine without diverticulitis K57.30    Attention deficit hyperactivity disorder F90.9    Morbid obesity ([facility]) E66.01    Cellulitis of leg without foot, right L03.115    Atherosclerotic heart disease of native coronary artery with other forms of angina pectoris ([facility] I25.118    CAD, multiple vessel I25.10    Chest pain R07.9    PVD (peripheral vascular disease) ([facility]) I73.9    Atrial fibrillation, unspecified type ([facility] I48.91    Stage 3a chronic kidney disease ([facility]) N18.31    Shortness of breath R06.02    COVID-19 U07.1         Plan:      Monitor for bleeding  Wound care  Podiatrty follow up  Psych to manage psychotropics  Encourage activity as tolerated and follow diet  Continue current medications  Monitor bp   Prevent pressure sore  Prevent falls      Electronically signed by [address], MD on 12/11/2022 at 12:55 PM

## 2022-12-13 ENCOUNTER — OFFICE VISIT (OUTPATIENT)
Dept: NEPHROLOGY | Age: 72
End: 2022-12-13
Payer: MEDICARE

## 2022-12-13 VITALS
RESPIRATION RATE: 16 BRPM | HEART RATE: 82 BPM | WEIGHT: 261.9 LBS | TEMPERATURE: 95.6 F | BODY MASS INDEX: 42.09 KG/M2 | DIASTOLIC BLOOD PRESSURE: 68 MMHG | HEIGHT: 66 IN | SYSTOLIC BLOOD PRESSURE: 120 MMHG

## 2022-12-13 DIAGNOSIS — N28.1 RENAL CYST: ICD-10-CM

## 2022-12-13 DIAGNOSIS — N18.32 STAGE 3B CHRONIC KIDNEY DISEASE (HCC): Primary | ICD-10-CM

## 2022-12-13 DIAGNOSIS — I50.22 CHRONIC SYSTOLIC (CONGESTIVE) HEART FAILURE (HCC): ICD-10-CM

## 2022-12-13 DIAGNOSIS — I10 ESSENTIAL HYPERTENSION: ICD-10-CM

## 2022-12-13 PROBLEM — E11.9 TYPE 2 DIABETES MELLITUS (HCC): Status: ACTIVE | Noted: 2022-12-13

## 2022-12-13 PROCEDURE — G8399 PT W/DXA RESULTS DOCUMENT: HCPCS | Performed by: INTERNAL MEDICINE

## 2022-12-13 PROCEDURE — 99204 OFFICE O/P NEW MOD 45 MIN: CPT | Performed by: INTERNAL MEDICINE

## 2022-12-13 PROCEDURE — 1124F ACP DISCUSS-NO DSCNMKR DOCD: CPT | Performed by: INTERNAL MEDICINE

## 2022-12-13 PROCEDURE — G8428 CUR MEDS NOT DOCUMENT: HCPCS | Performed by: INTERNAL MEDICINE

## 2022-12-13 PROCEDURE — 3017F COLORECTAL CA SCREEN DOC REV: CPT | Performed by: INTERNAL MEDICINE

## 2022-12-13 PROCEDURE — 1036F TOBACCO NON-USER: CPT | Performed by: INTERNAL MEDICINE

## 2022-12-13 PROCEDURE — 99214 OFFICE O/P EST MOD 30 MIN: CPT | Performed by: INTERNAL MEDICINE

## 2022-12-13 PROCEDURE — G8417 CALC BMI ABV UP PARAM F/U: HCPCS | Performed by: INTERNAL MEDICINE

## 2022-12-13 PROCEDURE — G8484 FLU IMMUNIZE NO ADMIN: HCPCS | Performed by: INTERNAL MEDICINE

## 2022-12-13 PROCEDURE — 3078F DIAST BP <80 MM HG: CPT | Performed by: INTERNAL MEDICINE

## 2022-12-13 PROCEDURE — 1090F PRES/ABSN URINE INCON ASSESS: CPT | Performed by: INTERNAL MEDICINE

## 2022-12-13 PROCEDURE — 3074F SYST BP LT 130 MM HG: CPT | Performed by: INTERNAL MEDICINE

## 2022-12-13 ASSESSMENT — ENCOUNTER SYMPTOMS
BACK PAIN: 1
EYES NEGATIVE: 1
SHORTNESS OF BREATH: 0
NAUSEA: 0
COUGH: 0
DIARRHEA: 0
ABDOMINAL PAIN: 0
VOMITING: 0
EYE PAIN: 0

## 2022-12-13 NOTE — PROGRESS NOTES
[facility]         Outpatient Initial consult note         12/13/2022 1:24 PM    [facility] [address] [facility]  [address] [facility] [address]  Dept: [phone]      Patient Name:   Gonzella Lesches:    1950  Primary Care Physician:  Lucia Sun MD     Chief Complaint : Evaluation of   worsening renal function     History of presenting illness :Grace Lang is a 67 y.o.   female with Past Medical History as stated below was sent / referred by Lucia Sun MD forevaluation of the above problem. Patient has a history of hypertension for 30 years. Patient has no history of diabetes mellitus. The patient denies history of renal stones anddenies NSAID use. Patient has no history of proteinuria. Patient Never had a kidney biopsy done. Patient does not use Herbal remedies/vitamin supplements. Patient denies frequency, hematuria, hesitancy, retention. Patient has no family history of kidney disease. Patient's chronic medical conditions of back pain, COPD and CHF are stable and well controlled with medications at this time. Patient had stents placed in the heart last year. She is on multiple medications including Lasix and losartan and Ruth Bars     Past History :     Past Medical History:   Diagnosis Date    ADHD (attention deficit hyperactivity disorder)     Anemia     severe    Asthma     Bipolar disorder (HCC)     Bradycardia, unspecified     Chronic anemia     Chronic atrial fibrillation ([address]     Chronic back pain     Chronic kidney disease     Patient states unaware of this. Has never been mentioned to her.      Colitis     Constipation     COPD (chronic obstructive pulmonary disease) (HCC)     Cystitis without hematuria     Diastolic CHF (HCC)     Esophagitis     grade A    GERD (gastroesophageal reflux disease)     Hemorrhoids     History of echocardiogram 2014    EF >60%, LV wall thickness mildly increased,    Hypertension     Hypothyroidism     Metabolic syndrome     Moderate protein-calorie malnutrition (HCC)     Muscle weakness (generalized)     Obesity     Pancreatitis     TIA (transient ischemic attack)      Past Surgical History:   Procedure Laterality Date    CARDIAC CATHETERIZATION Left 2021    right radial/ [facility]/ Dr. Marivel Quinones Right 2021    Dr. Floyd Egan Left 2022    EYE CATARACT EMULSIFICATION IOL IMPLANT (Left Eye)    COLONOSCOPY  2011    ischemic colitis    COLONOSCOPY  2017    -diverticulosis,hemorrhoids    [address] (CERVIX STATUS UNKNOWN)          INTRACAPSULAR CATARACT EXTRACTION Right 2021    EYE CATARACT EMULSIFICATION IOL IMPLANT performed by Joselyn Wong DO at [place] [id number] Left 2022    EYE CATARACT EMULSIFICATION IOL IMPLANT performed by Joselyn Wong DO at [facility] Right         TONSILLECTOMY AND ADENOIDECTOMY      UPPER GASTROINTESTINAL ENDOSCOPY  3/2013    5 cm axial hiatal hernia    UPPER GASTROINTESTINAL ENDOSCOPY  2017    Dr. Sadie Godinez     Social History     Socioeconomic History    Marital status:      Spouse name: Not on file    Number of children: Not on file    Years of education: Not on file    Highest education level: Not on file   Occupational History    Not on file   Tobacco Use    Smoking status: Former     Packs/day: 0.25     Years: 10.00     Pack years: 2.50     Types: Cigarettes     Quit date: 3/12/1998     Years since quittin.7    Smokeless tobacco: Never   Substance and Sexual Activity    Alcohol use: No    Drug use: No    Sexual activity: Not on file   Other Topics Concern    Not on file   Social History Narrative    Not on file     Social Determinants of Health     Financial Resource Strain: Not on file   Food Insecurity: Not on file   Transportation Needs: Not on file   Physical Activity: Not on file   Stress: Not on file   Social Connections: Not on file   Intimate Partner Violence: Not on file   Housing Stability: Not on file     Family History   Problem Relation Age of Onset    Diabetes Mother     High Blood Pressure Mother     Diabetes Father     High Blood Pressure Father     Bipolar Disorder Sister     Schizophrenia Sister     High Blood Pressure Other         2 sons    Bipolar Disorder Other         2 sons     Medications & Allergies :      Prior to Admission medications    Medication Sig Start Date End Date Taking? Authorizing Provider   divalproex (DEPAKOTE) 125 MG DR tablet  10/26/22  Yes Historical Provider, MD   nystatin (MYCOSTATIN) 359271 UNIT/GM powder Apply topically as needed Breast,folds   Yes Historical Provider, MD   QUEtiapine (SEROQUEL) 25 MG tablet Take 50 mg by mouth at bedtime   Yes Historical Provider, MD   divalproex (DEPAKOTE) 250 MG DR tablet Take 125 mg by mouth Daily Indications: Manic-Depression Give 3 tablets PO one time a day. Yes Historical Provider, MD   dapagliflozin (FARXIGA) 10 MG tablet Take 1 tablet by mouth every morning 7/19/22  Yes Benita Naik PA-C   loperamide (IMODIUM) 2 MG capsule Take 4 mg by mouth 4 times daily as needed for Diarrhea Every 4 hours   Yes Historical Provider, MD   metoprolol succinate (TOPROL XL) 25 MG extended release tablet TAKE 1 TABLET BY MOUTH DAILY.  3/22/22  Yes Art Given, MD   simethicone (MYLICON) 80 MG chewable tablet Take 80 mg by mouth every 6 hours as needed for Flatulence   Yes Historical Provider, MD   levothyroxine (SYNTHROID) 50 MCG tablet Take 50 mcg by mouth Daily   Yes Historical Provider, MD   latanoprost (XALATAN) 0.005 % ophthalmic solution Place 1 drop into the right eye nightly    Yes Historical Provider, MD   polyvinyl alcohol (LIQUIFILM TEARS) 1.4 % ophthalmic solution Place 1 drop into both eyes 4 times daily    Yes Historical Provider, MD   acetaminophen (TYLENOL) 650 MG extended release tablet Take 650 mg by mouth 4 times daily   Yes Historical Provider, MD   Sodium Phosphates (FLEET) 7-19 GM/118ML Place 1 enema rectally as needed   Yes Historical Provider, MD   traMADol (ULTRAM) 50 MG tablet Take 50 mg by mouth every 6 hours as needed for Pain.    Yes Historical Provider, MD   white petrolatum GEL Apply topically 2 times daily as needed for Dry Skin Arms and legs   Yes Historical Provider, MD   chlorhexidine (PERIDEX) 0.12 % solution Take 15 mLs by mouth 2 times daily  6/10/21  Yes Historical Provider, MD   atorvastatin (LIPITOR) 20 MG tablet Take 1 tablet by mouth daily 6/15/21  Yes Josef Astudillo MD   clopidogrel (PLAVIX) 75 MG tablet Take 1 tablet by mouth daily 5/30/21  Yes Nida Patel MD   losartan (COZAAR) 50 MG tablet Take 1 tablet by mouth daily 5/29/21  Yes Wili Santiago, APRN - CNP   apixaban (ELIQUIS) 5 MG TABS tablet Take 1 tablet by mouth 2 times daily 5/13/21  Yes Cornelia Gonzales MD   tiZANidine (ZANAFLEX) 2 MG tablet Take 2 mg by mouth 4 times daily   Yes Historical Provider, MD   cloNIDine (CATAPRES) 0.1 MG tablet Take 0.1 mg by mouth 3 times daily   Yes Historical Provider, MD   amLODIPine (NORVASC) 10 MG tablet Take 10 mg by mouth daily    Yes Historical Provider, MD   Valbenazine Tosylate 40 MG CAPS Take 40 mg by mouth nightly   Yes Historical Provider, MD   melatonin 3 MG TABS tablet Take 3 mg by mouth nightly   Yes Historical Provider, MD   polyethylene glycol (GLYCOLAX) packet Take 17 g by mouth daily   Yes Historical Provider, MD   Amantadine (SYMMETREL) 100 MG TABS tablet Take 100 mg by mouth 2 times daily   Yes Historical Provider, MD   Dextromethorphan-guaiFENesin 5-100 MG/5ML LIQD Take 5 mLs by mouth every 12 hours as needed (cough)   Yes Historical Provider, MD   acetaminophen (TYLENOL) 325 MG tablet Take 650 mg by mouth every 4 hours as needed for Pain or Fever   Yes Historical Provider, MD   sodium chloride (OCEAN) 0.65 % nasal spray 1 spray by Nasal route 3 times daily as needed for Congestion   Yes Historical Provider, MD   bisacodyl (DULCOLAX) 10 MG suppository Place 10 mg rectally daily as needed for Constipation   Yes Historical Provider, MD   magnesium hydroxide (MILK OF MAGNESIA) 400 MG/5ML suspension Take 30 mLs by mouth daily as needed for Constipation   Yes Historical Provider, MD   calcium carbonate 600 MG TABS tablet Take 1 tablet by mouth nightly    Yes Historical Provider, MD   omeprazole (PRILOSEC) 20 MG capsule TAKE 1 CAPSULE BY MOUTH DAILY. 5/21/15  Yes Jannis Libman, MD   guaiFENesin (MUCINEX) 600 MG extended release tablet Take 1,200 mg by mouth 2 times daily. PRN   Yes Historical Provider, MD   loratadine (CLARITIN) 10 MG tablet Take 10 mg by mouth daily. PRN    Yes Historical Provider, MD   furosemide (LASIX) 40 MG tablet Take 1 tablet by mouth 2 times daily for 14 days  Patient taking differently: Take 40 mg by mouth daily 6/19/22 8/23/22  Parris Cassidy MD   nitroGLYCERIN (NITROSTAT) 0.4 MG SL tablet up to max of 3 total doses. If no relief after 1 dose, call 911. 5/29/21   Nina Carrington MD     Allergies: Lamictal [lamotrigine], Oxcarbazepine, Pcn [penicillins], Sertraline, Trileptal, and Strattera [atomoxetine hcl]    Review of Systems Physical Exam   Review of Systems   Constitutional:  Positive for fatigue. Negative for chills and fever. HENT: Negative. Eyes: Negative. Negative for pain. Respiratory:  Negative for cough and shortness of breath. Cardiovascular:  Negative for chest pain and leg swelling. Gastrointestinal:  Negative for abdominal pain, diarrhea, nausea and vomiting. Genitourinary:  Negative for dysuria, frequency and hematuria. Musculoskeletal:  Positive for back pain. Negative for neck pain. Skin:  Negative for rash. Neurological:  Negative for dizziness, light-headedness and headaches. Psychiatric/Behavioral:  Negative for agitation and confusion. Physical Exam  Vitals reviewed. Constitutional:       Appearance: She is obese. HENT:      Head: Normocephalic and atraumatic. Right Ear: External ear normal.      Left Ear: External ear normal.      Nose: Nose normal.      Mouth/Throat:      Mouth: Mucous membranes are moist.   Eyes:      General: No scleral icterus. Right eye: No discharge. Left eye: No discharge. Conjunctiva/sclera: Conjunctivae normal.   Cardiovascular:      Rate and Rhythm: Normal rate and regular rhythm. Heart sounds: Normal heart sounds. Pulmonary:      Effort: Pulmonary effort is normal. No respiratory distress. Breath sounds: Normal breath sounds. No wheezing. Abdominal:      General: There is no distension. Palpations: Abdomen is soft. Tenderness: There is no abdominal tenderness. Musculoskeletal:         General: No swelling. Cervical back: Normal range of motion and neck supple. Right lower leg: No edema. Left lower leg: No edema. Skin:     General: Skin is warm and dry. Findings: No rash. Neurological:      General: No focal deficit present. Mental Status: She is alert and oriented to person, place, and time.    Psychiatric:         Mood and Affect: Mood normal.         Behavior: Behavior normal.        Vitals   Vitals:    12/13/22 1315   BP: 120/68   Pulse: 82   Resp: 16   Temp: (!) 95.6 °F (35.3 °C)   Weight: 261 lb 14.4 oz (118.8 kg)   Height: 5' 6\" (1.676 m)        Labs, Radiology and Tests :    Labs -    7/22           potassium 4.2           BUN 23           Creatinine 1.36           eGFR 38                       UPCR            [facility]                          Renal Ultrasound Scan -- will order       Other : old  lab data have been reviewed and has been reviewed and noted patient's creatinineTypically runs 1-1.2    Echocardiogram-3/22 shows ejection fraction greater than 60% and right ventricular systolic pressure of 45  Assessment    Renal -patient appears to be having CKD stage III mostly with a baseline creatinine around 1.2  -She probably might have had some acute kidney injury which appears to be improving in July  -CKD most likely secondary to senile nephrosclerosis longstanding hypertension and cardiac etiology  -At this time we will repeat lab work, BMP urine studies and a baseline renal ultrasound scan  Electrolytes appear to be within normal limits  Essential hypertension running well continue current medications  Hx of diabetes mellitus quantify protein in the next visit  Renal cyst on the CT scan we will evaluate with the renal ultrasound scan  History of diastolic congestive heart failure appears to be well compensated  Medications reviewed discussed with the patient  Follow-up in a month  Plan   No orders of the defined types were placed in this encounter. Trisha Mathew M.D  [facility].

## 2022-12-13 NOTE — PATIENT INSTRUCTIONS
SURVEY:    You may be receiving a survey from [facility] regarding your visit today. Please complete the survey to enable us to provide the highest quality of care to you and your family. If you cannot score us a very good on any question, please call the office to discuss how we could have made your experience a very good one. Thank you.

## 2022-12-20 ENCOUNTER — HOSPITAL ENCOUNTER (OUTPATIENT)
Dept: ULTRASOUND IMAGING | Age: 72
Discharge: HOME | End: 2022-12-22
Payer: MEDICARE

## 2022-12-20 ENCOUNTER — TELEPHONE (OUTPATIENT)
Dept: NEPHROLOGY | Age: 72
End: 2022-12-20

## 2022-12-20 DIAGNOSIS — N18.32 STAGE 3B CHRONIC KIDNEY DISEASE (HCC): ICD-10-CM

## 2022-12-20 PROCEDURE — 76775 US EXAM ABDO BACK WALL LIM: CPT

## 2023-01-05 ENCOUNTER — HOSPITAL ENCOUNTER (OUTPATIENT)
Age: 73
Setting detail: SPECIMEN
Discharge: HOME | End: 2023-01-05
Payer: MEDICARE

## 2023-01-05 LAB
ALBUMIN SERPL-MCNC: 3.8 G/DL (ref 3.5–5.2)
ALBUMIN/GLOBULIN RATIO: 1.3 (ref 1–2.5)
ALP BLD-CCNC: 116 U/L (ref 35–104)
ALT SERPL-CCNC: 12 U/L (ref 5–33)
ANION GAP SERPL CALCULATED.3IONS-SCNC: 11 MMOL/L (ref 9–17)
AST SERPL-CCNC: 13 U/L
BACTERIA: ABNORMAL
BILIRUB SERPL-MCNC: 0.7 MG/DL (ref 0.3–1.2)
BILIRUBIN URINE: NEGATIVE
BUN BLDV-MCNC: 15 MG/DL (ref 8–23)
BUN/CREAT BLD: 11 (ref 9–20)
CALCIUM SERPL-MCNC: 9.8 MG/DL (ref 8.6–10.4)
CHLORIDE BLD-SCNC: 100 MMOL/L (ref 98–107)
CO2: 26 MMOL/L (ref 20–31)
COLOR: YELLOW
CREAT SERPL-MCNC: 1.4 MG/DL (ref 0.5–0.9)
CREATININE URINE: 68.5 MG/DL (ref 28–217)
CREATININE URINE: 68.5 MG/DL (ref 28–217)
EPITHELIAL CELLS UA: ABNORMAL /HPF (ref 0–25)
GFR SERPL CREATININE-BSD FRML MDRD: 40 ML/MIN/1.73M2
GLUCOSE BLD-MCNC: 99 MG/DL (ref 70–99)
GLUCOSE URINE: ABNORMAL
HCT VFR BLD CALC: 45.4 % (ref 36.3–47.1)
HEMOGLOBIN: 14.8 G/DL (ref 11.9–15.1)
KETONES, URINE: NEGATIVE
LEUKOCYTE ESTERASE, URINE: ABNORMAL
MCH RBC QN AUTO: 30.9 PG (ref 25.2–33.5)
MCHC RBC AUTO-ENTMCNC: 32.6 G/DL (ref 28.4–34.8)
MCV RBC AUTO: 94.8 FL (ref 82.6–102.9)
MICROALBUMIN/CREAT 24H UR: <12 MG/L
MICROALBUMIN/CREAT UR-RTO: NORMAL MCG/MG CREAT
NITRITE, URINE: NEGATIVE
NRBC AUTOMATED: 0 PER 100 WBC
PDW BLD-RTO: 13.3 % (ref 11.8–14.4)
PH UA: 7 (ref 5–9)
PLATELET # BLD: 288 K/UL (ref 138–453)
PMV BLD AUTO: 9.7 FL (ref 8.1–13.5)
POTASSIUM SERPL-SCNC: 3.9 MMOL/L (ref 3.7–5.3)
PROTEIN UA: NEGATIVE
RBC # BLD: 4.79 M/UL (ref 3.95–5.11)
RBC UA: ABNORMAL /HPF (ref 0–2)
SODIUM BLD-SCNC: 137 MMOL/L (ref 135–144)
SPECIFIC GRAVITY UA: 1.01 (ref 1.01–1.02)
TOTAL PROTEIN, URINE: 7 MG/DL
TOTAL PROTEIN: 6.8 G/DL (ref 6.4–8.3)
TURBIDITY: CLEAR
URINE HGB: ABNORMAL
URINE TOTAL PROTEIN CREATININE RATIO: 0.1 (ref 0–0.2)
UROBILINOGEN, URINE: NORMAL
WBC # BLD: 6.6 K/UL (ref 3.5–11.3)
WBC UA: ABNORMAL /HPF (ref 0–5)

## 2023-01-05 PROCEDURE — 81001 URINALYSIS AUTO W/SCOPE: CPT

## 2023-01-05 PROCEDURE — P9603 ONE-WAY ALLOW PRORATED MILES: HCPCS

## 2023-01-05 PROCEDURE — 82570 ASSAY OF URINE CREATININE: CPT

## 2023-01-05 PROCEDURE — 84156 ASSAY OF PROTEIN URINE: CPT

## 2023-01-05 PROCEDURE — 82043 UR ALBUMIN QUANTITATIVE: CPT

## 2023-01-05 PROCEDURE — 85027 COMPLETE CBC AUTOMATED: CPT

## 2023-01-05 PROCEDURE — 80053 COMPREHEN METABOLIC PANEL: CPT

## 2023-01-05 PROCEDURE — 36415 COLL VENOUS BLD VENIPUNCTURE: CPT

## 2023-01-10 ENCOUNTER — OFFICE VISIT (OUTPATIENT)
Dept: NEPHROLOGY | Age: 73
End: 2023-01-10
Payer: MEDICARE

## 2023-01-10 VITALS
TEMPERATURE: 97.7 F | WEIGHT: 256.5 LBS | HEART RATE: 72 BPM | DIASTOLIC BLOOD PRESSURE: 73 MMHG | SYSTOLIC BLOOD PRESSURE: 98 MMHG | RESPIRATION RATE: 18 BRPM | BODY MASS INDEX: 41.22 KG/M2 | HEIGHT: 66 IN

## 2023-01-10 DIAGNOSIS — I10 ESSENTIAL HYPERTENSION: ICD-10-CM

## 2023-01-10 DIAGNOSIS — N28.1 RENAL CYST: ICD-10-CM

## 2023-01-10 DIAGNOSIS — N18.32 STAGE 3B CHRONIC KIDNEY DISEASE (HCC): Primary | ICD-10-CM

## 2023-01-10 PROCEDURE — 99214 OFFICE O/P EST MOD 30 MIN: CPT | Performed by: INTERNAL MEDICINE

## 2023-01-10 PROCEDURE — 99213 OFFICE O/P EST LOW 20 MIN: CPT | Performed by: INTERNAL MEDICINE

## 2023-01-10 PROCEDURE — G8484 FLU IMMUNIZE NO ADMIN: HCPCS | Performed by: INTERNAL MEDICINE

## 2023-01-10 PROCEDURE — 3017F COLORECTAL CA SCREEN DOC REV: CPT | Performed by: INTERNAL MEDICINE

## 2023-01-10 PROCEDURE — 3078F DIAST BP <80 MM HG: CPT | Performed by: INTERNAL MEDICINE

## 2023-01-10 PROCEDURE — G8428 CUR MEDS NOT DOCUMENT: HCPCS | Performed by: INTERNAL MEDICINE

## 2023-01-10 PROCEDURE — G8417 CALC BMI ABV UP PARAM F/U: HCPCS | Performed by: INTERNAL MEDICINE

## 2023-01-10 PROCEDURE — 1090F PRES/ABSN URINE INCON ASSESS: CPT | Performed by: INTERNAL MEDICINE

## 2023-01-10 PROCEDURE — 3074F SYST BP LT 130 MM HG: CPT | Performed by: INTERNAL MEDICINE

## 2023-01-10 PROCEDURE — 1036F TOBACCO NON-USER: CPT | Performed by: INTERNAL MEDICINE

## 2023-01-10 PROCEDURE — G8399 PT W/DXA RESULTS DOCUMENT: HCPCS | Performed by: INTERNAL MEDICINE

## 2023-01-10 PROCEDURE — 1124F ACP DISCUSS-NO DSCNMKR DOCD: CPT | Performed by: INTERNAL MEDICINE

## 2023-01-10 NOTE — PROGRESS NOTES
[facility]        Outpatient Follow-Up note         1/10/2023 11:51 AM    Patient Name:   Surinder Segovia:    1950  Primary Care Physician:  MD KAMRAN Smith [facility]  [address] [facility]  [facility] [place] [address]  Dept: [phone]     Chief Complaint / Reason for follow-up : Follow Up of CKD     Interval History :  Patient seen and examined by me. Feels well. No complaints. No hospittalizations and no med changes      Past History :  Past Medical History:   Diagnosis Date    ADHD (attention deficit hyperactivity disorder)     Anemia     severe    Asthma     Bipolar disorder (HCC)     Bradycardia, unspecified     Chronic anemia     Chronic atrial fibrillation (HCC)     Chronic back pain     Chronic kidney disease     Patient states unaware of this. Has never been mentioned to her.      Colitis     Constipation     COPD (chronic obstructive pulmonary disease) (HCC)     Cystitis without hematuria     Diastolic CHF (HCC)     Esophagitis     grade A    GERD (gastroesophageal reflux disease)     Hemorrhoids     History of echocardiogram 06/02/2014    EF >60%, LV wall thickness mildly increased,    Hypertension     Hypothyroidism     Metabolic syndrome     Moderate protein-calorie malnutrition (HCC)     Muscle weakness (generalized)     Obesity     Pancreatitis     TIA (transient ischemic attack)      Past Surgical History:   Procedure Laterality Date    CARDIAC CATHETERIZATION Left 05/27/2021    right radial/ [facility]/ Dr. Isaías Cody Right 12/27/2021    Dr. Rosa Villagran Left 02/07/2022    EYE CATARACT EMULSIFICATION IOL IMPLANT (Left Eye)    COLONOSCOPY  5/2011    ischemic colitis    COLONOSCOPY  03/07/2017    -diverticulosis,hemorrhoids    HYMENECTOMY      1972    HYSTERECTOMY (CERVIX STATUS UNKNOWN)      2008    INTRACAPSULAR CATARACT EXTRACTION Right 12/27/2021    EYE CATARACT EMULSIFICATION IOL IMPLANT performed by Michelle Lamas DO at [facility] Left 2/7/2022    EYE CATARACT EMULSIFICATION IOL IMPLANT performed by Michelle Lamas DO at [facility] Right     2008    TONSILLECTOMY AND ADENOIDECTOMY      UPPER GASTROINTESTINAL ENDOSCOPY  3/2013    5 cm axial hiatal hernia    UPPER GASTROINTESTINAL ENDOSCOPY  03/07/2017    Dr. Jose Boone        Medications :     Outpatient Medications Marked as Taking for the 1/10/23 encounter (Office Visit) with Boris Gloria MD   Medication Sig Dispense Refill    divalproex (DEPAKOTE) 125 MG DR tablet Take 125 mg by mouth daily      nystatin (MYCOSTATIN) 494755 UNIT/GM powder Apply topically as needed Breast,folds      divalproex (DEPAKOTE) 250 MG DR tablet Take 125 mg by mouth Daily Indications: Manic-Depression Give 3 tablets PO one time a day. dapagliflozin (FARXIGA) 10 MG tablet Take 1 tablet by mouth every morning 90 tablet 3    furosemide (LASIX) 40 MG tablet Take 1 tablet by mouth 2 times daily for 14 days (Patient taking differently: Take 40 mg by mouth daily) 28 tablet 0    loperamide (IMODIUM) 2 MG capsule Take 4 mg by mouth 4 times daily as needed for Diarrhea Every 4 hours      metoprolol succinate (TOPROL XL) 25 MG extended release tablet TAKE 1 TABLET BY MOUTH DAILY.  90 tablet 3    simethicone (MYLICON) 80 MG chewable tablet Take 80 mg by mouth every 6 hours as needed for Flatulence      levothyroxine (SYNTHROID) 50 MCG tablet Take 50 mcg by mouth Daily      latanoprost (XALATAN) 0.005 % ophthalmic solution Place 1 drop into the right eye nightly       polyvinyl alcohol (LIQUIFILM TEARS) 1.4 % ophthalmic solution Place 1 drop into both eyes 4 times daily       acetaminophen (TYLENOL) 650 MG extended release tablet Take 650 mg by mouth 4 times daily      Sodium Phosphates (FLEET) 7-19 GM/118ML Place 1 enema rectally as needed      traMADol (ULTRAM) 50 MG tablet Take 50 mg by mouth every 6 hours as needed for Pain.      white petrolatum GEL Apply topically 2 times daily as needed for Dry Skin Arms and legs      chlorhexidine (PERIDEX) 0.12 % solution Take 15 mLs by mouth 2 times daily       atorvastatin (LIPITOR) 20 MG tablet Take 1 tablet by mouth daily 90 tablet 3    nitroGLYCERIN (NITROSTAT) 0.4 MG SL tablet up to max of 3 total doses. If no relief after 1 dose, call 911. 25 tablet 1    clopidogrel (PLAVIX) 75 MG tablet Take 1 tablet by mouth daily 30 tablet 3    losartan (COZAAR) 50 MG tablet Take 1 tablet by mouth daily 30 tablet 3    apixaban (ELIQUIS) 5 MG TABS tablet Take 1 tablet by mouth 2 times daily 60 tablet 0    tiZANidine (ZANAFLEX) 2 MG tablet Take 2 mg by mouth 4 times daily      cloNIDine (CATAPRES) 0.1 MG tablet Take 0.1 mg by mouth 3 times daily      amLODIPine (NORVASC) 10 MG tablet Take 10 mg by mouth daily       Valbenazine Tosylate 40 MG CAPS Take 40 mg by mouth nightly      polyethylene glycol (GLYCOLAX) packet Take 17 g by mouth daily      Amantadine (SYMMETREL) 100 MG TABS tablet Take 100 mg by mouth 2 times daily      Dextromethorphan-guaiFENesin 5-100 MG/5ML LIQD Take 5 mLs by mouth every 12 hours as needed (cough)      acetaminophen (TYLENOL) 325 MG tablet Take 650 mg by mouth every 4 hours as needed for Pain or Fever      sodium chloride (OCEAN) 0.65 % nasal spray 1 spray by Nasal route 3 times daily as needed for Congestion      bisacodyl (DULCOLAX) 10 MG suppository Place 10 mg rectally daily as needed for Constipation      magnesium hydroxide (MILK OF MAGNESIA) 400 MG/5ML suspension Take 30 mLs by mouth daily as needed for Constipation      calcium carbonate 600 MG TABS tablet Take 1 tablet by mouth nightly       omeprazole (PRILOSEC) 20 MG capsule TAKE 1 CAPSULE BY MOUTH DAILY.  (Patient taking differently: Take 20 mg by mouth Daily) 30 capsule 5    guaiFENesin (MUCINEX) 600 MG extended release tablet Take 1,200 mg by mouth 2 times daily. PRN      loratadine (CLARITIN) 10 MG tablet Take 10 mg by mouth daily. PRN          Vitals     BP 98/73 (Site: Right Lower Arm, Position: Standing, Cuff Size: Medium Adult)   Pulse 72   Temp 97.7 °F (36.5 °C) (Temporal)   Resp 18   Ht 5' 6\" (1.676 m)   Wt 256 lb 8 oz (116.3 kg)   BMI 41.40 kg/m²  Wt Readings from Last 3 Encounters:   01/10/23 256 lb 8 oz (116.3 kg)   12/13/22 261 lb 14.4 oz (118.8 kg)   08/23/22 264 lb (119.7 kg)        Physical Exam     General -- no distress  Lungs -- clear  Heart -- S1, S2 heard, JVD - no  Abdomen - soft, non-tender  Extremities -- no edema  CNS - awake and alert    Labs, Radiology and Tests    Labs -    7/22 1/23          potassium 4.2 3.9          BUN 23 15          Creatinine 1.36 1.40          eGFR 38 40                      UPCR  100          UMCR  < 10                        Renal Ultrasound Scan --12/22  Right kidney 8.8 cm left kidney 8.4 cm  2 cm cyst on the left kidney  Increased echogenicity consistent with medical renal disease     Other : old  lab data have been reviewed and has been reviewed and noted patient's creatinineTypically runs 1-1.2    Echocardiogram-3/22 shows ejection fraction greater than 60% and right ventricular systolic pressure of 45  Assessment    Renal -patient appears to be having CKD stage III mostly with a baseline creatinine around 1.2  -Now appears to be running around 1.4. I think this may be her new baseline  -CKD most likely secondary to senile nephrosclerosis longstanding hypertension and cardiac etiology  -Renal ultrasound scan shows evidence of CKD and urine appears to be clear  Electrolytes appear to be within normal limits  Essential hypertension running slightly lower in the office however the nursing home typically runs more close to 120s she is on multiple medications.   Hx of diabetes mellitus quantify protein in the next visit  Renal cyst on the CT scan we will evaluate with the renal ultrasound scan  History of diastolic congestive heart failure appears to be well compensated  Medications reviewed discussed with the patient  Follow-up in 6 months    Tests and orders placed this Encounter     Orders Placed This Encounter   Procedures    Brigida Torres M.D  [facility].

## 2023-01-10 NOTE — PATIENT INSTRUCTIONS
SURVEY:    You may be receiving a survey from [employer] regarding your visit today. Please complete the survey to enable us to provide the highest quality of care to you and your family. If you cannot score us a very good on any question, please call the office to discuss how we could have made your experience a very good one. Thank you.

## 2023-01-12 ENCOUNTER — OUTSIDE SERVICES (OUTPATIENT)
Dept: PRIMARY CARE CLINIC | Age: 73
End: 2023-01-12

## 2023-01-12 DIAGNOSIS — I48.91 ATRIAL FIBRILLATION, UNSPECIFIED TYPE (HCC): ICD-10-CM

## 2023-01-12 DIAGNOSIS — I50.22 CHRONIC SYSTOLIC (CONGESTIVE) HEART FAILURE (HCC): ICD-10-CM

## 2023-01-12 DIAGNOSIS — F31.9 BIPOLAR 1 DISORDER (HCC): ICD-10-CM

## 2023-01-12 DIAGNOSIS — I73.9 PVD (PERIPHERAL VASCULAR DISEASE) (HCC): ICD-10-CM

## 2023-01-12 DIAGNOSIS — I10 ESSENTIAL HYPERTENSION: Primary | ICD-10-CM

## 2023-01-12 DIAGNOSIS — I25.118 ATHEROSCLEROTIC HEART DISEASE OF NATIVE CORONARY ARTERY WITH OTHER FORMS OF ANGINA PECTORIS (HCC): ICD-10-CM

## 2023-01-12 NOTE — PROGRESS NOTES
Patient:  Brenda Yung, 1950  I saw this patient on 1/11/2023, at [facility]   Shortness of breath better,blood sugars well controlled,  Edema improving  bp well controlled  Tremors same      Reason for Visit:      ICD-10-CM    1. Essential hypertension  I10       2. Chronic systolic (congestive) heart failure ([facility])  I50.22       3. Bipolar 1 disorder ([facility])  F31.9       4. PVD (peripheral vascular disease) ([facility])  I73.9       5. Atrial fibrillation, unspecified type ([facility]  I48.91       6. Atherosclerotic heart disease of native coronary artery with other forms of angina pectoris ([facility]  I25.118                     Changes since last visit:   Tremors same   Mood changes better,psych managing meds  Shortness of breath and edema better  BP better  1. Fall:  none  2. Behavioral Change: mood better  3. Pain Control: adequate  4. Mobility: improving  5. Pressure Sore:  none  Allergies:  Lamictal [lamotrigine], Oxcarbazepine, Pcn [penicillins], Sertraline, Trileptal, and Strattera [atomoxetine hcl]    Current Outpatient Medications   Medication Sig Dispense Refill    divalproex (DEPAKOTE) 125 MG DR tablet Take 125 mg by mouth daily      nystatin (MYCOSTATIN) 429560 UNIT/GM powder Apply topically as needed Breast,folds      QUEtiapine (SEROQUEL) 25 MG tablet Take 50 mg by mouth at bedtime (Patient not taking: Reported on 1/10/2023)      divalproex (DEPAKOTE) 250 MG DR tablet Take 125 mg by mouth Daily Indications: Manic-Depression Give 3 tablets PO one time a day. dapagliflozin (FARXIGA) 10 MG tablet Take 1 tablet by mouth every morning 90 tablet 3    furosemide (LASIX) 40 MG tablet Take 1 tablet by mouth 2 times daily for 14 days (Patient taking differently: Take 40 mg by mouth daily) 28 tablet 0    loperamide (IMODIUM) 2 MG capsule Take 4 mg by mouth 4 times daily as needed for Diarrhea Every 4 hours      metoprolol succinate (TOPROL XL) 25 MG extended release tablet TAKE 1 TABLET BY MOUTH DAILY.  90 tablet 3 simethicone (MYLICON) 80 MG chewable tablet Take 80 mg by mouth every 6 hours as needed for Flatulence      levothyroxine (SYNTHROID) 50 MCG tablet Take 50 mcg by mouth Daily      latanoprost (XALATAN) 0.005 % ophthalmic solution Place 1 drop into the right eye nightly       polyvinyl alcohol (LIQUIFILM TEARS) 1.4 % ophthalmic solution Place 1 drop into both eyes 4 times daily       acetaminophen (TYLENOL) 650 MG extended release tablet Take 650 mg by mouth 4 times daily      Sodium Phosphates (FLEET) 7-19 GM/118ML Place 1 enema rectally as needed      traMADol (ULTRAM) 50 MG tablet Take 50 mg by mouth every 6 hours as needed for Pain.      white petrolatum GEL Apply topically 2 times daily as needed for Dry Skin Arms and legs      chlorhexidine (PERIDEX) 0.12 % solution Take 15 mLs by mouth 2 times daily       atorvastatin (LIPITOR) 20 MG tablet Take 1 tablet by mouth daily 90 tablet 3    nitroGLYCERIN (NITROSTAT) 0.4 MG SL tablet up to max of 3 total doses.  If no relief after 1 dose, call 911. 25 tablet 1    clopidogrel (PLAVIX) 75 MG tablet Take 1 tablet by mouth daily 30 tablet 3    losartan (COZAAR) 50 MG tablet Take 1 tablet by mouth daily 30 tablet 3    apixaban (ELIQUIS) 5 MG TABS tablet Take 1 tablet by mouth 2 times daily 60 tablet 0    tiZANidine (ZANAFLEX) 2 MG tablet Take 2 mg by mouth 4 times daily      cloNIDine (CATAPRES) 0.1 MG tablet Take 0.1 mg by mouth 3 times daily      amLODIPine (NORVASC) 10 MG tablet Take 10 mg by mouth daily       Valbenazine Tosylate 40 MG CAPS Take 40 mg by mouth nightly      melatonin 3 MG TABS tablet Take 3 mg by mouth nightly (Patient not taking: Reported on 1/10/2023)      polyethylene glycol (GLYCOLAX) packet Take 17 g by mouth daily      Amantadine (SYMMETREL) 100 MG TABS tablet Take 100 mg by mouth 2 times daily      Dextromethorphan-guaiFENesin 5-100 MG/5ML LIQD Take 5 mLs by mouth every 12 hours as needed (cough)      acetaminophen (TYLENOL) 325 MG tablet Take 650 mg by mouth every 4 hours as needed for Pain or Fever      sodium chloride (OCEAN) 0.65 % nasal spray 1 spray by Nasal route 3 times daily as needed for Congestion      bisacodyl (DULCOLAX) 10 MG suppository Place 10 mg rectally daily as needed for Constipation      magnesium hydroxide (MILK OF MAGNESIA) 400 MG/5ML suspension Take 30 mLs by mouth daily as needed for Constipation      calcium carbonate 600 MG TABS tablet Take 1 tablet by mouth nightly       omeprazole (PRILOSEC) 20 MG capsule TAKE 1 CAPSULE BY MOUTH DAILY. (Patient taking differently: Take 20 mg by mouth Daily) 30 capsule 5    guaiFENesin (MUCINEX) 600 MG extended release tablet Take 1,200 mg by mouth 2 times daily. PRN      loratadine (CLARITIN) 10 MG tablet Take 10 mg by mouth daily. PRN        No current facility-administered medications for this visit. Past Medical History:    Past Medical History:   Diagnosis Date    ADHD (attention deficit hyperactivity disorder)     Anemia     severe    Asthma     Bipolar disorder (HCC)     Bradycardia, unspecified     Chronic anemia     Chronic atrial fibrillation ([facility]     Chronic back pain     Chronic kidney disease     Patient states unaware of this. Has never been mentioned to her.      Colitis     Constipation     COPD (chronic obstructive pulmonary disease) (HCC)     Cystitis without hematuria     Diastolic CHF (HCC)     Esophagitis     grade A    GERD (gastroesophageal reflux disease)     Hemorrhoids     History of echocardiogram 06/02/2014    EF >60%, LV wall thickness mildly increased,    Hypertension     Hypothyroidism     Metabolic syndrome     Moderate protein-calorie malnutrition (HCC)     Muscle weakness (generalized)     Obesity     Pancreatitis     TIA (transient ischemic attack)        Past Surgical History:    Past Surgical History:   Procedure Laterality Date    CARDIAC CATHETERIZATION Left 05/27/2021    right radial/ [facility]/ Dr. Michael Pandey WITH IMPLANT Right 2021    Dr. Romana Gaul Left 2022    EYE CATARACT EMULSIFICATION IOL IMPLANT (Left Eye)    COLONOSCOPY  2011    ischemic colitis    COLONOSCOPY  2017    -diverticulosis,hemorrhoids    [address] (CERVIX STATUS UNKNOWN)      2008    INTRACAPSULAR CATARACT EXTRACTION Right 2021    EYE CATARACT EMULSIFICATION IOL IMPLANT performed by Liliana Castellon DO at [facility] Left 2022    EYE CATARACT EMULSIFICATION IOL IMPLANT performed by Liliana Castellon DO at [facility] Right         TONSILLECTOMY AND ADENOIDECTOMY      UPPER GASTROINTESTINAL ENDOSCOPY  3/2013    5 cm axial hiatal hernia    UPPER GASTROINTESTINAL ENDOSCOPY  2017    Dr. Giovanna Berumen       Social History:   Social History     Tobacco Use    Smoking status: Former     Packs/day: 0.25     Years: 10.00     Pack years: 2.50     Types: Cigarettes     Quit date: 3/12/1998     Years since quittin.8    Smokeless tobacco: Never   Substance Use Topics    Alcohol use: No       Family History:   Family History   Problem Relation Age of Onset    Diabetes Mother     High Blood Pressure Mother     Diabetes Father     High Blood Pressure Father     Bipolar Disorder Sister     Schizophrenia Sister     High Blood Pressure Other         2 sons    Bipolar Disorder Other         2 sons           Review of Systems:  Constitutional: negative for fevers or chills  Eyes: negative for visual disturbance   ENT: positive for oral infection,negative for sore throat or nasal congestion,no dysphagia. No epistaxis.   Respiratory:  No shortness of breath   Cardiovascular: neg for chest pain , palpitations,pnd,  edema better  Gastrointestinal: neg for pain, neg for nausea, vomiting, diarrhea ,  constipation,no reyna,no blood in stool  Genitourinary: negative for dysuria, urgency,neg for hematuria , frequency  Integument/breast: has open area rt great toe,negative for skin rash or lesions  Neurological: negative for unilateral weakness, numbness or tingling,tremors same  Muscular Skeletal: denies joint pain   Psych- mood changes improving    Objective:    Vitals: BP:120/67 T:97.9 P:68 R:18    -----------------------------------------------------------------  Exam:  GEN:   A & O x3, no apparent distress,obese  EYES:  No gross abnormalities. and PERRLA  ENT:Throat- throat-normal,nose- no drainage  NECK: normal, supple, no lymphadenopathy,  no carotid bruits  PULM: Diminished air entry on auscultation bilaterally- no wheezes, rales or rhonchi, normal air movement, no respiratory distress  COR:   S1,s2,RRR, no murmurs and no gallops,has minimal bilat leg edema  ABD:    soft, non-tender, non-distended, normal bowel sounds, no masses or organomegaly  : no cva or flank tenderness  EXT:has minimal edema, no calf tenderness, and warm to touch. NEURO: Motor and sensory grossly intact,has tremors at rest  PSYCH: mood better  SKIN:   No rash or lesions    -----------------------------------------------------------------  Diagnostic Data:   All diagnostic data was reviewed    Assessment:          ICD-10-CM    1. Essential hypertension -well controlled I10       2. Chronic systolic (congestive) heart failure (HCC) - stable I50.22       3. B[facility] 1 disorder ([facility] -psych to manage,mood stable F31.9       4. PVD (peripheral vascular disease) ([facility]) -no claudication I73.9       5. Atrial fibrillation, unspecified type ([facility]) - sinus rhythm, I48.91       6. Atherosclerotic heart disease of native coronary artery with other forms of angina pectoris ([facility] - no chest pain,pnd I25.118                     Patient Active Problem List   Diagnosis Code    TIA (transient ischemic attack) G45.9    Bipolar 1 disorder ([facility] F31.9    DDD (degenerative disc disease), lumbar M51.36    Bradycardia R00.1    Essential hypertension I10    Obesity (BMI 30-39. 9) E66.9    GERD (gastroesophageal reflux disease) K21.9    Iron deficiency anemia D50.9    Iron deficiency anemia due to chronic blood loss D50.0    History of colon polyps Z86.010    Hypothyroidism E03.9    Other chronic pain G89.29    Other hemorrhoids K64.8    Diverticulosis of large intestine without diverticulitis K57.30    Attention deficit hyperactivity disorder F90.9    Morbid obesity ([facility]) E66.01    Cellulitis of leg without foot, right L03.115    Atherosclerotic heart disease of native coronary artery with other forms of angina pectoris ([facility]) I25.118    CAD, multiple vessel I25.10    Chest pain R07.9    PVD (peripheral vascular disease) ([facility]) I73.9    Atrial fibrillation, unspecified type ([facility]) I48.91    Stage 3a chronic kidney disease ([facility]) N18.31    Shortness of breath R06.02    COVID-19 U07.1    Type 2 diabetes mellitus E11.9    Chronic systolic (congestive) heart failure I50.22         Plan:      Monitor for bleeding  Psych to manage psychotropics  Encourage activity as tolerated and follow diet  Continue current medications  Monitor bp   Prevent pressure sore  Prevent falls      Electronically signed by Marcio Alegria MD on 1/13/2023 at 10:25 AM

## 2023-01-13 ENCOUNTER — HOSPITAL ENCOUNTER (OUTPATIENT)
Age: 73
Setting detail: SPECIMEN
Discharge: HOME | End: 2023-01-13
Payer: MEDICARE

## 2023-01-13 LAB
BACTERIA: ABNORMAL
BILIRUBIN URINE: NEGATIVE
COLOR: YELLOW
EPITHELIAL CELLS UA: ABNORMAL /HPF (ref 0–25)
GLUCOSE URINE: ABNORMAL
KETONES, URINE: NEGATIVE
LEUKOCYTE ESTERASE, URINE: ABNORMAL
NITRITE, URINE: NEGATIVE
PH UA: 6.5 (ref 5–9)
PROTEIN UA: NEGATIVE
RBC UA: ABNORMAL /HPF (ref 0–2)
SPECIFIC GRAVITY UA: 1.01 (ref 1.01–1.02)
TURBIDITY: CLEAR
URINE HGB: ABNORMAL
UROBILINOGEN, URINE: NORMAL
WBC UA: ABNORMAL /HPF (ref 0–5)
YEAST: ABNORMAL

## 2023-01-13 PROCEDURE — 81001 URINALYSIS AUTO W/SCOPE: CPT

## 2023-01-16 ENCOUNTER — TELEPHONE (OUTPATIENT)
Dept: NEPHROLOGY | Age: 73
End: 2023-01-16

## 2023-01-16 NOTE — TELEPHONE ENCOUNTER
----- Message from Kamila Hewitt MD sent at 1/16/2023 11:52 AM EST -----  Regarding: RE: DIET  No dietary restrictions from [facility]  ----- Message -----  From: Nevin Castellanos LPN  Sent: 1/69/0942   6:53 AM EST  To: Kamila Hewitt MD  Subject: FW: DIET                                           ----- Message -----  From: Florin Garcia  Sent: 1/13/2023   5:59 PM EST  To: [facility] Clinical Staff  Subject: DIET                                             Should I be watching what I do or don't eat

## 2023-01-26 ENCOUNTER — OFFICE VISIT (OUTPATIENT)
Dept: NEUROLOGY | Age: 73
End: 2023-01-26
Payer: MEDICARE

## 2023-01-26 VITALS
HEART RATE: 77 BPM | WEIGHT: 254.7 LBS | HEIGHT: 66 IN | BODY MASS INDEX: 40.93 KG/M2 | SYSTOLIC BLOOD PRESSURE: 125 MMHG | RESPIRATION RATE: 18 BRPM | TEMPERATURE: 96.9 F | DIASTOLIC BLOOD PRESSURE: 88 MMHG

## 2023-01-26 DIAGNOSIS — R25.1 TREMORS OF NERVOUS SYSTEM: Primary | ICD-10-CM

## 2023-01-26 DIAGNOSIS — E66.01 OBESITY, CLASS III, BMI 40-49.9 (MORBID OBESITY) (HCC): ICD-10-CM

## 2023-01-26 PROCEDURE — G8484 FLU IMMUNIZE NO ADMIN: HCPCS | Performed by: NEUROMUSCULOSKELETAL MEDICINE, SPORTS MEDICINE

## 2023-01-26 PROCEDURE — G8427 DOCREV CUR MEDS BY ELIG CLIN: HCPCS | Performed by: NEUROMUSCULOSKELETAL MEDICINE, SPORTS MEDICINE

## 2023-01-26 PROCEDURE — 99203 OFFICE O/P NEW LOW 30 MIN: CPT | Performed by: NEUROMUSCULOSKELETAL MEDICINE, SPORTS MEDICINE

## 2023-01-26 PROCEDURE — 1090F PRES/ABSN URINE INCON ASSESS: CPT | Performed by: NEUROMUSCULOSKELETAL MEDICINE, SPORTS MEDICINE

## 2023-01-26 PROCEDURE — 3079F DIAST BP 80-89 MM HG: CPT | Performed by: NEUROMUSCULOSKELETAL MEDICINE, SPORTS MEDICINE

## 2023-01-26 PROCEDURE — 3074F SYST BP LT 130 MM HG: CPT | Performed by: NEUROMUSCULOSKELETAL MEDICINE, SPORTS MEDICINE

## 2023-01-26 PROCEDURE — 3017F COLORECTAL CA SCREEN DOC REV: CPT | Performed by: NEUROMUSCULOSKELETAL MEDICINE, SPORTS MEDICINE

## 2023-01-26 PROCEDURE — 1036F TOBACCO NON-USER: CPT | Performed by: NEUROMUSCULOSKELETAL MEDICINE, SPORTS MEDICINE

## 2023-01-26 PROCEDURE — G8417 CALC BMI ABV UP PARAM F/U: HCPCS | Performed by: NEUROMUSCULOSKELETAL MEDICINE, SPORTS MEDICINE

## 2023-01-26 PROCEDURE — G8399 PT W/DXA RESULTS DOCUMENT: HCPCS | Performed by: NEUROMUSCULOSKELETAL MEDICINE, SPORTS MEDICINE

## 2023-01-26 PROCEDURE — 1124F ACP DISCUSS-NO DSCNMKR DOCD: CPT | Performed by: NEUROMUSCULOSKELETAL MEDICINE, SPORTS MEDICINE

## 2023-01-26 NOTE — PROGRESS NOTES
NEUROLOGY CONSULT    Patient Name:  Tammie Malcolm  :   1950  Clinic Visit Date: 2023    I saw Ms. Tammie Malcolm  in the neurology clinic today for tremors. 76-year-old right-handed lady with a known history of bipolar disorder,  cardiomyopathy, diabetes, hypertension, hyperlipidemia, hypothyroidism, neurovascularly with complaints of persistent tremors to upper extremities and lower extremities \"since about 23 years \". Tremors are persistent throughout the day, both with intention and at rest.  Tremors have been lately bothersome, and is worse in the left upper extremity. No headache, neurological symptoms slurred speech difficulty swallowing, muscle pain, rigidity in extremities shuffling gait or falls. She usually uses a walker or pushes a wheelchair to ambulate at the nursing home where she resides at the present time. She is on multiple medications for different reasons as noted below. She is on valbenazine for symptoms of suspected tardive dyskinesia. REVIEW OF SYSTEMS    Constitutional Weight changes: absent, change in appetite: absent Fatigue: absent; Fevers : absent, Any recent hospitalizations:  absent   HEENT Ears: normal,  Visual disturbance: absent   Respiratory Shortness of breath: absent, choking:  absent, Cough: absent, Snoring : absent   Cardiovascular Chest pain: absent, Leg swelling :absent, palpitations : absent, fainting : absent   GI Constipation: absent, Diarrhea: absent, Swallowing change: absent    Urinary frequency: absent, Urinary urgency: absent, Urinary incontinence: absent   Musculoskeletal Neck pain: absent, Back pain: present, Stiffness: present, Muscle pain: present, Joint pain: present, restless leg : present   Dermatological Hair loss: absent, Skin changes: absent   Neurological Confusion: absent, Trouble concentrating: present, Seizures: absent;  Memory loss: absent, balance problem: present, Dizziness: absent, vertigo: absent, Weakness: present, Numbness absent, Tremor: present, Spasm: absent, involuntary movement: present, Speech difficulty: absent, Headache: absent, Light sensitivity: absent   Psychiatric Anxiety: absent, Depression  present, drug abuse: absent, Hallucination: absent, mood disorder: absent, Suicidal ideations absent   Hematologic Abnormal bleeding: absent, Anemia: absent, Lymph gland changes: absent Clotting disorder: absent     Past Medical History:   Diagnosis Date    ADHD (attention deficit hyperactivity disorder)     Anemia     severe    Asthma     Bipolar disorder (HCC)     Bradycardia, unspecified     Chronic anemia     Chronic atrial fibrillation (HCC)     Chronic back pain     Chronic kidney disease     Patient states unaware of this. Has never been mentioned to her.      Colitis     Constipation     COPD (chronic obstructive pulmonary disease) (HCC)     Cystitis without hematuria     Diastolic CHF (HCC)     Esophagitis     grade A    GERD (gastroesophageal reflux disease)     Hemorrhoids     History of echocardiogram 06/02/2014    EF >60%, LV wall thickness mildly increased,    Hypertension     Hypothyroidism     Metabolic syndrome     Moderate protein-calorie malnutrition (HCC)     Muscle weakness (generalized)     Obesity     Pancreatitis     TIA (transient ischemic attack)        Past Surgical History:   Procedure Laterality Date    CARDIAC CATHETERIZATION Left 05/27/2021    right radial/ [facility]/ Dr. Raymundo Pritchett Right 12/27/2021    Dr. Kamran Clark Left 02/07/2022    EYE CATARACT EMULSIFICATION IOL IMPLANT (Left Eye)    COLONOSCOPY  5/2011    ischemic colitis    COLONOSCOPY  03/07/2017    -diverticulosis,hemorrhoids    HYMENECTOMY      1972    HYSTERECTOMY (CERVIX STATUS UNKNOWN)      2008    INTRACAPSULAR CATARACT EXTRACTION Right 12/27/2021    EYE CATARACT EMULSIFICATION IOL IMPLANT performed by Margaret Fernandez DO at [address] [place] [address] CATARACT EXTRACTION Left 2022    EYE CATARACT EMULSIFICATION IOL IMPLANT performed by John Paul Canela DO at [facility] Right     2008    TONSILLECTOMY AND ADENOIDECTOMY      UPPER GASTROINTESTINAL ENDOSCOPY  3/2013    5 cm axial hiatal hernia    UPPER GASTROINTESTINAL ENDOSCOPY  2017    Dr. Sabina Tomlin       Social History     Socioeconomic History    Marital status:      Spouse name: Not on file    Number of children: Not on file    Years of education: Not on file    Highest education level: Not on file   Occupational History    Not on file   Tobacco Use    Smoking status: Former     Packs/day: 0.25     Years: 10.00     Pack years: 2.50     Types: Cigarettes     Quit date: 3/12/1998     Years since quittin.8    Smokeless tobacco: Never   Substance and Sexual Activity    Alcohol use: No    Drug use: No    Sexual activity: Not on file   Other Topics Concern    Not on file   Social History Narrative    Not on file     Social Determinants of Health     Financial Resource Strain: Not on file   Food Insecurity: Not on file   Transportation Needs: Not on file   Physical Activity: Not on file   Stress: Not on file   Social Connections: Not on file   Intimate Partner Violence: Not on file   Housing Stability: Not on file       Family History   Problem Relation Age of Onset    Diabetes Mother     High Blood Pressure Mother     Diabetes Father     High Blood Pressure Father     Bipolar Disorder Sister     Schizophrenia Sister     High Blood Pressure Other         2 sons    Bipolar Disorder Other         2 sons       Current Outpatient Medications   Medication Sig Dispense Refill    divalproex (DEPAKOTE) 125 MG DR tablet Take 125 mg by mouth daily      nystatin (MYCOSTATIN) 665358 UNIT/GM powder Apply topically as needed Breast,folds      divalproex (DEPAKOTE) 250 MG DR tablet Take 125 mg by mouth Daily Indications: Manic-Depression Give 3 tablets PO one time a day.       dapagliflozin (Eriberto Herbert) 10 MG tablet Take 1 tablet by mouth every morning 90 tablet 3    furosemide (LASIX) 40 MG tablet Take 1 tablet by mouth 2 times daily for 14 days (Patient taking differently: Take 40 mg by mouth daily) 28 tablet 0    loperamide (IMODIUM) 2 MG capsule Take 4 mg by mouth 4 times daily as needed for Diarrhea Every 4 hours      metoprolol succinate (TOPROL XL) 25 MG extended release tablet TAKE 1 TABLET BY MOUTH DAILY. 90 tablet 3    simethicone (MYLICON) 80 MG chewable tablet Take 80 mg by mouth every 6 hours as needed for Flatulence      levothyroxine (SYNTHROID) 50 MCG tablet Take 50 mcg by mouth Daily      polyvinyl alcohol (LIQUIFILM TEARS) 1.4 % ophthalmic solution Place 1 drop into both eyes 4 times daily       Sodium Phosphates (FLEET) 7-19 GM/118ML Place 1 enema rectally as needed      traMADol (ULTRAM) 50 MG tablet Take 50 mg by mouth every 6 hours as needed for Pain.      white petrolatum GEL Apply topically 2 times daily as needed for Dry Skin Arms and legs      atorvastatin (LIPITOR) 20 MG tablet Take 1 tablet by mouth daily 90 tablet 3    nitroGLYCERIN (NITROSTAT) 0.4 MG SL tablet up to max of 3 total doses.  If no relief after 1 dose, call 911. 25 tablet 1    clopidogrel (PLAVIX) 75 MG tablet Take 1 tablet by mouth daily 30 tablet 3    losartan (COZAAR) 50 MG tablet Take 1 tablet by mouth daily 30 tablet 3    apixaban (ELIQUIS) 5 MG TABS tablet Take 1 tablet by mouth 2 times daily 60 tablet 0    tiZANidine (ZANAFLEX) 2 MG tablet Take 2 mg by mouth 4 times daily      cloNIDine (CATAPRES) 0.1 MG tablet Take 0.1 mg by mouth 3 times daily      amLODIPine (NORVASC) 10 MG tablet Take 10 mg by mouth daily       Valbenazine Tosylate 40 MG CAPS Take 40 mg by mouth nightly      polyethylene glycol (GLYCOLAX) packet Take 17 g by mouth daily      Amantadine (SYMMETREL) 100 MG TABS tablet Take 100 mg by mouth 2 times daily      sodium chloride (OCEAN) 0.65 % nasal spray 1 spray by Nasal route 3 times daily as needed for Congestion      bisacodyl (DULCOLAX) 10 MG suppository Place 10 mg rectally daily as needed for Constipation      magnesium hydroxide (MILK OF MAGNESIA) 400 MG/5ML suspension Take 30 mLs by mouth daily as needed for Constipation      calcium carbonate 600 MG TABS tablet Take 1 tablet by mouth nightly       omeprazole (PRILOSEC) 20 MG capsule TAKE 1 CAPSULE BY MOUTH DAILY. (Patient taking differently: Take 20 mg by mouth Daily) 30 capsule 5    loratadine (CLARITIN) 10 MG tablet Take 10 mg by mouth daily. PRN       QUEtiapine (SEROQUEL) 25 MG tablet Take 50 mg by mouth at bedtime (Patient not taking: No sig reported)      latanoprost (XALATAN) 0.005 % ophthalmic solution Place 1 drop into the right eye nightly       acetaminophen (TYLENOL) 650 MG extended release tablet Take 650 mg by mouth 4 times daily      chlorhexidine (PERIDEX) 0.12 % solution Take 15 mLs by mouth 2 times daily       melatonin 3 MG TABS tablet Take 3 mg by mouth nightly (Patient not taking: No sig reported)      Dextromethorphan-guaiFENesin 5-100 MG/5ML LIQD Take 5 mLs by mouth every 12 hours as needed (cough)      acetaminophen (TYLENOL) 325 MG tablet Take 650 mg by mouth every 4 hours as needed for Pain or Fever      guaiFENesin (MUCINEX) 600 MG extended release tablet Take 1,200 mg by mouth 2 times daily. PRN       No current facility-administered medications for this visit.      DATA:  Lab Results   Component Value Date    WBC 6.6 01/05/2023    HGB 14.8 01/05/2023     01/05/2023    CHOL 132 05/12/2022    TRIG 63 05/12/2022    HDL 67 05/12/2022    ALT 12 01/05/2023    AST 13 01/05/2023     01/05/2023    K 3.9 01/05/2023     01/05/2023    CREATININE 1.40 (H) 01/05/2023    BUN 15 01/05/2023    CO2 26 01/05/2023    TSH 3.94 08/25/2022    INR 1.6 01/31/2022    LABA1C 5.5 05/12/2022    LABMICR Can not be calculated 01/05/2023       /88 (Site: Right Upper Arm, Position: Sitting, Cuff Size: Medium Adult)   Pulse 77 Temp 96.9 °F (36.1 °C) (Temporal)   Resp 18   Ht 5' 6\" (1.676 m)   Wt 254 lb 11.2 oz (115.5 kg)   BMI 41.11 kg/m²     NEUROLOGICAL EXAMINATION:     MENTAL STATUS:.  Alert and oriented x 3. No confusion. No language dysfunction. Memory is intact. CRANIAL NERVES: Pupils are equal and reactive. EOMS are complete in all directions. No nystagmus or any other abnormal eye movements. Facial sensation is normal.  No facial weakness. Hearing is normal..  Palate and tongue movements are normal.  Shoulder shrug is symmetrical and normal    MOTOR EXAMINATION: Muscle tone is normal in all the limbs. Strength is 5/5 in both upper and lower limbs. No abnormal limb movements. SENSORY EXAMINATION: Normal.     STRETCH REFLEXES: Symmetrical in both the upper and lower limbs. GAIT: Wheelchair. Uses a walker to ambulate. IMPRESSION: Tremors. . Extrapyramidal.  Difficult to differentiate between essential tremor versus parkinsonian tremor    PLAN:    1. Artane (2 mg), 1/2 tablet 3 times daily. 2.  Follow-up evaluation in 1 month    NOTE: This neurology evaluation is part of outpatient coverage at [facility]  1-2 days per week. Patients requiring frequent evaluations or uncomfortable with potential 3-4 day turnaround on questions or calls  may be better served by a neurologist in the area full time. [facility] at [facility]. Jareth is available for outpatient visits and procedures including EMG/NCS. Non-[facility] neurologists also practice in [facility] (Dr. Aaron Duggan) and [facility] (Debbi Gonzales).        Lethea Lanes, MD   1/26/2023  12:18 PM

## 2023-01-26 NOTE — PATIENT INSTRUCTIONS
SURVEY:    You may be receiving a survey from [employer] regarding your visit today. Please complete the survey to enable us to provide the highest quality of care to you and your family. If you cannot score us a very good on any question, please call the office to discuss how we could have made your experience a very good one. Thank you.

## 2023-02-23 ENCOUNTER — OFFICE VISIT (OUTPATIENT)
Dept: NEUROLOGY | Age: 73
End: 2023-02-23
Payer: MEDICARE

## 2023-02-23 ENCOUNTER — OFFICE VISIT (OUTPATIENT)
Dept: CARDIOLOGY | Age: 73
End: 2023-02-23
Payer: MEDICARE

## 2023-02-23 VITALS
SYSTOLIC BLOOD PRESSURE: 102 MMHG | HEART RATE: 69 BPM | HEIGHT: 66 IN | OXYGEN SATURATION: 93 % | BODY MASS INDEX: 40.5 KG/M2 | DIASTOLIC BLOOD PRESSURE: 65 MMHG | RESPIRATION RATE: 18 BRPM | WEIGHT: 252 LBS

## 2023-02-23 VITALS
DIASTOLIC BLOOD PRESSURE: 69 MMHG | HEART RATE: 62 BPM | RESPIRATION RATE: 18 BRPM | SYSTOLIC BLOOD PRESSURE: 118 MMHG | BODY MASS INDEX: 40.67 KG/M2 | TEMPERATURE: 97 F | WEIGHT: 252 LBS

## 2023-02-23 DIAGNOSIS — I10 ESSENTIAL HYPERTENSION: ICD-10-CM

## 2023-02-23 DIAGNOSIS — I07.1 MODERATE TRICUSPID REGURGITATION BY PRIOR ECHOCARDIOGRAM: ICD-10-CM

## 2023-02-23 DIAGNOSIS — I48.0 PAF (PAROXYSMAL ATRIAL FIBRILLATION) (HCC): ICD-10-CM

## 2023-02-23 DIAGNOSIS — R25.1 TREMORS OF NERVOUS SYSTEM: Primary | ICD-10-CM

## 2023-02-23 DIAGNOSIS — R29.898 WEAKNESS OF BOTH LOWER EXTREMITIES: ICD-10-CM

## 2023-02-23 DIAGNOSIS — E78.2 MIXED HYPERLIPIDEMIA: ICD-10-CM

## 2023-02-23 DIAGNOSIS — I25.10 CORONARY ARTERY DISEASE INVOLVING NATIVE CORONARY ARTERY OF NATIVE HEART WITHOUT ANGINA PECTORIS: ICD-10-CM

## 2023-02-23 DIAGNOSIS — I38 VALVULAR HEART DISEASE: ICD-10-CM

## 2023-02-23 DIAGNOSIS — R06.02 SOB (SHORTNESS OF BREATH): ICD-10-CM

## 2023-02-23 DIAGNOSIS — I50.32 CHRONIC DIASTOLIC CONGESTIVE HEART FAILURE (HCC): Primary | ICD-10-CM

## 2023-02-23 DIAGNOSIS — Z95.820 S/P ANGIOPLASTY WITH STENT: ICD-10-CM

## 2023-02-23 PROCEDURE — G8399 PT W/DXA RESULTS DOCUMENT: HCPCS | Performed by: PHYSICIAN ASSISTANT

## 2023-02-23 PROCEDURE — 99214 OFFICE O/P EST MOD 30 MIN: CPT | Performed by: NEUROMUSCULOSKELETAL MEDICINE, SPORTS MEDICINE

## 2023-02-23 PROCEDURE — G8417 CALC BMI ABV UP PARAM F/U: HCPCS | Performed by: NEUROMUSCULOSKELETAL MEDICINE, SPORTS MEDICINE

## 2023-02-23 PROCEDURE — 1124F ACP DISCUSS-NO DSCNMKR DOCD: CPT | Performed by: NEUROMUSCULOSKELETAL MEDICINE, SPORTS MEDICINE

## 2023-02-23 PROCEDURE — 1036F TOBACCO NON-USER: CPT | Performed by: PHYSICIAN ASSISTANT

## 2023-02-23 PROCEDURE — G8399 PT W/DXA RESULTS DOCUMENT: HCPCS | Performed by: NEUROMUSCULOSKELETAL MEDICINE, SPORTS MEDICINE

## 2023-02-23 PROCEDURE — 1124F ACP DISCUSS-NO DSCNMKR DOCD: CPT | Performed by: PHYSICIAN ASSISTANT

## 2023-02-23 PROCEDURE — G8417 CALC BMI ABV UP PARAM F/U: HCPCS | Performed by: PHYSICIAN ASSISTANT

## 2023-02-23 PROCEDURE — 3074F SYST BP LT 130 MM HG: CPT | Performed by: PHYSICIAN ASSISTANT

## 2023-02-23 PROCEDURE — G8484 FLU IMMUNIZE NO ADMIN: HCPCS | Performed by: NEUROMUSCULOSKELETAL MEDICINE, SPORTS MEDICINE

## 2023-02-23 PROCEDURE — 3078F DIAST BP <80 MM HG: CPT | Performed by: PHYSICIAN ASSISTANT

## 2023-02-23 PROCEDURE — G8427 DOCREV CUR MEDS BY ELIG CLIN: HCPCS | Performed by: PHYSICIAN ASSISTANT

## 2023-02-23 PROCEDURE — 99214 OFFICE O/P EST MOD 30 MIN: CPT | Performed by: PHYSICIAN ASSISTANT

## 2023-02-23 PROCEDURE — 1036F TOBACCO NON-USER: CPT | Performed by: NEUROMUSCULOSKELETAL MEDICINE, SPORTS MEDICINE

## 2023-02-23 PROCEDURE — 99211 OFF/OP EST MAY X REQ PHY/QHP: CPT | Performed by: PHYSICIAN ASSISTANT

## 2023-02-23 PROCEDURE — 3017F COLORECTAL CA SCREEN DOC REV: CPT | Performed by: PHYSICIAN ASSISTANT

## 2023-02-23 PROCEDURE — G8484 FLU IMMUNIZE NO ADMIN: HCPCS | Performed by: PHYSICIAN ASSISTANT

## 2023-02-23 PROCEDURE — 99213 OFFICE O/P EST LOW 20 MIN: CPT | Performed by: NEUROMUSCULOSKELETAL MEDICINE, SPORTS MEDICINE

## 2023-02-23 PROCEDURE — G8428 CUR MEDS NOT DOCUMENT: HCPCS | Performed by: NEUROMUSCULOSKELETAL MEDICINE, SPORTS MEDICINE

## 2023-02-23 PROCEDURE — 3017F COLORECTAL CA SCREEN DOC REV: CPT | Performed by: NEUROMUSCULOSKELETAL MEDICINE, SPORTS MEDICINE

## 2023-02-23 PROCEDURE — 1090F PRES/ABSN URINE INCON ASSESS: CPT | Performed by: NEUROMUSCULOSKELETAL MEDICINE, SPORTS MEDICINE

## 2023-02-23 PROCEDURE — 3078F DIAST BP <80 MM HG: CPT | Performed by: NEUROMUSCULOSKELETAL MEDICINE, SPORTS MEDICINE

## 2023-02-23 PROCEDURE — 1090F PRES/ABSN URINE INCON ASSESS: CPT | Performed by: PHYSICIAN ASSISTANT

## 2023-02-23 PROCEDURE — 3074F SYST BP LT 130 MM HG: CPT | Performed by: NEUROMUSCULOSKELETAL MEDICINE, SPORTS MEDICINE

## 2023-02-23 RX ORDER — TRIHEXYPHENIDYL HYDROCHLORIDE 2 MG/1
TABLET ORAL
COMMUNITY
Start: 2023-01-26

## 2023-02-23 NOTE — PROGRESS NOTES
NEUROLOGY Follow up    Patient Name:  Farrah Barragan  :   1950  Clinic Visit Date: 2023    I saw Ms. Farrah Barragan  in the neurology clinic today for tremors. 27-year-old right-handed lady with a known history of bipolar disorder,  cardiomyopathy, diabetes, hypertension, hyperlipidemia, hypothyroidism, neurovascularly with complaints of persistent tremors to upper extremities and lower extremities \"since about 23 years \". She was started on Artane in 1 mg 3 times daily month ago. \"Tremors have improved \". No side effects with Artane. Doing well without any new significant neurological symptoms of concern    REVIEW OF SYSTEMS    Constitutional Weight changes: absent, change in appetite: absent Fatigue: absent; Fevers : absent, Any recent hospitalizations:  absent   HEENT Ears: normal,  Visual disturbance: absent   Respiratory Shortness of breath: present, choking:  absent, Cough: absent, Snoring : absent   Cardiovascular Chest pain: absent, Leg swelling :absent, palpitations : absent, fainting : absent   GI Constipation: absent, Diarrhea: absent, Swallowing change: absent    Urinary frequency: absent, Urinary urgency: absent, Urinary incontinence: absent   Musculoskeletal Neck pain: absent, Back pain: present, Stiffness: present, Muscle pain: present, Joint pain: absent, restless leg : absent   Dermatological Hair loss: absent, Skin changes: absent   Neurological Confusion: absent, Trouble concentrating: absent, Seizures: absent;  Memory loss: absent, balance problem: present, Dizziness: absent, vertigo: absent, Weakness: absent, Numbness absent, Tremor: present, Spasm: absent, involuntary movement: absent, Speech difficulty: absent, Headache: absent, Light sensitivity: absent   Psychiatric Anxiety: absent, Depression  absent, drug abuse: absent, Hallucination: absent, mood disorder: absent, Suicidal ideations absent   Hematologic Abnormal bleeding: absent, Anemia: absent, Lymph gland changes: absent Clotting disorder: absent     Past Medical History:   Diagnosis Date    ADHD (attention deficit hyperactivity disorder)     Anemia     severe    Asthma     Bipolar disorder (HCC)     Bradycardia, unspecified     Chronic anemia     Chronic atrial fibrillation (HCC)     Chronic back pain     Chronic kidney disease     Patient states unaware of this. Has never been mentioned to her.      Colitis     Constipation     COPD (chronic obstructive pulmonary disease) (HCC)     Cystitis without hematuria     Diastolic CHF (HCC)     Esophagitis     grade A    GERD (gastroesophageal reflux disease)     Hemorrhoids     History of echocardiogram 06/02/2014    EF >60%, LV wall thickness mildly increased,    Hypertension     Hypothyroidism     Metabolic syndrome     Moderate protein-calorie malnutrition (HCC)     Muscle weakness (generalized)     Obesity     Pancreatitis     TIA (transient ischemic attack)        Past Surgical History:   Procedure Laterality Date    CARDIAC CATHETERIZATION Left 05/27/2021    right radial/ [facility]/ Dr. Sal Friend Right 12/27/2021    Dr. Mamadou Remy Left 02/07/2022    EYE CATARACT EMULSIFICATION IOL IMPLANT (Left Eye)    COLONOSCOPY  5/2011    ischemic colitis    COLONOSCOPY  03/07/2017    -diverticulosis,hemorrhoids    HYMENECTOMY      1972    HYSTERECTOMY (CERVIX STATUS UNKNOWN)      2008    INTRACAPSULAR CATARACT EXTRACTION Right 12/27/2021    EYE CATARACT EMULSIFICATION IOL IMPLANT performed by Marissa Mustafa DO at [facility] Left 2/7/2022    EYE CATARACT EMULSIFICATION IOL IMPLANT performed by Marissa Mustafa DO at [facility] Right     2008    TONSILLECTOMY AND ADENOIDECTOMY      UPPER GASTROINTESTINAL ENDOSCOPY  3/2013    5 cm axial hiatal hernia    UPPER GASTROINTESTINAL ENDOSCOPY  03/07/2017    Dr. Rica Dillon       Social History     Socioeconomic History Marital status:      Spouse name: Not on file    Number of children: Not on file    Years of education: Not on file    Highest education level: Not on file   Occupational History    Not on file   Tobacco Use    Smoking status: Former     Packs/day: 0.25     Years: 10.00     Pack years: 2.50     Types: Cigarettes     Quit date: 3/12/1998     Years since quittin.9    Smokeless tobacco: Never   Substance and Sexual Activity    Alcohol use: No    Drug use: No    Sexual activity: Not on file   Other Topics Concern    Not on file   Social History Narrative    Not on file     Social Determinants of Health     Financial Resource Strain: Not on file   Food Insecurity: Not on file   Transportation Needs: Not on file   Physical Activity: Not on file   Stress: Not on file   Social Connections: Not on file   Intimate Partner Violence: Not on file   Housing Stability: Not on file       Family History   Problem Relation Age of Onset    Diabetes Mother     High Blood Pressure Mother     Diabetes Father     High Blood Pressure Father     Bipolar Disorder Sister     Schizophrenia Sister     High Blood Pressure Other         2 sons    Bipolar Disorder Other         2 sons       Current Outpatient Medications   Medication Sig Dispense Refill    trihexyphenidyl (ARTANE) 2 MG tablet       divalproex (DEPAKOTE) 125 MG DR tablet Take 125 mg by mouth daily      nystatin (MYCOSTATIN) 550007 UNIT/GM powder Apply topically as needed Breast,folds      divalproex (DEPAKOTE) 250 MG DR tablet Take 125 mg by mouth Daily Indications: Manic-Depression Give 3 tablets PO one time a day.       dapagliflozin (FARXIGA) 10 MG tablet Take 1 tablet by mouth every morning 90 tablet 3    furosemide (LASIX) 40 MG tablet Take 1 tablet by mouth 2 times daily for 14 days (Patient taking differently: Take 40 mg by mouth daily) 28 tablet 0    loperamide (IMODIUM) 2 MG capsule Take 4 mg by mouth 4 times daily as needed for Diarrhea Every 4 hours metoprolol succinate (TOPROL XL) 25 MG extended release tablet TAKE 1 TABLET BY MOUTH DAILY.  90 tablet 3    simethicone (MYLICON) 80 MG chewable tablet Take 80 mg by mouth every 6 hours as needed for Flatulence      levothyroxine (SYNTHROID) 50 MCG tablet Take 50 mcg by mouth Daily      latanoprost (XALATAN) 0.005 % ophthalmic solution Place 1 drop into the right eye nightly       polyvinyl alcohol (LIQUIFILM TEARS) 1.4 % ophthalmic solution Place 1 drop into both eyes 4 times daily       acetaminophen (TYLENOL) 650 MG extended release tablet Take 650 mg by mouth 4 times daily      Sodium Phosphates (FLEET) 7-19 GM/118ML Place 1 enema rectally as needed      traMADol (ULTRAM) 50 MG tablet Take 50 mg by mouth every 6 hours as needed for Pain.      white petrolatum GEL Apply topically 2 times daily as needed for Dry Skin Arms and legs      chlorhexidine (PERIDEX) 0.12 % solution Take 15 mLs by mouth 2 times daily       atorvastatin (LIPITOR) 20 MG tablet Take 1 tablet by mouth daily 90 tablet 3    clopidogrel (PLAVIX) 75 MG tablet Take 1 tablet by mouth daily 30 tablet 3    losartan (COZAAR) 50 MG tablet Take 1 tablet by mouth daily 30 tablet 3    apixaban (ELIQUIS) 5 MG TABS tablet Take 1 tablet by mouth 2 times daily 60 tablet 0    tiZANidine (ZANAFLEX) 2 MG tablet Take 2 mg by mouth 4 times daily      cloNIDine (CATAPRES) 0.1 MG tablet Take 0.1 mg by mouth 3 times daily      amLODIPine (NORVASC) 10 MG tablet Take 10 mg by mouth daily       Valbenazine Tosylate 40 MG CAPS Take 40 mg by mouth nightly      polyethylene glycol (GLYCOLAX) packet Take 17 g by mouth daily      Amantadine (SYMMETREL) 100 MG TABS tablet Take 100 mg by mouth 2 times daily      Dextromethorphan-guaiFENesin 5-100 MG/5ML LIQD Take 5 mLs by mouth every 12 hours as needed (cough)      acetaminophen (TYLENOL) 325 MG tablet Take 650 mg by mouth every 4 hours as needed for Pain or Fever      sodium chloride (OCEAN) 0.65 % nasal spray 1 spray by Nasal route 3 times daily as needed for Congestion      bisacodyl (DULCOLAX) 10 MG suppository Place 10 mg rectally daily as needed for Constipation      magnesium hydroxide (MILK OF MAGNESIA) 400 MG/5ML suspension Take 30 mLs by mouth daily as needed for Constipation      calcium carbonate 600 MG TABS tablet Take 1 tablet by mouth nightly       omeprazole (PRILOSEC) 20 MG capsule TAKE 1 CAPSULE BY MOUTH DAILY. (Patient taking differently: Take 20 mg by mouth Daily) 30 capsule 5    guaiFENesin (MUCINEX) 600 MG extended release tablet Take 1,200 mg by mouth 2 times daily. PRN      loratadine (CLARITIN) 10 MG tablet Take 10 mg by mouth daily. PRN       QUEtiapine (SEROQUEL) 25 MG tablet Take 50 mg by mouth at bedtime (Patient not taking: No sig reported)      nitroGLYCERIN (NITROSTAT) 0.4 MG SL tablet up to max of 3 total doses. If no relief after 1 dose, call 911. (Patient not taking: Reported on 2/23/2023) 25 tablet 1    melatonin 3 MG TABS tablet Take 3 mg by mouth nightly (Patient not taking: No sig reported)       No current facility-administered medications for this visit. DATA:  Lab Results   Component Value Date    WBC 6.6 01/05/2023    HGB 14.8 01/05/2023     01/05/2023    CHOL 132 05/12/2022    TRIG 63 05/12/2022    HDL 67 05/12/2022    ALT 12 01/05/2023    AST 13 01/05/2023     01/05/2023    K 3.9 01/05/2023     01/05/2023    CREATININE 1.40 (H) 01/05/2023    BUN 15 01/05/2023    CO2 26 01/05/2023    TSH 3.94 08/25/2022    INR 1.6 01/31/2022    LABA1C 5.5 05/12/2022    LABMICR Can not be calculated 01/05/2023       /69 (Site: Right Upper Arm, Position: Sitting, Cuff Size: Large Adult)   Pulse 62   Temp 97 °F (36.1 °C) (Temporal)   Resp 18   Wt 252 lb (114.3 kg)   BMI 40.67 kg/m²     NEUROLOGICAL EXAMINATION:        MENTAL STATUS:. .  Normal.    CRANIAL NERVES: II-XII are normal     MOTOR EXAMINATION: Muscle tone is normal in all the limbs.   Strength is 5/5 in both upper and lower limbs. Mild bilateral upper extremity postural tremors. SENSORY EXAMINATION: Normal.      STRETCH REFLEXES: Symmetrical in both the upper and lower limbs. GAIT: Wheelchair. Uses a walker to ambulate. IMPRESSION: Tremors. .  Most likely extrapyramidal.  Difficult to differentiate between essential tremor versus parkinsonian tremor     PLAN:     1. Increase dose of Artane to 2 mg, 3 times daily  2. Follow-up in 3 months    NOTE: This neurology evaluation is part of outpatient coverage at [facility]  1-2 days per week. Patients requiring frequent evaluations or uncomfortable with potential 3-4 day turnaround on questions or calls  may be better served by a neurologist in the area full time. [facility] at [facility]. Jareth is available for outpatient visits and procedures including EMG/NCS. Non-[facility] neurologists also practice in [facility] (Dr. Dorian Ford) and [facility] (Debbi Crook).        Robinette Rubinstein, MD   2/23/2023  11:43 AM

## 2023-02-23 NOTE — PATIENT INSTRUCTIONS
SURVEY:    You may be receiving a survey from [employer] regarding your visit today. Please complete the survey to enable us to provide the highest quality of care to you and your family. If you cannot score us a very good on any question, please call the office to discuss how we could have made your experience a very good one. Thank you.

## 2023-02-23 NOTE — PROGRESS NOTES
Alexandro Burch am scribing for and in the presence of Hollis Monday, [place]     Patient: Huyen Luke  : 1950  Date of Visit: 2023    REASON FOR VISIT / CONSULTATION: Follow-up (HX:CHF,PAF,CAD, s/p stent, valvular heart disease Pt is here for 6 month follow up she is doing well. She does have sob with walking and can walk short distance. She is off balance at times Denies:CP, lightheaded/dizziness,palp)      History of Present Illness:         Dear Clint Santana MD    I had the pleasure of seeing Huyen Luke in my office today. Ms. Kiera Velazco is a 75 y.o. female who presented today for follow-up. She initially seen in my office to establish care back on 2021. She had a stress test, echocardiogram and a Holter monitor done prior to her initial visit as below due to worsening shortness of breath. She had had hypertension for many years and has been on anti-hypertensive drugs for awhile. She also has had thyorid issue and has been on Levothyroxine as well for many years. She denied every being diagnosied with diabtes or sleep apnea. She is a former smoker. She quit smoking however in  and has not smoked since. Her family history consits of her mother who had caroid artery disease in her 50-60's. Echo done on 2021: Global left ventricular systolic function appears preserved with an estimated ejection fraction of >60%. The left ventricular cavity size is within normal limits and the left ventricular wall thickness is mildly increased. No definite specific wall motion abnormalities were identified. The left atrium is severely dilated (>40), borderline dilated with a left atrial volume index of 57 ml/m2. Normal mitral valve structure with mild to moderate mitral regurgitation. Normal tricuspid valve structure with moderate to severe tricuspid regurgitation.  Mild to moderate pulmonary hypertension with an estimated right ventricular systolic pressure of 37 mmHg. Diastology cannot be assessed due to the patients rhythm of what appeared to be atrial fibrillation. Compared to the previous study of 6/2/14, the patient now has moderate to severe tricuspid regurgitation and her mitral regurgitation is now at least mild to moderate. Holter done on 5/17/2021: The rhythm was atrial fibrillation. Average QRS duration 0.09. Maximum R-R 2.19 seconds with 58 pauses greater than 2.0  seconds. 2. Diary returned with entries of \"shortness of breath\" with isolated PVCs noted. 1-Atrial fibrillation throughout with average HR of 72 bpm , ranging between 50 and 142 bpm. well controlled ventricular  rate. No significant pauses. Maximum R-R interval is 2.19 second during typical sleep time. recorded 2- Very frequent PVCs (PVCs burden 10%), mainly isolated with occasional couplets. No ventricular runs. Stress test done on 4/19/2021: Limited quality study with significant motion artifact particularly during stress imaging. Equivocal myocardial perfusion study. There is a small/moderate perfusion defect of mild/moderate intensity in the anterior and inferior regions during stress and rest imaging which is most consistent with artifact, but may be due to a small degree of coronary ischemia. Global left ventricular systolic function was normal with an EF of 68% without regional wall motion abnormalities. Significant ST segment changes at rest that impair accurate ECG interpretation during stress. EKG done in office (5/26/2021): Showed atrial fibrillation with a HR of 70 bpm.    Heart cath done on 5/27/2021-  Moderate to severe single vessel disease involving the LAD coronary artery with moderate disease in a large Cx. Normal left ventricular end diastolic pressure. (LVEDP). Heart [facility] on 5/28/2021- Successful PCI of the with no with one drug-eluting stent.      Echo done on 3/22/2022: Global left ventricular systolic function appears preserved with an  estimated ejection fraction of >60%. The left ventricular cavity size is within normal limits and the left ventricular wall thickness is mildly increased. No definite specific wall motion abnormalities were identified. The left atrium is severely dilated (>40), left atrial volume index of 50 ml/m2. The right atrium appears at least moderately dilated. Mild aortic regurgitation. Mild to moderate mitral regurgitation. Moderate tricuspid regurgitation. Moderate pulmonary hypertension with an estimated right ventricular systolic pressure of 45 mmHg. Diastolic function cannot be assessed due atrial fibrillation. CAM done on 3/22/2022: 6 days and 23 hours recorded. Baseline rhythm is atrial fibrillation throughout with fairly controlled ventricular rate. Average heart rate is 59 bpm ranging between 33 and 140 bpm.  No significant pauses. Rare isolated PVCs noted. No patient-activated events recorded. Ms. Lily Roth is here for her follow up. She is doing well. She is able to do short walking trips when pushing her wheelchair. She does admit her walking has got shorter because of leg weakness or shortness of breath. She is still living in an assisted living facility. No fever or cough. No stomach pain. No issues moving her bowels. She denied any lightheaded or dizziness or heart palpitations. She denied any current or recent chest pain, abdominal pain, bleeding problems, problems with her medications or any other concerns at this time. She is eating and drinking okay. She denies blood in her urine or stool. Bleeding Risks: Ms. Lily Roth denies any current or recent bleeding problems including a history of a GI bleed, ulcers, recent or upcoming surgeries, blood in her stool or black tarry stools or blood in her urine.     PAST MEDICAL HISTORY:        Past Medical History:   Diagnosis Date    ADHD (attention deficit hyperactivity disorder)     Anemia     severe    Asthma     Bipolar disorder (HCC)     Bradycardia, unspecified     Chronic anemia     Chronic atrial fibrillation (HCC)     Chronic back pain     Chronic kidney disease     Patient states unaware of this. Has never been mentioned to her. Colitis     Constipation     COPD (chronic obstructive pulmonary disease) (HCC)     Cystitis without hematuria     Diastolic CHF (HCC)     Esophagitis     grade A    GERD (gastroesophageal reflux disease)     Hemorrhoids     History of echocardiogram 06/02/2014    EF >60%, LV wall thickness mildly increased,    Hypertension     Hypothyroidism     Metabolic syndrome     Moderate protein-calorie malnutrition (HCC)     Muscle weakness (generalized)     Obesity     Pancreatitis     TIA (transient ischemic attack)            CURRENT ALLERGIES: Lamictal [lamotrigine], Oxcarbazepine, Pcn [penicillins], Sertraline, Trileptal, and Strattera [atomoxetine hcl] REVIEW OF SYSTEMS: 14 systems were reviewed. Pertinent positives and negatives as above, all else negative.      Past Surgical History:   Procedure Laterality Date    CARDIAC CATHETERIZATION Left 05/27/2021    right radial/ [facility]/ Dr. Malcolm Marie Right 12/27/2021    Dr. Mark Wheeler Left 02/07/2022    EYE CATARACT EMULSIFICATION IOL IMPLANT (Left Eye)    COLONOSCOPY  5/2011    ischemic colitis    COLONOSCOPY  03/07/2017    -diverticulosis,hemorrhoids    HYMENECTOMY      1972    HYSTERECTOMY (CERVIX STATUS UNKNOWN)      2008    INTRACAPSULAR CATARACT EXTRACTION Right 12/27/2021    EYE CATARACT EMULSIFICATION IOL IMPLANT performed by Ann Barrios DO at [facility] Left 2/7/2022    EYE CATARACT EMULSIFICATION IOL IMPLANT performed by Ann Barrios DO at [facility] [place] by [facility] Right     2008    TONSILLECTOMY AND ADENOIDECTOMY      UPPER GASTROINTESTINAL ENDOSCOPY  3/2013    5 cm axial hiatal hernia    UPPER GASTROINTESTINAL ENDOSCOPY  03/07/2017    Dr. Rossy Szymanski History:  Social History     Tobacco Use    Smoking status: Former     Packs/day: 0.25     Years: 10.00     Pack years: 2.50     Types: Cigarettes     Quit date: 3/12/1998     Years since quittin.9    Smokeless tobacco: Never   Substance Use Topics    Alcohol use: No    Drug use: No        CURRENT MEDICATIONS:        Outpatient Medications Marked as Taking for the 23 encounter (Office Visit) with Vincenzo Hodgkin, PA-C   Medication Sig Dispense Refill    trihexyphenidyl (ARTANE) 2 MG tablet       divalproex (DEPAKOTE) 125 MG DR tablet Take 125 mg by mouth daily      nystatin (MYCOSTATIN) 259248 UNIT/GM powder Apply topically as needed Breast,folds      divalproex (DEPAKOTE) 250 MG DR tablet Take 125 mg by mouth Daily Indications: Manic-Depression Give 3 tablets PO one time a day. dapagliflozin (FARXIGA) 10 MG tablet Take 1 tablet by mouth every morning 90 tablet 3    furosemide (LASIX) 40 MG tablet Take 1 tablet by mouth 2 times daily for 14 days (Patient taking differently: Take 40 mg by mouth daily) 28 tablet 0    loperamide (IMODIUM) 2 MG capsule Take 4 mg by mouth 4 times daily as needed for Diarrhea Every 4 hours      metoprolol succinate (TOPROL XL) 25 MG extended release tablet TAKE 1 TABLET BY MOUTH DAILY.  90 tablet 3    simethicone (MYLICON) 80 MG chewable tablet Take 80 mg by mouth every 6 hours as needed for Flatulence      levothyroxine (SYNTHROID) 50 MCG tablet Take 50 mcg by mouth Daily      latanoprost (XALATAN) 0.005 % ophthalmic solution Place 1 drop into the right eye nightly       polyvinyl alcohol (LIQUIFILM TEARS) 1.4 % ophthalmic solution Place 1 drop into both eyes 4 times daily       acetaminophen (TYLENOL) 650 MG extended release tablet Take 650 mg by mouth 4 times daily      Sodium Phosphates (FLEET) 7-19 GM/118ML Place 1 enema rectally as needed      traMADol (ULTRAM) 50 MG tablet Take 50 mg by mouth every 6 hours as needed for Pain.      white petrolatum GEL Apply topically 2 times daily as needed for Dry Skin Arms and legs      chlorhexidine (PERIDEX) 0.12 % solution Take 15 mLs by mouth 2 times daily       atorvastatin (LIPITOR) 20 MG tablet Take 1 tablet by mouth daily 90 tablet 3    nitroGLYCERIN (NITROSTAT) 0.4 MG SL tablet up to max of 3 total doses. If no relief after 1 dose, call 911. 25 tablet 1    clopidogrel (PLAVIX) 75 MG tablet Take 1 tablet by mouth daily 30 tablet 3    losartan (COZAAR) 50 MG tablet Take 1 tablet by mouth daily 30 tablet 3    apixaban (ELIQUIS) 5 MG TABS tablet Take 1 tablet by mouth 2 times daily 60 tablet 0    tiZANidine (ZANAFLEX) 2 MG tablet Take 2 mg by mouth 4 times daily      cloNIDine (CATAPRES) 0.1 MG tablet Take 0.1 mg by mouth 3 times daily      amLODIPine (NORVASC) 10 MG tablet Take 10 mg by mouth daily       Valbenazine Tosylate 40 MG CAPS Take 40 mg by mouth nightly      polyethylene glycol (GLYCOLAX) packet Take 17 g by mouth daily      Amantadine (SYMMETREL) 100 MG TABS tablet Take 100 mg by mouth 2 times daily      Dextromethorphan-guaiFENesin 5-100 MG/5ML LIQD Take 5 mLs by mouth every 12 hours as needed (cough)      acetaminophen (TYLENOL) 325 MG tablet Take 650 mg by mouth every 4 hours as needed for Pain or Fever      sodium chloride (OCEAN) 0.65 % nasal spray 1 spray by Nasal route 3 times daily as needed for Congestion      bisacodyl (DULCOLAX) 10 MG suppository Place 10 mg rectally daily as needed for Constipation      magnesium hydroxide (MILK OF MAGNESIA) 400 MG/5ML suspension Take 30 mLs by mouth daily as needed for Constipation      calcium carbonate 600 MG TABS tablet Take 1 tablet by mouth nightly       omeprazole (PRILOSEC) 20 MG capsule TAKE 1 CAPSULE BY MOUTH DAILY. (Patient taking differently: Take 20 mg by mouth Daily) 30 capsule 5    guaiFENesin (MUCINEX) 600 MG extended release tablet Take 1,200 mg by mouth 2 times daily. PRN      loratadine (CLARITIN) 10 MG tablet Take 10 mg by mouth daily.  PRN          FAMILY HISTORY: family history includes Bipolar Disorder in her sister and another family member; Diabetes in her father and mother; High Blood Pressure in her father, mother, and another family member; Schizophrenia in her sister.     Physical Examination:     /65 (Site: Left Lower Arm, Position: Sitting, Cuff Size: Medium Adult)   Pulse 69   Resp 18   Ht 5' 6\" (1.676 m)   Wt 252 lb (114.3 kg)   SpO2 93%   BMI 40.67 kg/m²  Body mass index is 40.67 kg/m².    Constitutional: She appeared oriented to person and place. She appears well-developed and well-nourished. In no acute distress.   HEENT: Normocephalic and atraumatic. No JVD present. Carotid bruit is not present. No mass and no thyromegaly present. No lymphadenopathy noted.  Cardiovascular: Normal rate, irregularly irregular rhythm, normal heart sounds. Exam reveals no gallop and no friction rubs. 2/6 systolic murmur, 4th intercostal space on the LEFT must lateral to the sternal border  Pulmonary/Chest: Effort normal and breath sounds normal. No respiratory distress. She has no wheezes, rhonchi or rales.  Abdominal: Soft, non-tender. She exhibits no organomegaly, mass or bruit.   Extremities: Trace bilateral ankle edema.  No cyanosis or clubbing. 2+ radial and carotid pulses. Distal extremity pulses: 2+ bilaterally. Compression stockings in place.  Neurological: Alertness and orientation as per Constitutional exam. No evidence of gross cranial nerve deficit. Coordination appeared normal.   Skin: Skin is warm and dry. There is no rash or diaphoresis.   Psychiatric: She has a normal mood and affect. Her speech is normal and behavior is normal.      MOST RECENT LABS ON RECORD:   Lab Results   Component Value Date    WBC 6.6 01/05/2023    HGB 14.8 01/05/2023    HCT 45.4 01/05/2023     01/05/2023    CHOL 132 05/12/2022    TRIG 63 05/12/2022    HDL 67 05/12/2022    ALT 12 01/05/2023    AST 13 01/05/2023     01/05/2023    K 3.9 01/05/2023      01/05/2023    CREATININE 1.40 (H) 01/05/2023    BUN 15 01/05/2023    CO2 26 01/05/2023    TSH 3.94 08/25/2022    INR 1.6 01/31/2022    LABA1C 5.5 05/12/2022    LABMICR Can not be calculated 01/05/2023    BNP NOT REPORTED 03/11/2014       ASSESSMENT:        1. Chronic diastolic congestive heart failure ([facility] [address]    2. PAF (paroxysmal atrial fibrillation) ([facility] [address]    3. Coronary artery disease involving native coronary artery of native heart without angina pectoris    4. S/P angioplasty with stent    5. Valvular heart disease    6. Moderate tricuspid regurgitation by prior echocardiogram    7. Essential hypertension    8. Mixed hyperlipidemia    9. SOB (shortness of breath)    10. Weakness of both lower extremities          PLAN:          Chronic diastolic heart failure: New York Heart Association Class: IIb (Marked symptoms during daily activities)/ Right Sided Heart Failure. Weight is down 16 pounds since last being seen, no signs of volume overload at today's appointment. Beta Blocker: Continue Metoprolol succinate (Toprol XL) 25 mg daily. ACE Inibitor/ARB: Continue losartan (Cozaar) 50 mg daily. Diuretics: Continue furosemide (Lasix) 40 mg every morning. Heart failure counseling: I told them to continue wearing lower extremity compression stockings and I advised them to try and keep their legs up whenever possible and to limit salt in their diet. Continue Farxiga 10 mg daily. I did advise her that the most common side effect of this medication is UTI's and to be aware of any sighs or symptoms of this. I reviewed recent blood work from January. Paroxysmal Atrial Fibrillation: Rate Control   Beta Blocker: Continue metoprolol succinate (Toprol XL) 25 mg once daily. I also discussed the potential side effects of this medication including lightheadedness and dizziness and told her to stop the medication of this occurs and call our office if this occurs.   Calcium Channel Blocker: Continue amlodipine (Norvasc)  10 mg daily. I discussed the potential side effects of this medication including lightheadedness and dizziness and told her to call the office if this occurs. [id number] Score for Atrial Fibrillation Stroke Risk   Risk   Factors  Component Value   C CHF Yes 1   H HTN Yes 1   A2 Age >= 76 No,  (70 y.o.) 0   D DM Yes 1   S2 Prior Stroke/TIA Yes 2   V Vascular Disease No 0   A Age 74-69 Yes,  (70 y.o.) 1   Sc Sex female 1    [id number]  Score  7   Score last updated 3/15/60 3:09 PM EDT  Click here for a link to the UpToDate guideline \"Atrial Fibrillation: Anticoagulation therapy to prevent embolization  Disclaimer: Risk Score calculation is dependent on accuracy of patient problem list and past encounter diagnosis. Stroke Risk: Her CHADS2-VASc score is 6/9 (9.8% stroke risk)  Anticoagulation: Continue Apixaban (Eliquis) 5 mg every 12 hours. I also reminded her to watch for signs of blood in her stool or black tarry stools and stop the medication immediately if this develops as this could be life threatening. Dose appropriate, reviewed her most recent blood work. Atherosclerotic Heart Disease: Successful PCI of the LAD with one drug-eluting stent on 5/28/2021. Antiplatelet Agent: Continue clopidogrel (Plavix): Plavix 75 mg daily. Beta Blocker: Continue Metoprolol succinate (Toprol XL) 25 mg daily. Anti-anginal medications: Continue nitroglycerin 0.4 mg tablets as needed for chest pain. and amlodipine (Norvasc) 10 mg once daily. Cholesterol Reduction Therapy: Continue Atorvastatin (Lipitor) 20 mg daily. Additional counseling: I advised them to call our office or go to the emergency room if they developed worsening or persistent chest pain or increased shortness of breath as this could be life threatening. I ordered a repeat lipid panel to be done. Moderate Tricuspid Regurgitation: asymptomatic Moderate pulmonary hypertension with an estimated right ventricular systolic pressure of 45 mmHg.  She reports worsening shortness of breath over the past 6 months, no signs of volume overload clinically at today's appointment. Beta Blocker: Continue Metoprolol succinate (Toprol XL) 25 mg daily. ACE Inibitor/ARB: Continue losartan (Cozaar) 50 mg daily. Diuretics: Continue furosemide (Lasix) 40 mg every morning. Because of her known history of valvular heart disease, I ordered a echocardiogram to better assess the severity of this problem. Essential Hypertension: Controlled  Beta Blocker: Continue Metoprolol succinate (Toprol XL) 25 mg daily. ACE Inibitor/ARB: Continue losartan (Cozaar) 50 mg daily. Calcium Channel Blocker: Continue amlodipine (Norvasc) 10 mg once daily. Diuretics: Continue furosemide (Lasix) 40 mg every morning. Hyperlipidemia: Mixed, LDL done on 5/12/2022 was 52 mg/d L  Cholesterol Reduction Therapy: Continue Atorvastatin (Lipitor) 20 mg daily. I ordered a repeat lipid panel. Shortness of breath with almost any exertion:  Additional Testing List: I ordered a echocardiogram to better assess for the etiology of this problem and to help guide future management     Lower extremity weakness:  I placed order to start doing PT. She reports her mobility is limited by leg weakness. She completed PT 6-8 months ago but has not been doing her exercises and is noticing a functional decline. I spent about 10 minutes talking with Ms. Luis Sutton  about what I expect is at least mild to moderate deconditioning. I explained that if she is only doing the minimum necessary to accomplish his daily activities of living, it will be normal to expect shortness of breath whenever she does more activity.   I went on to say that although she may be very happy with her current quality of life, if she were to develop pneumonia, a unexpected surgery, or an unexpected bone fracture, this would likely lead to significant worsening deconditioning and may very well make her unable to perform these, and activities of daily living and therefore take away her independence. Therefore although ideally I would suggest that she exercise for 30-40 minutes 5 days a week, I think that if he would just exercise 3 days a week for 30-40 minutes this would not only help maintain her current quality of life but would give her some reserve if and more likely when she develops some unexpected illness. In the meantime, I encouraged Ms. Dos Santos to continue to take her other medications. FOLLOW UP:   I told Ms. Randolph Lyon to call my office if she had any problems, but otherwise I asked her to Return in about 6 months (around 8/23/2023). However, I would be happy to see her sooner should the need arise. Sincerely,  Mike Henley PA-C  [facility] Cardiology Specialist    [facility], [address] [place] [address]  Phone: [phone], Fax: [phone]     I believe that the risk of significant morbidity and mortality related to the patient's current medical conditions are: Intermediate. Approximately 30 minutes were spent during prep work, discussion and exam of the patient, and follow up documentation and all of their questions were answered. The documentation recorded by the scribe, accurately and completely reflects the services I personally performed and the decisions made by me.  Mike Henley PA-C February 23, 2023

## 2023-02-23 NOTE — PATIENT INSTRUCTIONS
SURVEY:    You may be receiving a survey from [employer] regarding your visit today. Please complete the survey to enable us to provide the highest quality of care to you and your family. If you cannot score us a very good on any question, please call the office to discuss how we could have made your experience a very good one. Thank you.

## 2023-02-24 ENCOUNTER — HOSPITAL ENCOUNTER (OUTPATIENT)
Age: 73
Setting detail: SPECIMEN
Discharge: HOME | End: 2023-02-24
Payer: MEDICARE

## 2023-02-24 DIAGNOSIS — E78.2 MIXED HYPERLIPIDEMIA: ICD-10-CM

## 2023-02-24 DIAGNOSIS — R06.02 SOB (SHORTNESS OF BREATH): ICD-10-CM

## 2023-02-24 DIAGNOSIS — I50.32 CHRONIC DIASTOLIC CONGESTIVE HEART FAILURE (HCC): ICD-10-CM

## 2023-02-24 DIAGNOSIS — I25.10 CORONARY ARTERY DISEASE INVOLVING NATIVE CORONARY ARTERY OF NATIVE HEART WITHOUT ANGINA PECTORIS: ICD-10-CM

## 2023-02-24 DIAGNOSIS — I10 ESSENTIAL HYPERTENSION: ICD-10-CM

## 2023-02-24 DIAGNOSIS — I07.1 MODERATE TRICUSPID REGURGITATION BY PRIOR ECHOCARDIOGRAM: ICD-10-CM

## 2023-02-24 DIAGNOSIS — I38 VALVULAR HEART DISEASE: ICD-10-CM

## 2023-02-24 DIAGNOSIS — Z95.820 S/P ANGIOPLASTY WITH STENT: ICD-10-CM

## 2023-02-24 DIAGNOSIS — I48.0 PAF (PAROXYSMAL ATRIAL FIBRILLATION) (HCC): ICD-10-CM

## 2023-02-24 LAB
CHOLEST SERPL-MCNC: 122 MG/DL
CHOLESTEROL/HDL RATIO: 2.4
HDLC SERPL-MCNC: 51 MG/DL
LDLC SERPL CALC-MCNC: 51 MG/DL (ref 0–130)
TRIGL SERPL-MCNC: 99 MG/DL

## 2023-02-24 PROCEDURE — 36415 COLL VENOUS BLD VENIPUNCTURE: CPT

## 2023-02-24 PROCEDURE — P9603 ONE-WAY ALLOW PRORATED MILES: HCPCS

## 2023-02-24 PROCEDURE — 80061 LIPID PANEL: CPT

## 2023-02-27 ENCOUNTER — TELEPHONE (OUTPATIENT)
Dept: CARDIOLOGY | Age: 73
End: 2023-02-27

## 2023-02-27 NOTE — TELEPHONE ENCOUNTER
----- Message from Saman Bills PA-C sent at 2/27/2023 11:37 AM EST -----  Please notify patient that their lab results are normal.   Please continue current treatment and follow up.

## 2023-03-27 ENCOUNTER — TELEPHONE (OUTPATIENT)
Dept: CARDIOLOGY | Age: 73
End: 2023-03-27

## 2023-03-27 ENCOUNTER — HOSPITAL ENCOUNTER (OUTPATIENT)
Dept: NON INVASIVE DIAGNOSTICS | Age: 73
Discharge: HOME | End: 2023-03-27
Payer: MEDICARE

## 2023-03-27 DIAGNOSIS — R06.02 SOB (SHORTNESS OF BREATH): ICD-10-CM

## 2023-03-27 LAB
LV EF: 55 %
LVEF MODALITY: NORMAL

## 2023-03-27 PROCEDURE — 93306 TTE W/DOPPLER COMPLETE: CPT

## 2023-03-27 NOTE — TELEPHONE ENCOUNTER
----- Message from Caron Vallejo PA-C sent at 3/27/2023  4:10 PM EDT -----  Please let them know their echo looks good, will discuss at follow up appointment. Thanks.

## 2023-05-19 ENCOUNTER — HOSPITAL ENCOUNTER (OUTPATIENT)
Age: 73
Setting detail: SPECIMEN
Discharge: HOME | End: 2023-05-19
Payer: MEDICARE

## 2023-05-19 LAB
BASOPHILS # BLD: 0.05 K/UL (ref 0–0.2)
BASOPHILS NFR BLD: 1 % (ref 0–2)
EOSINOPHIL # BLD: 0.19 K/UL (ref 0–0.44)
EOSINOPHILS RELATIVE PERCENT: 3 % (ref 1–4)
ERYTHROCYTE [DISTWIDTH] IN BLOOD BY AUTOMATED COUNT: 13 % (ref 11.8–14.4)
HCT VFR BLD AUTO: 43.8 % (ref 36.3–47.1)
HGB BLD-MCNC: 15.1 G/DL (ref 11.9–15.1)
IMM GRANULOCYTES # BLD AUTO: <0.03 K/UL (ref 0–0.3)
IMM GRANULOCYTES NFR BLD: 0 %
INR PPP: 1.3
IRON SERPL-MCNC: 109 UG/DL (ref 37–145)
LYMPHOCYTES # BLD: 35 % (ref 24–43)
LYMPHOCYTES NFR BLD: 2.5 K/UL (ref 1.1–3.7)
MCH RBC QN AUTO: 33.4 PG (ref 25.2–33.5)
MCHC RBC AUTO-ENTMCNC: 34.5 G/DL (ref 28.4–34.8)
MCV RBC AUTO: 96.9 FL (ref 82.6–102.9)
MONOCYTES NFR BLD: 0.5 K/UL (ref 0.1–1.2)
MONOCYTES NFR BLD: 7 % (ref 3–12)
NEUTROPHILS NFR BLD: 54 % (ref 36–65)
NEUTS SEG NFR BLD: 3.86 K/UL (ref 1.5–8.1)
NRBC AUTOMATED: 0 PER 100 WBC
PLATELET # BLD AUTO: 248 K/UL (ref 138–453)
PMV BLD AUTO: 9.5 FL (ref 8.1–13.5)
PROTHROMBIN TIME: 15.9 SEC (ref 11.9–14.8)
RBC # BLD AUTO: 4.52 M/UL (ref 3.95–5.11)
VIT B12 SERPL-MCNC: 863 PG/ML (ref 232–1245)
WBC OTHER # BLD: 7.1 K/UL (ref 3.5–11.3)

## 2023-05-19 PROCEDURE — 36415 COLL VENOUS BLD VENIPUNCTURE: CPT

## 2023-05-19 PROCEDURE — 85610 PROTHROMBIN TIME: CPT

## 2023-05-19 PROCEDURE — 82607 VITAMIN B-12: CPT

## 2023-05-19 PROCEDURE — 85025 COMPLETE CBC W/AUTO DIFF WBC: CPT

## 2023-05-19 PROCEDURE — 83540 ASSAY OF IRON: CPT

## 2023-05-25 ENCOUNTER — HOSPITAL ENCOUNTER (OUTPATIENT)
Age: 73
Setting detail: SPECIMEN
Discharge: HOME | End: 2023-05-25

## 2023-05-25 ENCOUNTER — OFFICE VISIT (OUTPATIENT)
Dept: NEUROLOGY | Age: 73
End: 2023-05-25
Payer: MEDICARE

## 2023-05-25 VITALS — SYSTOLIC BLOOD PRESSURE: 103 MMHG | HEART RATE: 62 BPM | DIASTOLIC BLOOD PRESSURE: 59 MMHG

## 2023-05-25 DIAGNOSIS — R25.1 TREMORS OF NERVOUS SYSTEM: Primary | ICD-10-CM

## 2023-05-25 PROCEDURE — G8427 DOCREV CUR MEDS BY ELIG CLIN: HCPCS | Performed by: NEUROMUSCULOSKELETAL MEDICINE, SPORTS MEDICINE

## 2023-05-25 PROCEDURE — G8399 PT W/DXA RESULTS DOCUMENT: HCPCS | Performed by: NEUROMUSCULOSKELETAL MEDICINE, SPORTS MEDICINE

## 2023-05-25 PROCEDURE — 1124F ACP DISCUSS-NO DSCNMKR DOCD: CPT | Performed by: NEUROMUSCULOSKELETAL MEDICINE, SPORTS MEDICINE

## 2023-05-25 PROCEDURE — 99214 OFFICE O/P EST MOD 30 MIN: CPT | Performed by: NEUROMUSCULOSKELETAL MEDICINE, SPORTS MEDICINE

## 2023-05-25 PROCEDURE — 3078F DIAST BP <80 MM HG: CPT | Performed by: NEUROMUSCULOSKELETAL MEDICINE, SPORTS MEDICINE

## 2023-05-25 PROCEDURE — 1090F PRES/ABSN URINE INCON ASSESS: CPT | Performed by: NEUROMUSCULOSKELETAL MEDICINE, SPORTS MEDICINE

## 2023-05-25 PROCEDURE — G8417 CALC BMI ABV UP PARAM F/U: HCPCS | Performed by: NEUROMUSCULOSKELETAL MEDICINE, SPORTS MEDICINE

## 2023-05-25 PROCEDURE — 99212 OFFICE O/P EST SF 10 MIN: CPT | Performed by: NEUROMUSCULOSKELETAL MEDICINE, SPORTS MEDICINE

## 2023-05-25 PROCEDURE — 3017F COLORECTAL CA SCREEN DOC REV: CPT | Performed by: NEUROMUSCULOSKELETAL MEDICINE, SPORTS MEDICINE

## 2023-05-25 PROCEDURE — 1036F TOBACCO NON-USER: CPT | Performed by: NEUROMUSCULOSKELETAL MEDICINE, SPORTS MEDICINE

## 2023-05-25 PROCEDURE — 3074F SYST BP LT 130 MM HG: CPT | Performed by: NEUROMUSCULOSKELETAL MEDICINE, SPORTS MEDICINE

## 2023-05-25 NOTE — PATIENT INSTRUCTIONS
SURVEY:    You may be receiving a survey from [facility] regarding your visit today. Please complete the survey to enable us to provide the highest quality of care to you and your family. If you cannot score us a very good on any question, please call the office to discuss how we could have made your experience a very good one. Thank you.

## 2023-05-25 NOTE — PROGRESS NOTES
daily as needed for Constipation      magnesium hydroxide (MILK OF MAGNESIA) 400 MG/5ML suspension Take 30 mLs by mouth daily as needed for Constipation      calcium carbonate 600 MG TABS tablet Take 1 tablet by mouth nightly      omeprazole (PRILOSEC) 20 MG capsule TAKE 1 CAPSULE BY MOUTH DAILY. (Patient taking differently: Take 1 capsule by mouth Daily) 30 capsule 5    guaiFENesin (MUCINEX) 600 MG extended release tablet Take 2 tablets by mouth 2 times daily PRN      loratadine (CLARITIN) 10 MG tablet Take 1 tablet by mouth daily PRN      QUEtiapine (SEROQUEL) 25 MG tablet Take 50 mg by mouth at bedtime (Patient not taking: Reported on 1/10/2023)      nitroGLYCERIN (NITROSTAT) 0.4 MG SL tablet up to max of 3 total doses. If no relief after 1 dose, call 911. (Patient not taking: Reported on 5/25/2023) 25 tablet 1    melatonin 3 MG TABS tablet Take 3 mg by mouth nightly (Patient not taking: Reported on 1/10/2023)      Dextromethorphan-guaiFENesin 5-100 MG/5ML LIQD Take 5 mLs by mouth every 12 hours as needed (cough) (Patient not taking: Reported on 5/25/2023)       No current facility-administered medications for this visit. DATA:  Lab Results   Component Value Date    WBC 7.1 05/19/2023    HGB 15.1 05/19/2023     05/19/2023    CHOL 122 02/24/2023    TRIG 99 02/24/2023    HDL 51 02/24/2023    ALT 12 01/05/2023    AST 13 01/05/2023     01/05/2023    K 3.9 01/05/2023     01/05/2023    CREATININE 1.40 (H) 01/05/2023    BUN 15 01/05/2023    CO2 26 01/05/2023    TSH 3.94 08/25/2022    INR 1.3 05/19/2023    LABA1C 5.5 05/12/2022    LABMICR Can not be calculated 01/05/2023       BP (!) 103/59 (Site: Left Upper Arm, Position: Sitting, Cuff Size: Medium Adult)   Pulse 62     NEUROLOGICAL EXAMINATION:     MENTAL STATUS:. .  Normal.     CRANIAL NERVES: II-XII are normal     MOTOR EXAMINATION: Muscle tone is normal in all the limbs. Strength is 5/5 in both upper and lower limbs.   No tremor or any other

## 2023-05-30 ENCOUNTER — HOSPITAL ENCOUNTER (OUTPATIENT)
Age: 73
Setting detail: SPECIMEN
Discharge: HOME | End: 2023-05-30

## 2023-06-01 ENCOUNTER — HOSPITAL ENCOUNTER (OUTPATIENT)
Age: 73
Setting detail: SPECIMEN
Discharge: HOME | End: 2023-06-01
Payer: MEDICARE

## 2023-06-01 LAB
ALBUMIN SERPL-MCNC: 3.5 G/DL (ref 3.5–5.2)
ALBUMIN/GLOB SERPL: 1.3 {RATIO} (ref 1–2.5)
ALP SERPL-CCNC: 72 U/L (ref 35–104)
ALT SERPL-CCNC: 12 U/L (ref 5–33)
ANION GAP SERPL CALCULATED.3IONS-SCNC: 11 MMOL/L (ref 9–17)
AST SERPL-CCNC: 12 U/L
BILIRUB SERPL-MCNC: 0.7 MG/DL (ref 0.3–1.2)
BUN SERPL-MCNC: 18 MG/DL (ref 8–23)
BUN/CREAT SERPL: 12 (ref 9–20)
CALCIUM SERPL-MCNC: 9.7 MG/DL (ref 8.6–10.4)
CHLORIDE SERPL-SCNC: 103 MMOL/L (ref 98–107)
CHOLEST SERPL-MCNC: 129 MG/DL
CHOLESTEROL/HDL RATIO: 2.3
CO2 SERPL-SCNC: 25 MMOL/L (ref 20–31)
CREAT SERPL-MCNC: 1.52 MG/DL (ref 0.5–0.9)
GFR SERPL CREATININE-BSD FRML MDRD: 36 ML/MIN/1.73M2
GLUCOSE SERPL-MCNC: 134 MG/DL (ref 70–99)
HDLC SERPL-MCNC: 55 MG/DL
LDLC SERPL CALC-MCNC: 52 MG/DL (ref 0–130)
POTASSIUM SERPL-SCNC: 3.8 MMOL/L (ref 3.7–5.3)
PROT SERPL-MCNC: 6.3 G/DL (ref 6.4–8.3)
SODIUM SERPL-SCNC: 139 MMOL/L (ref 135–144)
TRIGL SERPL-MCNC: 109 MG/DL
TSH SERPL-MCNC: 1.18 UIU/ML (ref 0.3–5)

## 2023-06-01 PROCEDURE — 83036 HEMOGLOBIN GLYCOSYLATED A1C: CPT

## 2023-06-01 PROCEDURE — 84443 ASSAY THYROID STIM HORMONE: CPT

## 2023-06-01 PROCEDURE — 80053 COMPREHEN METABOLIC PANEL: CPT

## 2023-06-01 PROCEDURE — 36415 COLL VENOUS BLD VENIPUNCTURE: CPT

## 2023-06-01 PROCEDURE — 80061 LIPID PANEL: CPT

## 2023-06-01 PROCEDURE — P9604 ONE-WAY ALLOW PRORATED TRIP: HCPCS

## 2023-06-02 LAB
EST. AVERAGE GLUCOSE BLD GHB EST-MCNC: 108 MG/DL
HBA1C MFR BLD: 5.4 % (ref 4–6)

## 2023-06-08 ENCOUNTER — HOSPITAL ENCOUNTER (OUTPATIENT)
Age: 73
Setting detail: SPECIMEN
Discharge: HOME | End: 2023-06-08
Payer: MEDICARE

## 2023-06-08 LAB
ALBUMIN SERPL-MCNC: 4.1 G/DL (ref 3.5–5.2)
ALBUMIN/GLOB SERPL: 1.3 {RATIO} (ref 1–2.5)
ALP SERPL-CCNC: 84 U/L (ref 35–104)
ALT SERPL-CCNC: 15 U/L (ref 5–33)
ANION GAP SERPL CALCULATED.3IONS-SCNC: 13 MMOL/L (ref 9–17)
AST SERPL-CCNC: 16 U/L
BASOPHILS # BLD: 0.05 K/UL (ref 0–0.2)
BASOPHILS NFR BLD: 1 % (ref 0–2)
BILIRUB SERPL-MCNC: 0.7 MG/DL (ref 0.3–1.2)
BUN SERPL-MCNC: 17 MG/DL (ref 8–23)
BUN/CREAT SERPL: 11 (ref 9–20)
CALCIUM SERPL-MCNC: 10.1 MG/DL (ref 8.6–10.4)
CHLORIDE SERPL-SCNC: 100 MMOL/L (ref 98–107)
CO2 SERPL-SCNC: 25 MMOL/L (ref 20–31)
CREAT SERPL-MCNC: 1.49 MG/DL (ref 0.5–0.9)
EOSINOPHIL # BLD: 0.27 K/UL (ref 0–0.44)
EOSINOPHILS RELATIVE PERCENT: 3 % (ref 1–4)
ERYTHROCYTE [DISTWIDTH] IN BLOOD BY AUTOMATED COUNT: 13.2 % (ref 11.8–14.4)
GFR SERPL CREATININE-BSD FRML MDRD: 37 ML/MIN/1.73M2
GLUCOSE SERPL-MCNC: 101 MG/DL (ref 70–99)
HCT VFR BLD AUTO: 45.7 % (ref 36.3–47.1)
HGB BLD-MCNC: 15.1 G/DL (ref 11.9–15.1)
IMM GRANULOCYTES # BLD AUTO: <0.03 K/UL (ref 0–0.3)
IMM GRANULOCYTES NFR BLD: 0 %
INR PPP: 1.2
LYMPHOCYTES # BLD: 32 % (ref 24–43)
LYMPHOCYTES NFR BLD: 2.8 K/UL (ref 1.1–3.7)
MCH RBC QN AUTO: 32.7 PG (ref 25.2–33.5)
MCHC RBC AUTO-ENTMCNC: 33 G/DL (ref 28.4–34.8)
MCV RBC AUTO: 98.9 FL (ref 82.6–102.9)
MONOCYTES NFR BLD: 0.71 K/UL (ref 0.1–1.2)
MONOCYTES NFR BLD: 8 % (ref 3–12)
NEUTROPHILS NFR BLD: 56 % (ref 36–65)
NEUTS SEG NFR BLD: 4.84 K/UL (ref 1.5–8.1)
NRBC AUTOMATED: 0 PER 100 WBC
PLATELET # BLD AUTO: 275 K/UL (ref 138–453)
PMV BLD AUTO: 9.9 FL (ref 8.1–13.5)
POTASSIUM SERPL-SCNC: 4.2 MMOL/L (ref 3.7–5.3)
PROT SERPL-MCNC: 7.3 G/DL (ref 6.4–8.3)
PROTHROMBIN TIME: 15.3 SEC (ref 11.9–14.8)
RBC # BLD AUTO: 4.62 M/UL (ref 3.95–5.11)
SODIUM SERPL-SCNC: 138 MMOL/L (ref 135–144)
WBC OTHER # BLD: 8.7 K/UL (ref 3.5–11.3)

## 2023-06-08 PROCEDURE — 85610 PROTHROMBIN TIME: CPT

## 2023-06-08 PROCEDURE — 36415 COLL VENOUS BLD VENIPUNCTURE: CPT

## 2023-06-08 PROCEDURE — 80053 COMPREHEN METABOLIC PANEL: CPT

## 2023-06-08 PROCEDURE — 85027 COMPLETE CBC AUTOMATED: CPT

## 2023-06-19 ENCOUNTER — HOSPITAL ENCOUNTER (OUTPATIENT)
Age: 73
Setting detail: SPECIMEN
Discharge: HOME | End: 2023-06-19
Payer: MEDICARE

## 2023-06-19 DIAGNOSIS — I10 ESSENTIAL HYPERTENSION: ICD-10-CM

## 2023-06-19 DIAGNOSIS — N18.32 STAGE 3B CHRONIC KIDNEY DISEASE (HCC): ICD-10-CM

## 2023-06-19 DIAGNOSIS — N28.1 RENAL CYST: ICD-10-CM

## 2023-06-19 LAB
ANION GAP SERPL CALCULATED.3IONS-SCNC: 12 MMOL/L (ref 9–17)
BUN SERPL-MCNC: 16 MG/DL (ref 8–23)
BUN/CREAT SERPL: 11 (ref 9–20)
CALCIUM SERPL-MCNC: 9.4 MG/DL (ref 8.6–10.4)
CHLORIDE SERPL-SCNC: 101 MMOL/L (ref 98–107)
CO2 SERPL-SCNC: 26 MMOL/L (ref 20–31)
CREAT SERPL-MCNC: 1.45 MG/DL (ref 0.5–0.9)
GFR SERPL CREATININE-BSD FRML MDRD: 38 ML/MIN/1.73M2
GLUCOSE SERPL-MCNC: 115 MG/DL (ref 70–99)
POTASSIUM SERPL-SCNC: 4.2 MMOL/L (ref 3.7–5.3)
SODIUM SERPL-SCNC: 139 MMOL/L (ref 135–144)

## 2023-06-19 PROCEDURE — P9603 ONE-WAY ALLOW PRORATED MILES: HCPCS

## 2023-06-19 PROCEDURE — 80048 BASIC METABOLIC PNL TOTAL CA: CPT

## 2023-06-19 PROCEDURE — 36415 COLL VENOUS BLD VENIPUNCTURE: CPT

## 2023-07-31 PROBLEM — N18.30 STAGE 3 CHRONIC KIDNEY DISEASE (HCC): Status: ACTIVE | Noted: 2022-01-13

## 2023-08-08 ENCOUNTER — OFFICE VISIT (OUTPATIENT)
Dept: NEPHROLOGY | Age: 73
End: 2023-08-08
Payer: MEDICARE

## 2023-08-08 VITALS
TEMPERATURE: 97.8 F | HEIGHT: 66 IN | BODY MASS INDEX: 35.63 KG/M2 | DIASTOLIC BLOOD PRESSURE: 64 MMHG | RESPIRATION RATE: 18 BRPM | HEART RATE: 63 BPM | SYSTOLIC BLOOD PRESSURE: 100 MMHG | WEIGHT: 221.7 LBS

## 2023-08-08 DIAGNOSIS — N18.32 STAGE 3B CHRONIC KIDNEY DISEASE (HCC): Primary | ICD-10-CM

## 2023-08-08 DIAGNOSIS — I10 ESSENTIAL HYPERTENSION: ICD-10-CM

## 2023-08-08 DIAGNOSIS — N28.1 RENAL CYST: ICD-10-CM

## 2023-08-08 DIAGNOSIS — I50.22 CHRONIC SYSTOLIC (CONGESTIVE) HEART FAILURE (HCC): ICD-10-CM

## 2023-08-08 PROCEDURE — G8399 PT W/DXA RESULTS DOCUMENT: HCPCS | Performed by: INTERNAL MEDICINE

## 2023-08-08 PROCEDURE — 3074F SYST BP LT 130 MM HG: CPT | Performed by: INTERNAL MEDICINE

## 2023-08-08 PROCEDURE — 3078F DIAST BP <80 MM HG: CPT | Performed by: INTERNAL MEDICINE

## 2023-08-08 PROCEDURE — G8427 DOCREV CUR MEDS BY ELIG CLIN: HCPCS | Performed by: INTERNAL MEDICINE

## 2023-08-08 PROCEDURE — 1124F ACP DISCUSS-NO DSCNMKR DOCD: CPT | Performed by: INTERNAL MEDICINE

## 2023-08-08 PROCEDURE — 3017F COLORECTAL CA SCREEN DOC REV: CPT | Performed by: INTERNAL MEDICINE

## 2023-08-08 PROCEDURE — 99214 OFFICE O/P EST MOD 30 MIN: CPT | Performed by: INTERNAL MEDICINE

## 2023-08-08 PROCEDURE — 99213 OFFICE O/P EST LOW 20 MIN: CPT | Performed by: INTERNAL MEDICINE

## 2023-08-08 PROCEDURE — 1090F PRES/ABSN URINE INCON ASSESS: CPT | Performed by: INTERNAL MEDICINE

## 2023-08-08 PROCEDURE — 1036F TOBACCO NON-USER: CPT | Performed by: INTERNAL MEDICINE

## 2023-08-08 PROCEDURE — G8417 CALC BMI ABV UP PARAM F/U: HCPCS | Performed by: INTERNAL MEDICINE

## 2023-08-08 RX ORDER — DEXTROMETHORPHAN POLISTIREX 30 MG/5ML
60 SUSPENSION ORAL 2 TIMES DAILY PRN
COMMUNITY

## 2023-08-08 RX ORDER — DIVALPROEX SODIUM 125 MG/1
375 TABLET, DELAYED RELEASE ORAL NIGHTLY
COMMUNITY

## 2023-08-08 NOTE — PATIENT INSTRUCTIONS
SURVEY:    You may be receiving a survey from [employer] regarding your visit today. Please complete the survey to enable us to provide the highest quality of care to you and your family. If you cannot score us a very good on any question, please call the office to discuss how we could have made your experience a very good one. Thank you.

## 2023-08-14 NOTE — TELEPHONE ENCOUNTER
----- Message from Feliciano Huerta MD sent at 12/20/2022  4:32 PM EST -----  Let the pt know that the kidney US scanis normal Area M Indication Text: Tumors in this location are included in Area M (cheek, forehead, scalp, neck, jawline and pretibial skin).  Mohs surgery is indicated for tumors in these anatomic locations.

## 2023-08-15 ENCOUNTER — APPOINTMENT (OUTPATIENT)
Dept: GENERAL RADIOLOGY | Age: 73
End: 2023-08-15
Attending: EMERGENCY MEDICINE
Payer: MEDICARE

## 2023-08-15 ENCOUNTER — APPOINTMENT (OUTPATIENT)
Dept: CT IMAGING | Age: 73
End: 2023-08-15
Attending: EMERGENCY MEDICINE
Payer: MEDICARE

## 2023-08-15 ENCOUNTER — HOSPITAL ENCOUNTER (OUTPATIENT)
Age: 73
Setting detail: OBSERVATION
Discharge: NURSING, REHAB OR LONG-TERM CARE | End: 2023-08-17
Attending: EMERGENCY MEDICINE | Admitting: INTERNAL MEDICINE
Payer: MEDICARE

## 2023-08-15 DIAGNOSIS — S09.90XA CLOSED HEAD INJURY, INITIAL ENCOUNTER: ICD-10-CM

## 2023-08-15 DIAGNOSIS — R55 SYNCOPE AND COLLAPSE: Primary | ICD-10-CM

## 2023-08-15 LAB
ALBUMIN SERPL-MCNC: 3.9 G/DL (ref 3.5–5.2)
ALBUMIN/GLOB SERPL: 1.4 {RATIO} (ref 1–2.5)
ALP SERPL-CCNC: 66 U/L (ref 35–104)
ALT SERPL-CCNC: 8 U/L (ref 5–33)
ANION GAP SERPL CALCULATED.3IONS-SCNC: 11 MMOL/L (ref 9–17)
AST SERPL-CCNC: 12 U/L
BACTERIA URNS QL MICRO: ABNORMAL
BASOPHILS # BLD: 0.05 K/UL (ref 0–0.2)
BASOPHILS NFR BLD: 1 % (ref 0–2)
BILIRUB SERPL-MCNC: 0.7 MG/DL (ref 0.3–1.2)
BILIRUB UR QL STRIP: NEGATIVE
BUN SERPL-MCNC: 21 MG/DL (ref 8–23)
BUN/CREAT SERPL: 11 (ref 9–20)
CALCIUM SERPL-MCNC: 9.8 MG/DL (ref 8.6–10.4)
CHLORIDE SERPL-SCNC: 96 MMOL/L (ref 98–107)
CLARITY UR: CLEAR
CO2 SERPL-SCNC: 27 MMOL/L (ref 20–31)
COLOR UR: YELLOW
CREAT SERPL-MCNC: 1.9 MG/DL (ref 0.5–0.9)
DATE LAST DOSE: ABNORMAL
EOSINOPHIL # BLD: 0.13 K/UL (ref 0–0.44)
EOSINOPHILS RELATIVE PERCENT: 1 % (ref 1–4)
EPI CELLS #/AREA URNS HPF: ABNORMAL /HPF (ref 0–25)
ERYTHROCYTE [DISTWIDTH] IN BLOOD BY AUTOMATED COUNT: 13.1 % (ref 11.8–14.4)
GFR SERPL CREATININE-BSD FRML MDRD: 27 ML/MIN/1.73M2
GLUCOSE SERPL-MCNC: 106 MG/DL (ref 70–99)
GLUCOSE UR STRIP-MCNC: ABNORMAL MG/DL
HCT VFR BLD AUTO: 40 % (ref 36.3–47.1)
HGB BLD-MCNC: 13.4 G/DL (ref 11.9–15.1)
HGB UR QL STRIP.AUTO: ABNORMAL
IMM GRANULOCYTES # BLD AUTO: <0.03 K/UL (ref 0–0.3)
IMM GRANULOCYTES NFR BLD: 0 %
KETONES UR STRIP-MCNC: NEGATIVE MG/DL
LACTATE BLDV-SCNC: 1.8 MMOL/L (ref 0.5–2.2)
LEUKOCYTE ESTERASE UR QL STRIP: ABNORMAL
LYMPHOCYTES NFR BLD: 1.97 K/UL (ref 1.1–3.7)
LYMPHOCYTES RELATIVE PERCENT: 21 % (ref 24–43)
MAGNESIUM SERPL-MCNC: 2 MG/DL (ref 1.6–2.6)
MCH RBC QN AUTO: 33.5 PG (ref 25.2–33.5)
MCHC RBC AUTO-ENTMCNC: 33.5 G/DL (ref 28.4–34.8)
MCV RBC AUTO: 100 FL (ref 82.6–102.9)
MONOCYTES NFR BLD: 0.72 K/UL (ref 0.1–1.2)
MONOCYTES NFR BLD: 8 % (ref 3–12)
NEUTROPHILS NFR BLD: 69 % (ref 36–65)
NEUTS SEG NFR BLD: 6.32 K/UL (ref 1.5–8.1)
NITRITE UR QL STRIP: NEGATIVE
NRBC BLD-RTO: 0 PER 100 WBC
PH UR STRIP: 7.5 [PH] (ref 5–9)
PLATELET # BLD AUTO: 260 K/UL (ref 138–453)
PMV BLD AUTO: 9.2 FL (ref 8.1–13.5)
POTASSIUM SERPL-SCNC: 4.1 MMOL/L (ref 3.7–5.3)
PROT SERPL-MCNC: 6.6 G/DL (ref 6.4–8.3)
PROT UR STRIP-MCNC: NEGATIVE MG/DL
RBC # BLD AUTO: 4 M/UL (ref 3.95–5.11)
RBC #/AREA URNS HPF: ABNORMAL /HPF (ref 0–2)
SODIUM SERPL-SCNC: 134 MMOL/L (ref 135–144)
SP GR UR STRIP: <1.005 (ref 1.01–1.02)
TME LAST DOSE: ABNORMAL H
TROPONIN I SERPL HS-MCNC: 27 NG/L (ref 0–14)
TROPONIN I SERPL HS-MCNC: 32 NG/L (ref 0–14)
UROBILINOGEN UR STRIP-ACNC: NORMAL EU/DL (ref 0–1)
VALPROATE SERPL-MCNC: 32 UG/ML (ref 50–125)
VANCOMYCIN DOSE: ABNORMAL MG
WBC #/AREA URNS HPF: ABNORMAL /HPF (ref 0–5)
WBC OTHER # BLD: 9.2 K/UL (ref 3.5–11.3)

## 2023-08-15 PROCEDURE — 84439 ASSAY OF FREE THYROXINE: CPT

## 2023-08-15 PROCEDURE — 87186 SC STD MICRODIL/AGAR DIL: CPT

## 2023-08-15 PROCEDURE — 87077 CULTURE AEROBIC IDENTIFY: CPT

## 2023-08-15 PROCEDURE — 80053 COMPREHEN METABOLIC PANEL: CPT

## 2023-08-15 PROCEDURE — 86403 PARTICLE AGGLUT ANTBDY SCRN: CPT

## 2023-08-15 PROCEDURE — 99285 EMERGENCY DEPT VISIT HI MDM: CPT

## 2023-08-15 PROCEDURE — 84443 ASSAY THYROID STIM HORMONE: CPT

## 2023-08-15 PROCEDURE — 93005 ELECTROCARDIOGRAM TRACING: CPT | Performed by: EMERGENCY MEDICINE

## 2023-08-15 PROCEDURE — 70450 CT HEAD/BRAIN W/O DYE: CPT

## 2023-08-15 PROCEDURE — 36415 COLL VENOUS BLD VENIPUNCTURE: CPT

## 2023-08-15 PROCEDURE — 83735 ASSAY OF MAGNESIUM: CPT

## 2023-08-15 PROCEDURE — 81001 URINALYSIS AUTO W/SCOPE: CPT

## 2023-08-15 PROCEDURE — G0378 HOSPITAL OBSERVATION PER HR: HCPCS

## 2023-08-15 PROCEDURE — 73562 X-RAY EXAM OF KNEE 3: CPT

## 2023-08-15 PROCEDURE — 87086 URINE CULTURE/COLONY COUNT: CPT

## 2023-08-15 PROCEDURE — 83605 ASSAY OF LACTIC ACID: CPT

## 2023-08-15 PROCEDURE — 80164 ASSAY DIPROPYLACETIC ACD TOT: CPT

## 2023-08-15 PROCEDURE — 94761 N-INVAS EAR/PLS OXIMETRY MLT: CPT

## 2023-08-15 PROCEDURE — 72125 CT NECK SPINE W/O DYE: CPT

## 2023-08-15 PROCEDURE — 84484 ASSAY OF TROPONIN QUANT: CPT

## 2023-08-15 PROCEDURE — 85025 COMPLETE CBC W/AUTO DIFF WBC: CPT

## 2023-08-15 RX ORDER — SODIUM CHLORIDE 9 MG/ML
INJECTION, SOLUTION INTRAVENOUS CONTINUOUS
Status: DISCONTINUED | OUTPATIENT
Start: 2023-08-15 | End: 2023-08-15

## 2023-08-15 RX ORDER — CLOPIDOGREL BISULFATE 75 MG/1
75 TABLET ORAL DAILY
Status: DISCONTINUED | OUTPATIENT
Start: 2023-08-16 | End: 2023-08-17 | Stop reason: HOSPADM

## 2023-08-15 RX ORDER — SODIUM CHLORIDE 0.9 % (FLUSH) 0.9 %
5-40 SYRINGE (ML) INJECTION EVERY 12 HOURS SCHEDULED
Status: DISCONTINUED | OUTPATIENT
Start: 2023-08-15 | End: 2023-08-17 | Stop reason: HOSPADM

## 2023-08-15 RX ORDER — ONDANSETRON 2 MG/ML
4 INJECTION INTRAMUSCULAR; INTRAVENOUS EVERY 6 HOURS PRN
Status: DISCONTINUED | OUTPATIENT
Start: 2023-08-15 | End: 2023-08-17 | Stop reason: HOSPADM

## 2023-08-15 RX ORDER — CLONIDINE HYDROCHLORIDE 0.1 MG/1
0.1 TABLET ORAL 3 TIMES DAILY
Status: DISCONTINUED | OUTPATIENT
Start: 2023-08-15 | End: 2023-08-16

## 2023-08-15 RX ORDER — POLYVINYL ALCOHOL 14 MG/ML
1 SOLUTION/ DROPS OPHTHALMIC 4 TIMES DAILY
Status: DISCONTINUED | OUTPATIENT
Start: 2023-08-15 | End: 2023-08-17 | Stop reason: HOSPADM

## 2023-08-15 RX ORDER — SODIUM CHLORIDE 9 MG/ML
INJECTION, SOLUTION INTRAVENOUS PRN
Status: DISCONTINUED | OUTPATIENT
Start: 2023-08-15 | End: 2023-08-17 | Stop reason: HOSPADM

## 2023-08-15 RX ORDER — DIVALPROEX SODIUM 125 MG/1
125 TABLET, DELAYED RELEASE ORAL DAILY
Status: DISCONTINUED | OUTPATIENT
Start: 2023-08-16 | End: 2023-08-17 | Stop reason: HOSPADM

## 2023-08-15 RX ORDER — AMANTADINE HYDROCHLORIDE 100 MG/1
100 CAPSULE, GELATIN COATED ORAL 2 TIMES DAILY
Status: DISCONTINUED | OUTPATIENT
Start: 2023-08-15 | End: 2023-08-17 | Stop reason: HOSPADM

## 2023-08-15 RX ORDER — ONDANSETRON 4 MG/1
4 TABLET, ORALLY DISINTEGRATING ORAL EVERY 8 HOURS PRN
Status: DISCONTINUED | OUTPATIENT
Start: 2023-08-15 | End: 2023-08-17 | Stop reason: HOSPADM

## 2023-08-15 RX ORDER — ACETAMINOPHEN 650 MG/1
650 SUPPOSITORY RECTAL EVERY 6 HOURS PRN
Status: DISCONTINUED | OUTPATIENT
Start: 2023-08-15 | End: 2023-08-17 | Stop reason: HOSPADM

## 2023-08-15 RX ORDER — ACETAMINOPHEN 325 MG/1
650 TABLET ORAL EVERY 6 HOURS PRN
Status: DISCONTINUED | OUTPATIENT
Start: 2023-08-15 | End: 2023-08-17 | Stop reason: HOSPADM

## 2023-08-15 RX ORDER — POLYETHYLENE GLYCOL 3350 17 G/17G
17 POWDER, FOR SOLUTION ORAL DAILY PRN
Status: DISCONTINUED | OUTPATIENT
Start: 2023-08-15 | End: 2023-08-17 | Stop reason: HOSPADM

## 2023-08-15 RX ORDER — METOPROLOL SUCCINATE 25 MG/1
25 TABLET, EXTENDED RELEASE ORAL DAILY
Status: DISCONTINUED | OUTPATIENT
Start: 2023-08-16 | End: 2023-08-17 | Stop reason: HOSPADM

## 2023-08-15 RX ORDER — LATANOPROST 50 UG/ML
1 SOLUTION/ DROPS OPHTHALMIC NIGHTLY
Status: DISCONTINUED | OUTPATIENT
Start: 2023-08-15 | End: 2023-08-17 | Stop reason: HOSPADM

## 2023-08-15 RX ORDER — LEVOTHYROXINE SODIUM 0.05 MG/1
50 TABLET ORAL DAILY
Status: DISCONTINUED | OUTPATIENT
Start: 2023-08-16 | End: 2023-08-17 | Stop reason: HOSPADM

## 2023-08-15 RX ORDER — ATORVASTATIN CALCIUM 20 MG/1
20 TABLET, FILM COATED ORAL DAILY
Status: DISCONTINUED | OUTPATIENT
Start: 2023-08-15 | End: 2023-08-17 | Stop reason: HOSPADM

## 2023-08-15 RX ORDER — TRIHEXYPHENIDYL HYDROCHLORIDE 2 MG/1
2 TABLET ORAL 3 TIMES DAILY
Status: DISCONTINUED | OUTPATIENT
Start: 2023-08-15 | End: 2023-08-17 | Stop reason: HOSPADM

## 2023-08-15 RX ORDER — AMLODIPINE BESYLATE 10 MG/1
10 TABLET ORAL DAILY
Status: DISCONTINUED | OUTPATIENT
Start: 2023-08-16 | End: 2023-08-16

## 2023-08-15 RX ORDER — LOSARTAN POTASSIUM 50 MG/1
50 TABLET ORAL DAILY
Status: DISCONTINUED | OUTPATIENT
Start: 2023-08-16 | End: 2023-08-17 | Stop reason: HOSPADM

## 2023-08-15 RX ORDER — SODIUM CHLORIDE 0.9 % (FLUSH) 0.9 %
5-40 SYRINGE (ML) INJECTION PRN
Status: DISCONTINUED | OUTPATIENT
Start: 2023-08-15 | End: 2023-08-17 | Stop reason: HOSPADM

## 2023-08-15 ASSESSMENT — PAIN DESCRIPTION - PAIN TYPE: TYPE: CHRONIC PAIN

## 2023-08-15 ASSESSMENT — PAIN - FUNCTIONAL ASSESSMENT: PAIN_FUNCTIONAL_ASSESSMENT: 0-10

## 2023-08-15 ASSESSMENT — PAIN DESCRIPTION - LOCATION: LOCATION: KNEE

## 2023-08-15 ASSESSMENT — PAIN SCALES - GENERAL
PAINLEVEL_OUTOF10: 10
PAINLEVEL_OUTOF10: 0

## 2023-08-15 ASSESSMENT — PAIN DESCRIPTION - DESCRIPTORS: DESCRIPTORS: DISCOMFORT

## 2023-08-15 ASSESSMENT — PAIN DESCRIPTION - ORIENTATION: ORIENTATION: LEFT

## 2023-08-15 NOTE — ED PROVIDER NOTES
times daily    ATORVASTATIN (LIPITOR) 20 MG TABLET    Take 1 tablet by mouth daily    BISACODYL (DULCOLAX) 10 MG SUPPOSITORY    Place 1 suppository rectally daily as needed for Constipation    CALCIUM CARBONATE 600 MG TABS TABLET    Take 1 tablet by mouth nightly    CHLORHEXIDINE (PERIDEX) 0.12 % SOLUTION    Take 15 mLs by mouth 2 times daily    CLONIDINE (CATAPRES) 0.1 MG TABLET    Take 1 tablet by mouth 3 times daily    CLOPIDOGREL (PLAVIX) 75 MG TABLET    Take 1 tablet by mouth daily    DAPAGLIFLOZIN (FARXIGA) 10 MG TABLET    Take 1 tablet by mouth every morning    DEXTROMETHORPHAN (DELSYM) 30 MG/5ML EXTENDED RELEASE LIQUID    Take 10 mLs by mouth 2 times daily as needed for Cough    DEXTROMETHORPHAN-GUAIFENESIN 5-100 MG/5ML LIQD    Take 5 mLs by mouth every 12 hours as needed (cough)    DIVALPROEX (DEPAKOTE) 125 MG DR TABLET    Take 1 tablet by mouth daily    DIVALPROEX (DEPAKOTE) 125 MG DR TABLET    Take 3 tablets by mouth at bedtime    FUROSEMIDE (LASIX) 40 MG TABLET    Take 1 tablet by mouth 2 times daily for 14 days    GUAIFENESIN (MUCINEX) 600 MG EXTENDED RELEASE TABLET    Take 2 tablets by mouth 2 times daily PRN    LATANOPROST (XALATAN) 0.005 % OPHTHALMIC SOLUTION    Place 1 drop into the right eye nightly    LEVOTHYROXINE (SYNTHROID) 50 MCG TABLET    Take 1 tablet by mouth Daily    LOPERAMIDE (IMODIUM) 2 MG CAPSULE    Take 2 capsules by mouth 4 times daily as needed for Diarrhea Every 4 hours    LORATADINE (CLARITIN) 10 MG TABLET    Take 1 tablet by mouth daily PRN    LOSARTAN (COZAAR) 50 MG TABLET    Take 1 tablet by mouth daily    MAGNESIUM HYDROXIDE (MILK OF MAGNESIA) 400 MG/5ML SUSPENSION    Take 30 mLs by mouth daily as needed for Constipation    METOPROLOL SUCCINATE (TOPROL XL) 25 MG EXTENDED RELEASE TABLET    TAKE 1 TABLET BY MOUTH DAILY. NITROGLYCERIN (NITROSTAT) 0.4 MG SL TABLET    up to max of 3 total doses. If no relief after 1 dose, call 911.     NYSTATIN (MYCOSTATIN) [id number] UNIT/GM POWDER Creatinine 1.9 (*)     Est, Glom Filt Rate 27 (*)     Sodium 134 (*)     Chloride 96 (*)     All other components within normal limits   TROPONIN - Abnormal; Notable for the following components:    Troponin, High Sensitivity 32 (*)     All other components within normal limits   URINALYSIS WITH MICROSCOPIC - Abnormal; Notable for the following components:    Glucose, Ur TRACE (*)     Specific Gravity, UA <1.005 (*)     Urine Hgb TRACE (*)     Leukocyte Esterase, Urine LARGE (*)     Bacteria, UA 1+ (*)     All other components within normal limits   VALPROIC ACID LEVEL, TOTAL - Abnormal; Notable for the following components:    Valproic Acid Lvl 32 (*)     All other components within normal limits   TROPONIN - Abnormal; Notable for the following components:    Troponin, High Sensitivity 27 (*)     All other components within normal limits   CULTURE, URINE   MAGNESIUM   LACTIC ACID       All other labs were within normal range or not returned as of this dictation. EMERGENCY DEPARTMENT COURSE and DIFFERENTIAL DIAGNOSIS/MDM:     Patient had a syncopal episode with resultant injury to the bilateral knees and to the head. Physical examination demonstrates a hematoma on the right side of the forehead. Bruising and tenderness to the bilateral knees without any definite suggestion of fracture. Extremity exam was otherwise unremarkable. Neurologic exam demonstrates no findings of concern. CT of the head and cervical spine unremarkable for any acute process. X-rays of the bilateral knees demonstrate no evident fracture. Labs show an elevated troponin, slightly elevated from her baseline. Otherwise, no acute process. UA ordered but not available at this time. She is in atrial fibrillation and has a history of the same. Uncertain of the cause of syncope. Concerned that this may have been secondary to her dysrhythmia. Thus, admitted to the hospitalist service under Dr. Neo Schwartz.   Discussed with the

## 2023-08-16 PROBLEM — E44.0 MODERATE MALNUTRITION (HCC): Status: ACTIVE | Noted: 2017-01-19

## 2023-08-16 LAB
ANION GAP SERPL CALCULATED.3IONS-SCNC: 10 MMOL/L (ref 9–17)
BASOPHILS # BLD: 0.06 K/UL (ref 0–0.2)
BASOPHILS NFR BLD: 1 % (ref 0–2)
BUN SERPL-MCNC: 17 MG/DL (ref 8–23)
BUN/CREAT SERPL: 12 (ref 9–20)
CALCIUM SERPL-MCNC: 9.1 MG/DL (ref 8.6–10.4)
CHLORIDE SERPL-SCNC: 103 MMOL/L (ref 98–107)
CO2 SERPL-SCNC: 26 MMOL/L (ref 20–31)
CREAT SERPL-MCNC: 1.4 MG/DL (ref 0.5–0.9)
EKG ATRIAL RATE: 277 BPM
EKG Q-T INTERVAL: 408 MS
EKG QRS DURATION: 88 MS
EKG QTC CALCULATION (BAZETT): 455 MS
EKG R AXIS: -3 DEGREES
EKG T AXIS: -45 DEGREES
EKG VENTRICULAR RATE: 75 BPM
EOSINOPHIL # BLD: 0.22 K/UL (ref 0–0.44)
EOSINOPHILS RELATIVE PERCENT: 3 % (ref 1–4)
ERYTHROCYTE [DISTWIDTH] IN BLOOD BY AUTOMATED COUNT: 13.1 % (ref 11.8–14.4)
GFR SERPL CREATININE-BSD FRML MDRD: 39 ML/MIN/1.73M2
GLUCOSE SERPL-MCNC: 76 MG/DL (ref 70–99)
HCT VFR BLD AUTO: 39.2 % (ref 36.3–47.1)
HGB BLD-MCNC: 13.3 G/DL (ref 11.9–15.1)
IMM GRANULOCYTES # BLD AUTO: <0.03 K/UL (ref 0–0.3)
IMM GRANULOCYTES NFR BLD: 0 %
LYMPHOCYTES NFR BLD: 2.31 K/UL (ref 1.1–3.7)
LYMPHOCYTES RELATIVE PERCENT: 27 % (ref 24–43)
MCH RBC QN AUTO: 33.2 PG (ref 25.2–33.5)
MCHC RBC AUTO-ENTMCNC: 33.9 G/DL (ref 28.4–34.8)
MCV RBC AUTO: 97.8 FL (ref 82.6–102.9)
MONOCYTES NFR BLD: 0.85 K/UL (ref 0.1–1.2)
MONOCYTES NFR BLD: 10 % (ref 3–12)
NEUTROPHILS NFR BLD: 59 % (ref 36–65)
NEUTS SEG NFR BLD: 5.13 K/UL (ref 1.5–8.1)
NRBC BLD-RTO: 0 PER 100 WBC
PLATELET # BLD AUTO: 238 K/UL (ref 138–453)
PMV BLD AUTO: 9.5 FL (ref 8.1–13.5)
POTASSIUM SERPL-SCNC: 4.1 MMOL/L (ref 3.7–5.3)
RBC # BLD AUTO: 4.01 M/UL (ref 3.95–5.11)
SODIUM SERPL-SCNC: 139 MMOL/L (ref 135–144)
T4 FREE SERPL-MCNC: 1.5 NG/DL (ref 0.9–1.7)
TROPONIN I SERPL HS-MCNC: 31 NG/L (ref 0–14)
TSH SERPL DL<=0.05 MIU/L-ACNC: 2.13 UIU/ML (ref 0.3–5)
WBC OTHER # BLD: 8.6 K/UL (ref 3.5–11.3)

## 2023-08-16 PROCEDURE — 6370000000 HC RX 637 (ALT 250 FOR IP): Performed by: INTERNAL MEDICINE

## 2023-08-16 PROCEDURE — 93010 ELECTROCARDIOGRAM REPORT: CPT | Performed by: INTERNAL MEDICINE

## 2023-08-16 PROCEDURE — 84484 ASSAY OF TROPONIN QUANT: CPT

## 2023-08-16 PROCEDURE — 6370000000 HC RX 637 (ALT 250 FOR IP)

## 2023-08-16 PROCEDURE — 97162 PT EVAL MOD COMPLEX 30 MIN: CPT

## 2023-08-16 PROCEDURE — 97116 GAIT TRAINING THERAPY: CPT

## 2023-08-16 PROCEDURE — G0378 HOSPITAL OBSERVATION PER HR: HCPCS

## 2023-08-16 PROCEDURE — 85025 COMPLETE CBC W/AUTO DIFF WBC: CPT

## 2023-08-16 PROCEDURE — 97166 OT EVAL MOD COMPLEX 45 MIN: CPT

## 2023-08-16 PROCEDURE — 97110 THERAPEUTIC EXERCISES: CPT

## 2023-08-16 PROCEDURE — 80164 ASSAY DIPROPYLACETIC ACD TOT: CPT

## 2023-08-16 PROCEDURE — 36415 COLL VENOUS BLD VENIPUNCTURE: CPT

## 2023-08-16 PROCEDURE — 80165 DIPROPYLACETIC ACID FREE: CPT

## 2023-08-16 PROCEDURE — 2580000003 HC RX 258: Performed by: INTERNAL MEDICINE

## 2023-08-16 PROCEDURE — 80048 BASIC METABOLIC PNL TOTAL CA: CPT

## 2023-08-16 PROCEDURE — 97530 THERAPEUTIC ACTIVITIES: CPT

## 2023-08-16 PROCEDURE — 94761 N-INVAS EAR/PLS OXIMETRY MLT: CPT

## 2023-08-16 RX ORDER — TRAMADOL HYDROCHLORIDE 50 MG/1
50 TABLET ORAL EVERY 6 HOURS PRN
Status: DISCONTINUED | OUTPATIENT
Start: 2023-08-16 | End: 2023-08-17 | Stop reason: HOSPADM

## 2023-08-16 RX ORDER — CLONIDINE 0.1 MG/24H
1 PATCH, EXTENDED RELEASE TRANSDERMAL WEEKLY
Status: DISCONTINUED | OUTPATIENT
Start: 2023-08-16 | End: 2023-08-17 | Stop reason: HOSPADM

## 2023-08-16 RX ADMIN — CLONIDINE HYDROCHLORIDE 0.1 MG: 0.1 TABLET ORAL at 00:54

## 2023-08-16 RX ADMIN — LEVOTHYROXINE SODIUM 50 MCG: 0.05 TABLET ORAL at 07:46

## 2023-08-16 RX ADMIN — APIXABAN 5 MG: 5 TABLET, FILM COATED ORAL at 20:52

## 2023-08-16 RX ADMIN — APIXABAN 5 MG: 5 TABLET, FILM COATED ORAL at 09:30

## 2023-08-16 RX ADMIN — AMANTADINE HYDROCHLORIDE 100 MG: 100 CAPSULE ORAL at 20:52

## 2023-08-16 RX ADMIN — AMANTADINE HYDROCHLORIDE 100 MG: 100 CAPSULE ORAL at 00:52

## 2023-08-16 RX ADMIN — TRAMADOL HYDROCHLORIDE 50 MG: 50 TABLET, COATED ORAL at 15:39

## 2023-08-16 RX ADMIN — DIVALPROEX SODIUM 375 MG: 250 TABLET, DELAYED RELEASE ORAL at 00:52

## 2023-08-16 RX ADMIN — TRIHEXYPHENIDYL HYDROCHLORIDE 2 MG: 2 TABLET ORAL at 09:30

## 2023-08-16 RX ADMIN — SODIUM CHLORIDE, PRESERVATIVE FREE 10 ML: 5 INJECTION INTRAVENOUS at 01:06

## 2023-08-16 RX ADMIN — POLYVINYL ALCOHOL 1 DROP: 14 SOLUTION/ DROPS OPHTHALMIC at 20:53

## 2023-08-16 RX ADMIN — POLYVINYL ALCOHOL 1 DROP: 14 SOLUTION/ DROPS OPHTHALMIC at 00:55

## 2023-08-16 RX ADMIN — CLOPIDOGREL BISULFATE 75 MG: 75 TABLET ORAL at 09:30

## 2023-08-16 RX ADMIN — ONDANSETRON 4 MG: 4 TABLET, ORALLY DISINTEGRATING ORAL at 11:49

## 2023-08-16 RX ADMIN — LATANOPROST 1 DROP: 50 SOLUTION/ DROPS OPHTHALMIC at 20:53

## 2023-08-16 RX ADMIN — TRIHEXYPHENIDYL HYDROCHLORIDE 2 MG: 2 TABLET ORAL at 14:03

## 2023-08-16 RX ADMIN — TRAMADOL HYDROCHLORIDE 50 MG: 50 TABLET, COATED ORAL at 21:49

## 2023-08-16 RX ADMIN — DIVALPROEX SODIUM 125 MG: 125 TABLET, DELAYED RELEASE ORAL at 09:30

## 2023-08-16 RX ADMIN — APIXABAN 5 MG: 5 TABLET, FILM COATED ORAL at 00:54

## 2023-08-16 RX ADMIN — EMPAGLIFLOZIN 10 MG: 10 TABLET, FILM COATED ORAL at 09:30

## 2023-08-16 RX ADMIN — AMANTADINE HYDROCHLORIDE 100 MG: 100 CAPSULE ORAL at 09:30

## 2023-08-16 RX ADMIN — LATANOPROST 1 DROP: 50 SOLUTION/ DROPS OPHTHALMIC at 00:55

## 2023-08-16 RX ADMIN — SODIUM CHLORIDE, PRESERVATIVE FREE 10 ML: 5 INJECTION INTRAVENOUS at 09:48

## 2023-08-16 RX ADMIN — METOPROLOL SUCCINATE 25 MG: 25 TABLET, EXTENDED RELEASE ORAL at 09:30

## 2023-08-16 RX ADMIN — SODIUM CHLORIDE, PRESERVATIVE FREE 10 ML: 5 INJECTION INTRAVENOUS at 20:58

## 2023-08-16 RX ADMIN — TRAMADOL HYDROCHLORIDE 50 MG: 50 TABLET, COATED ORAL at 00:54

## 2023-08-16 RX ADMIN — TRIHEXYPHENIDYL HYDROCHLORIDE 2 MG: 2 TABLET ORAL at 00:53

## 2023-08-16 RX ADMIN — DIVALPROEX SODIUM 375 MG: 250 TABLET, DELAYED RELEASE ORAL at 20:52

## 2023-08-16 RX ADMIN — TRAMADOL HYDROCHLORIDE 50 MG: 50 TABLET, COATED ORAL at 09:30

## 2023-08-16 RX ADMIN — ATORVASTATIN CALCIUM 20 MG: 20 TABLET, FILM COATED ORAL at 20:53

## 2023-08-16 RX ADMIN — TRIHEXYPHENIDYL HYDROCHLORIDE 2 MG: 2 TABLET ORAL at 20:52

## 2023-08-16 RX ADMIN — ATORVASTATIN CALCIUM 20 MG: 20 TABLET, FILM COATED ORAL at 00:54

## 2023-08-16 ASSESSMENT — PAIN - FUNCTIONAL ASSESSMENT
PAIN_FUNCTIONAL_ASSESSMENT: ACTIVITIES ARE NOT PREVENTED

## 2023-08-16 ASSESSMENT — PAIN DESCRIPTION - LOCATION
LOCATION: KNEE;NECK
LOCATION: KNEE

## 2023-08-16 ASSESSMENT — PAIN DESCRIPTION - PAIN TYPE
TYPE: ACUTE PAIN;CHRONIC PAIN
TYPE: CHRONIC PAIN
TYPE: CHRONIC PAIN

## 2023-08-16 ASSESSMENT — PAIN SCALES - GENERAL
PAINLEVEL_OUTOF10: 6
PAINLEVEL_OUTOF10: 8
PAINLEVEL_OUTOF10: 5
PAINLEVEL_OUTOF10: 7
PAINLEVEL_OUTOF10: 5
PAINLEVEL_OUTOF10: 8

## 2023-08-16 ASSESSMENT — PAIN DESCRIPTION - FREQUENCY
FREQUENCY: CONTINUOUS
FREQUENCY: INTERMITTENT

## 2023-08-16 ASSESSMENT — PAIN DESCRIPTION - ORIENTATION
ORIENTATION: RIGHT;LEFT
ORIENTATION: RIGHT;LEFT
ORIENTATION: LEFT;RIGHT
ORIENTATION: RIGHT;LEFT
ORIENTATION: LEFT;RIGHT
ORIENTATION: RIGHT;LEFT

## 2023-08-16 ASSESSMENT — PAIN DESCRIPTION - ONSET
ONSET: ON-GOING

## 2023-08-16 ASSESSMENT — PAIN DESCRIPTION - DESCRIPTORS
DESCRIPTORS: SHARP;TIGHTNESS;THROBBING
DESCRIPTORS: DISCOMFORT;ACHING
DESCRIPTORS: ACHING
DESCRIPTORS: ACHING

## 2023-08-16 NOTE — CARE COORDINATION
Case Management Assessment  Initial Evaluation    Date/Time of Evaluation: 8/16/2023 10:52 AM  Assessment Completed by: ANG Valente    If patient is discharged prior to next notation, then this note serves as note for discharge by case management. Patient Name: Joya Christina                   YOB: 1950  Diagnosis: Syncope and collapse [R55]  Closed head injury, initial encounter [S09.90XA]                   Date / Time: 8/15/2023  4:35 PM    Patient Admission Status: Observation   Readmission Risk (Low < 19, Mod (19-27), High > 27): No data recorded  Current PCP: Manuel Jones MD  PCP verified by CM? Yes    Chart Reviewed: Yes      History Provided by: Patient  Patient Orientation: Alert and Oriented, Person, Place, Situation, Self    Patient Cognition: Alert    Hospitalization in the last 30 days (Readmission):  No    If yes, Readmission Assessment in  Navigator will be completed. Advance Directives:      Code Status: DNR-CCA   Patient's Primary Decision Maker is: Named in [address] [place] [address]    Primary Decision MakerGweneth Musca - Child - [phone]    Secondary Decision Maker: Mary Polo - Brother/Sister - [phone]    Discharge Planning:    Patient lives with: Other (Comment) (ecf) Type of Home: Long-Term Care  Primary Care Giver:  Other (Comment) (ecf)  Patient Support Systems include: Children, Family Members, Other (Comment) (ecf)   Current Financial resources: Medicaid, Medicare  Current community resources: ECF/Home Care, Transportation  Current services prior to admission: Durable Medical Equipment, Transportation, Intermediate Care            Current DME: Wheelchair            Type of Home Care services:  [employer], Nursing Services    ADLS  Prior functional level: Assistance with the following:  Current functional level: Assistance with the following:    PT AM-PAC:   /24  OT AM-PAC:   /24    Family can provide assistance at DC: No  Would you

## 2023-08-16 NOTE — PROGRESS NOTES
Occupational Therapy  Facility/Department: Critical access hospital AT THE [facility] MED SURG  Occupational Therapy Initial Assessment    Name: Mike Medrano  : 1950  MRN: [id number]  Date of Service: 2023    Discharge Recommendations:  Continue to assess pending progress, Long Term Care with OT          Patient Diagnosis(es): The primary encounter diagnosis was Syncope and collapse. A diagnosis of Closed head injury, initial encounter was also pertinent to this visit. Past Medical History:  has a past medical history of ADHD (attention deficit hyperactivity disorder), Anemia, Asthma, Bipolar disorder ([address]), Bradycardia, unspecified, Chronic anemia, Chronic atrial fibrillation ([address]), Chronic back pain, Chronic kidney disease, Colitis, Constipation, COPD (chronic obstructive pulmonary disease) ([address]), Cystitis without hematuria, Diastolic CHF ([address]), Esophagitis, GERD (gastroesophageal reflux disease), Hemorrhoids, History of echocardiogram, Hypertension, Hypothyroidism, Metabolic syndrome, Moderate protein-calorie malnutrition ([address]), Muscle weakness (generalized), Obesity, Pancreatitis, and TIA (transient ischemic attack). Past Surgical History:  has a past surgical history that includes Hysterectomy; meniscectomy (Right); Tonsillectomy and adenoidectomy; Hymenectomy; Upper gastrointestinal endoscopy (3/2013); Colonoscopy (2011); Upper gastrointestinal endoscopy (2017); Colonoscopy (2017); Cardiac catheterization (Left, 2021); Cataract removal with implant (Right, 2021); Intracapsular cataract extraction (Right, 2021); Cataract removal with implant (Left, 2022); and Intracapsular cataract extraction (Left, 2022). Treatment Diagnosis: Weakness      Assessment   Performance deficits / Impairments: Decreased functional mobility ; Decreased endurance;Decreased ADL status; Decreased balance;Decreased strength;Decreased safe awareness  Assessment: 74 y/o F admitted to [facility] for syncope and

## 2023-08-16 NOTE — PROGRESS NOTES
36.1  Usual Body Weight: 252 lb (114.3 kg) (6 months ago)  % Weight Change (Calculated): -11.2  Weight Adjustment For: No Adjustment                 BMI Categories: Obese Class 2 (BMI 35.0 -39.9)    Estimated Daily Nutrient Needs:  Energy Requirements Based On: Kcal/kg  Weight Used for Energy Requirements: Current  Energy (kcal/day): 9046-9760 (15-18)  Weight Used for Protein Requirements: Ideal  Protein (g/day): 71-83 (1.2-1.4)  Method Used for Fluid Requirements: 1 ml/kcal  Fluid (ml/day): 1800    Nutrition Diagnosis:   Moderate malnutrition related to inadequate protein-energy intake as evidenced by weight loss greater than or equal to 10% in 6 months, mild loss of subcutaneous fat, other (comment) (low protein intakes)    Lab Results   Component Value Date     08/16/2023    K 4.1 08/16/2023     08/16/2023    CO2 26 08/16/2023    BUN 17 08/16/2023    CREATININE 1.4 (H) 08/16/2023    GLUCOSE 76 08/16/2023    CALCIUM 9.1 08/16/2023    PROT 6.6 08/15/2023    LABALBU 3.9 08/15/2023    BILITOT 0.7 08/15/2023    ALKPHOS 66 08/15/2023    AST 12 08/15/2023    ALT 8 08/15/2023    LABGLOM 39 (L) 08/16/2023    GFRAA 46 (L) 07/27/2022     Hemoglobin A1C   Date Value Ref Range Status   06/01/2023 5.4 4.0 - 6.0 % Final     No results found for: VITD25    Nutrition Interventions:   Food and/or Nutrient Delivery: Modify Current Diet (recommend liberate to 1800 ml fluid minimally)  Nutrition Education/Counseling: Education initiated  Coordination of Nutrition Care: Continue to monitor while inpatient  Plan of Care discussed with: patient    Goals:     Goals: Meet at least 75% of estimated needs       Nutrition Monitoring and Evaluation:   Behavioral-Environmental Outcomes: None Identified  Food/Nutrient Intake Outcomes: Food and Nutrient Intake  Physical Signs/Symptoms Outcomes: Biochemical Data, Weight, Fluid Status or Edema, Chewing or Swallowing    Discharge Planning:    No discharge needs at this time     Davi Jolly

## 2023-08-16 NOTE — PROGRESS NOTES
Vital signs and assessment completed and charted. Navigator complete. Home medications gone over. Patient educated on call light, bed, lights, and room. Writer gone over plan of care and orders with patient. Questions answered. Patient educated on using call light to call out for assistance or for using the bathroom. Patient acknowledged. Call light, bedside table and personal belongings within reach.

## 2023-08-16 NOTE — PLAN OF CARE
Problem: Discharge Planning  Goal: Discharge to home or other facility with appropriate resources  8/16/2023 1459 by Luisa Govea RN  Outcome: Progressing  Flowsheets (Taken 8/16/2023 1319)  Discharge to home or other facility with appropriate resources:   Identify barriers to discharge with patient and caregiver   Identify discharge learning needs (meds, wound care, etc)   Arrange for needed discharge resources and transportation as appropriate      Discharge plan is return to [facility] when ready. LSW assisting with d/c needs. Will continue to monitor. Problem: Pain  Goal: Verbalizes/displays adequate comfort level or baseline comfort level  8/16/2023 1459 by Luisa Govea RN  Outcome: Progressing  Flowsheets (Taken 8/16/2023 1319)  Verbalizes/displays adequate comfort level or baseline comfort level:   Encourage patient to monitor pain and request assistance   Administer analgesics based on type and severity of pain and evaluate response   Assess pain using appropriate pain scale   Implement non-pharmacological measures as appropriate and evaluate response     Pt educated on pain rating scale. Pain medications administered as ordered, see MAR. Will continue to monitor.           Problem: Safety - Adult  Goal: Free from fall injury  8/16/2023 1459 by Luisa Govea RN  Outcome: Progressing  Flowsheets (Taken 8/16/2023 1319)  Free From Fall Injury: Instruct family/caregiver on patient safety       Problem: ABCDS Injury Assessment  Goal: Absence of physical injury  8/16/2023 1459 by Luisa Govea RN  Outcome: Progressing  Flowsheets (Taken 8/16/2023 1319)  Absence of Physical Injury: Implement safety measures based on patient assessment      Problem: Nutrition Deficit:  Goal: Optimize nutritional status  8/16/2023 1459 by Luisa Govea RN  Outcome: Progressing  Flowsheets (Taken 8/16/2023 1205 by Juan Ellis RD, LD)  Nutrient intake appropriate for improving, restoring, or maintaining nutritional needs:

## 2023-08-16 NOTE — PROGRESS NOTES
Physical Therapy  Facility/Department: [facility] AT THE [facility] MED SURG  Daily Treatment Note  NAME: Francisco Chan  : 1950  MRN: [id number]    Date of Service: 2023    Discharge Recommendations:  Subacute/Skilled Nursing Facility, Therapy recommended at discharge     Patient Diagnosis(es): The primary encounter diagnosis was Syncope and collapse. A diagnosis of Closed head injury, initial encounter was also pertinent to this visit. Assessment   Assessment: Pt. ambulated 5ftx2 with [employer], Mod A. Transfers: Mod A. Seated exercises B LE x20  Activity Tolerance: Patient tolerated treatment well;Patient limited by pain; Patient limited by endurance  Equipment Needed: No     Plan    Physcial Therapy Plan  General Plan: 2 times a day 7 days a week  Current Treatment Recommendations: Strengthening;ROM;Balance training;Functional mobility training;Transfer training;ADL/Self-care training;Neuromuscular re-education;Gait training; Safety education & training;Patient/Caregiver education & training; Therapeutic activities     Restrictions  Restrictions/Precautions  Restrictions/Precautions: General Precautions, Fall Risk, Bed Alarm (CHAIR ALARM)  Required Braces or Orthoses?: No     Subjective    Subjective  Subjective: Pt. in chair upon arrival, agreeable to therapy  Pain: B LEs  Orientation  Overall Orientation Status: Within Functional Limits  Cognition  Overall Cognitive Status: WFL     Objective   Bed Mobility Training  Bed Mobility Training: No  Overall Level of Assistance: Moderate assistance  Interventions: Demonstration;Visual cues  Rolling: Moderate assistance  Supine to Sit: Moderate assistance  Sit to Supine: Moderate assistance  Scooting: Moderate assistance  Balance  Sitting: Intact  Standing: Impaired  Standing - Static: Poor  Standing - Dynamic: Poor  Transfer Training  Transfer Training: Yes  Overall Level of Assistance:  Moderate assistance;Maximum assistance;Assist X1  Interventions: Demonstration;Visual cues  Sit

## 2023-08-16 NOTE — PROGRESS NOTES
Physical Therapy  Facility/Department: [facility] AT THE [facility] MED SURG  Physical Therapy Initial Assessment    Name: Cuate Piper  : 1950  MRN: [id number]  Date of Service: 2023    Discharge Recommendations:  Subacute/Skilled Nursing Facility, Therapy recommended at discharge   PT Equipment Recommendations  Equipment Needed: No      Patient Diagnosis(es): The primary encounter diagnosis was Syncope and collapse. A diagnosis of Closed head injury, initial encounter was also pertinent to this visit. Past Medical History:  has a past medical history of ADHD (attention deficit hyperactivity disorder), Anemia, Asthma, Bipolar disorder ([address]), Bradycardia, unspecified, Chronic anemia, Chronic atrial fibrillation ([address]), Chronic back pain, Chronic kidney disease, Colitis, Constipation, COPD (chronic obstructive pulmonary disease) ([address]), Cystitis without hematuria, Diastolic CHF ([address]), Esophagitis, GERD (gastroesophageal reflux disease), Hemorrhoids, History of echocardiogram, Hypertension, Hypothyroidism, Metabolic syndrome, Moderate protein-calorie malnutrition ([address]), Muscle weakness (generalized), Obesity, Pancreatitis, and TIA (transient ischemic attack). Past Surgical History:  has a past surgical history that includes Hysterectomy; meniscectomy (Right); Tonsillectomy and adenoidectomy; Hymenectomy; Upper gastrointestinal endoscopy (3/2013); Colonoscopy (2011); Upper gastrointestinal endoscopy (2017); Colonoscopy (2017); Cardiac catheterization (Left, 2021); Cataract removal with implant (Right, 2021); Intracapsular cataract extraction (Right, 2021); Cataract removal with implant (Left, 2022); and Intracapsular cataract extraction (Left, 2022). Assessment   Body Structures, Functions, Activity Limitations Requiring Skilled Therapeutic Intervention: Decreased functional mobility ; Decreased ADL status; Decreased ROM; Decreased safe awareness;Decreased strength;Decreased

## 2023-08-16 NOTE — PROGRESS NOTES
Patient arrived to [facility], room 301, at this time via cart. Patient was transferred/pulled over to the hospital bed via x4 nurses. Patient is alert and orientated and on room air. Weight was obtained. Patient is from [address] [place] [address]. Patient follows a 1500ml fluid restriction at the nursing home. Patient states she gets around by \"pushing the back of a wheelchair. \"

## 2023-08-16 NOTE — PROGRESS NOTES
Patient A&Ox4, calm and cooperative. Assessment and vital signs completed, see flowsheet. Patient reports pain 7/10 to bilateral knees, sharp/tightness/throbbing used as descriptor words per pt, intermittent and only when moving per pt, pt denies any pain medications at this time. /78, MAP 97, HR 54 and irregular. Afib on telemetry, pt has hx of Afib. Orthostatic B/P and Pulse completed at this time, see flowsheets. Pt states \"I didn't feel light headed when I stood, I just got real shaky\" following the standing portion of orthos. Trace, pitting edema present in BLE. Scattered ecchymosis noted, ecchymosis and redness on buttocks/coccyx and right forehead. Patient resting in chair, call light within reach, denies further needs, will continue to monitor.

## 2023-08-16 NOTE — H&P
CATARACT REMOVAL WITH IMPLANT Right 12/27/2021    Dr. Rondon Gains Left 02/07/2022    EYE CATARACT EMULSIFICATION IOL IMPLANT (Left Eye)    COLONOSCOPY  5/2011    ischemic colitis    COLONOSCOPY  03/07/2017    -diverticulosis,hemorrhoids    [address] (CERVIX STATUS UNKNOWN)      2008    INTRACAPSULAR CATARACT EXTRACTION Right 12/27/2021    EYE CATARACT EMULSIFICATION IOL IMPLANT performed by Lily Malone DO at [address] Left 2/7/2022    EYE CATARACT EMULSIFICATION IOL IMPLANT performed by Lily Malone DO at [facility] Right     2008    TONSILLECTOMY AND ADENOIDECTOMY      UPPER GASTROINTESTINAL ENDOSCOPY  3/2013    5 cm axial hiatal hernia    UPPER GASTROINTESTINAL ENDOSCOPY  03/07/2017    Dr. Orion Sosa       Medications Prior to Admission:    Prior to Admission medications    Medication Sig Start Date End Date Taking? Authorizing Provider   carboxymethylcellulose 1 % ophthalmic solution Place 1 drop into both eyes nightly Artificial Tears.  Instill 1 drop into both eyes nightly   Yes Historical Provider, MD   dextromethorphan (DELSYM) 30 MG/5ML extended release liquid Take 10 mLs by mouth 2 times daily as needed for Cough    Historical Provider, MD   divalproex (DEPAKOTE) 125 MG DR tablet Take 3 tablets by mouth at bedtime    Historical Provider, MD   trihexyphenidyl (ARTANE) 2 MG tablet Take 1 tablet by mouth 3 times daily 1/26/23   Historical Provider, MD   divalproex (DEPAKOTE) 125 MG DR tablet Take 1 tablet by mouth daily 10/26/22   Historical Provider, MD   nystatin (MYCOSTATIN) 881214 UNIT/GM powder Apply topically as needed Breast,folds    Historical Provider, MD   dapagliflozin (FARXIGA) 10 MG tablet Take 1 tablet by mouth every morning 7/19/22   Xiomara Cardoso PA-C   furosemide (LASIX) 40 MG tablet Take 1 tablet by mouth 2 times daily for 14 days  Patient taking differently: Take 1 tablet by mouth daily 6/19/22 8/15/23  Dewey Doss MD   loperamide (IMODIUM) 2 MG capsule Take 2 capsules by mouth 4 times daily as needed for Diarrhea Every 4 hours    Historical Provider, MD   metoprolol succinate (TOPROL XL) 25 MG extended release tablet TAKE 1 TABLET BY MOUTH DAILY. 3/22/22   Devi Lopez MD   simethicone (MYLICON) 80 MG chewable tablet Take 1 tablet by mouth every 6 hours as needed for Flatulence    Historical Provider, MD   levothyroxine (SYNTHROID) 50 MCG tablet Take 1 tablet by mouth Daily    Historical Provider, MD   latanoprost (XALATAN) 0.005 % ophthalmic solution Place 1 drop into the right eye nightly    Historical Provider, MD   polyvinyl alcohol (LIQUIFILM TEARS) 1.4 % ophthalmic solution Place 1 drop into both eyes 4 times daily    Historical Provider, MD   acetaminophen (TYLENOL) 650 MG extended release tablet Take 1 tablet by mouth 4 times daily    Historical Provider, MD   Sodium Phosphates (FLEET) 7-19 GM/118ML Place 1 enema rectally as needed    Historical Provider, MD   traMADol (ULTRAM) 50 MG tablet Take 1 tablet by mouth every 6 hours as needed for Pain. Historical Provider, MD   white petrolatum GEL Apply topically 2 times daily as needed for Dry Skin Arms and legs    Historical Provider, MD   chlorhexidine (PERIDEX) 0.12 % solution Take 15 mLs by mouth 2 times daily as needed 6/10/21   Historical Provider, MD   atorvastatin (LIPITOR) 20 MG tablet Take 1 tablet by mouth daily  Patient taking differently: Take 1 tablet by mouth at bedtime 6/15/21   Devi Lopez MD   nitroGLYCERIN (NITROSTAT) 0.4 MG SL tablet up to max of 3 total doses.  If no relief after 1 dose, call 911. 5/29/21   Kristen Hsu MD   clopidogrel (PLAVIX) 75 MG tablet Take 1 tablet by mouth daily 5/30/21   Kristen Hsu MD   losartan (COZAAR) 50 MG tablet Take 1 tablet by mouth daily 5/29/21   Carmella Nye APRN - CNP   apixaban (ELIQUIS) 5 MG TABS tablet Take 1 tablet by mouth 2 times daily

## 2023-08-17 VITALS
OXYGEN SATURATION: 100 % | HEART RATE: 78 BPM | TEMPERATURE: 98.1 F | DIASTOLIC BLOOD PRESSURE: 84 MMHG | BODY MASS INDEX: 35.97 KG/M2 | WEIGHT: 223.8 LBS | HEIGHT: 66 IN | RESPIRATION RATE: 16 BRPM | SYSTOLIC BLOOD PRESSURE: 127 MMHG

## 2023-08-17 LAB
ANION GAP SERPL CALCULATED.3IONS-SCNC: 11 MMOL/L (ref 9–17)
BASOPHILS # BLD: 0.04 K/UL (ref 0–0.2)
BASOPHILS NFR BLD: 0 % (ref 0–2)
BUN SERPL-MCNC: 19 MG/DL (ref 8–23)
BUN/CREAT SERPL: 12 (ref 9–20)
CALCIUM SERPL-MCNC: 9.2 MG/DL (ref 8.6–10.4)
CHLORIDE SERPL-SCNC: 103 MMOL/L (ref 98–107)
CO2 SERPL-SCNC: 24 MMOL/L (ref 20–31)
CREAT SERPL-MCNC: 1.6 MG/DL (ref 0.5–0.9)
EOSINOPHIL # BLD: 0.21 K/UL (ref 0–0.44)
EOSINOPHILS RELATIVE PERCENT: 2 % (ref 1–4)
ERYTHROCYTE [DISTWIDTH] IN BLOOD BY AUTOMATED COUNT: 13.2 % (ref 11.8–14.4)
GFR SERPL CREATININE-BSD FRML MDRD: 34 ML/MIN/1.73M2
GLUCOSE SERPL-MCNC: 82 MG/DL (ref 70–99)
HCT VFR BLD AUTO: 38.1 % (ref 36.3–47.1)
HGB BLD-MCNC: 13 G/DL (ref 11.9–15.1)
IMM GRANULOCYTES # BLD AUTO: <0.03 K/UL (ref 0–0.3)
IMM GRANULOCYTES NFR BLD: 0 %
LYMPHOCYTES NFR BLD: 2.82 K/UL (ref 1.1–3.7)
LYMPHOCYTES RELATIVE PERCENT: 31 % (ref 24–43)
MCH RBC QN AUTO: 33.5 PG (ref 25.2–33.5)
MCHC RBC AUTO-ENTMCNC: 34.1 G/DL (ref 28.4–34.8)
MCV RBC AUTO: 98.2 FL (ref 82.6–102.9)
MICROORGANISM SPEC CULT: ABNORMAL
MICROORGANISM SPEC CULT: ABNORMAL
MONOCYTES NFR BLD: 0.85 K/UL (ref 0.1–1.2)
MONOCYTES NFR BLD: 10 % (ref 3–12)
NEUTROPHILS NFR BLD: 57 % (ref 36–65)
NEUTS SEG NFR BLD: 5.05 K/UL (ref 1.5–8.1)
NRBC BLD-RTO: 0 PER 100 WBC
PLATELET # BLD AUTO: 222 K/UL (ref 138–453)
PMV BLD AUTO: 9.5 FL (ref 8.1–13.5)
POTASSIUM SERPL-SCNC: 4.4 MMOL/L (ref 3.7–5.3)
RBC # BLD AUTO: 3.88 M/UL (ref 3.95–5.11)
SODIUM SERPL-SCNC: 138 MMOL/L (ref 135–144)
SPECIMEN DESCRIPTION: ABNORMAL
WBC OTHER # BLD: 9 K/UL (ref 3.5–11.3)

## 2023-08-17 PROCEDURE — 6370000000 HC RX 637 (ALT 250 FOR IP): Performed by: INTERNAL MEDICINE

## 2023-08-17 PROCEDURE — 97116 GAIT TRAINING THERAPY: CPT

## 2023-08-17 PROCEDURE — 2580000003 HC RX 258: Performed by: INTERNAL MEDICINE

## 2023-08-17 PROCEDURE — G0378 HOSPITAL OBSERVATION PER HR: HCPCS

## 2023-08-17 PROCEDURE — 80048 BASIC METABOLIC PNL TOTAL CA: CPT

## 2023-08-17 PROCEDURE — 85025 COMPLETE CBC W/AUTO DIFF WBC: CPT

## 2023-08-17 PROCEDURE — 97110 THERAPEUTIC EXERCISES: CPT

## 2023-08-17 PROCEDURE — 36415 COLL VENOUS BLD VENIPUNCTURE: CPT

## 2023-08-17 PROCEDURE — 6370000000 HC RX 637 (ALT 250 FOR IP)

## 2023-08-17 RX ORDER — TRAMADOL HYDROCHLORIDE 50 MG/1
50 TABLET ORAL EVERY 6 HOURS PRN
Qty: 12 TABLET | Refills: 0 | Status: SHIPPED | OUTPATIENT
Start: 2023-08-17 | End: 2023-08-20

## 2023-08-17 RX ORDER — CLONIDINE 0.1 MG/24H
1 PATCH, EXTENDED RELEASE TRANSDERMAL WEEKLY
Qty: 4 PATCH | DISCHARGE
Start: 2023-08-23

## 2023-08-17 RX ORDER — FUROSEMIDE 40 MG/1
40 TABLET ORAL DAILY
DISCHARGE
Start: 2023-08-17 | End: 2023-08-23

## 2023-08-17 RX ADMIN — ONDANSETRON 4 MG: 4 TABLET, ORALLY DISINTEGRATING ORAL at 10:59

## 2023-08-17 RX ADMIN — CLOPIDOGREL BISULFATE 75 MG: 75 TABLET ORAL at 08:23

## 2023-08-17 RX ADMIN — TRIHEXYPHENIDYL HYDROCHLORIDE 2 MG: 2 TABLET ORAL at 13:31

## 2023-08-17 RX ADMIN — DIVALPROEX SODIUM 125 MG: 125 TABLET, DELAYED RELEASE ORAL at 08:23

## 2023-08-17 RX ADMIN — TRAMADOL HYDROCHLORIDE 50 MG: 50 TABLET, COATED ORAL at 06:15

## 2023-08-17 RX ADMIN — EMPAGLIFLOZIN 10 MG: 10 TABLET, FILM COATED ORAL at 08:23

## 2023-08-17 RX ADMIN — TRAMADOL HYDROCHLORIDE 50 MG: 50 TABLET, COATED ORAL at 13:31

## 2023-08-17 RX ADMIN — SODIUM CHLORIDE, PRESERVATIVE FREE 10 ML: 5 INJECTION INTRAVENOUS at 08:22

## 2023-08-17 RX ADMIN — LEVOTHYROXINE SODIUM 50 MCG: 0.05 TABLET ORAL at 06:33

## 2023-08-17 RX ADMIN — AMANTADINE HYDROCHLORIDE 100 MG: 100 CAPSULE ORAL at 08:23

## 2023-08-17 RX ADMIN — APIXABAN 5 MG: 5 TABLET, FILM COATED ORAL at 08:23

## 2023-08-17 RX ADMIN — TRIHEXYPHENIDYL HYDROCHLORIDE 2 MG: 2 TABLET ORAL at 08:23

## 2023-08-17 RX ADMIN — METOPROLOL SUCCINATE 25 MG: 25 TABLET, EXTENDED RELEASE ORAL at 08:23

## 2023-08-17 ASSESSMENT — PAIN DESCRIPTION - DESCRIPTORS
DESCRIPTORS: ACHING
DESCRIPTORS: ACHING

## 2023-08-17 ASSESSMENT — PAIN - FUNCTIONAL ASSESSMENT: PAIN_FUNCTIONAL_ASSESSMENT: ACTIVITIES ARE NOT PREVENTED

## 2023-08-17 ASSESSMENT — PAIN DESCRIPTION - FREQUENCY: FREQUENCY: INTERMITTENT

## 2023-08-17 ASSESSMENT — PAIN DESCRIPTION - ORIENTATION
ORIENTATION: RIGHT;LEFT
ORIENTATION: RIGHT;LEFT

## 2023-08-17 ASSESSMENT — PAIN SCALES - GENERAL
PAINLEVEL_OUTOF10: 5
PAINLEVEL_OUTOF10: 5

## 2023-08-17 ASSESSMENT — PAIN DESCRIPTION - LOCATION
LOCATION: KNEE
LOCATION: KNEE

## 2023-08-17 ASSESSMENT — PAIN DESCRIPTION - ONSET: ONSET: ON-GOING

## 2023-08-17 ASSESSMENT — PAIN DESCRIPTION - PAIN TYPE: TYPE: CHRONIC PAIN

## 2023-08-17 NOTE — DISCHARGE SUMMARY
Discharge Summary    Yazmin Tiwari  :  1950  MRN:  [id number]    Admit date:  8/15/2023      Discharge date: 2023     Admitting Physician:  Joni Lam MD    Discharge Diagnoses:    Principal Problem:    Syncope and collapse  Active Problems:    Type 2 diabetes mellitus    Chronic systolic (congestive) heart failure    Moderate malnutrition (HCC)    Atrial fibrillation, unspecified type ([address])    Stage 3 chronic kidney disease ([address])  Resolved Problems:    * No resolved hospital problems. *      Hospital Course:   Yazmin Tiwari is a 75 y.o. female admitted with syncope and collapse. She presented to the emergency room after a syncopal episode from [facility]. Patient was in the bathroom and fell on her knees when they gave out hitting her head on the faucet. She did not remember anything after that she stated she woke up on the floor. She did have bruising to the forehead. She complained of left knee pain but was chronic. She does have history of atrial fibrillation is on Eliquis and CHF. She denied chest pain or shortness of breath. She denied recent illness. Patient was evaluated and admitted. Vital signs monitored. Her amlodipine was stopped and clonidine was changed to Catapres patch and she is tolerated this well. Labs are stable and hemodynamically she is stable. Radiology studies negative. Plan will be to discharge patient today back to [facility] she will follow-up with primary care as an outpatient. She will get a BMP and CBC with differential on . Consultants:  none    Procedures: none    Complications: none    Discharge Condition: fair    Exam:  GEN:    Awake, alert and oriented x3. EYES:   EOMI, pupils equal   NECK: Supple. No lymphadenopathy.   No carotid bruit  CVS:     regular rate and rhythm, no audible murmur  PULM:  CTA, no wheezes, rales or rhonchi, no acute respiratory distress  ABD:     Bowels sounds normal. XRAY VIEWS OF THE RIGHT KNEE 8/15/2023 2:58 pm COMPARISON: None. HISTORY: ORDERING SYSTEM PROVIDED HISTORY: fall inj TECHNOLOGIST PROVIDED HISTORY: fall inj FINDINGS: Mineralization appears normal.  There is no joint effusion or hemarthrosis. There are tricompartmental osteophytes with moderate to advanced medial compartment narrowing. No fracture, dislocation or focal bone lesion is identified. 1.  No acute fracture or dislocation. 2. Moderate to advanced tricompartmental degenerative changes of the knee which are medial compartment predominant. CT Head WO Contrast    Result Date: 8/15/2023  EXAMINATION: CT OF THE HEAD WITHOUT CONTRAST  8/15/2023 6:06 pm TECHNIQUE: CT of the head was performed without the administration of intravenous contrast. Automated exposure control, iterative reconstruction, and/or weight based adjustment of the mA/kV was utilized to reduce the radiation dose to as low as reasonably achievable. COMPARISON: None. HISTORY: ORDERING SYSTEM PROVIDED HISTORY: Fall injury / syncope TECHNOLOGIST PROVIDED HISTORY: Fall injury / syncope Decision Support Exception - unselect if not a suspected or confirmed emergency medical condition->Emergency Medical Condition (MA) FINDINGS: BRAIN/VENTRICLES: There is no acute intracranial hemorrhage, mass effect or midline shift. No abnormal extra-axial fluid collection. The gray-white differentiation is maintained without evidence of an acute infarct. There is prominence of the ventricles and sulci due to global parenchymal volume loss. There are nonspecific areas of hypoattenuation within the periventricular and subcortical white matter, which likely represent chronic microvascular ischemic change. ORBITS: The visualized portion of the orbits demonstrate no acute abnormality. SINUSES: The visualized paranasal sinuses and mastoid air cells demonstrate no acute abnormality. SOFT TISSUES/SKULL: Hyperostosis frontalis interna.      No acute intracranial

## 2023-08-17 NOTE — PROGRESS NOTES
Patient shift assessment and vitals completed at this time as charted. Patient is alert and oriented x4 and rates pain in her knees 5/10 but denies the need for pain medication at this time. Patient was assisted to bed from the chair and tolerated well with a walker and some assistance from [facility]. Patient denied having any dizziness or lightheadedness. Patient states no needs at this time. Call light is in reach, plan of care is ongoing.

## 2023-08-17 NOTE — PROGRESS NOTES
Occupational Therapy  Facility/Department: [facility] AT THE [facility] MED SURG  Daily Treatment Note  NAME: Félix Mancilla  : 1950  MRN: [id number]    Date of Service: 2023    Discharge Recommendations:  Continue to assess pending progress, Long Term Care with OT         Patient Diagnosis(es): The primary encounter diagnosis was Syncope and collapse. A diagnosis of Closed head injury, initial encounter was also pertinent to this visit. Assessment    Activity Tolerance: Patient tolerated treatment well;Patient limited by pain; Patient limited by endurance  Discharge Recommendations: Continue to assess pending progress; Long Term Care with OT      Plan   Occupational Therapy Plan  Times Per Day: Once a day  Days Per Week: 7 Days  Current Treatment Recommendations: Strengthening;Balance training;Functional mobility training; Endurance training;Patient/Caregiver education & training; Safety education & training;Equipment evaluation, education, & procurement;Self-Care / ADL     Restrictions       Subjective   Subjective  Subjective: Pt seated in bedside chair upon arrival.  Agreeable to ther ex. Pain: Pt complains of 7/10 R shoulder and knee pain. Orientation  Overall Orientation Status: Within Functional Limits  Pain: 7/10 bilateral knees  Cognition  Overall Cognitive Status: WFL        Objective    Vitals     Bed Mobility Training  Bed Mobility Training: No  Transfer Training  Transfer Training: Yes  Overall Level of Assistance: Moderate assistance;Assist X1  Interventions: Demonstration;Visual cues  Sit to Stand:  Moderate assistance;Assist X1  Stand to Sit: Moderate assistance;Assist X1  Gait Training  Gait Training: Yes  Right Side Weight Bearing: As tolerated  Left Side Weight Bearing: As tolerated  Gait  Overall Level of Assistance: Minimum assistance;Assist X1;Contact-guard assistance  Interventions: Demonstration;Visual cues  Base of Support: Widened  Speed/Corina: Slow  Gait Abnormalities: Antalgic  Distance (ft):  (20ft x2)  Assistive Device: Walker, rolling;Gait belt        OT Exercises  Exercise Treatment: Pt completed B UE ther ex with 1# dumbells, 15 reps x 1 set x 5 planes with rest breaks between sets. Pt declines second set due to discomfort. Safety Devices  Type of Devices: All fall risk precautions in place;Call light within reach; Chair alarm in place; Left in chair     Patient Education  Education Given To: Patient  Education Provided: Role of Therapy;Home Exercise Program;Energy Conservation  Education Method: Verbal  Education Outcome: Verbalized understanding;Demonstrated understanding    Goals  Short Term Goals  Time Frame for Short Term Goals: 21 visits  Short Term Goal 1: Patient to complete toielting, grooming, and dressing c s/u c use of AE/DME as needed to ensure safe return to [facility]. Short Term Goal 2: Patient to engage in 15 minutes of ther ex/ther act to improve strength and activity tolerance for I/ADL upon return home.        Therapy Time   Individual Concurrent Group Co-treatment   Time In 1130         Time Out 1143         Minutes LOBO Perez

## 2023-08-17 NOTE — PLAN OF CARE
Problem: Discharge Planning  Goal: Discharge to home or other facility with appropriate resources  2023 by Geraldene Apgar, RN  Outcome: Progressing     Problem: Pain  Goal: Verbalizes/displays adequate comfort level or baseline comfort level  2023 by Geraldene Apgar, RN  Outcome: Progressing     Problem: Safety - Adult  Goal: Free from fall injury  2023 by Geraldene Apgar, RN  Outcome: Progressing     Problem: ABCDS Injury Assessment  Goal: Absence of physical injury  2023 by Geraldene Apgar, RN  Outcome: Progressing     Problem: Nutrition Deficit:  Goal: Optimize nutritional status  2023 by Geraldene Apgar, RN  Outcome: Progressing     Problem: Skin/Tissue Integrity  Goal: Absence of new skin breakdown  Description: 1. Monitor for areas of redness and/or skin breakdown  2. Assess vascular access sites hourly  3. Every 4-6 hours minimum:  Change oxygen saturation probe site  4. Every 4-6 hours:  If on nasal continuous positive airway pressure, respiratory therapy assess nares and determine need for appliance change or resting period.   2023 by Geraldene Apgar, RN  Outcome: Progressing

## 2023-08-17 NOTE — PROGRESS NOTES
Physical Therapy  Facility/Department: [facility] THE [facility] MED SURG  Daily Treatment Note  NAME: Jeanmarie Carrillo  : 1950  MRN: [id number]    Date of Service: 2023    Discharge Recommendations:  Subacute/Skilled Nursing Facility, Therapy recommended at discharge        Patient Diagnosis(es): The primary encounter diagnosis was Syncope and collapse. A diagnosis of Closed head injury, initial encounter was also pertinent to this visit. Assessment   Assessment: Transfers: Modx1 Seated and reclined therex BLE x15 in all planes. Gait: 20ft x2 with front wheeled walker and CGA/Darrion  Activity Tolerance: Patient tolerated treatment well;Patient limited by pain; Patient limited by endurance     Plan    Physcial Therapy Plan  General Plan: 2 times a day 7 days a week (1x/day on weekends)     Restrictions  Restrictions/Precautions  Restrictions/Precautions: General Precautions, Fall Risk, Bed Alarm (CHAIR ALARM)  Required Braces or Orthoses?: No     Subjective    Subjective  Subjective: Pt in chair upon arrival, agreeable to therapy. Pain: 7/10 bilateral knees     Objective   Vitals     Bed Mobility Training  Bed Mobility Training: No  Transfer Training  Transfer Training: Yes  Overall Level of Assistance: Moderate assistance;Assist X1  Interventions: Demonstration;Visual cues  Sit to Stand: Moderate assistance;Assist X1  Stand to Sit: Moderate assistance;Assist X1  Gait Training  Gait Training: Yes  Right Side Weight Bearing: As tolerated  Left Side Weight Bearing: As tolerated  Gait  Overall Level of Assistance: Minimum assistance;Assist X1;Contact-guard assistance  Interventions: Demonstration;Visual cues  Base of Support: Widened  Speed/Corina: Slow  Gait Abnormalities: Antalgic  Distance (ft):  (20ft x2)  Assistive Device: Walker, rolling;Gait belt     PT Exercises  Exercise Treatment: seated and reclined therex BLE x15 in all planes     Safety Devices  Type of Devices: Chair alarm in place; Left in chair;Nurse

## 2023-08-17 NOTE — PROGRESS NOTES
Patient is discharge back to [facility] today. She will go by Linda Wick @ 2:15 PM.  The son is aware of the discharge. Discharge paper work done and fax to [facility].   ANG Delvalle

## 2023-08-17 NOTE — PROGRESS NOTES
Pt vitals and assessment complete, see flowsheet. Pt A&Ox4, breathing normal. Pt c/o chronic pain 5/10 in knees, night nurse gave prn pain med. Pt denies any further needs, pt sitting in chair with alarm on, call light within reach, will continue to monitor.

## 2023-08-17 NOTE — PROGRESS NOTES
Physical Therapy  Facility/Department: [facility] AT THE [facility] MED SURG  Daily Treatment Note  NAME: Rosetta Gong  : 1950  MRN: [id number]    Date of Service: 2023    Discharge Recommendations:  Subacute/Skilled Nursing Facility, Therapy recommended at discharge        Patient Diagnosis(es): The primary encounter diagnosis was Syncope and collapse. A diagnosis of Closed head injury, initial encounter was also pertinent to this visit. Assessment   Assessment: Transfers: Modx1 Seated and reclined therex BLE x15 in all planes. Gait: 20ft with front wheeled walker and CGA/Darrion  Activity Tolerance: Patient tolerated treatment well;Patient limited by pain; Patient limited by endurance     Plan    Physcial Therapy Plan  General Plan: 2 times a day 7 days a week (1x/day on weekends and holidays)     Restrictions  Restrictions/Precautions  Restrictions/Precautions: General Precautions, Fall Risk, Bed Alarm (CHAIR ALARM)  Required Braces or Orthoses?: No     Subjective    Subjective  Subjective: Pt in chair upon arrival, agreeable to therapy stating \" told me he wanted me to work with therapy one last time before I left\"  Pain: denies pain wehn sitting, increases to 6-7/10 when standing  Orientation  Overall Orientation Status: Within Functional Limits  Cognition  Overall Cognitive Status: WFL     Objective   Vitals     Bed Mobility Training  Bed Mobility Training: No  Transfer Training  Transfer Training: Yes  Overall Level of Assistance: Moderate assistance;Assist X1  Interventions: Demonstration;Verbal cues  Sit to Stand: Moderate assistance;Assist X1  Stand to Sit: Moderate assistance;Assist X1  Stand Pivot Transfers:  Moderate assistance;Assist X1  Gait Training  Gait Training: Yes  Right Side Weight Bearing: As tolerated  Left Side Weight Bearing: As tolerated  Gait  Overall Level of Assistance: Minimum assistance;Assist X1;Contact-guard assistance  Interventions: Demonstration;Verbal cues  Base of Support:

## 2023-08-18 LAB
VALPROATE FREE MFR SERPL: 21.6 % (ref 5–18.4)
VALPROATE FREE SERPL-MCNC: 9.7 UG/ML (ref 7–23)
VALPROATE SERPL-MCNC: 45 UG/ML (ref 50–125)

## 2023-08-23 ENCOUNTER — HOSPITAL ENCOUNTER (OUTPATIENT)
Age: 73
Setting detail: SPECIMEN
Discharge: HOME | End: 2023-08-23

## 2023-08-23 ENCOUNTER — OFFICE VISIT (OUTPATIENT)
Dept: CARDIOLOGY | Age: 73
End: 2023-08-23
Payer: MEDICARE

## 2023-08-23 ENCOUNTER — HOSPITAL ENCOUNTER (OUTPATIENT)
Age: 73
Discharge: HOME | End: 2023-08-25
Payer: MEDICARE

## 2023-08-23 VITALS
HEART RATE: 74 BPM | BODY MASS INDEX: 35.84 KG/M2 | WEIGHT: 223 LBS | RESPIRATION RATE: 18 BRPM | SYSTOLIC BLOOD PRESSURE: 138 MMHG | HEIGHT: 66 IN | DIASTOLIC BLOOD PRESSURE: 63 MMHG | OXYGEN SATURATION: 98 %

## 2023-08-23 DIAGNOSIS — E78.2 MIXED HYPERLIPIDEMIA: ICD-10-CM

## 2023-08-23 DIAGNOSIS — Z79.01 CHRONIC ANTICOAGULATION: ICD-10-CM

## 2023-08-23 DIAGNOSIS — Z95.820 S/P ANGIOPLASTY WITH STENT: ICD-10-CM

## 2023-08-23 DIAGNOSIS — I25.10 CORONARY ARTERY DISEASE INVOLVING NATIVE CORONARY ARTERY OF NATIVE HEART WITHOUT ANGINA PECTORIS: ICD-10-CM

## 2023-08-23 DIAGNOSIS — I07.1 MODERATE TRICUSPID REGURGITATION: ICD-10-CM

## 2023-08-23 DIAGNOSIS — I48.0 PAF (PAROXYSMAL ATRIAL FIBRILLATION) (HCC): ICD-10-CM

## 2023-08-23 DIAGNOSIS — R55 SYNCOPE, UNSPECIFIED SYNCOPE TYPE: ICD-10-CM

## 2023-08-23 DIAGNOSIS — R55 SYNCOPE AND COLLAPSE: ICD-10-CM

## 2023-08-23 DIAGNOSIS — R55 SYNCOPE, UNSPECIFIED SYNCOPE TYPE: Primary | ICD-10-CM

## 2023-08-23 DIAGNOSIS — I10 ESSENTIAL HYPERTENSION: ICD-10-CM

## 2023-08-23 DIAGNOSIS — I50.32 CHRONIC DIASTOLIC CONGESTIVE HEART FAILURE (HCC): ICD-10-CM

## 2023-08-23 LAB — ECHO BSA: 2.17 M2

## 2023-08-23 PROCEDURE — G8417 CALC BMI ABV UP PARAM F/U: HCPCS | Performed by: INTERNAL MEDICINE

## 2023-08-23 PROCEDURE — G8399 PT W/DXA RESULTS DOCUMENT: HCPCS | Performed by: INTERNAL MEDICINE

## 2023-08-23 PROCEDURE — 99213 OFFICE O/P EST LOW 20 MIN: CPT | Performed by: INTERNAL MEDICINE

## 2023-08-23 PROCEDURE — 1036F TOBACCO NON-USER: CPT | Performed by: INTERNAL MEDICINE

## 2023-08-23 PROCEDURE — 93242 EXT ECG>48HR<7D RECORDING: CPT

## 2023-08-23 PROCEDURE — 3017F COLORECTAL CA SCREEN DOC REV: CPT | Performed by: INTERNAL MEDICINE

## 2023-08-23 PROCEDURE — 1090F PRES/ABSN URINE INCON ASSESS: CPT | Performed by: INTERNAL MEDICINE

## 2023-08-23 PROCEDURE — 99211 OFF/OP EST MAY X REQ PHY/QHP: CPT | Performed by: INTERNAL MEDICINE

## 2023-08-23 PROCEDURE — 3078F DIAST BP <80 MM HG: CPT | Performed by: INTERNAL MEDICINE

## 2023-08-23 PROCEDURE — 3075F SYST BP GE 130 - 139MM HG: CPT | Performed by: INTERNAL MEDICINE

## 2023-08-23 PROCEDURE — 1123F ACP DISCUSS/DSCN MKR DOCD: CPT | Performed by: INTERNAL MEDICINE

## 2023-08-23 PROCEDURE — G8427 DOCREV CUR MEDS BY ELIG CLIN: HCPCS | Performed by: INTERNAL MEDICINE

## 2023-08-23 RX ORDER — FUROSEMIDE 20 MG/1
20 TABLET ORAL EVERY OTHER DAY
Qty: 7 TABLET | Refills: 0 | Status: SHIPPED | OUTPATIENT
Start: 2023-08-23 | End: 2023-09-06

## 2023-08-23 NOTE — PATIENT INSTRUCTIONS
SURVEY:    You may be receiving a survey from [employer] regarding your visit today. Please complete the survey to enable us to provide the highest quality of care to you and your family. If you cannot score us a very good on any question, please call the office to discuss how we could have made your experience a very good one. Thank you.

## 2023-08-23 NOTE — PROGRESS NOTES
volume overload clinically at today's appointment. Beta Blocker: Continue Metoprolol succinate (Toprol XL) 25 mg daily. ACE Inibitor/ARB: Continue losartan (Cozaar) 50 mg daily. Diuretics: DECREASE to furosemide (Lasix) 20 mg every other day. Essential Hypertension: Controlled  Beta Blocker: Continue Metoprolol succinate (Toprol XL) 25 mg daily. ACE Inibitor/ARB: Continue losartan (Cozaar) 50 mg daily. Diuretics: DECREASE to furosemide (Lasix) 20 mg every other day. Hyperlipidemia: Mixed, LDL done on 6/1/2023 was 52 mg/d L  Cholesterol Reduction Therapy: Continue Atorvastatin (Lipitor) 20 mg daily. In the meantime, I encouraged Ms. Dos Santos to continue to take her other medications. FOLLOW UP:   I told Ms. Ronaldo [place] to call my office if she had any problems, but otherwise I asked her to Return in about 3 months (around 11/23/2023). However, I would be happy to see her sooner should the need arise. Sincerely,  Pete Hussein MD, F.A.C.C. [facility] Cardiology Specialist    [address] [place] [address]  Phone: [phone], Fax: [phone]     I believe that the risk of significant morbidity and mortality related to the patient's current medical conditions are: intermediate-high. Approximately 40 minutes were spent during prep work, discussion and exam of the patient, and follow up documentation and all of their questions were answered. The documentation recorded by the scribe, accurately and completely reflects the services I personally performed and the decisions made by me. Pete Hussein MD, F.A.C.C.  August 23, 2023

## 2023-08-24 ENCOUNTER — HOSPITAL ENCOUNTER (OUTPATIENT)
Age: 73
Setting detail: SPECIMEN
Discharge: HOME | End: 2023-08-24
Payer: MEDICARE

## 2023-08-24 LAB
ALBUMIN SERPL-MCNC: 3.9 G/DL (ref 3.5–5.2)
ALBUMIN/GLOB SERPL: 1.6 {RATIO} (ref 1–2.5)
ALP SERPL-CCNC: 68 U/L (ref 35–104)
ALT SERPL-CCNC: 9 U/L (ref 5–33)
ANION GAP SERPL CALCULATED.3IONS-SCNC: 12 MMOL/L (ref 9–17)
AST SERPL-CCNC: 10 U/L
BILIRUB SERPL-MCNC: 0.8 MG/DL (ref 0.3–1.2)
BUN SERPL-MCNC: 23 MG/DL (ref 8–23)
BUN/CREAT SERPL: 12 (ref 9–20)
CALCIUM SERPL-MCNC: 10 MG/DL (ref 8.6–10.4)
CHLORIDE SERPL-SCNC: 99 MMOL/L (ref 98–107)
CO2 SERPL-SCNC: 27 MMOL/L (ref 20–31)
CREAT SERPL-MCNC: 1.9 MG/DL (ref 0.5–0.9)
GFR SERPL CREATININE-BSD FRML MDRD: 27 ML/MIN/1.73M2
GLUCOSE SERPL-MCNC: 116 MG/DL (ref 70–99)
POTASSIUM SERPL-SCNC: 3.5 MMOL/L (ref 3.7–5.3)
PROT SERPL-MCNC: 6.3 G/DL (ref 6.4–8.3)
SODIUM SERPL-SCNC: 138 MMOL/L (ref 135–144)

## 2023-08-24 PROCEDURE — 80053 COMPREHEN METABOLIC PANEL: CPT

## 2023-08-24 PROCEDURE — 36415 COLL VENOUS BLD VENIPUNCTURE: CPT

## 2023-08-24 PROCEDURE — P9604 ONE-WAY ALLOW PRORATED TRIP: HCPCS

## 2023-08-30 ENCOUNTER — HOSPITAL ENCOUNTER (OUTPATIENT)
Age: 73
Setting detail: SPECIMEN
Discharge: HOME | End: 2023-08-30

## 2023-09-01 ENCOUNTER — HOSPITAL ENCOUNTER (OUTPATIENT)
Age: 73
Setting detail: SPECIMEN
Discharge: HOME | End: 2023-09-01
Payer: MEDICARE

## 2023-09-01 DIAGNOSIS — I10 ESSENTIAL HYPERTENSION: ICD-10-CM

## 2023-09-01 DIAGNOSIS — I50.32 CHRONIC DIASTOLIC CONGESTIVE HEART FAILURE (HCC): ICD-10-CM

## 2023-09-01 DIAGNOSIS — I07.1 MODERATE TRICUSPID REGURGITATION: ICD-10-CM

## 2023-09-01 DIAGNOSIS — Z95.820 S/P ANGIOPLASTY WITH STENT: ICD-10-CM

## 2023-09-01 DIAGNOSIS — E78.2 MIXED HYPERLIPIDEMIA: ICD-10-CM

## 2023-09-01 DIAGNOSIS — Z79.01 CHRONIC ANTICOAGULATION: ICD-10-CM

## 2023-09-01 DIAGNOSIS — I25.10 CORONARY ARTERY DISEASE INVOLVING NATIVE CORONARY ARTERY OF NATIVE HEART WITHOUT ANGINA PECTORIS: ICD-10-CM

## 2023-09-01 DIAGNOSIS — I48.0 PAF (PAROXYSMAL ATRIAL FIBRILLATION) (HCC): ICD-10-CM

## 2023-09-01 DIAGNOSIS — R55 SYNCOPE, UNSPECIFIED SYNCOPE TYPE: ICD-10-CM

## 2023-09-01 LAB
ANION GAP SERPL CALCULATED.3IONS-SCNC: 14 MMOL/L (ref 9–17)
BUN SERPL-MCNC: 22 MG/DL (ref 8–23)
BUN/CREAT SERPL: 13 (ref 9–20)
CALCIUM SERPL-MCNC: 10 MG/DL (ref 8.6–10.4)
CHLORIDE SERPL-SCNC: 99 MMOL/L (ref 98–107)
CO2 SERPL-SCNC: 21 MMOL/L (ref 20–31)
CREAT SERPL-MCNC: 1.7 MG/DL (ref 0.5–0.9)
GFR SERPL CREATININE-BSD FRML MDRD: 31 ML/MIN/1.73M2
GLUCOSE SERPL-MCNC: 80 MG/DL (ref 70–99)
POTASSIUM SERPL-SCNC: 4.9 MMOL/L (ref 3.7–5.3)
SODIUM SERPL-SCNC: 134 MMOL/L (ref 135–144)

## 2023-09-01 PROCEDURE — 80048 BASIC METABOLIC PNL TOTAL CA: CPT

## 2023-09-05 ENCOUNTER — TELEPHONE (OUTPATIENT)
Dept: CARDIOLOGY | Age: 73
End: 2023-09-05

## 2023-09-05 NOTE — TELEPHONE ENCOUNTER
----- Message from Rosanne Bourne MD sent at 9/2/2023  9:37 AM EDT -----  Blood work is stable. Continue current therapy and follow up.  Thank you

## 2023-09-07 ENCOUNTER — HOSPITAL ENCOUNTER (OUTPATIENT)
Age: 73
Setting detail: SPECIMEN
Discharge: HOME | End: 2023-09-07
Payer: MEDICARE

## 2023-09-07 LAB
ERYTHROCYTE [DISTWIDTH] IN BLOOD BY AUTOMATED COUNT: 13.4 % (ref 11.8–14.4)
HCT VFR BLD AUTO: 39.7 % (ref 36.3–47.1)
HGB BLD-MCNC: 12.9 G/DL (ref 11.9–15.1)
MCH RBC QN AUTO: 32.7 PG (ref 25.2–33.5)
MCHC RBC AUTO-ENTMCNC: 32.5 G/DL (ref 28.4–34.8)
MCV RBC AUTO: 100.8 FL (ref 82.6–102.9)
NRBC BLD-RTO: 0 PER 100 WBC
PLATELET # BLD AUTO: 288 K/UL (ref 138–453)
PMV BLD AUTO: 9.3 FL (ref 8.1–13.5)
RBC # BLD AUTO: 3.94 M/UL (ref 3.95–5.11)
WBC OTHER # BLD: 6.1 K/UL (ref 3.5–11.3)

## 2023-09-07 PROCEDURE — 85027 COMPLETE CBC AUTOMATED: CPT

## 2023-09-07 PROCEDURE — P9603 ONE-WAY ALLOW PRORATED MILES: HCPCS

## 2023-09-07 PROCEDURE — 36415 COLL VENOUS BLD VENIPUNCTURE: CPT

## 2023-09-11 LAB — ECHO BSA: 2.17 M2

## 2023-09-12 ENCOUNTER — HOSPITAL ENCOUNTER (OUTPATIENT)
Age: 73
Setting detail: SPECIMEN
Discharge: HOME | End: 2023-09-12
Payer: MEDICARE

## 2023-09-12 LAB
ERYTHROCYTE [DISTWIDTH] IN BLOOD BY AUTOMATED COUNT: 13.8 % (ref 11.8–14.4)
HCT VFR BLD AUTO: 38.5 % (ref 36.3–47.1)
HGB BLD-MCNC: 12.7 G/DL (ref 11.9–15.1)
MCH RBC QN AUTO: 33.4 PG (ref 25.2–33.5)
MCHC RBC AUTO-ENTMCNC: 33 G/DL (ref 28.4–34.8)
MCV RBC AUTO: 101.3 FL (ref 82.6–102.9)
NRBC BLD-RTO: 0 PER 100 WBC
PLATELET # BLD AUTO: 247 K/UL (ref 138–453)
PMV BLD AUTO: 9.6 FL (ref 8.1–13.5)
RBC # BLD AUTO: 3.8 M/UL (ref 3.95–5.11)
WBC OTHER # BLD: 5.5 K/UL (ref 3.5–11.3)

## 2023-09-12 PROCEDURE — P9604 ONE-WAY ALLOW PRORATED TRIP: HCPCS

## 2023-09-12 PROCEDURE — 85027 COMPLETE CBC AUTOMATED: CPT

## 2023-09-12 PROCEDURE — 36415 COLL VENOUS BLD VENIPUNCTURE: CPT

## 2023-09-15 ENCOUNTER — TELEPHONE (OUTPATIENT)
Dept: CARDIOLOGY | Age: 73
End: 2023-09-15

## 2023-09-15 NOTE — TELEPHONE ENCOUNTER
----- Message from Nara Bazzi MD sent at 9/14/2023  8:46 PM EDT -----  Heart monitor is okay, Heart rate is controlled. Continue current therapy and follow up.  Thank you

## 2023-10-19 ENCOUNTER — HOSPITAL ENCOUNTER (OUTPATIENT)
Age: 73
Discharge: HOME | End: 2023-10-21
Attending: INTERNAL MEDICINE
Payer: MEDICARE

## 2023-10-19 VITALS
DIASTOLIC BLOOD PRESSURE: 82 MMHG | BODY MASS INDEX: 34.71 KG/M2 | WEIGHT: 215.98 LBS | SYSTOLIC BLOOD PRESSURE: 155 MMHG | HEIGHT: 66 IN

## 2023-10-19 DIAGNOSIS — R55 SYNCOPE, UNSPECIFIED SYNCOPE TYPE: ICD-10-CM

## 2023-10-19 DIAGNOSIS — I48.0 PAF (PAROXYSMAL ATRIAL FIBRILLATION) (HCC): ICD-10-CM

## 2023-10-19 DIAGNOSIS — I07.1 MODERATE TRICUSPID REGURGITATION: ICD-10-CM

## 2023-10-19 DIAGNOSIS — I25.10 CORONARY ARTERY DISEASE INVOLVING NATIVE CORONARY ARTERY OF NATIVE HEART WITHOUT ANGINA PECTORIS: ICD-10-CM

## 2023-10-19 DIAGNOSIS — E78.2 MIXED HYPERLIPIDEMIA: ICD-10-CM

## 2023-10-19 DIAGNOSIS — Z79.01 CHRONIC ANTICOAGULATION: ICD-10-CM

## 2023-10-19 DIAGNOSIS — I50.32 CHRONIC DIASTOLIC CONGESTIVE HEART FAILURE (HCC): ICD-10-CM

## 2023-10-19 DIAGNOSIS — I10 ESSENTIAL HYPERTENSION: ICD-10-CM

## 2023-10-19 DIAGNOSIS — Z95.820 S/P ANGIOPLASTY WITH STENT: ICD-10-CM

## 2023-10-19 LAB
ECHO AO ROOT DIAM: 3.1 CM
ECHO AO ROOT INDEX: 1.5 CM/M2
ECHO AV ACCELERATION TIME: 71.83 MS
ECHO AV CUSP MM: 1.6 CM
ECHO AV MEAN GRADIENT: 4 MMHG
ECHO AV MEAN VELOCITY: 1 M/S
ECHO AV PEAK VELOCITY: 1.4 M/S
ECHO AV REGURGITANT PHT: 733.5 MILLISECOND
ECHO AV VTI: 28 CM
ECHO BSA: 2.14 M2
ECHO EST RA PRESSURE: 8 MMHG
ECHO LA DIAMETER INDEX: 2.61 CM/M2
ECHO LA DIAMETER: 5.4 CM
ECHO LA MAJOR AXIS: 6.6 CM
ECHO LA TO AORTIC ROOT RATIO: 1.74
ECHO LA VOL 2C: 98 ML (ref 22–52)
ECHO LA VOL 4C: 85 ML (ref 22–52)
ECHO LA VOL BP: 93 ML (ref 22–52)
ECHO LA VOL/BSA BIPLANE: 45 ML/M2 (ref 16–34)
ECHO LA VOLUME AREA LENGTH: 96 ML
ECHO LA VOLUME INDEX A2C: 47 ML/M2 (ref 16–34)
ECHO LA VOLUME INDEX A4C: 41 ML/M2 (ref 16–34)
ECHO LA VOLUME INDEX AREA LENGTH: 46 ML/M2 (ref 16–34)
ECHO LV E' LATERAL VELOCITY: 12 CM/S
ECHO LV EDV A2C: 52 ML
ECHO LV EDV A4C: 71 ML
ECHO LV EDV BP: 64 ML (ref 56–104)
ECHO LV EDV INDEX A4C: 34 ML/M2
ECHO LV EDV INDEX BP: 31 ML/M2
ECHO LV EDV NDEX A2C: 25 ML/M2
ECHO LV EJECTION FRACTION BIPLANE: 58 % (ref 55–100)
ECHO LV ESV A2C: 23 ML
ECHO LV ESV A4C: 30 ML
ECHO LV ESV BP: 27 ML (ref 19–49)
ECHO LV ESV INDEX A2C: 11 ML/M2
ECHO LV ESV INDEX A4C: 14 ML/M2
ECHO LV ESV INDEX BP: 13 ML/M2
ECHO LV FRACTIONAL SHORTENING: 30 % (ref 28–44)
ECHO LV INTERNAL DIMENSION DIASTOLE INDEX: 1.93 CM/M2
ECHO LV INTERNAL DIMENSION DIASTOLIC: 4 CM (ref 3.9–5.3)
ECHO LV INTERNAL DIMENSION SYSTOLIC INDEX: 1.35 CM/M2
ECHO LV INTERNAL DIMENSION SYSTOLIC: 2.8 CM
ECHO LV IVSD: 1.1 CM (ref 0.6–0.9)
ECHO LV IVSS: 1.3 CM
ECHO LV MASS 2D: 145.6 G (ref 67–162)
ECHO LV MASS INDEX 2D: 70.4 G/M2 (ref 43–95)
ECHO LV POSTERIOR WALL DIASTOLIC: 1.1 CM (ref 0.6–0.9)
ECHO LV POSTERIOR WALL SYSTOLIC: 1.3 CM
ECHO LV RELATIVE WALL THICKNESS RATIO: 0.55
ECHO LVOT AV VTI INDEX: 0.76
ECHO LVOT MEAN GRADIENT: 2 MMHG
ECHO LVOT VTI: 21.2 CM
ECHO MV E DECELERATION TIME (DT): 182.3 MS
ECHO MV E VELOCITY: 1.11 M/S
ECHO MV E/E' LATERAL: 9.25
ECHO MV EROA PISA: 0.1 SQUARE MILLIMETER
ECHO MV MEAN GRADIENT: 84 MMHG
ECHO MV REGURGITANT ALIASING (NYQUIST) VELOCITY: 23 CM/S
ECHO MV REGURGITANT PEAK GRADIENT: 117 MMHG
ECHO MV REGURGITANT PEAK VELOCITY: 5.4 M/S
ECHO MV REGURGITANT RADIUS PISA: 0.53 CM
ECHO PV MAX VELOCITY: 0.9 M/S
ECHO RIGHT VENTRICULAR SYSTOLIC PRESSURE (RVSP): 30 MMHG
ECHO RV INTERNAL DIMENSION: 3.4 CM
ECHO TV REGURGITANT MAX VELOCITY: 2.33 M/S

## 2023-10-19 PROCEDURE — 93306 TTE W/DOPPLER COMPLETE: CPT

## 2023-10-19 PROCEDURE — 93306 TTE W/DOPPLER COMPLETE: CPT | Performed by: FAMILY MEDICINE

## 2023-10-20 ENCOUNTER — TELEPHONE (OUTPATIENT)
Dept: CARDIOLOGY | Age: 73
End: 2023-10-20

## 2023-10-20 NOTE — TELEPHONE ENCOUNTER
----- Message from Mitchell Ureña MD sent at 10/19/2023 10:59 PM EDT -----  Slight worsening in mitral regurgitation. We will continue to monitor by echo next visit. Continue current therapy and follow-up. Please call with questions and/or concerns.   Thank you

## 2023-11-27 ENCOUNTER — OFFICE VISIT (OUTPATIENT)
Dept: NEUROLOGY | Age: 73
End: 2023-11-27
Payer: MEDICARE

## 2023-11-27 VITALS
WEIGHT: 203.2 LBS | HEART RATE: 62 BPM | RESPIRATION RATE: 18 BRPM | SYSTOLIC BLOOD PRESSURE: 133 MMHG | HEIGHT: 66 IN | DIASTOLIC BLOOD PRESSURE: 78 MMHG | TEMPERATURE: 96 F | BODY MASS INDEX: 32.66 KG/M2

## 2023-11-27 DIAGNOSIS — R25.1 TREMORS OF NERVOUS SYSTEM: Primary | ICD-10-CM

## 2023-11-27 DIAGNOSIS — G20.A1 PARKINSON'S DISEASE WITHOUT DYSKINESIA OR FLUCTUATING MANIFESTATIONS: ICD-10-CM

## 2023-11-27 PROCEDURE — G8484 FLU IMMUNIZE NO ADMIN: HCPCS | Performed by: NEUROMUSCULOSKELETAL MEDICINE, SPORTS MEDICINE

## 2023-11-27 PROCEDURE — G8417 CALC BMI ABV UP PARAM F/U: HCPCS | Performed by: NEUROMUSCULOSKELETAL MEDICINE, SPORTS MEDICINE

## 2023-11-27 PROCEDURE — G8427 DOCREV CUR MEDS BY ELIG CLIN: HCPCS | Performed by: NEUROMUSCULOSKELETAL MEDICINE, SPORTS MEDICINE

## 2023-11-27 PROCEDURE — 1036F TOBACCO NON-USER: CPT | Performed by: NEUROMUSCULOSKELETAL MEDICINE, SPORTS MEDICINE

## 2023-11-27 PROCEDURE — 99214 OFFICE O/P EST MOD 30 MIN: CPT | Performed by: NEUROMUSCULOSKELETAL MEDICINE, SPORTS MEDICINE

## 2023-11-27 PROCEDURE — 1123F ACP DISCUSS/DSCN MKR DOCD: CPT | Performed by: NEUROMUSCULOSKELETAL MEDICINE, SPORTS MEDICINE

## 2023-11-27 PROCEDURE — 99213 OFFICE O/P EST LOW 20 MIN: CPT | Performed by: NEUROMUSCULOSKELETAL MEDICINE, SPORTS MEDICINE

## 2023-11-27 PROCEDURE — 3075F SYST BP GE 130 - 139MM HG: CPT | Performed by: NEUROMUSCULOSKELETAL MEDICINE, SPORTS MEDICINE

## 2023-11-27 PROCEDURE — G8399 PT W/DXA RESULTS DOCUMENT: HCPCS | Performed by: NEUROMUSCULOSKELETAL MEDICINE, SPORTS MEDICINE

## 2023-11-27 PROCEDURE — 3078F DIAST BP <80 MM HG: CPT | Performed by: NEUROMUSCULOSKELETAL MEDICINE, SPORTS MEDICINE

## 2023-11-27 PROCEDURE — 3017F COLORECTAL CA SCREEN DOC REV: CPT | Performed by: NEUROMUSCULOSKELETAL MEDICINE, SPORTS MEDICINE

## 2023-11-27 PROCEDURE — 1090F PRES/ABSN URINE INCON ASSESS: CPT | Performed by: NEUROMUSCULOSKELETAL MEDICINE, SPORTS MEDICINE

## 2023-11-27 RX ORDER — SALIVA STIMULANT COMB. NO.3
SPRAY, NON-AEROSOL (ML) MUCOUS MEMBRANE PRN
COMMUNITY

## 2023-11-27 RX ORDER — TRAMADOL HYDROCHLORIDE 50 MG/1
50 TABLET ORAL EVERY 8 HOURS PRN
COMMUNITY

## 2023-11-27 RX ORDER — AMLODIPINE BESYLATE 10 MG/1
10 TABLET ORAL DAILY
COMMUNITY

## 2023-11-27 RX ORDER — AMMONIUM LACTATE 12 G/100G
CREAM TOPICAL NIGHTLY PRN
COMMUNITY

## 2023-11-27 NOTE — PATIENT INSTRUCTIONS
SURVEY:    You may be receiving a survey from [employer] regarding your visit today. Please complete the survey to enable us to provide the highest quality of care to you and your family. If you cannot score us a very good on any question, please call the office to discuss how we could have made your experience a very good one. Thank you.

## 2023-11-30 ENCOUNTER — OFFICE VISIT (OUTPATIENT)
Dept: CARDIOLOGY | Age: 73
End: 2023-11-30
Payer: MEDICARE

## 2023-11-30 VITALS
HEART RATE: 72 BPM | SYSTOLIC BLOOD PRESSURE: 117 MMHG | HEIGHT: 66 IN | DIASTOLIC BLOOD PRESSURE: 73 MMHG | WEIGHT: 199 LBS | RESPIRATION RATE: 18 BRPM | OXYGEN SATURATION: 99 % | BODY MASS INDEX: 31.98 KG/M2

## 2023-11-30 DIAGNOSIS — I10 ESSENTIAL HYPERTENSION: ICD-10-CM

## 2023-11-30 DIAGNOSIS — D68.69 SECONDARY HYPERCOAGULABLE STATE (HCC): ICD-10-CM

## 2023-11-30 DIAGNOSIS — I48.0 PAF (PAROXYSMAL ATRIAL FIBRILLATION) (HCC): ICD-10-CM

## 2023-11-30 DIAGNOSIS — R55 SYNCOPE, UNSPECIFIED SYNCOPE TYPE: ICD-10-CM

## 2023-11-30 DIAGNOSIS — Z79.01 CHRONIC ANTICOAGULATION: ICD-10-CM

## 2023-11-30 DIAGNOSIS — I07.1 MODERATE TRICUSPID REGURGITATION: ICD-10-CM

## 2023-11-30 DIAGNOSIS — E78.2 MIXED HYPERLIPIDEMIA: ICD-10-CM

## 2023-11-30 DIAGNOSIS — G20.B1 PARKINSON'S DISEASE WITH DYSKINESIA, UNSPECIFIED WHETHER MANIFESTATIONS FLUCTUATE: ICD-10-CM

## 2023-11-30 DIAGNOSIS — I50.32 CHRONIC DIASTOLIC CONGESTIVE HEART FAILURE (HCC): ICD-10-CM

## 2023-11-30 DIAGNOSIS — Z95.820 S/P ANGIOPLASTY WITH STENT: ICD-10-CM

## 2023-11-30 DIAGNOSIS — I25.10 CORONARY ARTERY DISEASE INVOLVING NATIVE CORONARY ARTERY OF NATIVE HEART WITHOUT ANGINA PECTORIS: ICD-10-CM

## 2023-11-30 PROCEDURE — 1123F ACP DISCUSS/DSCN MKR DOCD: CPT | Performed by: PHYSICIAN ASSISTANT

## 2023-11-30 PROCEDURE — G8399 PT W/DXA RESULTS DOCUMENT: HCPCS | Performed by: PHYSICIAN ASSISTANT

## 2023-11-30 PROCEDURE — G8484 FLU IMMUNIZE NO ADMIN: HCPCS | Performed by: PHYSICIAN ASSISTANT

## 2023-11-30 PROCEDURE — 1090F PRES/ABSN URINE INCON ASSESS: CPT | Performed by: PHYSICIAN ASSISTANT

## 2023-11-30 PROCEDURE — 3017F COLORECTAL CA SCREEN DOC REV: CPT | Performed by: PHYSICIAN ASSISTANT

## 2023-11-30 PROCEDURE — 1036F TOBACCO NON-USER: CPT | Performed by: PHYSICIAN ASSISTANT

## 2023-11-30 PROCEDURE — 3074F SYST BP LT 130 MM HG: CPT | Performed by: PHYSICIAN ASSISTANT

## 2023-11-30 PROCEDURE — G8427 DOCREV CUR MEDS BY ELIG CLIN: HCPCS | Performed by: PHYSICIAN ASSISTANT

## 2023-11-30 PROCEDURE — G8417 CALC BMI ABV UP PARAM F/U: HCPCS | Performed by: PHYSICIAN ASSISTANT

## 2023-11-30 PROCEDURE — 99211 OFF/OP EST MAY X REQ PHY/QHP: CPT | Performed by: PHYSICIAN ASSISTANT

## 2023-11-30 PROCEDURE — 99214 OFFICE O/P EST MOD 30 MIN: CPT | Performed by: PHYSICIAN ASSISTANT

## 2023-11-30 PROCEDURE — 3078F DIAST BP <80 MM HG: CPT | Performed by: PHYSICIAN ASSISTANT

## 2023-11-30 NOTE — PATIENT INSTRUCTIONS
SURVEY:    You may be receiving a survey from [employer] regarding your visit today. Please complete the survey to enable us to provide the highest quality of care to you and your family. If you cannot score us a very good on any question, please call the office to discuss how we could have made your experience a very good one. Thank you.

## 2023-11-30 NOTE — PROGRESS NOTES
Sherlyn Medrano am scribing for and in the presence of Myranda Godoy     Patient: Soumya Zhang  : 1950  Date of Visit: 2023    REASON FOR VISIT / CONSULTATION: Congestive Heart Failure (HX: CHF, PAF, CAD, mod tricuspid regurg, HTN, HLD. Pt is here for a 3 month follow up. Pt reports she is doing well and BP has been better since restarting amlodipine. She was diagnosed with Parkinson's on Monday when she saw Dr. Jenny Gavin. Denies CP, SOB, light headed/dizziness, palps. )    History of Present Illness:         Dear Aj Ta MD    I had the pleasure of seeing Soumya Zhang in my office today. Ms. Alexia Palacios is a 75 y.o. female who presented today for follow-up. She initially seen in my office to establish care back on 2021. She had a stress test, echocardiogram and a Holter monitor done prior to her initial visit as below due to worsening shortness of breath. She had had hypertension for many years and has been on anti-hypertensive drugs for awhile. She also has had thyorid issue and has been on Levothyroxine as well for many years. She denied every being diagnosied with diabtes or sleep apnea. She is a former smoker. She quit smoking however in  and has not smoked since. Her family history consits of her mother who had caroid artery disease in her 50-60's. Echo done on 2021: Global left ventricular systolic function appears preserved with an estimated ejection fraction of >60%. The left ventricular cavity size is within normal limits and the left ventricular wall thickness is mildly increased. No definite specific wall motion abnormalities were identified. The left atrium is severely dilated (>40), borderline dilated with a left atrial volume index of 57 ml/m2. Normal mitral valve structure with mild to moderate mitral regurgitation. Normal tricuspid valve structure with moderate to severe tricuspid regurgitation.  Mild to

## 2023-12-01 ENCOUNTER — HOSPITAL ENCOUNTER (OUTPATIENT)
Age: 73
Setting detail: SPECIMEN
Discharge: HOME | End: 2023-12-01
Payer: MEDICARE

## 2023-12-01 LAB
ANION GAP SERPL CALCULATED.3IONS-SCNC: 13 MMOL/L (ref 9–17)
BUN SERPL-MCNC: 17 MG/DL (ref 8–23)
BUN/CREAT SERPL: 14 (ref 9–20)
CALCIUM SERPL-MCNC: 10 MG/DL (ref 8.6–10.4)
CHLORIDE SERPL-SCNC: 100 MMOL/L (ref 98–107)
CO2 SERPL-SCNC: 22 MMOL/L (ref 20–31)
CREAT SERPL-MCNC: 1.2 MG/DL (ref 0.5–0.9)
GFR SERPL CREATININE-BSD FRML MDRD: 48 ML/MIN/1.73M2
GLUCOSE SERPL-MCNC: 94 MG/DL (ref 70–99)
POTASSIUM SERPL-SCNC: 4.1 MMOL/L (ref 3.7–5.3)
SODIUM SERPL-SCNC: 135 MMOL/L (ref 135–144)

## 2023-12-01 PROCEDURE — 36415 COLL VENOUS BLD VENIPUNCTURE: CPT

## 2023-12-01 PROCEDURE — P9603 ONE-WAY ALLOW PRORATED MILES: HCPCS

## 2023-12-01 PROCEDURE — 80048 BASIC METABOLIC PNL TOTAL CA: CPT

## 2023-12-04 ENCOUNTER — TELEPHONE (OUTPATIENT)
Dept: CARDIOLOGY | Age: 73
End: 2023-12-04

## 2023-12-04 NOTE — TELEPHONE ENCOUNTER
----- Message from Marco Bishop MD sent at 12/2/2023 10:19 PM EST -----  Blood work is good. Continue current therapy and follow up. Please call with questions and/or concern.  Thank you

## 2024-02-05 PROBLEM — N18.32 STAGE 3B CHRONIC KIDNEY DISEASE (HCC): Status: ACTIVE | Noted: 2024-02-05

## 2024-02-06 ENCOUNTER — TELEPHONE (OUTPATIENT)
Dept: NEPHROLOGY | Age: 74
End: 2024-02-06

## 2024-02-06 ENCOUNTER — HOSPITAL ENCOUNTER (OUTPATIENT)
Age: 74
Setting detail: SPECIMEN
Discharge: HOME | End: 2024-02-06
Payer: MEDICARE

## 2024-02-06 DIAGNOSIS — N39.0 URINARY TRACT INFECTION WITHOUT HEMATURIA, SITE UNSPECIFIED: Primary | ICD-10-CM

## 2024-02-06 DIAGNOSIS — I48.0 PAF (PAROXYSMAL ATRIAL FIBRILLATION) (HCC): ICD-10-CM

## 2024-02-06 DIAGNOSIS — I10 ESSENTIAL HYPERTENSION: ICD-10-CM

## 2024-02-06 DIAGNOSIS — N18.32 STAGE 3B CHRONIC KIDNEY DISEASE (HCC): ICD-10-CM

## 2024-02-06 DIAGNOSIS — Z95.820 S/P ANGIOPLASTY WITH STENT: ICD-10-CM

## 2024-02-06 DIAGNOSIS — N28.1 RENAL CYST: ICD-10-CM

## 2024-02-06 DIAGNOSIS — D68.69 SECONDARY HYPERCOAGULABLE STATE (HCC): ICD-10-CM

## 2024-02-06 DIAGNOSIS — I50.22 CHRONIC SYSTOLIC (CONGESTIVE) HEART FAILURE (HCC): ICD-10-CM

## 2024-02-06 LAB
BACTERIA URNS QL MICRO: ABNORMAL
BILIRUB UR QL STRIP: NEGATIVE
CASTS #/AREA URNS LPF: ABNORMAL /LPF
CLARITY UR: CLEAR
COLOR UR: YELLOW
CREAT UR-MCNC: 88.5 MG/DL (ref 28–217)
EPI CELLS #/AREA URNS HPF: ABNORMAL /HPF (ref 0–25)
GLUCOSE UR STRIP-MCNC: ABNORMAL MG/DL
HGB UR QL STRIP.AUTO: NEGATIVE
KETONES UR STRIP-MCNC: NEGATIVE MG/DL
LEUKOCYTE ESTERASE UR QL STRIP: ABNORMAL
MUCOUS THREADS URNS QL MICRO: ABNORMAL
NITRITE UR QL STRIP: NEGATIVE
PH UR STRIP: 6 [PH] (ref 5–9)
PROT UR STRIP-MCNC: NEGATIVE MG/DL
RBC #/AREA URNS HPF: ABNORMAL /HPF (ref 0–2)
RENAL EPITHELIAL, UA: ABNORMAL /HPF
SP GR UR STRIP: 1.02 (ref 1.01–1.02)
TOTAL PROTEIN, URINE: 9 MG/DL
URINE TOTAL PROTEIN CREATININE RATIO: 0.1 (ref 0–0.2)
UROBILINOGEN UR STRIP-ACNC: NORMAL EU/DL (ref 0–1)
WBC #/AREA URNS HPF: ABNORMAL /HPF (ref 0–5)

## 2024-02-06 PROCEDURE — 84156 ASSAY OF PROTEIN URINE: CPT

## 2024-02-06 PROCEDURE — 87086 URINE CULTURE/COLONY COUNT: CPT

## 2024-02-06 PROCEDURE — 86403 PARTICLE AGGLUT ANTBDY SCRN: CPT

## 2024-02-06 PROCEDURE — 81001 URINALYSIS AUTO W/SCOPE: CPT

## 2024-02-06 PROCEDURE — 82570 ASSAY OF URINE CREATININE: CPT

## 2024-02-06 NOTE — TELEPHONE ENCOUNTER
Unable to speak to Patients nurse to obtain subjective information regarding UTI symptoms so I am adding the urine culture on since they are time sensitive.

## 2024-02-06 NOTE — TELEPHONE ENCOUNTER
----- Message from Sher Platt MD sent at 2/6/2024  3:19 PM EST -----  Check urine culture if she has any UTI symptoms

## 2024-02-07 LAB
MICROORGANISM SPEC CULT: ABNORMAL
SPECIMEN DESCRIPTION: ABNORMAL

## 2024-02-08 NOTE — RESULT ENCOUNTER NOTE
Patient has UTI. Will send in antibiotic. The bacteria in her urine is typically treated with a penicillin but I see she is allergic. Has she every taken keflex before? If so and did ok then send keflex 500 mg bid x 7 days

## 2024-02-08 NOTE — TELEPHONE ENCOUNTER
Spoke to Dr. Daniel face to face and informed her patient is not having any symptoms. She states if starts to have symptoms to let us know. Disregard sending antibiotics at this time.       Patient has UTI. Will send in antibiotic. The bacteria in her urine is typically treated with a penicillin but I see she is allergic. Has she every taken keflex before? If so and did ok then send keflex 500 mg bid x 7 day

## 2024-02-09 ENCOUNTER — HOSPITAL ENCOUNTER (OUTPATIENT)
Age: 74
Setting detail: SPECIMEN
Discharge: HOME | End: 2024-02-09
Payer: MEDICARE

## 2024-02-09 LAB
ALBUMIN SERPL-MCNC: 3.7 G/DL (ref 3.5–5.2)
ANION GAP SERPL CALCULATED.3IONS-SCNC: 14 MMOL/L (ref 9–17)
BUN SERPL-MCNC: 25 MG/DL (ref 8–23)
BUN/CREAT SERPL: 23 (ref 9–20)
CALCIUM SERPL-MCNC: 9.5 MG/DL (ref 8.6–10.4)
CHLORIDE SERPL-SCNC: 102 MMOL/L (ref 98–107)
CO2 SERPL-SCNC: 22 MMOL/L (ref 20–31)
CREAT SERPL-MCNC: 1.1 MG/DL (ref 0.5–0.9)
GFR SERPL CREATININE-BSD FRML MDRD: 52 ML/MIN/1.73M2
GLUCOSE SERPL-MCNC: 116 MG/DL (ref 70–99)
PHOSPHATE SERPL-MCNC: 3.3 MG/DL (ref 2.6–4.5)
POTASSIUM SERPL-SCNC: 4.3 MMOL/L (ref 3.7–5.3)
SODIUM SERPL-SCNC: 138 MMOL/L (ref 135–144)

## 2024-02-09 PROCEDURE — 80069 RENAL FUNCTION PANEL: CPT

## 2024-02-09 PROCEDURE — 36415 COLL VENOUS BLD VENIPUNCTURE: CPT

## 2024-02-13 ENCOUNTER — HOSPITAL ENCOUNTER (OUTPATIENT)
Age: 74
Setting detail: SPECIMEN
Discharge: HOME | End: 2024-02-13

## 2024-02-13 ENCOUNTER — OFFICE VISIT (OUTPATIENT)
Dept: NEPHROLOGY | Age: 74
End: 2024-02-13
Payer: MEDICARE

## 2024-02-13 VITALS
BODY MASS INDEX: 32.26 KG/M2 | HEART RATE: 60 BPM | TEMPERATURE: 96.7 F | WEIGHT: 200.7 LBS | RESPIRATION RATE: 18 BRPM | DIASTOLIC BLOOD PRESSURE: 52 MMHG | SYSTOLIC BLOOD PRESSURE: 84 MMHG | HEIGHT: 66 IN

## 2024-02-13 DIAGNOSIS — I10 ESSENTIAL HYPERTENSION: ICD-10-CM

## 2024-02-13 DIAGNOSIS — N18.32 STAGE 3B CHRONIC KIDNEY DISEASE (HCC): Primary | ICD-10-CM

## 2024-02-13 DIAGNOSIS — N28.1 RENAL CYST: ICD-10-CM

## 2024-02-13 PROCEDURE — G8417 CALC BMI ABV UP PARAM F/U: HCPCS | Performed by: INTERNAL MEDICINE

## 2024-02-13 PROCEDURE — G8484 FLU IMMUNIZE NO ADMIN: HCPCS | Performed by: INTERNAL MEDICINE

## 2024-02-13 PROCEDURE — 1090F PRES/ABSN URINE INCON ASSESS: CPT | Performed by: INTERNAL MEDICINE

## 2024-02-13 PROCEDURE — 3078F DIAST BP <80 MM HG: CPT | Performed by: INTERNAL MEDICINE

## 2024-02-13 PROCEDURE — 1036F TOBACCO NON-USER: CPT | Performed by: INTERNAL MEDICINE

## 2024-02-13 PROCEDURE — G8399 PT W/DXA RESULTS DOCUMENT: HCPCS | Performed by: INTERNAL MEDICINE

## 2024-02-13 PROCEDURE — 99214 OFFICE O/P EST MOD 30 MIN: CPT | Performed by: INTERNAL MEDICINE

## 2024-02-13 PROCEDURE — 3017F COLORECTAL CA SCREEN DOC REV: CPT | Performed by: INTERNAL MEDICINE

## 2024-02-13 PROCEDURE — 1123F ACP DISCUSS/DSCN MKR DOCD: CPT | Performed by: INTERNAL MEDICINE

## 2024-02-13 PROCEDURE — 99213 OFFICE O/P EST LOW 20 MIN: CPT | Performed by: INTERNAL MEDICINE

## 2024-02-13 PROCEDURE — G8427 DOCREV CUR MEDS BY ELIG CLIN: HCPCS | Performed by: INTERNAL MEDICINE

## 2024-02-13 PROCEDURE — 3074F SYST BP LT 130 MM HG: CPT | Performed by: INTERNAL MEDICINE

## 2024-02-13 NOTE — PROGRESS NOTES
[facility]        Outpatient Follow-Up note         2/13/2024 12:15 PM    Patient Name:   Libertad Dos Santos  YOB: 1950  Primary Care Physician:  Yunior Lo MD [facility]  [address] [facility] [address]  Dept: [phone]     Chief Complaint / Reason for follow-up : Follow Up of CKD     Interval History :  Patient seen and examined by me.   Feels well. No complaints.  No hospittalizations Sinemet-dose has been decreased     Past History :  Past Medical History:   Diagnosis Date    ADHD (attention deficit hyperactivity disorder)     Anemia     severe    Asthma     Bipolar disorder (HCC)     Bradycardia, unspecified     Chronic anemia     Chronic atrial fibrillation (HCC)     Chronic back pain     Chronic kidney disease     Patient states unaware of this.  Has never been mentioned to her.     Colitis     Constipation     COPD (chronic obstructive pulmonary disease) (HCC)     Cystitis without hematuria     Diastolic CHF (HCC)     Esophagitis     grade A    GERD (gastroesophageal reflux disease)     Hemorrhoids     History of echocardiogram 06/02/2014    EF >60%, LV wall thickness mildly increased,    Hypertension     Hypothyroidism     Metabolic syndrome     Moderate protein-calorie malnutrition (HCC)     Muscle weakness (generalized)     Obesity     Pancreatitis     TIA (transient ischemic attack)      Past Surgical History:   Procedure Laterality Date    CARDIAC CATHETERIZATION Left 05/27/2021    right radial/ [facility]/ Dr. Anil Jean Baptiste    CATARACT REMOVAL WITH IMPLANT Right 12/27/2021    Dr. Barnett    CATARACT REMOVAL WITH IMPLANT Left 02/07/2022    EYE CATARACT EMULSIFICATION IOL IMPLANT (Left Eye)    COLONOSCOPY  5/2011    ischemic colitis    COLONOSCOPY  03/07/2017    -diverticulosis,hemorrhoids    HYMENECTOMY      1972    HYSTERECTOMY  No

## 2024-02-13 NOTE — PATIENT INSTRUCTIONS
check the blood pressure twice a day at least for a week and send us the readings      SURVEY:    Thank you for allowing us to care for you today.    You may be receiving a survey from [facility] regarding your visit today- electronically or via mail.      Please help us by completing the survey as this will provide the needed feedback to ensure we are providing the very best care for you and your family.    If you cannot score us a very good on any question, please call the office to discuss how we could have made your experience a very good one.    Thank you.       STAFF:    Rosanne Andrea, Lacey Mcfarland, Carmencita Ozuna      CLINICAL STAFF:    Kelsy Zuleta LPN, Barb Hess LPN, Libertad Rasmussen LPN, Elba Rodriguez CMA

## 2024-02-17 PROBLEM — I50.33 ACUTE ON CHRONIC DIASTOLIC CONGESTIVE HEART FAILURE (HCC): Status: ACTIVE | Noted: 2024-02-17

## 2024-02-19 ENCOUNTER — HOSPITAL ENCOUNTER (OUTPATIENT)
Age: 74
Setting detail: SPECIMEN
Discharge: HOME | End: 2024-02-19
Payer: MEDICARE

## 2024-02-19 LAB
ERYTHROCYTE [DISTWIDTH] IN BLOOD BY AUTOMATED COUNT: 13.1 % (ref 11.8–14.4)
HCT VFR BLD AUTO: 41.4 % (ref 36.3–47.1)
HGB BLD-MCNC: 13.5 G/DL (ref 11.9–15.1)
MCH RBC QN AUTO: 32.1 PG (ref 25.2–33.5)
MCHC RBC AUTO-ENTMCNC: 32.6 G/DL (ref 28.4–34.8)
MCV RBC AUTO: 98.6 FL (ref 82.6–102.9)
NRBC BLD-RTO: 0 PER 100 WBC
PLATELET # BLD AUTO: 263 K/UL (ref 138–453)
PMV BLD AUTO: 9.7 FL (ref 8.1–13.5)
RBC # BLD AUTO: 4.2 M/UL (ref 3.95–5.11)
WBC OTHER # BLD: 6.7 K/UL (ref 3.5–11.3)

## 2024-02-19 PROCEDURE — P9604 ONE-WAY ALLOW PRORATED TRIP: HCPCS

## 2024-02-19 PROCEDURE — 36415 COLL VENOUS BLD VENIPUNCTURE: CPT

## 2024-02-19 PROCEDURE — 85027 COMPLETE CBC AUTOMATED: CPT

## 2024-02-27 ENCOUNTER — HOSPITAL ENCOUNTER (OUTPATIENT)
Age: 74
Setting detail: SPECIMEN
Discharge: HOME | End: 2024-02-27
Payer: MEDICARE

## 2024-02-27 LAB
TSH SERPL DL<=0.05 MIU/L-ACNC: 3.51 UIU/ML (ref 0.3–5)
VIT B12 SERPL-MCNC: 946 PG/ML (ref 232–1245)

## 2024-02-27 PROCEDURE — 82607 VITAMIN B-12: CPT

## 2024-02-27 PROCEDURE — 84443 ASSAY THYROID STIM HORMONE: CPT

## 2024-02-27 PROCEDURE — 36415 COLL VENOUS BLD VENIPUNCTURE: CPT

## 2024-02-27 PROCEDURE — P9603 ONE-WAY ALLOW PRORATED MILES: HCPCS

## 2024-03-06 ENCOUNTER — OFFICE VISIT (OUTPATIENT)
Dept: CARDIOLOGY | Age: 74
End: 2024-03-06

## 2024-03-06 VITALS
HEIGHT: 66 IN | OXYGEN SATURATION: 98 % | SYSTOLIC BLOOD PRESSURE: 99 MMHG | WEIGHT: 201 LBS | DIASTOLIC BLOOD PRESSURE: 60 MMHG | BODY MASS INDEX: 32.3 KG/M2 | RESPIRATION RATE: 18 BRPM | HEART RATE: 60 BPM

## 2024-03-06 DIAGNOSIS — I07.1 MODERATE TRICUSPID REGURGITATION: ICD-10-CM

## 2024-03-06 DIAGNOSIS — I10 PRIMARY HYPERTENSION: ICD-10-CM

## 2024-03-06 DIAGNOSIS — Z79.01 CHRONIC ANTICOAGULATION: ICD-10-CM

## 2024-03-06 DIAGNOSIS — Z87.898 HISTORY OF SYNCOPE: Primary | ICD-10-CM

## 2024-03-06 DIAGNOSIS — I50.32 CHRONIC DIASTOLIC CONGESTIVE HEART FAILURE (HCC): ICD-10-CM

## 2024-03-06 DIAGNOSIS — I27.20 MODERATE PULMONARY HYPERTENSION (HCC): ICD-10-CM

## 2024-03-06 DIAGNOSIS — Z95.820 S/P ANGIOPLASTY WITH STENT: ICD-10-CM

## 2024-03-06 DIAGNOSIS — I48.0 PAF (PAROXYSMAL ATRIAL FIBRILLATION) (HCC): ICD-10-CM

## 2024-03-06 DIAGNOSIS — E78.2 MIXED HYPERLIPIDEMIA: ICD-10-CM

## 2024-03-06 DIAGNOSIS — I25.10 ASHD (ARTERIOSCLEROTIC HEART DISEASE): ICD-10-CM

## 2024-03-06 RX ORDER — AMLODIPINE BESYLATE 5 MG/1
5 TABLET ORAL DAILY
Qty: 90 TABLET | Refills: 3 | Status: SHIPPED | OUTPATIENT
Start: 2024-03-06

## 2024-03-06 NOTE — PROGRESS NOTES
History of syncope    2. Chronic diastolic congestive heart failure (HCC)    3. PAF (paroxysmal atrial fibrillation) ([facility])    4. Chronic anticoagulation    5. ASHD (arteriosclerotic heart disease)    6. S/P angioplasty with stent    7. Moderate tricuspid regurgitation    8. Moderate pulmonary hypertension ([facility])    9. Primary hypertension    10. Mixed hyperlipidemia      PLAN:        Syncope/near syncope of unknown etiology: no further episodes   Denies any further syncopal episodes since last being seen.   Currently residing in a nursing and is using the wheelchair all the time.  She has Parkinson disease with significant tremors.  No loss of consciousness.  No falls or near falls recently.  Nonpharmacologic counseling: Because of her condition, I reminded her to try and keep herself well-hydrated and to take extra time when moving from laying to sitting, sitting to standing and standing to walking. I also explained to her to help improve her symptoms she should include 3 g sodium diet, 1 or 2 L of sports drinks daily, knee-high compressions stockings.  Diuretics: Continue furosemide (Lasix) 20 mg every other morning.  No fluid restriction needed.    Chronic diastolic heart failure: New York Heart Association Class: IIb (Marked symptoms during daily activities)/ Right Sided Heart Failure. We will increased her fluid restriction to 1800 at this time. She has been losing weight and is stable.   Beta Blocker: Continue Metoprolol succinate (Toprol XL) 25 mg daily.   ACE Inibitor/ARB: Continue losartan (Cozaar) 50 mg daily.  Diuretics: Continue furosemide (Lasix) 20 mg as directed.  Heart failure counseling: I told them to continue wearing lower extremity compression stockings and I advised them to try and keep their legs up whenever possible and to limit salt in their diet.  Continue Farxiga 10 mg daily. I did advise her that the most common side effect of this medication is UTI's and to be aware of any sighs or

## 2024-03-20 ENCOUNTER — HOSPITAL ENCOUNTER (OUTPATIENT)
Age: 74
Setting detail: SPECIMEN
Discharge: HOME | End: 2024-03-20
Payer: MEDICARE

## 2024-03-20 LAB — VALPROATE SERPL-MCNC: 38 UG/ML (ref 50–125)

## 2024-03-20 PROCEDURE — P9603 ONE-WAY ALLOW PRORATED MILES: HCPCS

## 2024-03-20 PROCEDURE — 80164 ASSAY DIPROPYLACETIC ACD TOT: CPT

## 2024-03-20 PROCEDURE — 36415 COLL VENOUS BLD VENIPUNCTURE: CPT

## 2024-03-21 ENCOUNTER — HOSPITAL ENCOUNTER (OUTPATIENT)
Age: 74
Setting detail: SPECIMEN
Discharge: HOME | End: 2024-03-21

## 2024-05-29 ENCOUNTER — HOSPITAL ENCOUNTER (OUTPATIENT)
Age: 74
Setting detail: SPECIMEN
Discharge: HOME | End: 2024-05-29
Payer: MEDICARE

## 2024-05-29 LAB
25(OH)D3 SERPL-MCNC: 46.6 NG/ML (ref 30–100)
ALBUMIN SERPL-MCNC: 3.8 G/DL (ref 3.5–5.2)
ALBUMIN/GLOB SERPL: 1.4 {RATIO} (ref 1–2.5)
ALP SERPL-CCNC: 90 U/L (ref 35–104)
ALT SERPL-CCNC: <5 U/L (ref 5–33)
ANION GAP SERPL CALCULATED.3IONS-SCNC: 9 MMOL/L (ref 9–17)
AST SERPL-CCNC: 14 U/L
BILIRUB SERPL-MCNC: 0.6 MG/DL (ref 0.3–1.2)
BUN SERPL-MCNC: 24 MG/DL (ref 8–23)
BUN/CREAT SERPL: 22 (ref 9–20)
CALCIUM SERPL-MCNC: 9.4 MG/DL (ref 8.6–10.4)
CHLORIDE SERPL-SCNC: 102 MMOL/L (ref 98–107)
CHOLEST SERPL-MCNC: 127 MG/DL (ref 0–199)
CHOLESTEROL/HDL RATIO: 2
CO2 SERPL-SCNC: 26 MMOL/L (ref 20–31)
CREAT SERPL-MCNC: 1.1 MG/DL (ref 0.5–0.9)
ERYTHROCYTE [DISTWIDTH] IN BLOOD BY AUTOMATED COUNT: 13 % (ref 11.8–14.4)
EST. AVERAGE GLUCOSE BLD GHB EST-MCNC: 100 MG/DL
GFR, ESTIMATED: 52 ML/MIN/1.73M2
GLUCOSE SERPL-MCNC: 92 MG/DL (ref 70–99)
HBA1C MFR BLD: 5.1 % (ref 4–6)
HCT VFR BLD AUTO: 44.7 % (ref 36.3–47.1)
HDLC SERPL-MCNC: 61 MG/DL
HGB BLD-MCNC: 14.5 G/DL (ref 11.9–15.1)
LDLC SERPL CALC-MCNC: 53 MG/DL (ref 0–100)
MCH RBC QN AUTO: 31.7 PG (ref 25.2–33.5)
MCHC RBC AUTO-ENTMCNC: 32.4 G/DL (ref 28.4–34.8)
MCV RBC AUTO: 97.6 FL (ref 82.6–102.9)
NRBC BLD-RTO: 0 PER 100 WBC
PLATELET # BLD AUTO: 275 K/UL (ref 138–453)
PMV BLD AUTO: 9.8 FL (ref 8.1–13.5)
POTASSIUM SERPL-SCNC: 4.3 MMOL/L (ref 3.7–5.3)
PROT SERPL-MCNC: 6.5 G/DL (ref 6.4–8.3)
RBC # BLD AUTO: 4.58 M/UL (ref 3.95–5.11)
SODIUM SERPL-SCNC: 137 MMOL/L (ref 135–144)
TRIGL SERPL-MCNC: 64 MG/DL
TSH SERPL DL<=0.05 MIU/L-ACNC: 2.27 UIU/ML (ref 0.3–5)
VLDLC SERPL CALC-MCNC: 13 MG/DL
WBC OTHER # BLD: 6.4 K/UL (ref 3.5–11.3)

## 2024-05-29 PROCEDURE — 85027 COMPLETE CBC AUTOMATED: CPT

## 2024-05-29 PROCEDURE — 80061 LIPID PANEL: CPT

## 2024-05-29 PROCEDURE — 83036 HEMOGLOBIN GLYCOSYLATED A1C: CPT

## 2024-05-29 PROCEDURE — 36415 COLL VENOUS BLD VENIPUNCTURE: CPT

## 2024-05-29 PROCEDURE — 84443 ASSAY THYROID STIM HORMONE: CPT

## 2024-05-29 PROCEDURE — 80053 COMPREHEN METABOLIC PANEL: CPT

## 2024-05-29 PROCEDURE — P9604 ONE-WAY ALLOW PRORATED TRIP: HCPCS

## 2024-05-29 PROCEDURE — 82306 VITAMIN D 25 HYDROXY: CPT

## 2024-08-09 NOTE — DISCHARGE INSTR - COC
Continuity of Care Form    Patient Name: Laurel John   :  1950  MRN:  [id number]    Admit date:  8/15/2023  Discharge date:  2023    Code Status Order: [id number]   Advance Directives:     Admitting Physician:  Michele Driscoll MD  PCP: Kellen Leung MD    Discharging Nurse: [address] Unit/Room#: 6816/4270-33  Discharging Unit Phone Number: [phone]    Emergency Contact:   Extended Emergency Contact Information  Primary Emergency Contact: [facility] Phone: [phone]  Relation: Child  Secondary Emergency Contact: [facility] Phone: [phone]  Relation: Brother/Sister    Past Surgical History:  Past Surgical History:   Procedure Laterality Date    CARDIAC CATHETERIZATION Left 2021    right radial/ [facility]/ Dr. Henry Guadarrama Right 2021    Dr. Delmi Rasheed Left 2022    EYE CATARACT EMULSIFICATION IOL IMPLANT (Left Eye)    COLONOSCOPY  2011    ischemic colitis    COLONOSCOPY  2017    -diverticulosis,hemorrhoids    [address] (CERVIX STATUS UNKNOWN)          INTRACAPSULAR CATARACT EXTRACTION Right 2021    EYE CATARACT EMULSIFICATION IOL IMPLANT performed by Lashaun Mcfarland DO at [address] Left 2022    EYE CATARACT EMULSIFICATION IOL IMPLANT performed by Lashaun Mcfarland DO at [facility] Right         TONSILLECTOMY AND ADENOIDECTOMY      UPPER GASTROINTESTINAL ENDOSCOPY  3/2013    5 cm axial hiatal hernia    UPPER GASTROINTESTINAL ENDOSCOPY  2017    Dr. Levi Lees       Immunization History:   Immunization History   Administered Date(s) Administered    COVID-19, MODERNA Bivalent, (age 12y+), IM, 48 mcg/0.5 mL 10/18/2022    COVID-19, PFIZER PURPLE top, DILUTE for use, (age 15 y+), 30mcg/0.3mL 2021, 2021, 10/07/2021    Influenza Virus Vaccine patient):  Glasses, Dentures upper and lower    RN SIGNATURE:  Electronically signed by Katherine Pat RN on 8/17/23 at 11:08 AM EDT    CASE MANAGEMENT/SOCIAL WORK SECTION    Inpatient Status Date: OOS    Readmission Risk Assessment Score:  Readmission Risk              Risk of Unplanned Readmission:  0           Discharging to Facility/ Agency   Name: [facility] [address] [place]  Phone:[phone]  Fax:[phone]    Dialysis Facility (if applicable)   Name:  Address:  Dialysis Schedule:  Phone:  Fax:    / signature: Electronically signed by ANG Campbell on 8/16/23 at 7:01 AM EDT    PHYSICIAN SECTION    Prognosis: Fair    Condition at Discharge: Stable    Rehab Potential (if transferring to Rehab): Fair    Recommended Labs or Other Treatments After Discharge: BMP and CBC with differential 1/15    Physician Certification: I certify the above information and transfer of Erlinda Randle  is necessary for the continuing treatment of the diagnosis listed and that she requires Intermediate Nursing Care for greater 30 days.      Update Admission H&P: No change in H&P    PHYSICIAN SIGNATURE:  Electronically signed by SONJA López CNP on 8/17/23 at 11:03 AM EDT No

## 2024-09-04 ENCOUNTER — HOSPITAL ENCOUNTER (OUTPATIENT)
Dept: MRI IMAGING | Age: 74
Discharge: HOME | End: 2024-09-06
Payer: MEDICARE

## 2024-09-04 ENCOUNTER — HOSPITAL ENCOUNTER (OUTPATIENT)
Age: 74
Discharge: HOME | End: 2024-09-04
Payer: MEDICARE

## 2024-09-04 DIAGNOSIS — G25.9 MOVEMENT DISORDER: ICD-10-CM

## 2024-09-04 DIAGNOSIS — G20.B2 PARKINSON'S DISEASE WITH DYSKINESIA AND FLUCTUATING MANIFESTATIONS (HCC): ICD-10-CM

## 2024-09-04 DIAGNOSIS — F98.8 ELIMINATION DISORDER: ICD-10-CM

## 2024-09-04 LAB
CREAT SERPL-MCNC: 1.2 MG/DL (ref 0.5–0.9)
GFR, ESTIMATED: 45 ML/MIN/1.73M2

## 2024-09-04 PROCEDURE — 82565 ASSAY OF CREATININE: CPT

## 2024-09-04 PROCEDURE — 36415 COLL VENOUS BLD VENIPUNCTURE: CPT

## 2024-09-04 PROCEDURE — A9579 GAD-BASE MR CONTRAST NOS,1ML: HCPCS

## 2024-09-04 PROCEDURE — 70553 MRI BRAIN STEM W/O & W/DYE: CPT

## 2024-09-04 PROCEDURE — 6360000004 HC RX CONTRAST MEDICATION

## 2024-09-04 RX ADMIN — GADOTERIDOL 18 ML: 279.3 INJECTION, SOLUTION INTRAVENOUS at 13:45

## 2024-11-14 ENCOUNTER — OFFICE VISIT (OUTPATIENT)
Dept: CARDIOLOGY | Age: 74
End: 2024-11-14
Payer: MEDICARE

## 2024-11-14 VITALS
SYSTOLIC BLOOD PRESSURE: 128 MMHG | BODY MASS INDEX: 36.45 KG/M2 | HEART RATE: 60 BPM | RESPIRATION RATE: 16 BRPM | OXYGEN SATURATION: 98 % | DIASTOLIC BLOOD PRESSURE: 68 MMHG | WEIGHT: 225.8 LBS

## 2024-11-14 DIAGNOSIS — Z79.01 CHRONIC ANTICOAGULATION: ICD-10-CM

## 2024-11-14 DIAGNOSIS — I27.20 MODERATE PULMONARY HYPERTENSION (HCC): ICD-10-CM

## 2024-11-14 DIAGNOSIS — R06.02 SHORTNESS OF BREATH: ICD-10-CM

## 2024-11-14 DIAGNOSIS — Z95.820 S/P ANGIOPLASTY WITH STENT: ICD-10-CM

## 2024-11-14 DIAGNOSIS — I50.32 CHRONIC DIASTOLIC CONGESTIVE HEART FAILURE (HCC): ICD-10-CM

## 2024-11-14 DIAGNOSIS — I07.1 MODERATE TRICUSPID REGURGITATION: ICD-10-CM

## 2024-11-14 DIAGNOSIS — E78.2 MIXED HYPERLIPIDEMIA: ICD-10-CM

## 2024-11-14 DIAGNOSIS — I25.10 ASHD (ARTERIOSCLEROTIC HEART DISEASE): ICD-10-CM

## 2024-11-14 DIAGNOSIS — I10 PRIMARY HYPERTENSION: ICD-10-CM

## 2024-11-14 DIAGNOSIS — R01.1 HEART MURMUR: ICD-10-CM

## 2024-11-14 DIAGNOSIS — I48.0 PAF (PAROXYSMAL ATRIAL FIBRILLATION) (HCC): ICD-10-CM

## 2024-11-14 DIAGNOSIS — Z87.898 HISTORY OF SYNCOPE: ICD-10-CM

## 2024-11-14 PROCEDURE — 93005 ELECTROCARDIOGRAM TRACING: CPT | Performed by: INTERNAL MEDICINE

## 2024-11-14 PROCEDURE — 93005 ELECTROCARDIOGRAM TRACING: CPT | Performed by: PHYSICIAN ASSISTANT

## 2024-11-14 RX ORDER — DOXEPIN HYDROCHLORIDE 10 MG/1
10 CAPSULE ORAL NIGHTLY
COMMUNITY

## 2024-11-14 RX ORDER — PANTOPRAZOLE SODIUM 40 MG/1
40 TABLET, DELAYED RELEASE ORAL DAILY
COMMUNITY

## 2024-11-14 NOTE — PROGRESS NOTES
tablet up to max of 3 total doses. If no relief after 1 dose, call 911. 25 tablet 1    clopidogrel (PLAVIX) 75 MG tablet Take 1 tablet by mouth daily 30 tablet 3    losartan (COZAAR) 50 MG tablet Take 1 tablet by mouth daily 30 tablet 3    apixaban (ELIQUIS) 5 MG TABS tablet Take 1 tablet by mouth 2 times daily 60 tablet 0    tiZANidine (ZANAFLEX) 2 MG tablet Take 1 tablet by mouth 4 times daily      Valbenazine Tosylate 40 MG CAPS Take 1 capsule by mouth nightly      Amantadine (SYMMETREL) 100 MG TABS tablet Take 100 mg by mouth 2 times daily      acetaminophen (TYLENOL) 325 MG tablet Take 2 tablets by mouth every 4 hours as needed for Pain or Fever      sodium chloride (OCEAN) 0.65 % nasal spray 1 spray by Nasal route 3 times daily as needed for Congestion      bisacodyl (DULCOLAX) 10 MG suppository Place 1 suppository rectally daily as needed for Constipation      magnesium hydroxide (MILK OF MAGNESIA) 400 MG/5ML suspension Take 30 mLs by mouth daily as needed for Constipation      calcium carbonate 600 MG TABS tablet Take 1 tablet by mouth nightly      omeprazole (PRILOSEC) 20 MG capsule TAKE 1 CAPSULE BY MOUTH DAILY. (Patient taking differently: Take 1 capsule by mouth Daily) 30 capsule 5    loratadine (CLARITIN) 10 MG tablet Take 1 tablet by mouth daily PRN         FAMILY HISTORY: family history includes Bipolar Disorder in her sister and another family member; Diabetes in her father and mother; High Blood Pressure in her father, mother, and another family member; Schizophrenia in her sister.     Physical Examination:     /68 (Site: Left Upper Arm, Position: Sitting, Cuff Size: Medium Adult)   Pulse 60   Resp 16   Wt 102.4 kg (225 lb 12.8 oz)   SpO2 98%   BMI 36.45 kg/m²  Body mass index is 36.45 kg/m².    Constitutional: She appeared oriented to person and place. She appears well-developed and well-nourished. In no acute distress.   HEENT: Normocephalic and atraumatic. No JVD present. Carotid bruit

## 2024-12-04 ENCOUNTER — HOSPITAL ENCOUNTER (OUTPATIENT)
Age: 74
Discharge: HOME | End: 2024-12-06
Payer: MEDICARE

## 2024-12-04 ENCOUNTER — TELEPHONE (OUTPATIENT)
Dept: CARDIOLOGY | Age: 74
End: 2024-12-04

## 2024-12-04 VITALS
HEIGHT: 66 IN | WEIGHT: 223 LBS | SYSTOLIC BLOOD PRESSURE: 127 MMHG | DIASTOLIC BLOOD PRESSURE: 61 MMHG | BODY MASS INDEX: 35.84 KG/M2

## 2024-12-04 DIAGNOSIS — R01.1 HEART MURMUR: ICD-10-CM

## 2024-12-04 DIAGNOSIS — R06.02 SHORTNESS OF BREATH: ICD-10-CM

## 2024-12-04 LAB
ECHO AO SINUS VALSALVA DIAM: 2.7 CM
ECHO AO SINUS VALSALVA INDEX: 1.29 CM/M2
ECHO AR MAX VEL PISA: 3.3 M/S
ECHO AV CUSP MM: 2 CM
ECHO AV MEAN GRADIENT: 4 MMHG
ECHO AV MEAN VELOCITY: 0.9 M/S
ECHO AV PEAK GRADIENT: 8 MMHG
ECHO AV PEAK VELOCITY: 1.4 M/S
ECHO AV REGURGITANT PHT: 613 MS
ECHO AV VELOCITY RATIO: 0.86
ECHO AV VTI: 29.7 CM
ECHO BSA: 2.17 M2
ECHO EST RA PRESSURE: 3 MMHG
ECHO LA AREA 2C: 26.4 CM2
ECHO LA AREA 4C: 27.8 CM2
ECHO LA MAJOR AXIS: 6.9 CM
ECHO LA MINOR AXIS: 7.1 CM
ECHO LA VOL BP: 87 ML (ref 22–52)
ECHO LA VOL MOD A2C: 81 ML (ref 22–52)
ECHO LA VOL MOD A4C: 92 ML (ref 22–52)
ECHO LA VOL/BSA BIPLANE: 42 ML/M2 (ref 16–34)
ECHO LA VOLUME INDEX MOD A2C: 39 ML/M2 (ref 16–34)
ECHO LA VOLUME INDEX MOD A4C: 44 ML/M2 (ref 16–34)
ECHO LV E' LATERAL VELOCITY: 15.9 CM/S
ECHO LV EDV A2C: 61 ML
ECHO LV EDV A4C: 70 ML
ECHO LV EDV INDEX A4C: 33 ML/M2
ECHO LV EDV NDEX A2C: 29 ML/M2
ECHO LV EJECTION FRACTION A2C: 60 %
ECHO LV EJECTION FRACTION A4C: 58 %
ECHO LV EJECTION FRACTION BIPLANE: 60 % (ref 55–100)
ECHO LV ESV A2C: 24 ML
ECHO LV ESV A4C: 29 ML
ECHO LV ESV INDEX A2C: 11 ML/M2
ECHO LV ESV INDEX A4C: 14 ML/M2
ECHO LV FRACTIONAL SHORTENING: 35 % (ref 28–44)
ECHO LV INTERNAL DIMENSION DIASTOLE INDEX: 2.49 CM/M2
ECHO LV INTERNAL DIMENSION DIASTOLIC: 5.2 CM (ref 3.9–5.3)
ECHO LV INTERNAL DIMENSION SYSTOLIC INDEX: 1.63 CM/M2
ECHO LV INTERNAL DIMENSION SYSTOLIC: 3.4 CM
ECHO LV IVSD: 1 CM (ref 0.6–0.9)
ECHO LV MASS 2D: 194.2 G (ref 67–162)
ECHO LV MASS INDEX 2D: 92.9 G/M2 (ref 43–95)
ECHO LV POSTERIOR WALL DIASTOLIC: 1 CM (ref 0.6–0.9)
ECHO LV RELATIVE WALL THICKNESS RATIO: 0.38
ECHO LVOT AV VTI INDEX: 0.8
ECHO LVOT MEAN GRADIENT: 3 MMHG
ECHO LVOT PEAK GRADIENT: 6 MMHG
ECHO LVOT PEAK VELOCITY: 1.2 M/S
ECHO LVOT VTI: 23.7 CM
ECHO MV A VELOCITY: 0.69 M/S
ECHO MV E DECELERATION TIME (DT): 229 MS
ECHO MV E VELOCITY: 0.96 M/S
ECHO MV E/A RATIO: 1.39
ECHO MV E/E' LATERAL: 6.04
ECHO PV MAX VELOCITY: 1 M/S
ECHO PV PEAK GRADIENT: 4 MMHG
ECHO RIGHT VENTRICULAR SYSTOLIC PRESSURE (RVSP): 30 MMHG
ECHO TV REGURGITANT MAX VELOCITY: 2.58 M/S
ECHO TV REGURGITANT PEAK GRADIENT: 27 MMHG

## 2024-12-04 PROCEDURE — 93306 TTE W/DOPPLER COMPLETE: CPT

## 2024-12-04 PROCEDURE — 93306 TTE W/DOPPLER COMPLETE: CPT | Performed by: INTERNAL MEDICINE

## 2024-12-04 NOTE — TELEPHONE ENCOUNTER
----- Message from Shannon Morocho PA-C sent at 12/4/2024 12:33 PM EST -----  Please let them know their echo looks good, will discuss at follow up appointment. Thanks.

## 2025-01-21 PROBLEM — E11.69 TYPE 2 DIABETES MELLITUS WITH OTHER SPECIFIED COMPLICATION, UNSPECIFIED WHETHER LONG TERM INSULIN USE (HCC): Status: ACTIVE | Noted: 2022-12-13

## 2025-02-11 ENCOUNTER — OFFICE VISIT (OUTPATIENT)
Dept: NEPHROLOGY | Age: 75
End: 2025-02-11
Payer: MEDICARE

## 2025-02-11 VITALS
HEART RATE: 54 BPM | DIASTOLIC BLOOD PRESSURE: 59 MMHG | WEIGHT: 226 LBS | RESPIRATION RATE: 18 BRPM | BODY MASS INDEX: 36.32 KG/M2 | SYSTOLIC BLOOD PRESSURE: 126 MMHG | HEIGHT: 66 IN | TEMPERATURE: 97.3 F

## 2025-02-11 DIAGNOSIS — I10 ESSENTIAL HYPERTENSION: ICD-10-CM

## 2025-02-11 DIAGNOSIS — N28.1 RENAL CYST: ICD-10-CM

## 2025-02-11 DIAGNOSIS — N18.32 STAGE 3B CHRONIC KIDNEY DISEASE (HCC): ICD-10-CM

## 2025-02-11 DIAGNOSIS — N18.31 STAGE 3A CHRONIC KIDNEY DISEASE (HCC): Primary | ICD-10-CM

## 2025-02-11 PROCEDURE — G2211 COMPLEX E/M VISIT ADD ON: HCPCS | Performed by: INTERNAL MEDICINE

## 2025-02-11 PROCEDURE — G8427 DOCREV CUR MEDS BY ELIG CLIN: HCPCS | Performed by: INTERNAL MEDICINE

## 2025-02-11 PROCEDURE — G8417 CALC BMI ABV UP PARAM F/U: HCPCS | Performed by: INTERNAL MEDICINE

## 2025-02-11 PROCEDURE — 1123F ACP DISCUSS/DSCN MKR DOCD: CPT | Performed by: INTERNAL MEDICINE

## 2025-02-11 PROCEDURE — G8399 PT W/DXA RESULTS DOCUMENT: HCPCS | Performed by: INTERNAL MEDICINE

## 2025-02-11 PROCEDURE — 99213 OFFICE O/P EST LOW 20 MIN: CPT | Performed by: INTERNAL MEDICINE

## 2025-02-11 PROCEDURE — 1090F PRES/ABSN URINE INCON ASSESS: CPT | Performed by: INTERNAL MEDICINE

## 2025-02-11 PROCEDURE — 3078F DIAST BP <80 MM HG: CPT | Performed by: INTERNAL MEDICINE

## 2025-02-11 PROCEDURE — 1159F MED LIST DOCD IN RCRD: CPT | Performed by: INTERNAL MEDICINE

## 2025-02-11 PROCEDURE — 99214 OFFICE O/P EST MOD 30 MIN: CPT | Performed by: INTERNAL MEDICINE

## 2025-02-11 PROCEDURE — 3074F SYST BP LT 130 MM HG: CPT | Performed by: INTERNAL MEDICINE

## 2025-02-11 PROCEDURE — 1036F TOBACCO NON-USER: CPT | Performed by: INTERNAL MEDICINE

## 2025-02-11 RX ORDER — ALPRAZOLAM 0.25 MG/1
0.25 TABLET ORAL NIGHTLY PRN
COMMUNITY
Start: 2024-08-07

## 2025-02-11 RX ORDER — PSEUDOEPHEDRINE HCL 30 MG
100 TABLET ORAL 2 TIMES DAILY
COMMUNITY

## 2025-02-11 NOTE — PATIENT INSTRUCTIONS
SURVEY:    Thank you for allowing us to care for you today.    You may be receiving a survey from [facility] regarding your visit today- electronically or via mail.      Please help us by completing the survey as this will provide the needed feedback to ensure we are providing the very best care for you and your family.    If you cannot score us a very good on any question, please call the office to discuss how we could have made your experience a very good one.    Thank you.       STAFF:    Jackie Rahman, Carmencita STORM      CLINICAL STAFF:    Kelsy OJEDA, Rosanen STORM, Elba STORM, Barb OJEDA

## 2025-02-11 NOTE — PROGRESS NOTES
Diarrhea Every 4 hours      metoprolol succinate (TOPROL XL) 25 MG extended release tablet TAKE 1 TABLET BY MOUTH DAILY. 90 tablet 3    levothyroxine (SYNTHROID) 50 MCG tablet Take 1 tablet by mouth Daily      latanoprost (XALATAN) 0.005 % ophthalmic solution Place 1 drop into the right eye nightly      acetaminophen (TYLENOL) 650 MG extended release tablet Take 1 tablet by mouth 4 times daily      Sodium Phosphates (FLEET) 7-19 GM/118ML Place 1 enema rectally as needed      atorvastatin (LIPITOR) 20 MG tablet Take 1 tablet by mouth daily (Patient taking differently: Take 1 tablet by mouth at bedtime) 90 tablet 3    nitroGLYCERIN (NITROSTAT) 0.4 MG SL tablet up to max of 3 total doses. If no relief after 1 dose, call 911. 25 tablet 1    clopidogrel (PLAVIX) 75 MG tablet Take 1 tablet by mouth daily 30 tablet 3    losartan (COZAAR) 50 MG tablet Take 1 tablet by mouth daily 30 tablet 3    apixaban (ELIQUIS) 5 MG TABS tablet Take 1 tablet by mouth 2 times daily 60 tablet 0    tiZANidine (ZANAFLEX) 2 MG tablet Take 1 tablet by mouth 4 times daily      Valbenazine Tosylate 40 MG CAPS Take 1 capsule by mouth nightly      polyethylene glycol (GLYCOLAX) packet Take 1 packet by mouth daily      Amantadine (SYMMETREL) 100 MG TABS tablet Take 100 mg by mouth 2 times daily      acetaminophen (TYLENOL) 325 MG tablet Take 2 tablets by mouth every 4 hours as needed for Pain or Fever      sodium chloride (OCEAN) 0.65 % nasal spray 1 spray by Nasal route 3 times daily as needed for Congestion      bisacodyl (DULCOLAX) 10 MG suppository Place 1 suppository rectally daily as needed for Constipation      magnesium hydroxide (MILK OF MAGNESIA) 400 MG/5ML suspension Take 30 mLs by mouth daily as needed for Constipation      calcium carbonate 600 MG TABS tablet Take 1 tablet by mouth nightly      omeprazole (PRILOSEC) 20 MG capsule TAKE 1 CAPSULE BY MOUTH DAILY. (Patient taking differently: Take 1 capsule by mouth Daily) 30 capsule 5

## 2025-05-22 ENCOUNTER — OFFICE VISIT (OUTPATIENT)
Dept: CARDIOLOGY | Age: 75
End: 2025-05-22
Payer: MEDICARE

## 2025-05-22 VITALS
WEIGHT: 228 LBS | HEART RATE: 69 BPM | SYSTOLIC BLOOD PRESSURE: 129 MMHG | OXYGEN SATURATION: 98 % | DIASTOLIC BLOOD PRESSURE: 59 MMHG | RESPIRATION RATE: 18 BRPM | BODY MASS INDEX: 36.64 KG/M2 | HEIGHT: 66 IN

## 2025-05-22 DIAGNOSIS — I25.10 ASHD (ARTERIOSCLEROTIC HEART DISEASE): ICD-10-CM

## 2025-05-22 DIAGNOSIS — I07.1 MODERATE TRICUSPID REGURGITATION: ICD-10-CM

## 2025-05-22 DIAGNOSIS — Z95.820 S/P ANGIOPLASTY WITH STENT: ICD-10-CM

## 2025-05-22 DIAGNOSIS — Z79.01 CHRONIC ANTICOAGULATION: ICD-10-CM

## 2025-05-22 DIAGNOSIS — I10 PRIMARY HYPERTENSION: ICD-10-CM

## 2025-05-22 DIAGNOSIS — I50.32 CHRONIC DIASTOLIC CONGESTIVE HEART FAILURE (HCC): ICD-10-CM

## 2025-05-22 DIAGNOSIS — E78.2 MIXED HYPERLIPIDEMIA: ICD-10-CM

## 2025-05-22 DIAGNOSIS — Z87.898 HISTORY OF SYNCOPE: Primary | ICD-10-CM

## 2025-05-22 DIAGNOSIS — I48.0 PAF (PAROXYSMAL ATRIAL FIBRILLATION) (HCC): ICD-10-CM

## 2025-05-22 PROCEDURE — 1036F TOBACCO NON-USER: CPT | Performed by: INTERNAL MEDICINE

## 2025-05-22 PROCEDURE — G8399 PT W/DXA RESULTS DOCUMENT: HCPCS | Performed by: INTERNAL MEDICINE

## 2025-05-22 PROCEDURE — G8427 DOCREV CUR MEDS BY ELIG CLIN: HCPCS | Performed by: INTERNAL MEDICINE

## 2025-05-22 PROCEDURE — 3074F SYST BP LT 130 MM HG: CPT | Performed by: INTERNAL MEDICINE

## 2025-05-22 PROCEDURE — 99214 OFFICE O/P EST MOD 30 MIN: CPT | Performed by: INTERNAL MEDICINE

## 2025-05-22 PROCEDURE — 1090F PRES/ABSN URINE INCON ASSESS: CPT | Performed by: INTERNAL MEDICINE

## 2025-05-22 PROCEDURE — 1159F MED LIST DOCD IN RCRD: CPT | Performed by: INTERNAL MEDICINE

## 2025-05-22 PROCEDURE — G8417 CALC BMI ABV UP PARAM F/U: HCPCS | Performed by: INTERNAL MEDICINE

## 2025-05-22 PROCEDURE — 1123F ACP DISCUSS/DSCN MKR DOCD: CPT | Performed by: INTERNAL MEDICINE

## 2025-05-22 PROCEDURE — 3078F DIAST BP <80 MM HG: CPT | Performed by: INTERNAL MEDICINE

## 2025-05-22 NOTE — PROGRESS NOTES
125 MG DR tablet Take 3 tablets by mouth at bedtime      divalproex (DEPAKOTE) 125 MG DR tablet Take 2 tablets by mouth daily      nystatin (MYCOSTATIN) 252859 UNIT/GM powder Apply topically as needed Breast,folds      dapagliflozin (FARXIGA) 10 MG tablet Take 1 tablet by mouth every morning 90 tablet 3    loperamide (IMODIUM) 2 MG capsule Take 2 capsules by mouth 4 times daily as needed for Diarrhea Every 4 hours      metoprolol succinate (TOPROL XL) 25 MG extended release tablet TAKE 1 TABLET BY MOUTH DAILY. 90 tablet 3    levothyroxine (SYNTHROID) 50 MCG tablet Take 1 tablet by mouth Daily      latanoprost (XALATAN) 0.005 % ophthalmic solution Place 1 drop into the right eye nightly      acetaminophen (TYLENOL) 650 MG extended release tablet Take 1 tablet by mouth 4 times daily      Sodium Phosphates (FLEET) 7-19 GM/118ML Place 1 enema rectally as needed      atorvastatin (LIPITOR) 20 MG tablet Take 1 tablet by mouth daily 90 tablet 3    nitroGLYCERIN (NITROSTAT) 0.4 MG SL tablet up to max of 3 total doses. If no relief after 1 dose, call 911. 25 tablet 1    clopidogrel (PLAVIX) 75 MG tablet Take 1 tablet by mouth daily 30 tablet 3    losartan (COZAAR) 50 MG tablet Take 1 tablet by mouth daily 30 tablet 3    apixaban (ELIQUIS) 5 MG TABS tablet Take 1 tablet by mouth 2 times daily 60 tablet 0    tiZANidine (ZANAFLEX) 2 MG tablet Take 1 tablet by mouth 4 times daily      Valbenazine Tosylate 40 MG CAPS Take 1 capsule by mouth nightly      polyethylene glycol (GLYCOLAX) packet Take 1 packet by mouth daily      Amantadine (SYMMETREL) 100 MG TABS tablet Take 100 mg by mouth 2 times daily      acetaminophen (TYLENOL) 325 MG tablet Take 2 tablets by mouth every 4 hours as needed for Pain or Fever      sodium chloride (OCEAN) 0.65 % nasal spray 1 spray by Nasal route 3 times daily as needed for Congestion      bisacodyl (DULCOLAX) 10 MG suppository Place 1 suppository rectally daily as needed for Constipation

## 2025-08-12 ENCOUNTER — HOSPITAL ENCOUNTER (OUTPATIENT)
Dept: ULTRASOUND IMAGING | Age: 75
Discharge: HOME | End: 2025-08-14
Payer: MEDICARE

## 2025-08-12 DIAGNOSIS — R13.10 DYSPHAGIA, UNSPECIFIED TYPE: ICD-10-CM

## 2025-08-12 PROBLEM — K59.09 CHRONIC CONSTIPATION: Status: ACTIVE | Noted: 2025-08-12

## 2025-08-12 PROCEDURE — 76536 US EXAM OF HEAD AND NECK: CPT

## 2025-08-15 ENCOUNTER — TRANSCRIBE ORDERS (OUTPATIENT)
Dept: ADMINISTRATIVE | Age: 75
End: 2025-08-15

## 2025-08-15 DIAGNOSIS — R13.10 DYSPHAGIA, UNSPECIFIED TYPE: Primary | ICD-10-CM

## 2025-08-25 ENCOUNTER — HOSPITAL ENCOUNTER (OUTPATIENT)
Dept: GENERAL RADIOLOGY | Age: 75
Discharge: HOME | End: 2025-08-27
Attending: INTERNAL MEDICINE
Payer: MEDICARE

## 2025-08-25 DIAGNOSIS — R13.10 DYSPHAGIA, UNSPECIFIED TYPE: ICD-10-CM

## 2025-08-25 PROCEDURE — 74230 X-RAY XM SWLNG FUNCJ C+: CPT

## 2025-08-25 PROCEDURE — 92611 MOTION FLUOROSCOPY/SWALLOW: CPT

## (undated) DEVICE — SOLUTION IV IRRIG POUR BRL 0.9% SODIUM CHL 2F7124

## (undated) DEVICE — SOFT SHIELD® COLLAGEN SHIELD, 12 HOURS (CE): Brand: SOFT SHIELD® COLLAGEN SHIELDS

## (undated) DEVICE — DRESSING TRNSPAR FLM 2.4X2.8IN SURESITE 123

## (undated) DEVICE — BETADINE 5% EYE SOL

## (undated) DEVICE — BLADE 30D THK3.4MM 10.5X1.9MM ANG STAB

## (undated) DEVICE — KNIFE OPHTH DIA22MM 45DEG SLT W HNDL SHRP ANG PNT DEL DBL

## (undated) DEVICE — GLOVE SURG SZ 7 L12IN FNGR THK87MIL WHT LTX FREE

## (undated) DEVICE — Device: Brand: ALLEGRO 1X SILICONE I/A HANDPIECE (6)

## (undated) DEVICE — MARKER,SKIN,WI/RULER AND LABELS: Brand: MEDLINE

## (undated) DEVICE — GLOVE SURG SZ 65 L12IN FNGR THK87MIL WHT LTX FREE

## (undated) DEVICE — Device

## (undated) DEVICE — AMVISC PLUS  0.8ML: Brand: AMVISC PLUS

## (undated) DEVICE — AIRLIFE™ NASAL OXYGEN CANNULA CURVED, FLARED TIP, WITH 7 FEET (2.1 M) CRUSH RESISTANT TUBING, OVER-THE-EAR STYLE: Brand: AIRLIFE™

## (undated) DEVICE — SOLUTION IV IRRIG WATER 1000ML POUR BRL 2F7114